# Patient Record
Sex: FEMALE | Race: WHITE | NOT HISPANIC OR LATINO | Employment: FULL TIME | URBAN - NONMETROPOLITAN AREA
[De-identification: names, ages, dates, MRNs, and addresses within clinical notes are randomized per-mention and may not be internally consistent; named-entity substitution may affect disease eponyms.]

---
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
## 2020-09-18 NOTE — PROGRESS NOTES
Patient: Ruby Ramos    YOB: 2000    Date: 09/23/2020    Primary Care Provider: Isamar Leon APRN    Chief Complaint   Patient presents with   • Follow-up   • Suspicious Skin Lesion     on neck in hair line getting bigger and on back with pain        SUBJECTIVE:    History of present illness:  I saw the patient in the office today for the evaluation and treatment for a suspicious mole on her neck and back. Patient states has a mole on neck that is getting bigger and the one on back has started getting painful for a week now.  Lesions becoming pigmented, increasing in size and raised.  No history of melanoma in the family.  She does have history of sun exposure.    The following portions of the patient's history were reviewed and updated as appropriate: allergies, current medications, past family history, past medical history, past social history, past surgical history and problem list.      Review of Systems   Constitutional: Negative for chills, fever and unexpected weight change.   HENT: Negative for hearing loss, trouble swallowing and voice change.    Eyes: Negative for visual disturbance.   Respiratory: Negative for apnea, cough, chest tightness, shortness of breath and wheezing.    Cardiovascular: Negative for chest pain, palpitations and leg swelling.   Gastrointestinal: Negative for abdominal distention, abdominal pain, anal bleeding, blood in stool, constipation, diarrhea, nausea, rectal pain and vomiting.   Endocrine: Negative for cold intolerance and heat intolerance.   Genitourinary: Negative for difficulty urinating, dysuria and flank pain.   Musculoskeletal: Negative for back pain and gait problem.   Skin: Positive for color change. Negative for rash and wound.   Neurological: Negative for dizziness, syncope, speech difficulty, weakness, light-headedness, numbness and headaches.   Hematological: Negative for adenopathy. Does not bruise/bleed easily.   Psychiatric/Behavioral:  "Negative for confusion. The patient is not nervous/anxious.        Allergies:  Allergies   Allergen Reactions   • Gluten Meal    • Mucinex [Guaifenesin Er] Nausea Only and Dizziness       Medications:    Current Outpatient Medications:   •  ibuprofen (ADVIL,MOTRIN) 800 MG tablet, Take 800 mg by mouth Every 6 (Six) Hours As Needed for Mild Pain ., Disp: , Rfl:   •  Multiple Vitamins-Minerals (MULTIVITAMIN WITH MINERALS) tablet tablet, Take 1 tablet by mouth Daily., Disp: , Rfl:   •  Nutritional Supplements (COLD AND FLU PO), Take  by mouth 2 (Two) Times a Day., Disp: , Rfl:   •  ondansetron ODT (ZOFRAN-ODT) 8 MG disintegrating tablet, Take 1 tablet by mouth Every 8 (Eight) Hours As Needed for Nausea or Vomiting., Disp: 15 tablet, Rfl: 0  •  oseltamivir (TAMIFLU) 75 MG capsule, Take 1 capsule by mouth 2 (Two) Times a Day., Disp: 10 capsule, Rfl: 0  •  promethazine (PHENERGAN) 25 MG tablet, Take 1 tablet by mouth Every 6 (Six) Hours As Needed for Nausea., Disp: 20 tablet, Rfl: 0    History:  Past Medical History:   Diagnosis Date   • Celiac disease        Past Surgical History:   Procedure Laterality Date   • ENDOSCOPY     • HERNIA REPAIR         Family History   Problem Relation Age of Onset   • Hypertension Mother    • Hypertension Father    • Diabetes Father    • Cancer Maternal Grandmother    • Cancer Paternal Grandmother        Social History     Tobacco Use   • Smoking status: Current Every Day Smoker     Years: 2.00     Types: Electronic Cigarette   • Smokeless tobacco: Never Used   Substance Use Topics   • Alcohol use: Yes   • Drug use: Never        OBJECTIVE:    Vital Signs:   Vitals:    09/23/20 0916   BP: 110/80   Pulse: 120   SpO2: 98%   Weight: 85.3 kg (188 lb)   Height: 157 cm (61.81\")       Physical Exam:   General Appearance:    Alert, cooperative, in no acute distress   Head:    Normocephalic, without obvious abnormality, atraumatic.  1.5 cm raised pigmented lesion posterior scalp, 1 cm mass posterior " neck, pigmented and irregular.   Eyes:            Lids and lashes normal, conjunctivae and sclerae normal, no   icterus, no pallor, corneas clear, PERRLA   Ears:    Ears appear intact with no abnormalities noted   Throat:   No oral lesions, no thrush, oral mucosa moist   Neck:   No adenopathy, supple, trachea midline, no thyromegaly, no   carotid bruit, no JVD   Lungs:     Clear to auscultation,respirations regular, even and                  unlabored    Heart:    Regular rhythm and normal rate, normal S1 and S2, no            murmur, no gallop, no rub, no click   Chest Wall:    No abnormalities observed   Abdomen:     Normal bowel sounds, no masses, no organomegaly, soft        non-tender, non-distended, no guarding, no rebound                tenderness   Extremities:   Moves all extremities well, no edema, no cyanosis, no             redness   Pulses:   Pulses palpable and equal bilaterally   Skin:   No bleeding, bruising or rash   Lymph nodes:   No palpable adenopathy   Neurologic:   Cranial nerves 2 - 12 grossly intact, sensation intact, DTR       present and equal bilaterally     Results Review:   I reviewed the patient's new clinical results.    Review of Systems was reviewed and confirmed as accurate as documented by the MA.    ASSESSMENT/PLAN:    1. Skin neoplasm        Location: Posterior scalp and posterior neck    Procedure: #1.  Excision 1.5 cm lesion posterior scalp with layered closure.  2.  Excision 1 cm lesion posterior neck with primary closure      I recommend excision. Procedure and the risks and benefits were explained including bleeding and infection. The patient understands these and wishes to proceed.     The patient was brought to the procedure room. Consent and time out were performed. The area was prepped and draped in the usual fashion. 1% lidocaine with epinephrine was infused locally. An ellyptical incision was made around the lesion. Full thickness excision was performed. The lesion  size was 1.5 cm and 1 cm. The wound was closed in layers with interrupted simple vicryl and Nylon for the skin. Wound closure size was 1.8 cm and 1.2 cm. There were no complications and the patient tolerated the procedure well. Hemostasis was well controlled with pressure and there was minimal blood loss. Wound instructions were given.     I discussed the patients findings and my recommendations with patient        Electronically signed by Reid Garcia MD  09/23/20

## 2020-09-23 ENCOUNTER — OFFICE VISIT (OUTPATIENT)
Dept: SURGERY | Facility: CLINIC | Age: 20
End: 2020-09-23

## 2020-09-23 VITALS
DIASTOLIC BLOOD PRESSURE: 80 MMHG | HEIGHT: 62 IN | HEART RATE: 120 BPM | BODY MASS INDEX: 34.6 KG/M2 | OXYGEN SATURATION: 98 % | SYSTOLIC BLOOD PRESSURE: 110 MMHG | WEIGHT: 188 LBS

## 2020-09-23 DIAGNOSIS — D49.2 SKIN NEOPLASM: Primary | ICD-10-CM

## 2020-09-23 PROCEDURE — 11422 EXC H-F-NK-SP B9+MARG 1.1-2: CPT | Performed by: SURGERY

## 2020-09-23 PROCEDURE — 12041 INTMD RPR N-HF/GENIT 2.5CM/<: CPT | Performed by: SURGERY

## 2020-09-23 PROCEDURE — 99213 OFFICE O/P EST LOW 20 MIN: CPT | Performed by: SURGERY

## 2020-09-23 PROCEDURE — 11421 EXC H-F-NK-SP B9+MARG 0.6-1: CPT | Performed by: SURGERY

## 2020-12-14 ENCOUNTER — OFFICE VISIT (OUTPATIENT)
Dept: SURGERY | Facility: CLINIC | Age: 20
End: 2020-12-14

## 2020-12-14 VITALS
TEMPERATURE: 98.6 F | WEIGHT: 179 LBS | OXYGEN SATURATION: 99 % | SYSTOLIC BLOOD PRESSURE: 128 MMHG | BODY MASS INDEX: 32.94 KG/M2 | HEIGHT: 62 IN | DIASTOLIC BLOOD PRESSURE: 80 MMHG | HEART RATE: 101 BPM

## 2020-12-14 DIAGNOSIS — D49.2 SKIN NEOPLASM: Primary | ICD-10-CM

## 2020-12-14 PROCEDURE — 99212 OFFICE O/P EST SF 10 MIN: CPT | Performed by: SURGERY

## 2020-12-14 NOTE — PROGRESS NOTES
Patient: Ruby Ramos  YOB: 2000    Date: 12/14/2020    Primary Care Provider: Isamar Leon APRN    Chief Complaint   Patient presents with   • Follow-up     mole removal       History: Patient is in the office today to follow up on mole removal completed on 9/23/20.#1 of a  1.5 cm lesion posterior scalp with layered closure.  2.  Excision 1 cm lesion posterior neck with primary closure.  Patient states Lesion becoming pigmented, increasing in size and raised.  Patient states that in the last week, lesion has fallen off and now is not an issue.  It is well-healed without irritation or redness.    The following portions of the patient's history were reviewed and updated as appropriate: allergies, current medications, past family history, past medical history, past social history, past surgical history and problem list.    Review of Systems   Constitutional: Negative for chills, fever and unexpected weight change.   HENT: Negative for hearing loss, trouble swallowing and voice change.    Eyes: Negative for visual disturbance.   Respiratory: Negative for apnea, cough, chest tightness, shortness of breath and wheezing.    Cardiovascular: Negative for chest pain, palpitations and leg swelling.   Gastrointestinal: Negative for abdominal distention, abdominal pain, anal bleeding, blood in stool, constipation, diarrhea, nausea, rectal pain and vomiting.   Endocrine: Negative for cold intolerance and heat intolerance.   Genitourinary: Negative for difficulty urinating, dysuria and flank pain.   Musculoskeletal: Negative for back pain and gait problem.   Skin: Positive for color change. Negative for rash and wound.   Neurological: Negative for dizziness, syncope, speech difficulty, weakness, light-headedness, numbness and headaches.   Hematological: Negative for adenopathy. Does not bruise/bleed easily.   Psychiatric/Behavioral: Negative for confusion. The patient is not nervous/anxious.        Vital  "Signs  Vitals:    12/14/20 1536   BP: 128/80   Pulse: 101   Temp: 98.6 °F (37 °C)   SpO2: 99%   Weight: 81.2 kg (179 lb)   Height: 157.5 cm (62\")       Allergies:  Allergies   Allergen Reactions   • Gluten Meal    • Mucinex [Guaifenesin Er] Nausea Only and Dizziness       Medications:    Current Outpatient Medications:   •  ibuprofen (ADVIL,MOTRIN) 800 MG tablet, Take 800 mg by mouth Every 6 (Six) Hours As Needed for Mild Pain ., Disp: , Rfl:   •  Multiple Vitamins-Minerals (MULTIVITAMIN WITH MINERALS) tablet tablet, Take 1 tablet by mouth Daily., Disp: , Rfl:   •  Nutritional Supplements (COLD AND FLU PO), Take  by mouth 2 (Two) Times a Day., Disp: , Rfl:   •  ondansetron ODT (ZOFRAN-ODT) 8 MG disintegrating tablet, Take 1 tablet by mouth Every 8 (Eight) Hours As Needed for Nausea or Vomiting., Disp: 15 tablet, Rfl: 0  •  oseltamivir (TAMIFLU) 75 MG capsule, Take 1 capsule by mouth 2 (Two) Times a Day., Disp: 10 capsule, Rfl: 0  •  promethazine (PHENERGAN) 25 MG tablet, Take 1 tablet by mouth Every 6 (Six) Hours As Needed for Nausea., Disp: 20 tablet, Rfl: 0    Physical Exam:   General Appearance:    Alert, cooperative, in no acute distress   Head:    Normocephalic, without obvious abnormality, atraumatic   Lungs:     Clear to auscultation,respirations regular, even and                  unlabored    Heart:    Regular rhythm and normal rate, normal S1 and S2, no            murmur, no gallop, no rub, no click   Abdomen:     Normal bowel sounds, no masses, no organomegaly, soft        non-tender, non-distended, no guarding, no rebound                tenderness   Extremities:   Moves all extremities well, no edema, no cyanosis, no             redness   Pulses:   Pulses palpable and equal bilaterally   Skin:   No bleeding, bruising or rash.  1 cm area well-healed scar.  No evidence of recurrence or abnormality.     Results Review:   I reviewed the patient's new clinical results.     Review of Systems was reviewed and " confirmed as accurate as documented by the MA.    ASSESSMENT/PLAN:    1. Skin neoplasm       Patient reassured, no evidence of recurrence or malignancy.  Follow-up as needed    Electronically signed by Reid Garcia MD  12/14/20

## 2021-01-06 ENCOUNTER — HOSPITAL ENCOUNTER (EMERGENCY)
Facility: HOSPITAL | Age: 21
Discharge: HOME | End: 2021-01-06
Attending: STUDENT IN AN ORGANIZED HEALTH CARE EDUCATION/TRAINING PROGRAM | Admitting: STUDENT IN AN ORGANIZED HEALTH CARE EDUCATION/TRAINING PROGRAM

## 2021-01-06 VITALS
OXYGEN SATURATION: 100 % | HEIGHT: 62 IN | SYSTOLIC BLOOD PRESSURE: 133 MMHG | DIASTOLIC BLOOD PRESSURE: 94 MMHG | TEMPERATURE: 97.9 F | BODY MASS INDEX: 31.91 KG/M2 | RESPIRATION RATE: 17 BRPM | WEIGHT: 173.4 LBS | HEART RATE: 118 BPM

## 2021-01-06 DIAGNOSIS — L02.215 ABSCESS, PERINEUM: Primary | ICD-10-CM

## 2021-01-06 PROCEDURE — 99282 EMERGENCY DEPT VISIT SF MDM: CPT

## 2021-01-06 RX ORDER — SULFAMETHOXAZOLE AND TRIMETHOPRIM 800; 160 MG/1; MG/1
1 TABLET ORAL 2 TIMES DAILY
Qty: 14 TABLET | Refills: 0 | Status: SHIPPED | OUTPATIENT
Start: 2021-01-06 | End: 2021-01-14

## 2021-01-06 RX ORDER — LIDOCAINE HYDROCHLORIDE 10 MG/ML
10 INJECTION, SOLUTION INFILTRATION; PERINEURAL ONCE
Status: DISCONTINUED | OUTPATIENT
Start: 2021-01-06 | End: 2021-01-06 | Stop reason: HOSPADM

## 2021-01-06 NOTE — ED PROVIDER NOTES
Subjective   20-year-old female presents with a red swollen area in her right groin she states its been there for 2 to 3 days, it is painful, red, swollen, and tender to touch.  She is concerned that it may be an abscess.      History provided by:  Patient   used: No        Review of Systems   Skin:        Abscess in groin area   All other systems reviewed and are negative.      Past Medical History:   Diagnosis Date   • Celiac disease        Allergies   Allergen Reactions   • Gluten Meal    • Mucinex [Guaifenesin Er] Nausea Only and Dizziness       Past Surgical History:   Procedure Laterality Date   • ENDOSCOPY     • HERNIA REPAIR         Family History   Problem Relation Age of Onset   • Hypertension Mother    • Hypertension Father    • Diabetes Father    • Cancer Maternal Grandmother    • Cancer Paternal Grandmother        Social History     Socioeconomic History   • Marital status: Single     Spouse name: Not on file   • Number of children: Not on file   • Years of education: Not on file   • Highest education level: Not on file   Tobacco Use   • Smoking status: Current Every Day Smoker     Years: 2.00     Types: Electronic Cigarette   • Smokeless tobacco: Never Used   Substance and Sexual Activity   • Alcohol use: Yes   • Drug use: Never   • Sexual activity: Defer           Objective   Physical Exam  Vitals signs and nursing note reviewed.   Constitutional:       Appearance: She is well-developed.   HENT:      Head: Normocephalic.   Neck:      Musculoskeletal: Normal range of motion and neck supple.   Cardiovascular:      Rate and Rhythm: Normal rate and regular rhythm.   Pulmonary:      Effort: Pulmonary effort is normal.      Breath sounds: Normal breath sounds.   Abdominal:      Palpations: Abdomen is soft.   Genitourinary:         Comments: 3 cm abscess, red, swollen, pointing, fluctuant  Musculoskeletal: Normal range of motion.   Skin:     General: Skin is warm and dry.      Findings:  Erythema present.   Neurological:      Mental Status: She is alert and oriented to person, place, and time.      Deep Tendon Reflexes: Reflexes are normal and symmetric.         Incision & Drainage    Date/Time: 1/6/2021 4:43 PM  Performed by: Ace Ford Jr., PA-C  Authorized by: Paul Jasso MD     Consent:     Consent obtained:  Verbal    Consent given by:  Patient    Risks discussed:  Bleeding and incomplete drainage  Location:     Type:  Abscess    Size:  3    Location:  Anogenital    Anogenital location:  Perineum  Pre-procedure details:     Skin preparation:  Chloraprep  Anesthesia (see MAR for exact dosages):     Anesthesia method:  None  Procedure type:     Complexity:  Simple  Procedure details:     Needle aspiration: no      Incision types:  Stab incision    Incision depth:  Dermal    Scalpel blade:  11    Wound management:  Extensive cleaning    Drainage:  Purulent    Drainage amount:  Moderate    Wound treatment:  Wound left open    Packing materials:  None  Post-procedure details:     Patient tolerance of procedure:  Tolerated well, no immediate complications               ED Course                                           MDM  Number of Diagnoses or Management Options  Abscess, perineum: new and requires workup  Risk of Complications, Morbidity, and/or Mortality  Presenting problems: minimal  Management options: minimal    Patient Progress  Patient progress: stable      Final diagnoses:   Abscess, perineum            Ace Ford Jr., PA-C  01/06/21 5165

## 2021-01-14 ENCOUNTER — OFFICE VISIT (OUTPATIENT)
Dept: INTERNAL MEDICINE | Facility: CLINIC | Age: 21
End: 2021-01-14

## 2021-01-14 VITALS
TEMPERATURE: 98.2 F | RESPIRATION RATE: 15 BRPM | HEIGHT: 62 IN | SYSTOLIC BLOOD PRESSURE: 128 MMHG | BODY MASS INDEX: 31.47 KG/M2 | DIASTOLIC BLOOD PRESSURE: 80 MMHG | WEIGHT: 171 LBS | HEART RATE: 104 BPM | OXYGEN SATURATION: 100 %

## 2021-01-14 DIAGNOSIS — L02.92 BOIL: Primary | ICD-10-CM

## 2021-01-14 DIAGNOSIS — K90.0 CELIAC DISEASE: ICD-10-CM

## 2021-01-14 DIAGNOSIS — K21.9 GASTROESOPHAGEAL REFLUX DISEASE, UNSPECIFIED WHETHER ESOPHAGITIS PRESENT: ICD-10-CM

## 2021-01-14 PROCEDURE — 99214 OFFICE O/P EST MOD 30 MIN: CPT | Performed by: NURSE PRACTITIONER

## 2021-01-14 RX ORDER — FAMOTIDINE 20 MG/1
20 TABLET, FILM COATED ORAL 2 TIMES DAILY PRN
Qty: 60 TABLET | Refills: 1 | Status: ON HOLD | OUTPATIENT
Start: 2021-01-14 | End: 2021-01-21

## 2021-01-14 NOTE — PROGRESS NOTES
Chief Complaint / Reason:      Chief Complaint   Patient presents with   • Hospital Follow Up Visit     abscess near vagina. is it still infected.quit taking abxs -nausea       Subjective     HPI  Patient presents today to establish care and for ER follow-up regarding abscess.  Patient was seen at [facility] emergency department on 1/6 with Complaint of red swollen area in right groin that has been there for 2 to 3 days and was painful swollen and tender to touch.  She was concerned that it could be an abscess and she was not having fever or chills and stated that she did try over-the-counter and nonpharmacological interventions with minimal relief.  She denies sharing razors the area was lanced while she was in the ER and cleaned well along with being prescribed Bactrim.  Patient's wound was left open and she stated that it did go down some but is still and tender and is uncomfortable when sitting and that her pain any rub the area making the symptoms worse.  Patient did not finish the antibiotics as she states they were making her nauseous and reports celiac disease.  Patient requests referral to gastroenterology.  She denies any blood in urine or stool.    Patient is not up-to-date on vision or dental.  She denies any history of abuse or STDs.  Age of menarche 9 and she became sexually active when she was 15 [employer].  She does report history of anemia and acid reflux in which she states is somewhat acting up but could be due to the antibiotics.  History taken from: patient    PMH/FH/Social History were reviewed and updated appropriately in the electronic medical record.   Past Medical History:   Diagnosis Date   • Celiac disease    • Diabetes mellitus (CMS/HCC)      Past Surgical History:   Procedure Laterality Date   • ENDOSCOPY     • HERNIA REPAIR       Social History     Socioeconomic History   • Marital status: Single     Spouse name: Not on file   • Number of children: Not on file   • Years of  education: Not on file   • Highest education level: Not on file   Tobacco Use   • Smoking status: Former Smoker     Years: 2.00     Types: Electronic Cigarette   • Smokeless tobacco: Never Used   Substance and Sexual Activity   • Alcohol use: Not Currently   • Drug use: Never   • Sexual activity: Defer     Family History   Problem Relation Age of Onset   • Hypertension Mother    • Hypertension Father    • Diabetes Father    • Cancer Maternal Grandmother    • Cancer Paternal Grandmother        Review of Systems:   Review of Systems      All other systems were reviewed and are negative.  Exceptions are noted in the subjective or above.      Objective     Vital Signs  Vitals:    01/14/21 1716   BP: 128/80   Pulse: 104   Resp: 15   Temp: 98.2 °F (36.8 °C)   SpO2: 100%       Body mass index is 31.28 kg/m².    Physical Exam  Vitals signs and nursing note reviewed. Exam conducted with a chaperone present.   Constitutional:       Appearance: She is well-developed.   Cardiovascular:      Rate and Rhythm: Normal rate and regular rhythm.      Heart sounds: Normal heart sounds.   Pulmonary:      Effort: Pulmonary effort is normal.      Breath sounds: Normal breath sounds.   Abdominal:      General: Bowel sounds are normal.      Palpations: Abdomen is soft.   Genitourinary:     Labia:         Right: Tenderness and lesion present.           Comments: Small circular tender area noted in right vaginal area. No drainage noted   Musculoskeletal: Normal range of motion.   Skin:     General: Skin is warm and dry.      Capillary Refill: Capillary refill takes less than 2 seconds.   Neurological:      Mental Status: She is alert and oriented to person, place, and time.      Coordination: Coordination normal.   Psychiatric:         Behavior: Behavior normal.         Thought Content: Thought content normal.         Judgment: Judgment normal.              Results Review:    I reviewed the patient's new clinical results.       Medication  Review:     Current Outpatient Medications:   •  ibuprofen (ADVIL,MOTRIN) 800 MG tablet, Take 800 mg by mouth Every 6 (Six) Hours As Needed for Mild Pain ., Disp: , Rfl:   •  Multiple Vitamins-Minerals (MULTIVITAMIN WITH MINERALS) tablet tablet, Take 1 tablet by mouth Daily., Disp: , Rfl:     Diagnoses and all orders for this visit:    Boil  -      mupirocin (Bactroban) 2 % ointment; Apply  topically to the appropriate area as directed 2 (Two) Times a Day.  Discussed home care instructions along with differential diagnosis.  Recommend patient keep area clean and dry and avoid wearing tight fitting clothing.  Recommend limiting caffeine and carbohydrate intake.  Discussed worsening signs and symptoms with patient.  May need I&D if symptoms do not improve.  Celiac disease  -     Ambulatory Referral to Gastroenterology    Gastroesophageal reflux disease, unspecified whether esophagitis present  -    famotidine (Pepcid) 20 MG tablet; Take 1 tablet by mouth 2 (Two) Times a Day As Needed for Indigestion or Heartburn.  Avoid eating spicy foods  Avoid caffeinated beverages  Avoid carbonated beverages  Do not eat or drink within 2-3 hours of going to bed in the evening  Elevate head of bed on 4-6 inch blocks  Eat smaller portions of food throughout the day              Return in about 2 weeks (around 1/28/2021), or if symptoms worsen or fail to improve, for Annual.    Isamar Leon, APRN  01/14/2021             Repair Anesthesia Method: local infiltration

## 2021-01-21 ENCOUNTER — OFFICE VISIT (OUTPATIENT)
Dept: INTERNAL MEDICINE | Facility: CLINIC | Age: 21
End: 2021-01-21

## 2021-01-21 ENCOUNTER — HOSPITAL ENCOUNTER (INPATIENT)
Facility: HOSPITAL | Age: 21
LOS: 3 days | Discharge: HOME | End: 2021-01-24
Attending: EMERGENCY MEDICINE | Admitting: FAMILY MEDICINE

## 2021-01-21 ENCOUNTER — APPOINTMENT (OUTPATIENT)
Dept: GENERAL RADIOLOGY | Facility: HOSPITAL | Age: 21
End: 2021-01-21

## 2021-01-21 VITALS
HEIGHT: 62 IN | WEIGHT: 163.8 LBS | BODY MASS INDEX: 30.14 KG/M2 | DIASTOLIC BLOOD PRESSURE: 82 MMHG | HEART RATE: 134 BPM | SYSTOLIC BLOOD PRESSURE: 130 MMHG | OXYGEN SATURATION: 100 % | TEMPERATURE: 98.8 F

## 2021-01-21 DIAGNOSIS — R73.9 HYPERGLYCEMIA: Primary | ICD-10-CM

## 2021-01-21 DIAGNOSIS — E10.10 DIABETIC KETOACIDOSIS WITHOUT COMA ASSOCIATED WITH TYPE 1 DIABETES MELLITUS (HCC): Primary | ICD-10-CM

## 2021-01-21 DIAGNOSIS — R82.4 KETONURIA: ICD-10-CM

## 2021-01-21 LAB
ALBUMIN SERPL-MCNC: 4.6 G/DL (ref 3.5–5.2)
ALBUMIN/GLOB SERPL: 1.4 G/DL
ALP SERPL-CCNC: 131 U/L (ref 39–117)
ALT SERPL W P-5'-P-CCNC: 18 U/L (ref 1–33)
ANION GAP SERPL CALCULATED.3IONS-SCNC: 23 MMOL/L (ref 5–15)
ANION GAP SERPL CALCULATED.3IONS-SCNC: 23.4 MMOL/L (ref 5–15)
AST SERPL-CCNC: 14 U/L (ref 1–32)
ATMOSPHERIC PRESS: 731 MMHG
B-HCG UR QL: NEGATIVE
BACTERIA UR QL AUTO: ABNORMAL /HPF
BASE EXCESS BLDV CALC-SCNC: -22.1 MMOL/L (ref 0–2)
BASOPHILS # BLD AUTO: 0.11 10*3/MM3 (ref 0–0.2)
BASOPHILS NFR BLD AUTO: 0.9 % (ref 0–1.5)
BDY SITE: ABNORMAL
BILIRUB BLD-MCNC: NEGATIVE MG/DL
BILIRUB SERPL-MCNC: 0.5 MG/DL (ref 0–1.2)
BILIRUB UR QL STRIP: NEGATIVE
BUN SERPL-MCNC: 7 MG/DL (ref 6–20)
BUN SERPL-MCNC: 8 MG/DL (ref 6–20)
BUN/CREAT SERPL: 11.3 (ref 7–25)
BUN/CREAT SERPL: 11.7 (ref 7–25)
CALCIUM SPEC-SCNC: 7.8 MG/DL (ref 8.6–10.5)
CALCIUM SPEC-SCNC: 8.4 MG/DL (ref 8.6–10.5)
CHLORIDE SERPL-SCNC: 104 MMOL/L (ref 98–107)
CHLORIDE SERPL-SCNC: 98 MMOL/L (ref 98–107)
CLARITY UR: CLEAR
CLARITY, POC: CLEAR
CO2 SERPL-SCNC: 6.6 MMOL/L (ref 22–29)
CO2 SERPL-SCNC: 7 MMOL/L (ref 22–29)
COLOR UR: YELLOW
COLOR UR: YELLOW
CREAT SERPL-MCNC: 0.6 MG/DL (ref 0.57–1)
CREAT SERPL-MCNC: 0.71 MG/DL (ref 0.57–1)
DEPRECATED RDW RBC AUTO: 45.4 FL (ref 37–54)
EOSINOPHIL # BLD AUTO: 0.09 10*3/MM3 (ref 0–0.4)
EOSINOPHIL NFR BLD AUTO: 0.7 % (ref 0.3–6.2)
ERYTHROCYTE [DISTWIDTH] IN BLOOD BY AUTOMATED COUNT: 14.8 % (ref 12.3–15.4)
GFR SERPL CREATININE-BSD FRML MDRD: 105 ML/MIN/1.73
GFR SERPL CREATININE-BSD FRML MDRD: 127 ML/MIN/1.73
GLOBULIN UR ELPH-MCNC: 3.2 GM/DL
GLUCOSE BLDC GLUCOMTR-MCNC: 163 MG/DL (ref 70–130)
GLUCOSE BLDC GLUCOMTR-MCNC: 182 MG/DL (ref 70–130)
GLUCOSE BLDC GLUCOMTR-MCNC: 192 MG/DL (ref 70–130)
GLUCOSE BLDC GLUCOMTR-MCNC: 196 MG/DL (ref 70–130)
GLUCOSE BLDC GLUCOMTR-MCNC: 218 MG/DL (ref 70–130)
GLUCOSE BLDC GLUCOMTR-MCNC: 258 MG/DL (ref 70–130)
GLUCOSE SERPL-MCNC: 222 MG/DL (ref 65–99)
GLUCOSE SERPL-MCNC: 261 MG/DL (ref 65–99)
GLUCOSE UR STRIP-MCNC: ABNORMAL MG/DL
GLUCOSE UR STRIP-MCNC: ABNORMAL MG/DL
GRAN CASTS URNS QL MICRO: ABNORMAL /LPF
HBA1C MFR BLD: 11.2 %
HBA1C MFR BLD: 11.5 % (ref 4.8–5.6)
HCG SERPL QL: NEGATIVE
HCO3 BLDV-SCNC: 6 MMOL/L (ref 22–28)
HCT VFR BLD AUTO: 42.7 % (ref 34–46.6)
HGB BLD-MCNC: 15.3 G/DL (ref 12–15.9)
HGB UR QL STRIP.AUTO: ABNORMAL
HYALINE CASTS UR QL AUTO: ABNORMAL /LPF
IMM GRANULOCYTES # BLD AUTO: 0.11 10*3/MM3 (ref 0–0.05)
IMM GRANULOCYTES NFR BLD AUTO: 0.9 % (ref 0–0.5)
INHALED O2 CONCENTRATION: 21 %
KETONES UR QL STRIP: ABNORMAL
KETONES UR QL: ABNORMAL
LEUKOCYTE EST, POC: NEGATIVE
LEUKOCYTE ESTERASE UR QL STRIP.AUTO: NEGATIVE
LIPASE SERPL-CCNC: 20 U/L (ref 13–60)
LYMPHOCYTES # BLD AUTO: 1.62 10*3/MM3 (ref 0.7–3.1)
LYMPHOCYTES NFR BLD AUTO: 13.2 % (ref 19.6–45.3)
MAGNESIUM SERPL-MCNC: 2 MG/DL (ref 1.7–2.2)
MAGNESIUM SERPL-MCNC: 2.2 MG/DL (ref 1.7–2.2)
MCH RBC QN AUTO: 30.4 PG (ref 26.6–33)
MCHC RBC AUTO-ENTMCNC: 35.8 G/DL (ref 31.5–35.7)
MCV RBC AUTO: 84.9 FL (ref 79–97)
MODALITY: ABNORMAL
MONOCYTES # BLD AUTO: 1.08 10*3/MM3 (ref 0.1–0.9)
MONOCYTES NFR BLD AUTO: 8.8 % (ref 5–12)
NEUTROPHILS NFR BLD AUTO: 75.5 % (ref 42.7–76)
NEUTROPHILS NFR BLD AUTO: 9.27 10*3/MM3 (ref 1.7–7)
NITRITE UR QL STRIP: NEGATIVE
NITRITE UR-MCNC: NEGATIVE MG/ML
NRBC BLD AUTO-RTO: 0 /100 WBC (ref 0–0.2)
NT-PROBNP SERPL-MCNC: 9.9 PG/ML (ref 0–450)
OSMOLALITY SERPL: 292 MOSM/KG (ref 275–300)
PCO2 BLDV: 20 MM HG (ref 40–50)
PH BLDV: 7.09 PH UNITS (ref 7.32–7.42)
PH UR STRIP.AUTO: <=5 [PH] (ref 5–8)
PH UR: 6 [PH] (ref 5–8)
PHOSPHATE SERPL-MCNC: 2.2 MG/DL (ref 2.5–4.5)
PHOSPHATE SERPL-MCNC: 2.5 MG/DL (ref 2.5–4.5)
PLATELET # BLD AUTO: 272 10*3/MM3 (ref 140–450)
PMV BLD AUTO: 10.1 FL (ref 6–12)
PO2 BLDV: 32.9 MM HG (ref 30–50)
POTASSIUM SERPL-SCNC: 2.9 MMOL/L (ref 3.5–5.2)
POTASSIUM SERPL-SCNC: 3.2 MMOL/L (ref 3.5–5.2)
PROT SERPL-MCNC: 7.8 G/DL (ref 6–8.5)
PROT UR QL STRIP: ABNORMAL
PROT UR STRIP-MCNC: ABNORMAL MG/DL
RBC # BLD AUTO: 5.03 10*6/MM3 (ref 3.77–5.28)
RBC # UR STRIP: ABNORMAL /UL
RBC # UR: ABNORMAL /HPF
REF LAB TEST METHOD: ABNORMAL
SAO2 % BLDCOV: 65.5 % (ref 45–75)
SODIUM SERPL-SCNC: 128 MMOL/L (ref 136–145)
SODIUM SERPL-SCNC: 134 MMOL/L (ref 136–145)
SP GR UR STRIP: 1.03 (ref 1–1.03)
SP GR UR: 1.03 (ref 1–1.03)
SQUAMOUS #/AREA URNS HPF: ABNORMAL /HPF
TROPONIN T SERPL-MCNC: <0.01 NG/ML (ref 0–0.03)
TSH SERPL DL<=0.05 MIU/L-ACNC: 2.61 UIU/ML (ref 0.27–4.2)
UROBILINOGEN UR QL STRIP: ABNORMAL
UROBILINOGEN UR QL: NORMAL
VENTILATOR MODE: ABNORMAL
WBC # BLD AUTO: 12.28 10*3/MM3 (ref 3.4–10.8)
WBC UR QL AUTO: ABNORMAL /HPF

## 2021-01-21 PROCEDURE — 99223 1ST HOSP IP/OBS HIGH 75: CPT | Performed by: FAMILY MEDICINE

## 2021-01-21 PROCEDURE — 81003 URINALYSIS AUTO W/O SCOPE: CPT | Performed by: NURSE PRACTITIONER

## 2021-01-21 PROCEDURE — 82805 BLOOD GASES W/O2 SATURATION: CPT

## 2021-01-21 PROCEDURE — 82962 GLUCOSE BLOOD TEST: CPT

## 2021-01-21 PROCEDURE — 84484 ASSAY OF TROPONIN QUANT: CPT | Performed by: NURSE PRACTITIONER

## 2021-01-21 PROCEDURE — 80053 COMPREHEN METABOLIC PANEL: CPT | Performed by: NURSE PRACTITIONER

## 2021-01-21 PROCEDURE — 84100 ASSAY OF PHOSPHORUS: CPT | Performed by: NURSE PRACTITIONER

## 2021-01-21 PROCEDURE — 81001 URINALYSIS AUTO W/SCOPE: CPT | Performed by: NURSE PRACTITIONER

## 2021-01-21 PROCEDURE — 25010000003 POTASSIUM CHLORIDE 10 MEQ/100ML SOLUTION: Performed by: FAMILY MEDICINE

## 2021-01-21 PROCEDURE — 99284 EMERGENCY DEPT VISIT MOD MDM: CPT

## 2021-01-21 PROCEDURE — 82962 GLUCOSE BLOOD TEST: CPT | Performed by: NURSE PRACTITIONER

## 2021-01-21 PROCEDURE — 84703 CHORIONIC GONADOTROPIN ASSAY: CPT | Performed by: NURSE PRACTITIONER

## 2021-01-21 PROCEDURE — 83735 ASSAY OF MAGNESIUM: CPT | Performed by: NURSE PRACTITIONER

## 2021-01-21 PROCEDURE — 83880 ASSAY OF NATRIURETIC PEPTIDE: CPT | Performed by: NURSE PRACTITIONER

## 2021-01-21 PROCEDURE — 80048 BASIC METABOLIC PNL TOTAL CA: CPT | Performed by: NURSE PRACTITIONER

## 2021-01-21 PROCEDURE — 71045 X-RAY EXAM CHEST 1 VIEW: CPT

## 2021-01-21 PROCEDURE — 84100 ASSAY OF PHOSPHORUS: CPT | Performed by: HOSPITALIST

## 2021-01-21 PROCEDURE — 25010000003 POTASSIUM CHLORIDE 10 MEQ/100ML SOLUTION: Performed by: NURSE PRACTITIONER

## 2021-01-21 PROCEDURE — 99214 OFFICE O/P EST MOD 30 MIN: CPT | Performed by: NURSE PRACTITIONER

## 2021-01-21 PROCEDURE — 83690 ASSAY OF LIPASE: CPT | Performed by: NURSE PRACTITIONER

## 2021-01-21 PROCEDURE — 81025 URINE PREGNANCY TEST: CPT | Performed by: NURSE PRACTITIONER

## 2021-01-21 PROCEDURE — 83036 HEMOGLOBIN GLYCOSYLATED A1C: CPT | Performed by: NURSE PRACTITIONER

## 2021-01-21 PROCEDURE — 85025 COMPLETE CBC W/AUTO DIFF WBC: CPT | Performed by: NURSE PRACTITIONER

## 2021-01-21 PROCEDURE — 84443 ASSAY THYROID STIM HORMONE: CPT | Performed by: NURSE PRACTITIONER

## 2021-01-21 PROCEDURE — 83930 ASSAY OF BLOOD OSMOLALITY: CPT | Performed by: NURSE PRACTITIONER

## 2021-01-21 PROCEDURE — 93005 ELECTROCARDIOGRAM TRACING: CPT | Performed by: NURSE PRACTITIONER

## 2021-01-21 RX ORDER — DEXTROSE AND SODIUM CHLORIDE 5; .9 G/100ML; G/100ML
150 INJECTION, SOLUTION INTRAVENOUS CONTINUOUS PRN
Status: DISCONTINUED | OUTPATIENT
Start: 2021-01-21 | End: 2021-01-21

## 2021-01-21 RX ORDER — POTASSIUM CHLORIDE 7.45 MG/ML
10 INJECTION INTRAVENOUS ONCE
Status: COMPLETED | OUTPATIENT
Start: 2021-01-21 | End: 2021-01-21

## 2021-01-21 RX ORDER — SODIUM CHLORIDE 9 MG/ML
10 INJECTION, SOLUTION INTRAVENOUS CONTINUOUS PRN
Status: DISCONTINUED | OUTPATIENT
Start: 2021-01-21 | End: 2021-01-21

## 2021-01-21 RX ORDER — DEXTROSE, SODIUM CHLORIDE, AND POTASSIUM CHLORIDE 5; .45; .15 G/100ML; G/100ML; G/100ML
150 INJECTION INTRAVENOUS CONTINUOUS PRN
Status: DISCONTINUED | OUTPATIENT
Start: 2021-01-21 | End: 2021-01-21

## 2021-01-21 RX ORDER — SODIUM CHLORIDE 0.9 % (FLUSH) 0.9 %
10 SYRINGE (ML) INJECTION ONCE AS NEEDED
Status: DISCONTINUED | OUTPATIENT
Start: 2021-01-21 | End: 2021-01-21

## 2021-01-21 RX ORDER — SODIUM CHLORIDE 0.9 % (FLUSH) 0.9 %
3 SYRINGE (ML) INJECTION EVERY 12 HOURS SCHEDULED
Status: DISCONTINUED | OUTPATIENT
Start: 2021-01-21 | End: 2021-01-24 | Stop reason: HOSPADM

## 2021-01-21 RX ORDER — DEXTROSE, SODIUM CHLORIDE, AND POTASSIUM CHLORIDE 5; .45; .15 G/100ML; G/100ML; G/100ML
150 INJECTION INTRAVENOUS CONTINUOUS PRN
Status: DISCONTINUED | OUTPATIENT
Start: 2021-01-21 | End: 2021-01-22

## 2021-01-21 RX ORDER — SODIUM CHLORIDE AND POTASSIUM CHLORIDE 300; 900 MG/100ML; MG/100ML
250 INJECTION, SOLUTION INTRAVENOUS CONTINUOUS PRN
Status: DISCONTINUED | OUTPATIENT
Start: 2021-01-21 | End: 2021-01-22

## 2021-01-21 RX ORDER — SODIUM CHLORIDE 0.9 % (FLUSH) 0.9 %
10 SYRINGE (ML) INJECTION EVERY 12 HOURS SCHEDULED
Status: DISCONTINUED | OUTPATIENT
Start: 2021-01-21 | End: 2021-01-24 | Stop reason: HOSPADM

## 2021-01-21 RX ORDER — SODIUM CHLORIDE AND POTASSIUM CHLORIDE 300; 900 MG/100ML; MG/100ML
250 INJECTION, SOLUTION INTRAVENOUS CONTINUOUS PRN
Status: DISCONTINUED | OUTPATIENT
Start: 2021-01-21 | End: 2021-01-21

## 2021-01-21 RX ORDER — ACETAMINOPHEN 325 MG/1
650 TABLET ORAL EVERY 4 HOURS PRN
Status: DISCONTINUED | OUTPATIENT
Start: 2021-01-21 | End: 2021-01-24 | Stop reason: HOSPADM

## 2021-01-21 RX ORDER — SODIUM CHLORIDE AND POTASSIUM CHLORIDE 150; 450 MG/100ML; MG/100ML
250 INJECTION, SOLUTION INTRAVENOUS CONTINUOUS PRN
Status: DISCONTINUED | OUTPATIENT
Start: 2021-01-21 | End: 2021-01-22

## 2021-01-21 RX ORDER — ONDANSETRON 2 MG/ML
4 INJECTION INTRAMUSCULAR; INTRAVENOUS EVERY 6 HOURS PRN
Status: DISCONTINUED | OUTPATIENT
Start: 2021-01-21 | End: 2021-01-24 | Stop reason: HOSPADM

## 2021-01-21 RX ORDER — ACETAMINOPHEN 650 MG/1
650 SUPPOSITORY RECTAL EVERY 4 HOURS PRN
Status: DISCONTINUED | OUTPATIENT
Start: 2021-01-21 | End: 2021-01-24 | Stop reason: HOSPADM

## 2021-01-21 RX ORDER — SODIUM CHLORIDE 0.9 % (FLUSH) 0.9 %
10 SYRINGE (ML) INJECTION ONCE AS NEEDED
Status: DISCONTINUED | OUTPATIENT
Start: 2021-01-21 | End: 2021-01-24 | Stop reason: HOSPADM

## 2021-01-21 RX ORDER — SODIUM CHLORIDE 450 MG/100ML
250 INJECTION, SOLUTION INTRAVENOUS CONTINUOUS PRN
Status: DISCONTINUED | OUTPATIENT
Start: 2021-01-21 | End: 2021-01-21

## 2021-01-21 RX ORDER — SODIUM CHLORIDE 9 MG/ML
250 INJECTION, SOLUTION INTRAVENOUS CONTINUOUS PRN
Status: DISCONTINUED | OUTPATIENT
Start: 2021-01-21 | End: 2021-01-22

## 2021-01-21 RX ORDER — POTASSIUM CHLORIDE, DEXTROSE MONOHYDRATE AND SODIUM CHLORIDE 300; 5; 900 MG/100ML; G/100ML; MG/100ML
150 INJECTION, SOLUTION INTRAVENOUS CONTINUOUS PRN
Status: DISCONTINUED | OUTPATIENT
Start: 2021-01-21 | End: 2021-01-22

## 2021-01-21 RX ORDER — SODIUM CHLORIDE AND POTASSIUM CHLORIDE 150; 900 MG/100ML; MG/100ML
250 INJECTION, SOLUTION INTRAVENOUS CONTINUOUS PRN
Status: DISCONTINUED | OUTPATIENT
Start: 2021-01-21 | End: 2021-01-21

## 2021-01-21 RX ORDER — SODIUM CHLORIDE 0.9 % (FLUSH) 0.9 %
10 SYRINGE (ML) INJECTION AS NEEDED
Status: DISCONTINUED | OUTPATIENT
Start: 2021-01-21 | End: 2021-01-24 | Stop reason: HOSPADM

## 2021-01-21 RX ORDER — SODIUM CHLORIDE AND POTASSIUM CHLORIDE 150; 900 MG/100ML; MG/100ML
250 INJECTION, SOLUTION INTRAVENOUS CONTINUOUS PRN
Status: DISCONTINUED | OUTPATIENT
Start: 2021-01-21 | End: 2021-01-22

## 2021-01-21 RX ORDER — SODIUM CHLORIDE 9 MG/ML
250 INJECTION, SOLUTION INTRAVENOUS CONTINUOUS PRN
Status: DISCONTINUED | OUTPATIENT
Start: 2021-01-21 | End: 2021-01-21

## 2021-01-21 RX ORDER — DEXTROSE AND SODIUM CHLORIDE 5; .45 G/100ML; G/100ML
150 INJECTION, SOLUTION INTRAVENOUS CONTINUOUS PRN
Status: DISCONTINUED | OUTPATIENT
Start: 2021-01-21 | End: 2021-01-21

## 2021-01-21 RX ORDER — SODIUM CHLORIDE 0.9 % (FLUSH) 0.9 %
10 SYRINGE (ML) INJECTION AS NEEDED
Status: DISCONTINUED | OUTPATIENT
Start: 2021-01-21 | End: 2021-01-22

## 2021-01-21 RX ORDER — INSULIN GLARGINE 100 [IU]/ML
10 INJECTION, SOLUTION SUBCUTANEOUS DAILY
Qty: 1 PEN | Refills: 3 | Status: SHIPPED | OUTPATIENT
Start: 2021-01-21 | End: 2021-01-26 | Stop reason: SDUPTHER

## 2021-01-21 RX ORDER — SODIUM CHLORIDE 450 MG/100ML
250 INJECTION, SOLUTION INTRAVENOUS CONTINUOUS PRN
Status: DISCONTINUED | OUTPATIENT
Start: 2021-01-21 | End: 2021-01-22

## 2021-01-21 RX ORDER — SODIUM CHLORIDE 0.9 % (FLUSH) 0.9 %
3 SYRINGE (ML) INJECTION EVERY 12 HOURS SCHEDULED
Status: DISCONTINUED | OUTPATIENT
Start: 2021-01-21 | End: 2021-01-21

## 2021-01-21 RX ORDER — DEXTROSE, SODIUM CHLORIDE, AND POTASSIUM CHLORIDE 5; .45; .3 G/100ML; G/100ML; G/100ML
150 INJECTION INTRAVENOUS CONTINUOUS PRN
Status: DISCONTINUED | OUTPATIENT
Start: 2021-01-21 | End: 2021-01-22

## 2021-01-21 RX ORDER — SODIUM CHLORIDE 0.9 % (FLUSH) 0.9 %
10 SYRINGE (ML) INJECTION AS NEEDED
Status: DISCONTINUED | OUTPATIENT
Start: 2021-01-21 | End: 2021-01-21

## 2021-01-21 RX ORDER — SODIUM CHLORIDE AND POTASSIUM CHLORIDE 150; 450 MG/100ML; MG/100ML
250 INJECTION, SOLUTION INTRAVENOUS CONTINUOUS PRN
Status: DISCONTINUED | OUTPATIENT
Start: 2021-01-21 | End: 2021-01-21

## 2021-01-21 RX ORDER — SODIUM CHLORIDE 9 MG/ML
10 INJECTION, SOLUTION INTRAVENOUS CONTINUOUS PRN
Status: DISCONTINUED | OUTPATIENT
Start: 2021-01-21 | End: 2021-01-22

## 2021-01-21 RX ORDER — ONDANSETRON 4 MG/1
4 TABLET, FILM COATED ORAL EVERY 6 HOURS PRN
Status: DISCONTINUED | OUTPATIENT
Start: 2021-01-21 | End: 2021-01-24 | Stop reason: HOSPADM

## 2021-01-21 RX ORDER — DEXTROSE AND SODIUM CHLORIDE 5; .45 G/100ML; G/100ML
150 INJECTION, SOLUTION INTRAVENOUS CONTINUOUS PRN
Status: DISCONTINUED | OUTPATIENT
Start: 2021-01-21 | End: 2021-01-22

## 2021-01-21 RX ORDER — POTASSIUM CHLORIDE, DEXTROSE MONOHYDRATE AND SODIUM CHLORIDE 300; 5; 900 MG/100ML; G/100ML; MG/100ML
150 INJECTION, SOLUTION INTRAVENOUS CONTINUOUS PRN
Status: DISCONTINUED | OUTPATIENT
Start: 2021-01-21 | End: 2021-01-21

## 2021-01-21 RX ORDER — DEXTROSE AND SODIUM CHLORIDE 5; .9 G/100ML; G/100ML
150 INJECTION, SOLUTION INTRAVENOUS CONTINUOUS PRN
Status: DISCONTINUED | OUTPATIENT
Start: 2021-01-21 | End: 2021-01-22

## 2021-01-21 RX ORDER — POTASSIUM CHLORIDE 750 MG/1
40 CAPSULE, EXTENDED RELEASE ORAL ONCE
Status: COMPLETED | OUTPATIENT
Start: 2021-01-21 | End: 2021-01-21

## 2021-01-21 RX ORDER — DEXTROSE MONOHYDRATE 25 G/50ML
12.5 INJECTION, SOLUTION INTRAVENOUS AS NEEDED
Status: DISCONTINUED | OUTPATIENT
Start: 2021-01-21 | End: 2021-01-22

## 2021-01-21 RX ORDER — DEXTROSE, SODIUM CHLORIDE, AND POTASSIUM CHLORIDE 5; .9; .15 G/100ML; G/100ML; G/100ML
150 INJECTION INTRAVENOUS CONTINUOUS PRN
Status: DISCONTINUED | OUTPATIENT
Start: 2021-01-21 | End: 2021-01-21

## 2021-01-21 RX ORDER — DEXTROSE, SODIUM CHLORIDE, AND POTASSIUM CHLORIDE 5; .9; .15 G/100ML; G/100ML; G/100ML
150 INJECTION INTRAVENOUS CONTINUOUS PRN
Status: DISCONTINUED | OUTPATIENT
Start: 2021-01-21 | End: 2021-01-22

## 2021-01-21 RX ORDER — DEXTROSE MONOHYDRATE 25 G/50ML
12.5 INJECTION, SOLUTION INTRAVENOUS AS NEEDED
Status: DISCONTINUED | OUTPATIENT
Start: 2021-01-21 | End: 2021-01-21

## 2021-01-21 RX ORDER — DEXTROSE, SODIUM CHLORIDE, AND POTASSIUM CHLORIDE 5; .45; .3 G/100ML; G/100ML; G/100ML
150 INJECTION INTRAVENOUS CONTINUOUS PRN
Status: DISCONTINUED | OUTPATIENT
Start: 2021-01-21 | End: 2021-01-21

## 2021-01-21 RX ORDER — SODIUM CHLORIDE AND POTASSIUM CHLORIDE 150; 900 MG/100ML; MG/100ML
100 INJECTION, SOLUTION INTRAVENOUS CONTINUOUS
Status: DISCONTINUED | OUTPATIENT
Start: 2021-01-21 | End: 2021-01-21

## 2021-01-21 RX ADMIN — POTASSIUM CHLORIDE, DEXTROSE MONOHYDRATE AND SODIUM CHLORIDE 150 ML/HR: 300; 5; 450 INJECTION, SOLUTION INTRAVENOUS at 20:01

## 2021-01-21 RX ADMIN — POTASSIUM CHLORIDE 10 MEQ: 7.46 INJECTION, SOLUTION INTRAVENOUS at 18:01

## 2021-01-21 RX ADMIN — POTASSIUM CHLORIDE 10 MEQ: 7.46 INJECTION, SOLUTION INTRAVENOUS at 15:00

## 2021-01-21 RX ADMIN — POTASSIUM CHLORIDE 10 MEQ: 7.46 INJECTION, SOLUTION INTRAVENOUS at 20:00

## 2021-01-21 RX ADMIN — SODIUM CHLORIDE 1947 ML: 9 INJECTION, SOLUTION INTRAVENOUS at 13:46

## 2021-01-21 RX ADMIN — POTASSIUM CHLORIDE 10 MEQ: 7.46 INJECTION, SOLUTION INTRAVENOUS at 16:37

## 2021-01-21 RX ADMIN — SODIUM CHLORIDE, PRESERVATIVE FREE 3 ML: 5 INJECTION INTRAVENOUS at 18:02

## 2021-01-21 RX ADMIN — POTASSIUM CHLORIDE 40 MEQ: 10 CAPSULE, COATED, EXTENDED RELEASE ORAL at 20:01

## 2021-01-21 NOTE — ED PROVIDER NOTES
Subjective   History of Present Illness  This is a 20-year-old female that sent over by her primary care provider for questionable DKA.  She reports that she was seen in her office this morning and had a hemoglobin A1c of 11.  She did start her on medications but obtained a urine and noted ketones in her urine.  She was concerned for possible DKA and sent her here for further evaluation.  She denies any nausea or vomiting or loss of consciousness.  She denies any headache or diaphoresis.  She does complain of shortness of air.  Review of Systems   Constitutional: Negative.    HENT: Negative.    Eyes: Negative.    Respiratory: Positive for shortness of breath.    Cardiovascular: Negative.    Gastrointestinal: Negative.    Genitourinary: Negative.    Skin: Negative.    Neurological: Negative.    Psychiatric/Behavioral: Negative.        Past Medical History:   Diagnosis Date   • Celiac disease    • Diabetes mellitus (CMS/HCC)        Allergies   Allergen Reactions   • Gluten Meal    • Mucinex [Guaifenesin Er] Nausea Only and Dizziness       Past Surgical History:   Procedure Laterality Date   • ENDOSCOPY     • HERNIA REPAIR         Family History   Problem Relation Age of Onset   • Hypertension Mother    • Hypertension Father    • Diabetes Father    • Cancer Maternal Grandmother    • Cancer Paternal Grandmother        Social History     Socioeconomic History   • Marital status: Single     Spouse name: Not on file   • Number of children: Not on file   • Years of education: Not on file   • Highest education level: Not on file   Tobacco Use   • Smoking status: Former Smoker     Years: 2.00     Types: Electronic Cigarette   • Smokeless tobacco: Never Used   Substance and Sexual Activity   • Alcohol use: Not Currently   • Drug use: Never   • Sexual activity: Defer           Objective   Physical Exam  Vitals signs and nursing note reviewed.   Constitutional:       Appearance: Normal appearance. She is normal weight.    Neurological:      Mental Status: She is alert.     GEN: No acute distress  Head: Normocephalic, atraumatic  Eyes: Pupils equal round reactive to light  ENT: Posterior pharynx normal in appearance, oral mucosa is moist  Chest: Nontender to palpation  Cardiovascular: Regular rate  Lungs: Clear to auscultation bilaterally  Abdomen: Soft, nontender, nondistended, no peritoneal signs  Extremities: No edema, normal appearance  Neuro: GCS 15  Psych: Mood and affect are appropriate      Procedures           ED Course  ED Course as of Jan 23 2350   u Jan 21, 2021   1440 Will hold insulin gtt at this time until potassium is replaced.     [TW]   1514 Consult with Dr. Johnson, will accept to ICU      [TW]   1539 EKG interpreted by me reveals sinus tachycardia with rate of 106 bpm.  There are some nonspecific T wave changes.  This is an abnormal appearing EKG.    [TB]      ED Course User Index  [TB] Natalia Bailey MD  [TW] Katarzyna Vines, APRN                                           MDM  Number of Diagnoses or Management Options     Amount and/or Complexity of Data Reviewed  Clinical lab tests: ordered and reviewed  Tests in the radiology section of CPT®: ordered and reviewed  Review and summarize past medical records: yes  Discuss the patient with other providers: yes  Independent visualization of images, tracings, or specimens: yes    Risk of Complications, Morbidity, and/or Mortality  Presenting problems: moderate  Diagnostic procedures: moderate  Management options: moderate        Final diagnoses:   Diabetic ketoacidosis without coma associated with type 1 diabetes mellitus (CMS/HCC)            Katarzyna Vines, SONIYA  01/21/21 1516       Katarzyna Vines, SONIYA  01/23/21 9504

## 2021-01-21 NOTE — ED NOTES
At this time, House Supervisor contacted for bed assignment. She stated it would be a little bit before a bed would be opened up but she will call back.     Melia Kim  01/21/21 0480

## 2021-01-21 NOTE — PROGRESS NOTES
"  Office Visit      Patient Name: Ruby Ramos  : 2000   MRN: [id number]   Care Team: Patient Care Team:  Isamar Leon APRN as PCP - General (Nurse Practitioner)  Reid Garcia MD as Consulting Physician (General Surgery)    Chief Complaint  Prediabetes (Has a strong family hx of diabetes, has been having dry mouth, and weight loss. Father checked her blood sugar last Saturday as soon as she woke up and it was 300, since then it has not been below 250.)    Subjective     Subjective      Ruby Ramos presents to [facility] for hyperglycemia. Started going to the gym consistently in August and noticed she was more thirsty but did not think much of it. Has lost 25 pounds since September. Since then her symptoms have gotten worse. Over the weekend her mother noticed how quickly she was losing weight and said she didn't look well decided to check her blood sugar which was over 200 fasting. She continued taking her blood sugar over the weekend and her highest was over 500, it was never lower than 250.  Over the weekend has been very lethargic and can hardly get out of bed. Complains of polyuria, polydipsia, a bitter taste in her mouth, and has noticed her breathing is different. She feels somewhat better today, but has been watching more of what she eats the last few days.      She is on a gluten free diet due to her celiac disease so does not eat a lot of carbohydrates. Eats a lot of meats and vegetables. Snacks on cereal. Does not eat a lot of sweets. Drinks mostly water, milk, gatorade, and orange juice. Recently cut out soda. Was drinking about 3 24oz.  Cokes per day.    Exercises 3 days a week using the treadmill and weight machines.       Objective     Objective   Vital Signs:   /82   Pulse (!) 127   Temp 98.8 °F (37.1 °C)   Ht 157 cm (61.81\")   Wt 74.3 kg (163 lb 12.8 oz)   SpO2 100%   BMI 30.14 kg/m²     Physical Exam  Vitals signs and nursing note " reviewed.   Constitutional:       Appearance: She is not toxic-appearing.   Eyes:      Pupils: Pupils are equal, round, and reactive to light.   Neck:      Musculoskeletal: Neck supple. No muscular tenderness.   Cardiovascular:      Rate and Rhythm: Regular rhythm. Tachycardia present.      Heart sounds: Normal heart sounds. No murmur.   Pulmonary:      Effort: Pulmonary effort is normal.      Breath sounds: Normal breath sounds. No wheezing.   Abdominal:      General: Abdomen is flat. There is no distension.      Palpations: Abdomen is soft.      Tenderness: There is no abdominal tenderness.   Skin:     General: Skin is warm and dry.   Neurological:      Mental Status: She is alert.   Psychiatric:         Mood and Affect: Mood is anxious. Affect is tearful.          Assessment / Plan         Assessment  Problem List Items Addressed This Visit        Endocrine and Metabolic    Hyperglycemia - Primary    Relevant Orders    POC Glycosylated Hemoglobin (Hb A1C) (Completed)    CBC w AUTO Differential    Comprehensive metabolic panel    Vitamin B12    Urinalysis With Microscopic - Urine, Clean Catch    Microalbumin / Creatinine Urine Ratio - Urine, Clean Catch    Ambulatory Referral to Diabetic Education    POCT Glucose (Completed)    C-Peptide    Glutamic Acid Decarboxylase    Anti-islet Cell Antibody    Insulin, Random    Ambulatory Referral to Endocrinology    POC Urinalysis Dipstick, Automated (Completed)       Genitourinary and Reproductive     Ketonuria            Lab 01/21/21  1059   HEMOGLOBIN A1C 11.2     I spent 60 minutes caring for Ruby on this date of service. This time includes time spent by me in the following activities:preparing for the visit, reviewing tests, performing a medically appropriate examination and/or evaluation , counseling and educating the patient/family/caregiver, ordering medications, tests, or procedures, referring and communicating with other health care professionals , documenting  information in the medical record and independently interpreting results and communicating that information with the patient/family/caregiver    Plan  - Given her lab values in office today, increased heart rate, as well as her symptoms over the weekend directed her to go to the ER for further workup for DKA.   - Referral for urgent endocrinology placed for high suspicion of type 1 diabetes as this was abrupt in onset, her history of celiac disease, and her blood glucose levels have been dangerously high.  -We will start long-acting insulin 10 units daily.  She was instructed on how to administer insulin-- insert into the subcutaneous tissue on abdomen, thigh, or back of the arm.  She has a glucometer at home she just bought, testing strips were ordered. She will alternate monitoring fasting and pp blood glucose levels. Her father is a type II insulin dependent diabetic and currently lives with her-- he will be there to help her administer insulin.    - Discussed hypoglycemia treatment with 15 grams of fast-acting carbohydrates an then recheck blood glucose in 15 minutes if blood glucose drops below 70mg/dL  - Labs as above   -Discussed over the phone with the ER provider Dr. Bailey.  Informed of visit today, lab values, and concerns.  - Informed patient of diabetes education classes at the health department and gave her a booklet to log her blood sugars. Long discussion on diet-- limiting carbohydrates, fruits, sugar, and OK to continue eating meat and vegetables. Encouraged her to continue staying away from regular sodas, diet sodas are OK but should be limited.     Follow Up   Return in about 1 month (around 2/21/2021).  Patient was given instructions and counseling regarding her condition or for health maintenance advice. Please see specific information pulled into the AVS if appropriate.     SONIYA Gasca  [facility] [place]

## 2021-01-22 LAB
ANION GAP SERPL CALCULATED.3IONS-SCNC: 14.3 MMOL/L (ref 5–15)
ANION GAP SERPL CALCULATED.3IONS-SCNC: 16.3 MMOL/L (ref 5–15)
ANION GAP SERPL CALCULATED.3IONS-SCNC: 16.6 MMOL/L (ref 5–15)
ANION GAP SERPL CALCULATED.3IONS-SCNC: 17.5 MMOL/L (ref 5–15)
ANION GAP SERPL CALCULATED.3IONS-SCNC: 18.8 MMOL/L (ref 5–15)
ANION GAP SERPL CALCULATED.3IONS-SCNC: 20.5 MMOL/L (ref 5–15)
BUN SERPL-MCNC: 3 MG/DL (ref 6–20)
BUN SERPL-MCNC: 3 MG/DL (ref 6–20)
BUN SERPL-MCNC: 4 MG/DL (ref 6–20)
BUN/CREAT SERPL: 5.3 (ref 7–25)
BUN/CREAT SERPL: 6.1 (ref 7–25)
BUN/CREAT SERPL: 6.9 (ref 7–25)
BUN/CREAT SERPL: 7 (ref 7–25)
BUN/CREAT SERPL: 7.1 (ref 7–25)
BUN/CREAT SERPL: 8.3 (ref 7–25)
CALCIUM SPEC-SCNC: 8.2 MG/DL (ref 8.6–10.5)
CALCIUM SPEC-SCNC: 8.2 MG/DL (ref 8.6–10.5)
CALCIUM SPEC-SCNC: 8.3 MG/DL (ref 8.6–10.5)
CALCIUM SPEC-SCNC: 8.4 MG/DL (ref 8.6–10.5)
CHLORIDE SERPL-SCNC: 105 MMOL/L (ref 98–107)
CHLORIDE SERPL-SCNC: 106 MMOL/L (ref 98–107)
CHLORIDE SERPL-SCNC: 108 MMOL/L (ref 98–107)
CHLORIDE SERPL-SCNC: 109 MMOL/L (ref 98–107)
CHLORIDE SERPL-SCNC: 109 MMOL/L (ref 98–107)
CHLORIDE SERPL-SCNC: 111 MMOL/L (ref 98–107)
CO2 SERPL-SCNC: 10.7 MMOL/L (ref 22–29)
CO2 SERPL-SCNC: 10.7 MMOL/L (ref 22–29)
CO2 SERPL-SCNC: 6.5 MMOL/L (ref 22–29)
CO2 SERPL-SCNC: 6.5 MMOL/L (ref 22–29)
CO2 SERPL-SCNC: 8.2 MMOL/L (ref 22–29)
CO2 SERPL-SCNC: 8.4 MMOL/L (ref 22–29)
CREAT SERPL-MCNC: 0.48 MG/DL (ref 0.57–1)
CREAT SERPL-MCNC: 0.49 MG/DL (ref 0.57–1)
CREAT SERPL-MCNC: 0.56 MG/DL (ref 0.57–1)
CREAT SERPL-MCNC: 0.57 MG/DL (ref 0.57–1)
CREAT SERPL-MCNC: 0.57 MG/DL (ref 0.57–1)
CREAT SERPL-MCNC: 0.58 MG/DL (ref 0.57–1)
DEPRECATED RDW RBC AUTO: 47.3 FL (ref 37–54)
ERYTHROCYTE [DISTWIDTH] IN BLOOD BY AUTOMATED COUNT: 15 % (ref 12.3–15.4)
GFR SERPL CREATININE-BSD FRML MDRD: 133 ML/MIN/1.73
GFR SERPL CREATININE-BSD FRML MDRD: 135 ML/MIN/1.73
GFR SERPL CREATININE-BSD FRML MDRD: 135 ML/MIN/1.73
GFR SERPL CREATININE-BSD FRML MDRD: 138 ML/MIN/1.73
GFR SERPL CREATININE-BSD FRML MDRD: >150 ML/MIN/1.73
GFR SERPL CREATININE-BSD FRML MDRD: >150 ML/MIN/1.73
GLUCOSE BLDC GLUCOMTR-MCNC: 113 MG/DL (ref 70–130)
GLUCOSE BLDC GLUCOMTR-MCNC: 119 MG/DL (ref 70–130)
GLUCOSE BLDC GLUCOMTR-MCNC: 123 MG/DL (ref 70–130)
GLUCOSE BLDC GLUCOMTR-MCNC: 128 MG/DL (ref 70–130)
GLUCOSE BLDC GLUCOMTR-MCNC: 130 MG/DL (ref 70–130)
GLUCOSE BLDC GLUCOMTR-MCNC: 131 MG/DL (ref 70–130)
GLUCOSE BLDC GLUCOMTR-MCNC: 146 MG/DL (ref 70–130)
GLUCOSE BLDC GLUCOMTR-MCNC: 159 MG/DL (ref 70–130)
GLUCOSE BLDC GLUCOMTR-MCNC: 159 MG/DL (ref 70–130)
GLUCOSE BLDC GLUCOMTR-MCNC: 169 MG/DL (ref 70–130)
GLUCOSE BLDC GLUCOMTR-MCNC: 171 MG/DL (ref 70–130)
GLUCOSE BLDC GLUCOMTR-MCNC: 176 MG/DL (ref 70–130)
GLUCOSE BLDC GLUCOMTR-MCNC: 178 MG/DL (ref 70–130)
GLUCOSE BLDC GLUCOMTR-MCNC: 179 MG/DL (ref 70–130)
GLUCOSE BLDC GLUCOMTR-MCNC: 185 MG/DL (ref 70–130)
GLUCOSE BLDC GLUCOMTR-MCNC: 192 MG/DL (ref 70–130)
GLUCOSE BLDC GLUCOMTR-MCNC: 205 MG/DL (ref 70–130)
GLUCOSE BLDC GLUCOMTR-MCNC: 210 MG/DL (ref 70–130)
GLUCOSE BLDC GLUCOMTR-MCNC: 211 MG/DL (ref 70–130)
GLUCOSE BLDC GLUCOMTR-MCNC: 238 MG/DL (ref 70–130)
GLUCOSE BLDC GLUCOMTR-MCNC: 241 MG/DL (ref 70–130)
GLUCOSE BLDC GLUCOMTR-MCNC: 257 MG/DL (ref 70–130)
GLUCOSE BLDC GLUCOMTR-MCNC: 265 MG/DL (ref 70–130)
GLUCOSE SERPL-MCNC: 127 MG/DL (ref 65–99)
GLUCOSE SERPL-MCNC: 130 MG/DL (ref 65–99)
GLUCOSE SERPL-MCNC: 166 MG/DL (ref 65–99)
GLUCOSE SERPL-MCNC: 182 MG/DL (ref 65–99)
GLUCOSE SERPL-MCNC: 183 MG/DL (ref 65–99)
GLUCOSE SERPL-MCNC: 256 MG/DL (ref 65–99)
HCT VFR BLD AUTO: 40.3 % (ref 34–46.6)
HGB BLD-MCNC: 13.9 G/DL (ref 12–15.9)
MAGNESIUM SERPL-MCNC: 1.9 MG/DL (ref 1.7–2.2)
MAGNESIUM SERPL-MCNC: 1.9 MG/DL (ref 1.7–2.2)
MAGNESIUM SERPL-MCNC: 2 MG/DL (ref 1.7–2.2)
MCH RBC QN AUTO: 30.3 PG (ref 26.6–33)
MCHC RBC AUTO-ENTMCNC: 34.5 G/DL (ref 31.5–35.7)
MCV RBC AUTO: 87.8 FL (ref 79–97)
PHOSPHATE SERPL-MCNC: 0.8 MG/DL (ref 2.5–4.5)
PHOSPHATE SERPL-MCNC: 1 MG/DL (ref 2.5–4.5)
PHOSPHATE SERPL-MCNC: 1.5 MG/DL (ref 2.5–4.5)
PHOSPHATE SERPL-MCNC: 1.9 MG/DL (ref 2.5–4.5)
PHOSPHATE SERPL-MCNC: 1.9 MG/DL (ref 2.5–4.5)
PHOSPHATE SERPL-MCNC: 2 MG/DL (ref 2.5–4.5)
PLATELET # BLD AUTO: 230 10*3/MM3 (ref 140–450)
PMV BLD AUTO: 10 FL (ref 6–12)
POTASSIUM SERPL-SCNC: 2.7 MMOL/L (ref 3.5–5.2)
POTASSIUM SERPL-SCNC: 3 MMOL/L (ref 3.5–5.2)
POTASSIUM SERPL-SCNC: 3.2 MMOL/L (ref 3.5–5.2)
POTASSIUM SERPL-SCNC: 3.4 MMOL/L (ref 3.5–5.2)
POTASSIUM SERPL-SCNC: 3.6 MMOL/L (ref 3.5–5.2)
POTASSIUM SERPL-SCNC: 4.1 MMOL/L (ref 3.5–5.2)
RBC # BLD AUTO: 4.59 10*6/MM3 (ref 3.77–5.28)
SODIUM SERPL-SCNC: 132 MMOL/L (ref 136–145)
SODIUM SERPL-SCNC: 133 MMOL/L (ref 136–145)
SODIUM SERPL-SCNC: 133 MMOL/L (ref 136–145)
SODIUM SERPL-SCNC: 134 MMOL/L (ref 136–145)
SODIUM SERPL-SCNC: 135 MMOL/L (ref 136–145)
SODIUM SERPL-SCNC: 136 MMOL/L (ref 136–145)
WBC # BLD AUTO: 9.33 10*3/MM3 (ref 3.4–10.8)

## 2021-01-22 PROCEDURE — 84100 ASSAY OF PHOSPHORUS: CPT | Performed by: FAMILY MEDICINE

## 2021-01-22 PROCEDURE — 25010000003 MAGNESIUM SULFATE 4 GM/100ML SOLUTION: Performed by: FAMILY MEDICINE

## 2021-01-22 PROCEDURE — 25010000002 ONDANSETRON PER 1 MG: Performed by: FAMILY MEDICINE

## 2021-01-22 PROCEDURE — 85027 COMPLETE CBC AUTOMATED: CPT | Performed by: FAMILY MEDICINE

## 2021-01-22 PROCEDURE — 80048 BASIC METABOLIC PNL TOTAL CA: CPT | Performed by: FAMILY MEDICINE

## 2021-01-22 PROCEDURE — 82962 GLUCOSE BLOOD TEST: CPT

## 2021-01-22 PROCEDURE — 99232 SBSQ HOSP IP/OBS MODERATE 35: CPT | Performed by: FAMILY MEDICINE

## 2021-01-22 PROCEDURE — 83735 ASSAY OF MAGNESIUM: CPT | Performed by: FAMILY MEDICINE

## 2021-01-22 PROCEDURE — 63710000001 ONDANSETRON PER 8 MG: Performed by: FAMILY MEDICINE

## 2021-01-22 PROCEDURE — 25010000003 POTASSIUM CHLORIDE 10 MEQ/100ML SOLUTION: Performed by: FAMILY MEDICINE

## 2021-01-22 PROCEDURE — 25810000003 SODIUM CHLORIDE 0.9 % WITH KCL 20 MEQ 20-0.9 MEQ/L-% SOLUTION: Performed by: FAMILY MEDICINE

## 2021-01-22 RX ORDER — DEXTROSE, SODIUM CHLORIDE, AND POTASSIUM CHLORIDE 5; .9; .15 G/100ML; G/100ML; G/100ML
150 INJECTION INTRAVENOUS CONTINUOUS
Status: DISCONTINUED | OUTPATIENT
Start: 2021-01-22 | End: 2021-01-22 | Stop reason: ALTCHOICE

## 2021-01-22 RX ORDER — MAGNESIUM SULFATE HEPTAHYDRATE 40 MG/ML
4 INJECTION, SOLUTION INTRAVENOUS ONCE
Status: COMPLETED | OUTPATIENT
Start: 2021-01-22 | End: 2021-01-22

## 2021-01-22 RX ORDER — SODIUM CHLORIDE 450 MG/100ML
250 INJECTION, SOLUTION INTRAVENOUS CONTINUOUS PRN
Status: DISCONTINUED | OUTPATIENT
Start: 2021-01-22 | End: 2021-01-22

## 2021-01-22 RX ORDER — POTASSIUM CHLORIDE, DEXTROSE MONOHYDRATE AND SODIUM CHLORIDE 300; 5; 900 MG/100ML; G/100ML; MG/100ML
150 INJECTION, SOLUTION INTRAVENOUS CONTINUOUS PRN
Status: DISCONTINUED | OUTPATIENT
Start: 2021-01-22 | End: 2021-01-23

## 2021-01-22 RX ORDER — SODIUM CHLORIDE AND POTASSIUM CHLORIDE 150; 900 MG/100ML; MG/100ML
250 INJECTION, SOLUTION INTRAVENOUS CONTINUOUS PRN
Status: DISCONTINUED | OUTPATIENT
Start: 2021-01-22 | End: 2021-01-22

## 2021-01-22 RX ORDER — DEXTROSE, SODIUM CHLORIDE, AND POTASSIUM CHLORIDE 5; .45; .3 G/100ML; G/100ML; G/100ML
150 INJECTION INTRAVENOUS CONTINUOUS PRN
Status: DISCONTINUED | OUTPATIENT
Start: 2021-01-22 | End: 2021-01-23

## 2021-01-22 RX ORDER — DEXTROSE, SODIUM CHLORIDE, AND POTASSIUM CHLORIDE 5; .45; .3 G/100ML; G/100ML; G/100ML
150 INJECTION INTRAVENOUS CONTINUOUS
Status: DISCONTINUED | OUTPATIENT
Start: 2021-01-22 | End: 2021-01-23

## 2021-01-22 RX ORDER — DEXTROSE, SODIUM CHLORIDE, AND POTASSIUM CHLORIDE 5; .45; .15 G/100ML; G/100ML; G/100ML
150 INJECTION INTRAVENOUS CONTINUOUS PRN
Status: DISCONTINUED | OUTPATIENT
Start: 2021-01-22 | End: 2021-01-23

## 2021-01-22 RX ORDER — SODIUM CHLORIDE AND POTASSIUM CHLORIDE 300; 900 MG/100ML; MG/100ML
250 INJECTION, SOLUTION INTRAVENOUS CONTINUOUS PRN
Status: DISCONTINUED | OUTPATIENT
Start: 2021-01-22 | End: 2021-01-22

## 2021-01-22 RX ORDER — POTASSIUM CHLORIDE 750 MG/1
40 CAPSULE, EXTENDED RELEASE ORAL ONCE
Status: COMPLETED | OUTPATIENT
Start: 2021-01-22 | End: 2021-01-22

## 2021-01-22 RX ORDER — SODIUM CHLORIDE 0.9 % (FLUSH) 0.9 %
10 SYRINGE (ML) INJECTION ONCE AS NEEDED
Status: DISCONTINUED | OUTPATIENT
Start: 2021-01-22 | End: 2021-01-22

## 2021-01-22 RX ORDER — DEXTROSE MONOHYDRATE 25 G/50ML
12.5 INJECTION, SOLUTION INTRAVENOUS AS NEEDED
Status: DISCONTINUED | OUTPATIENT
Start: 2021-01-22 | End: 2021-01-24 | Stop reason: HOSPADM

## 2021-01-22 RX ORDER — MAGNESIUM SULFATE HEPTAHYDRATE 40 MG/ML
2 INJECTION, SOLUTION INTRAVENOUS AS NEEDED
Status: DISCONTINUED | OUTPATIENT
Start: 2021-01-22 | End: 2021-01-23

## 2021-01-22 RX ORDER — DEXTROSE AND SODIUM CHLORIDE 5; .9 G/100ML; G/100ML
150 INJECTION, SOLUTION INTRAVENOUS CONTINUOUS PRN
Status: DISCONTINUED | OUTPATIENT
Start: 2021-01-22 | End: 2021-01-22

## 2021-01-22 RX ORDER — DEXTROSE, SODIUM CHLORIDE, AND POTASSIUM CHLORIDE 5; .9; .15 G/100ML; G/100ML; G/100ML
150 INJECTION INTRAVENOUS CONTINUOUS PRN
Status: DISCONTINUED | OUTPATIENT
Start: 2021-01-22 | End: 2021-01-23

## 2021-01-22 RX ORDER — MAGNESIUM SULFATE HEPTAHYDRATE 40 MG/ML
4 INJECTION, SOLUTION INTRAVENOUS AS NEEDED
Status: DISCONTINUED | OUTPATIENT
Start: 2021-01-22 | End: 2021-01-23

## 2021-01-22 RX ORDER — POTASSIUM CHLORIDE 7.45 MG/ML
10 INJECTION INTRAVENOUS
Status: COMPLETED | OUTPATIENT
Start: 2021-01-22 | End: 2021-01-22

## 2021-01-22 RX ORDER — SODIUM CHLORIDE 9 MG/ML
10 INJECTION, SOLUTION INTRAVENOUS CONTINUOUS PRN
Status: DISCONTINUED | OUTPATIENT
Start: 2021-01-22 | End: 2021-01-24 | Stop reason: HOSPADM

## 2021-01-22 RX ORDER — DEXTROSE AND SODIUM CHLORIDE 5; .45 G/100ML; G/100ML
150 INJECTION, SOLUTION INTRAVENOUS CONTINUOUS PRN
Status: DISCONTINUED | OUTPATIENT
Start: 2021-01-22 | End: 2021-01-23

## 2021-01-22 RX ORDER — SODIUM CHLORIDE 0.9 % (FLUSH) 0.9 %
10 SYRINGE (ML) INJECTION AS NEEDED
Status: DISCONTINUED | OUTPATIENT
Start: 2021-01-22 | End: 2021-01-22

## 2021-01-22 RX ORDER — SODIUM CHLORIDE 0.9 % (FLUSH) 0.9 %
3 SYRINGE (ML) INJECTION EVERY 12 HOURS SCHEDULED
Status: DISCONTINUED | OUTPATIENT
Start: 2021-01-22 | End: 2021-01-22

## 2021-01-22 RX ORDER — SODIUM CHLORIDE AND POTASSIUM CHLORIDE 150; 450 MG/100ML; MG/100ML
250 INJECTION, SOLUTION INTRAVENOUS CONTINUOUS PRN
Status: DISCONTINUED | OUTPATIENT
Start: 2021-01-22 | End: 2021-01-22

## 2021-01-22 RX ORDER — SODIUM CHLORIDE AND POTASSIUM CHLORIDE 150; 900 MG/100ML; MG/100ML
125 INJECTION, SOLUTION INTRAVENOUS CONTINUOUS
Status: DISCONTINUED | OUTPATIENT
Start: 2021-01-22 | End: 2021-01-22

## 2021-01-22 RX ORDER — SODIUM CHLORIDE 9 MG/ML
250 INJECTION, SOLUTION INTRAVENOUS CONTINUOUS PRN
Status: DISCONTINUED | OUTPATIENT
Start: 2021-01-22 | End: 2021-01-22

## 2021-01-22 RX ADMIN — ACETAMINOPHEN 650 MG: 325 TABLET, FILM COATED ORAL at 15:59

## 2021-01-22 RX ADMIN — POTASSIUM CHLORIDE 10 MEQ: 7.46 INJECTION, SOLUTION INTRAVENOUS at 13:34

## 2021-01-22 RX ADMIN — ACETAMINOPHEN 650 MG: 325 TABLET, FILM COATED ORAL at 00:06

## 2021-01-22 RX ADMIN — SODIUM CHLORIDE AND POTASSIUM CHLORIDE 125 ML/HR: 9; 1.49 INJECTION, SOLUTION INTRAVENOUS at 06:15

## 2021-01-22 RX ADMIN — POTASSIUM CHLORIDE 10 MEQ: 7.46 INJECTION, SOLUTION INTRAVENOUS at 10:42

## 2021-01-22 RX ADMIN — POTASSIUM CHLORIDE 10 MEQ: 7.46 INJECTION, SOLUTION INTRAVENOUS at 08:21

## 2021-01-22 RX ADMIN — POTASSIUM CHLORIDE 40 MEQ: 10 CAPSULE, COATED, EXTENDED RELEASE ORAL at 07:16

## 2021-01-22 RX ADMIN — ONDANSETRON HYDROCHLORIDE 4 MG: 4 TABLET, FILM COATED ORAL at 10:47

## 2021-01-22 RX ADMIN — POTASSIUM CHLORIDE 40 MEQ: 10 CAPSULE, COATED, EXTENDED RELEASE ORAL at 18:45

## 2021-01-22 RX ADMIN — ACETAMINOPHEN 650 MG: 325 TABLET, FILM COATED ORAL at 07:31

## 2021-01-22 RX ADMIN — POTASSIUM CHLORIDE, DEXTROSE MONOHYDRATE AND SODIUM CHLORIDE 150 ML/HR: 300; 5; 450 INJECTION, SOLUTION INTRAVENOUS at 18:45

## 2021-01-22 RX ADMIN — POTASSIUM CHLORIDE 10 MEQ: 7.46 INJECTION, SOLUTION INTRAVENOUS at 09:32

## 2021-01-22 RX ADMIN — POTASSIUM CHLORIDE, DEXTROSE MONOHYDRATE AND SODIUM CHLORIDE 150 ML/HR: 150; 5; 900 INJECTION, SOLUTION INTRAVENOUS at 13:45

## 2021-01-22 RX ADMIN — ONDANSETRON 4 MG: 2 INJECTION INTRAMUSCULAR; INTRAVENOUS at 03:15

## 2021-01-22 RX ADMIN — DEXTROSE MONOHYDRATE, SODIUM CHLORIDE, AND POTASSIUM CHLORIDE 150 ML/HR: 50; 4.5; 1.49 INJECTION, SOLUTION INTRAVENOUS at 03:30

## 2021-01-22 RX ADMIN — POTASSIUM CHLORIDE 10 MEQ: 7.46 INJECTION, SOLUTION INTRAVENOUS at 07:17

## 2021-01-22 RX ADMIN — SODIUM CHLORIDE, PRESERVATIVE FREE 3 ML: 5 INJECTION INTRAVENOUS at 13:35

## 2021-01-22 RX ADMIN — INSULIN HUMAN 7 UNITS/HR: 1 INJECTION, SOLUTION INTRAVENOUS at 01:53

## 2021-01-22 RX ADMIN — POTASSIUM CHLORIDE 10 MEQ: 7.46 INJECTION, SOLUTION INTRAVENOUS at 12:05

## 2021-01-22 RX ADMIN — MAGNESIUM SULFATE IN WATER 4 G: 40 INJECTION, SOLUTION INTRAVENOUS at 18:46

## 2021-01-22 RX ADMIN — POTASSIUM CHLORIDE 40 MEQ: 10 CAPSULE, COATED, EXTENDED RELEASE ORAL at 20:54

## 2021-01-22 RX ADMIN — INSULIN HUMAN 7 UNITS/HR: 1 INJECTION, SOLUTION INTRAVENOUS at 13:36

## 2021-01-22 RX ADMIN — POTASSIUM & SODIUM PHOSPHATES POWDER PACK 280-160-250 MG 2 PACKET: 280-160-250 PACK at 18:45

## 2021-01-22 NOTE — H&P
[facility]   HISTORY AND PHYSICAL      Name:  Ruby Ramos   Age:  20 y.o.  Sex:  female  :  2000  MRN:  [id number]   Visit Number:  [id number]  Admission Date:  2021  Date Of Service:  21  Primary Care Physician:  Isamar Leon APRN    Chief Complaint:     Increased urination, thirst, nausea    History Of Presenting Illness:      The patient is a 20-year-old female who had presented to her PCP today with complaints of polyuria, polydipsia, polyphagia.  Medical history of celiac disease.  She tells me that over the last 2 to 3 months she has had significant symptoms of increased thirst, urination, and episodes of nausea.  She notes weight loss of probably 15 to 20 pounds.  She has been active and does exercise at a gym nearly daily.  She was recently diagnosed with a perineum abscess, was on Bactroban.  She states she did not feel well over the last few days, checked her blood glucose with her father's meter, was running in the 3-400 range.  She went to her PCP today and was diagnosed with diabetes.  Was sent to the ER for further evaluation.    In the ER, Patient was tachycardic, normotensive, afebrile, not hypoxic.  Initial labs with a glucose of 261, creatinine 0.71, potassium 3.2, CO2 of 6.6.  Magnesium of 2.2.  Urine pregnancy testing negative.  Troponin negative.  TSH within normal range.  Urinalysis with positive ketones and glucose.  Count 12,000.  VBG with pH of 7.086.  A1c of 11.5.  Chest x-ray unremarkable.  Due to concern for new onset diabetes with DKA, we were asked admit the patient.    Review Of Systems:     General: Denies any fevers, chills or loss of consciousness.   Psychological: No history of any hallucinations and delusions.  Ophthalmic: No history of any diplopia or transient loss of vision.  ENT: No history of sore throat, nasal congestion or ear pain.   Allergy and immunology: No history of rash or itching.  Hematological and  Lymphatic: No history of neck swelling or easy bleeding.  Endocrine: No history of any recent unintentional weight gain or loss.  Respiratory: Mild shortness of breath  Cardiovascular: No history of chest pain or palpitations.   Gastrointestinal: Nausea, no abdominal pain, no diarrhea  Genitourinary: No history of dysuria or hematuria.  Frequent urination  Musculoskeletal: No muscle pain. No calf pain.   Neurological: No history of any focal weakness. No loss of consciousness. Denies any numbness.  Generalized weakness  Dermatological: No history of any redness or pruritis.     Past Medical History:    Past Medical History:   Diagnosis Date   • Celiac disease    • Diabetes mellitus (CMS/HCC)        Past Surgical history:    Past Surgical History:   Procedure Laterality Date   • ENDOSCOPY     • HERNIA REPAIR         Social History:    Social History     Socioeconomic History   • Marital status: Single     Spouse name: Not on file   • Number of children: Not on file   • Years of education: Not on file   • Highest education level: Not on file   Tobacco Use   • Smoking status: Former Smoker     Years: 2.00     Types: Electronic Cigarette   • Smokeless tobacco: Never Used   Substance and Sexual Activity   • Alcohol use: Not Currently   • Drug use: Never   • Sexual activity: Defer       Family History:    Family History   Problem Relation Age of Onset   • Hypertension Mother    • Hypertension Father    • Diabetes Father    • Cancer Maternal Grandmother    • Cancer Paternal Grandmother      Multiple paternal relatives with type 2 diabetes  Allergies:      Gluten meal and Mucinex [guaifenesin er]    Home Medications:    Prior to Admission Medications     Prescriptions Last Dose Informant Patient Reported? Taking?    Insulin Glargine (BASAGLAR KWIKPEN) 100 UNIT/ML injection pen Patient Taking Differently  No Yes    Inject 10 Units under the skin into the appropriate area as directed Daily.    Patient taking differently:   Inject 10 Units under the skin into the appropriate area as directed Daily. New medication prescribed today, has not started taking yet.    Multiple Vitamins-Minerals (MULTIVITAMIN WITH MINERALS) tablet tablet 1/21/2021  Yes Yes    Take 1 tablet by mouth Daily.    mupirocin (Bactroban) 2 % ointment Past Week  No Yes    Apply  topically to the appropriate area as directed 2 (Two) Times a Day.    glucose blood test strip Patient Taking Differently  No No    Use as instructed             ED Medications:    Medications   sodium chloride 0.9 % flush 10 mL (has no administration in time range)   potassium chloride 10 mEq in 100 mL IVPB (0 mEq Intravenous Stopped 1/21/21 1636)     And   potassium chloride 10 mEq in 100 mL IVPB (0 mEq Intravenous Stopped 1/21/21 1801)     And   potassium chloride 10 mEq in 100 mL IVPB (10 mEq Intravenous New Bag 1/21/21 1801)     And   potassium chloride 10 mEq in 100 mL IVPB (has no administration in time range)   potassium chloride (MICRO-K) CR capsule 40 mEq (has no administration in time range)   sodium chloride 0.9 % flush 10 mL (has no administration in time range)   sodium chloride 0.9 % flush 10 mL (has no administration in time range)   sodium chloride 0.9 % with KCl 20 mEq/L infusion (has no administration in time range)   acetaminophen (TYLENOL) tablet 650 mg (has no administration in time range)     Or   acetaminophen (TYLENOL) suppository 650 mg (has no administration in time range)   ondansetron (ZOFRAN) tablet 4 mg (has no administration in time range)     Or   ondansetron (ZOFRAN) injection 4 mg (has no administration in time range)   sodium chloride 0.9 % flush 3 mL (has no administration in time range)   sodium chloride 0.9 % flush 10 mL (has no administration in time range)   sodium chloride 0.9 % infusion (has no administration in time range)   sodium chloride 0.9 % with KCl 20 mEq/L infusion (has no administration in time range)   sodium chloride 0.9 % with KCl 40 mEq/L  infusion (has no administration in time range)   dextrose 5 % and sodium chloride 0.9 % infusion (has no administration in time range)   dextrose 5 % and sodium chloride 0.9 % with KCl 40 mEq/L infusion (has no administration in time range)   dextrose 5 % and sodium chloride 0.9 % with KCl 20 mEq/L infusion (has no administration in time range)   sodium chloride 0.45 % infusion (has no administration in time range)   sodium chloride 0.45 % with KCl 20 mEq/L infusion (has no administration in time range)   sodium chloride 0.45 % 1,000 mL with potassium chloride 40 mEq infusion (has no administration in time range)   dextrose 5 % and sodium chloride 0.45 % infusion (has no administration in time range)   dextrose 5 % and sodium chloride 0.45 % with KCl 20 mEq/L infusion (has no administration in time range)   dextrose 5 % and sodium chloride 0.45 % with KCl 40 mEq/L infusion (has no administration in time range)   insulin regular 1 unit/mL in 0.9% sodium chloride (has no administration in time range)   dextrose (D50W) 25 g/ 50mL Intravenous Solution 12.5 g (has no administration in time range)   sodium chloride 0.9 % flush 10 mL (has no administration in time range)   sodium chloride 0.9 % infusion (has no administration in time range)   sodium chloride 0.9 % bolus 1,947 mL (0 mL Intravenous Stopped 1/21/21 1801)       Vital Signs:    Temp:  [97.3 °F (36.3 °C)-98.8 °F (37.1 °C)] 98.1 °F (36.7 °C)  Heart Rate:  [] 110  Resp:  [24] 24  BP: (104-142)/(49-83) 113/69        01/21/21  1216 01/21/21  1840   Weight: 64.9 kg (143 lb) 75.4 kg (166 lb 4.8 oz)       Body mass index is 29.46 kg/m².    Physical Exam:    General Appearance:  Alert and cooperative, not in any acute distress.   Head:  Atraumatic and normocephalic, without obvious abnormality.   Eyes:          PERRLA, conjunctivae and sclerae normal, no Icterus. No pallor. Extraocular movements are within normal limits.   Ears:  Ears appear intact with no  abnormalities noted.   Throat: No oral lesions, no thrush, oral mucosa moist.   Neck: Supple, trachea midline, no thyromegaly, no carotid bruit.   Back:   No tenderness to palpation, range of motion normal.   Lungs:   Breath sounds heard bilaterally equally.  No crackles or wheezing. No pleural rub or bronchial breathing.   Heart:  Normal S1 and S2, no murmur, no gallop, no rub. No JVD.   Abdomen:   Normal bowel sounds, no masses, no organomegaly. Soft, nontender, nondistended, no guarding, no rebound tenderness.   Extremities: Moves all extremities well, no edema, no cyanosis, no clubbing.   Pulses: Pulses palpable and equal bilaterally.   Skin: No bleeding, bruising or rash.   Neurologic: Alert and oriented x 3. Moves all four limbs equally. No tremors. No facial asymmetry.     Laboratory data:    I have reviewed the labs done in the emergency room.    Results from last 7 days   Lab Units 01/21/21  1500 01/21/21  1345   SODIUM mmol/L 134* 128*   POTASSIUM mmol/L 2.9* 3.2*   CHLORIDE mmol/L 104 98   CO2 mmol/L 7.0* 6.6*   BUN mg/dL 7 8   CREATININE mg/dL 0.60 0.71   CALCIUM mg/dL 7.8* 8.4*   BILIRUBIN mg/dL  --  0.5   ALK PHOS U/L  --  131*   ALT (SGPT) U/L  --  18   AST (SGOT) U/L  --  14   GLUCOSE mg/dL 222* 261*     Results from last 7 days   Lab Units 01/21/21  1345   WBC 10*3/mm3 12.28*   HEMOGLOBIN g/dL 15.3   HEMATOCRIT % 42.7   PLATELETS 10*3/mm3 272         Results from last 7 days   Lab Units 01/21/21  1345   TROPONIN T ng/mL <0.010     Results from last 7 days   Lab Units 01/21/21  1345   PROBNP pg/mL 9.9         Results from last 7 days   Lab Units 01/21/21  1345   LIPASE U/L 20         Results from last 7 days   Lab Units 01/21/21  1345 01/21/21  1057   COLOR UA  Yellow Yellow   SPECIFIC GRAVITY, URINE   --  1.030   GLUCOSE UA  >=1000 mg/dL (3+)*  --    KETONES UA  >=160 mg/dL (4+)* >80 mg/dL*   LEUKOCYTES UA  Negative Negative   PH, URINE  <=5.0 6.0   BILIRUBIN UA  Negative Negative   UROBILINOGEN UA   0.2 E.U./dL Normal     Pain Management Panel     There is no flowsheet data to display.              EKG:      Sinus tachycardia, rate of 106, no acute ST or T wave abnormalities per my interpretation    Radiology:    Imaging Results (Last 72 Hours)     Procedure Component Value Units Date/Time    XR Chest 1 View [[id number]] Collected: 01/21/21 1444     Updated: 01/21/21 1446    Narrative:      PROCEDURE: XR CHEST 1 VW-     HISTORY: short of breath     COMPARISON: October 21, 2017.     FINDINGS: The heart is normal in size. The mediastinum is unremarkable.  The lungs are clear. There is no pneumothorax.  There are no acute  osseous abnormalities.       Impression:      No acute cardiopulmonary process.     Continued followup is recommended.     This report was finalized on 1/21/2021 2:44 PM by Emerald Giang M.D..            Diabetic ketoacidosis without coma associated with type 1 diabetes mellitus (CMS/[facility])      Assessment:    1.  Diabetic ketoacidosis without coma associated with type 1 diabetes mellitus, suspected, present on admission  2.  Dehydration  3.  Nausea  4.  History of celiac disease  5.  Hypokalemia    Plan:    Patient be admitted to the ICU for further evaluation management.  She does have likely type 1 diabetes based off history and age with evidence of insulin deficiency with weight loss and symptomatology over the last 3 months.  Has been given aggressive hydration with normal saline, currently receiving potassium replacement.  Once obtain adequate potassium level, will start on insulin drip and adjust fluids based off BMP.  Monitor urine output.  Diabetic educator consult for tomorrow.  Nutrition consult.  Will allow the patient to have ice chips tonight.    It appears patient already has outpatient endocrinology referral made.  Will allow them to work-up full diabetes antibody testing.    Patient otherwise meets inpatient level care with anticipated stay greater than 2 midnights.  Further  plan of care will be depend upon improvement clinical condition.  Advance Care Planning   ACP discussion was held with the patient during this visit. Patient does not have an advance directive, declines further assistance.    Nicole Mcintosh DO  01/21/21  19:46 EST    Dictated utilizing Dragon dictation.

## 2021-01-22 NOTE — CONSULTS
"Adult Nutrition  Assessment/PES    Patient Name:  Ruby Ramos  YOB: 2000  MRN: [id number]  Admit Date:  1/21/2021    Assessment Date:  1/22/2021    Comments:    Recommend:  1. Continue current NPO diet order as medically appropriate and tolerated. Once medically appropriate, consider advancing to a regular diet with consistent carbohydrate and gluten-free modifications.   2. Once medically appropriate to advance diet, establish and encourage PO intake.  3. RD to order snack once diet advances.  4. Consider a multivitamin with minerals daily.  5. Continue to monitor and replace electrolytes PRN     RD to follow pt and available PRN.        Reason for Assessment     Row Name 01/22/21 0924          Reason for Assessment    Reason For Assessment  diagnosis/disease state;nurse/nurse practitioner consult     Diagnosis  diabetes diagnosis/complications;gastrointestinal disease DKA, DM 1, celiac disease     Identified At Risk by Screening Criteria  BMI;need for education           Anthropometrics     Row Name 01/22/21 0928 01/22/21 0400       Anthropometrics    Height  160 cm (63\")  --    Weight  --  72.8 kg (160 lb 8 oz)       Ideal Body Weight (IBW)    Ideal Body Weight (IBW) (kg)  52.72  --        Labs/Tests/Procedures/Meds     Row Name 01/22/21 0927          Labs/Procedures/Meds    Lab Results Reviewed  reviewed, pertinent     Lab Results Comments  High: Cl, HgbA1C Low: BUN, K+, phos        Medications    Pertinent Medications Reviewed  reviewed, pertinent     Pertinent Medications Comments  potassium chloride         Physical Findings     Row Name 01/22/21 0928          Physical Findings    Overall Physical Appearance  overweight         Estimated/Assessed Needs     Row Name 01/22/21 0928          Calculation Measurements    Weight Used For Calculations  58.4 kg (128 lb 12 oz) adjusted BW     Height  160 cm (63\")        Estimated/Assessed Needs    Additional Documentation  Calorie Requirements " (Group);Protein Requirements (Group);[place]-St. Jeor Equation (Group);Fluid Requirements (Group)        Calorie Requirements    Estimated Calorie Need Method  [place]-St Jeor     Estimated Calorie Requirement Comment  1565-0651        [place]-St. Jeor Equation    RMR (Tama-St. Jeor Equation)  1323.125     Tama-St. Jeor Activity Factors  -- 1.3        Protein Requirements    Weight Used For Protein Calculations  58.4 kg (128 lb 12 oz) adjusted BW     Est Protein Requirement Amount (gms/kg)  1.2 gm protein 58-70 grams     Estimated Protein Requirements (gms/day)  70.08        Fluid Requirements    Estimated Fluid Requirement Method  [place]-Segar Formula     [place]-Segar Method (over 20 kg)  2668         Nutrition Prescription Ordered     Row Name 01/22/21 0932          Nutrition Prescription PO    Current PO Diet  NPO         Evaluation of Received Nutrient/Fluid Intake     Row Name 01/22/21 0933          PO Evaluation    Number of Days PO Intake Evaluated  Insufficient Data               Problem/Interventions:  Problem 1     Row Name 01/22/21 0933          Nutrition Diagnoses Problem 1    Problem 1  Altered Nutrition Related to Labs     Etiology (related to)  Medical Diagnosis     Endocrine  DM1     Metabolic/ICU  Ketoacidosis     Signs/Symptoms (evidenced by)  Biochemical     Specific Labs Noted  K+;Phosphorus;HgbA1C;BUN               Intervention Goal     Row Name 01/22/21 0935          Intervention Goal    General  Improved nutrition related lab(s);Meet nutritional needs for age/condition     PO  Advance diet;Establish PO     Weight  No significant weight loss         Nutrition Intervention     Row Name 01/22/21 0935          Nutrition Intervention    RD/Tech Action  Follow Tx progress;Recommend/ordered     Recommended/Ordered  Diet;Snack Continue NPO diet order until medically appropriate to advance         Nutrition Prescription     Row Name 01/22/21 0937          Nutrition Prescription PO    PO  Prescription  Begin/change diet;Other (comment) Cotiue NPO diet order until medically appropriate to advance     Begin/Change Diet to  Regular     Common Modifiers  Consistent Carbohydrate;Gluten Free     New PO Prescription Ordered?  No, recommended        Other Orders    Obtain Weight  Daily     Obtain Weight Ordered?  No, recommended     Supplement  Vitamin mineral supplement     Supplement Ordered?  No, recommended     Other  Monitor and replace electrolytes PRN         Education/Evaluation     Row Name 01/22/21 0957          Education    Education  Will Instruct as appropriate        Monitor/Evaluation    Monitor  Per protocol;I&O;Pertinent labs;Weight;Skin status           Electronically signed by:  Soila Banuelos RD  01/22/21 09:39 EST

## 2021-01-22 NOTE — PLAN OF CARE
Goal Outcome Evaluation:  Plan of Care Reviewed With: patient  Progress: no change  Outcome Summary: Patient's potassium level remains low today; being replaced PO and IV. Currently on insulin drip. Respiration rate has improved. Patient c/o a headache today.

## 2021-01-22 NOTE — PROGRESS NOTES
[facility] HOSPITALIST    PROGRESS NOTE    Name:  Ruby Ramos   Age:  20 y.o.  Sex:  female  :  2000  MRN:  [id number]   Visit Number:  [id number]  Admission Date:  2021  Date Of Service:  21  Primary Care Physician:  Isamar Leon APRN     LOS: 1 day :      Chief Complaint: Nausea, thirst, hyperglycemia        Subjective / Interval History: The patient is a 20-year-old lady who had seen her primary care physician for diabetic symptoms including polyuria, polydipsia, polyphagia.  She has past medical history of celiac disease.  She had been active and going to the gym.  She recently had a perineum abscess on Bactroban.  She had generally felt unwell lately with her glucose checked on her father's meter running in the 300s to 400s.  She presented to the emergency room for further evaluation.  She was noted to be in acute diabetic ketoacidosis with new onset diabetes.  Her hemoglobin A1c was 11.5.  Her glucose was 261 with a creatinine of 0.71.  Urinalysis was positive for ketones and glucose.  White blood cell count 12 with VBG pH 7.086.  The hospitalist service was asked to admit to the ICU.    The patient was seen earlier today.  She had severe hypokalemia so her insulin drip was initially held and then restarted.  She is continued to have metabolic acidosis with increased anion gap and hyperglycemia.  Electrolyte replacement provided.      Review of Systems:     General ROS: Patient denies any fevers, chills or loss of consciousness.  Generalized malaise and thirst  Ophthalmic ROS: Denies any diplopia or transient loss of vision.  ENT ROS: Denies sore throat, nasal congestion or ear pain.   Respiratory ROS: Denies cough or shortness of breath.  Cardiovascular ROS: Denies chest pain or palpitations. No history of exertional chest pain.   Gastrointestinal ROS: Positive nausea without vomiting. Denies any abdominal pain. No diarrhea.  Genitourinary ROS: Denies dysuria  or hematuria.  Musculoskeletal ROS: Denies back pain. No muscle pain. No calf pain.  Neurological ROS: Denies any focal weakness. No loss of consciousness. Denies any numbness. Denies neck pain.   Dermatological ROS: Denies any redness or pruritis.    Vital Signs:    Temp:  [97.9 °F (36.6 °C)-99 °F (37.2 °C)] 98.1 °F (36.7 °C)  Heart Rate:  [] 95  Resp:  [14-24] 18  BP: ()/(46-79) 123/67    Intake and output:    I/O last 3 completed shifts:  In: 3772 [P.O.:150; I.V.:1575; IV Piggyback:2047]  Out: 1950 [Urine:1950]  I/O this shift:  In: 1978 [I.V.:1978]  Out: 2100 [Urine:2100]    Physical Examination:    General Appearance:    Alert and cooperative, not in any acute distress.   Head:    Atraumatic and normocephalic, without obvious abnormality.   Eyes:            PERRLA, conjunctivae and sclerae normal, no Icterus. No pallor. Extraocular movements are within normal limits.   Throat:   No oral lesions, no thrush, oral mucosa moist.   Neck:   Supple, trachea midline, no thyromegaly, no carotid bruit.   Lungs:     Chest shape is normal. Breath sounds heard bilaterally equally.  No crackles or wheezing. No Pleural rub or bronchial breathing.    Heart:    Normal S1 and S2, no murmur, no gallop, no rub. No JVD   Abdomen:     Normal bowel sounds, no masses, no organomegaly. Soft     nontender, nondistended, no guarding, no rebound                tenderness   Extremities:   Moves all extremities well, no edema, no cyanosis, no           clubbing   Skin:   No bleeding, bruising or rash.   Neurologic:   Cranial nerves 2 - 12 grossly intact, sensation intact, Motor power is normal and equal bilaterally.   Laboratory results:    Results from last 7 days   Lab Units 01/22/21  1148 01/22/21  0806 01/22/21  0503  01/21/21  1345   SODIUM mmol/L 133* 136 135*   < > 128*   POTASSIUM mmol/L 4.1 3.4* 2.7*   < > 3.2*   CHLORIDE mmol/L 106 109* 111*   < > 98   CO2 mmol/L 8.2* 10.7* 6.5*   < > 6.6*   BUN mg/dL 4* 4* 4*   < > 8    CREATININE mg/dL 0.56* 0.57 0.48*   < > 0.71   CALCIUM mg/dL 8.2* 8.4* 8.3*   < > 8.4*   BILIRUBIN mg/dL  --   --   --   --  0.5   ALK PHOS U/L  --   --   --   --  131*   ALT (SGPT) U/L  --   --   --   --  18   AST (SGOT) U/L  --   --   --   --  14   GLUCOSE mg/dL 183* 130* 127*   < > 261*    < > = values in this interval not displayed.     Results from last 7 days   Lab Units 01/22/21  0503 01/21/21  1345   WBC 10*3/mm3 9.33 12.28*   HEMOGLOBIN g/dL 13.9 15.3   HEMATOCRIT % 40.3 42.7   PLATELETS 10*3/mm3 230 272         Results from last 7 days   Lab Units 01/21/21  1345   TROPONIN T ng/mL <0.010               I have reviewed the patient's laboratory results.    Radiology results:    Imaging Results (Last 24 Hours)     ** No results found for the last 24 hours. **          I have reviewed the patient's radiology reports.    Medication Review:     I have reviewed the patient's active and prn medications.     Assessment:    1.  Diabetic ketoacidosis without coma associated with type 1 diabetes mellitus, suspected, present on admission  2.  Dehydration  3.  Nausea  4.  History of celiac disease  5.  Hypokalemia         Plan:  Continue to follow in the ICU requiring insulin drip.  Continue frequent glucose monitoring hourly with titration of drip according to frequent labs monitored.  Electrolyte replacement per protocol.  Insulin drip titration per protocol.  Continue to monitor labs and adjust medications daily.  I anticipate discharge home in the next 1 to 2 days once her acidosis resolves and her blood glucose remained stable.  Teaching for diabetes will be provided.  Continue n.p.o. with ice chips.    Medication risks and benefits were discussed in detail. Patient reported satisfaction with care delivered and treatment plan.     Oliva Jeter DO  01/22/21  17:37 EST

## 2021-01-22 NOTE — PROGRESS NOTES
Continued Stay Note  [facility]     Patient Name: Ruby Ramos  MRN: [id number]  Today's Date: 1/22/2021    Admit Date: 1/21/2021    Discharge Plan     Row Name 01/22/21 1430       Plan    Plan  Discharge plan attempted. Unable to contact anyone to discuss discharge needs. Will continue to follow and assist as needed.        Discharge Codes    No documentation.       Expected Discharge Date and Time     Expected Discharge Date Expected Discharge Time    Jan 26, 2021             Dasha Sanchez LCSW

## 2021-01-22 NOTE — PAYOR COMM NOTE
"TO:BC  FROM:JAY EASTMAN, RN PHONE [phone]  IN CLINICALS REF# [id number]    Ruby Ramos (20 y.o. Female)     Date of Birth Social Security Number Address Home Phone MRN    2000  [address] [id number] [phone] [id number]    [employer] Marital Status          [facility] Single       Admission Date Admission Type Admitting Provider Attending Provider Department, Room/Bed    21 Emergency Oliva Jeter DO Gandee, Julie G, DO [facility] INTENSIVE CARE, I01/    Discharge Date Discharge Disposition Discharge Destination                       Attending Provider: Oliva Jeter DO    Allergies: Gluten Meal, Mucinex [Guaifenesin Er]    Isolation: None   Infection: None   Code Status: CPR    Ht: 160 cm (63\")   Wt: 72.8 kg (160 lb 8 oz)    Admission Cmt: None   Principal Problem: None                Active Insurance as of 2021     Primary Coverage     Payor Plan Insurance Group Employer/Plan Group    [facility] BLUE CROSS [employer] EMPLOYEE [id number]     Payor Plan Address Payor Plan Phone Number Payor Plan Fax Number Effective Dates    [address] [phone]  2015 - None Entered    [place] [address]       Subscriber Name Subscriber Birth Date Member ID       TORIBIO RAMOS [birth year]1964 [id number]                 Emergency Contacts      (Rel.) Home Phone Work Phone Mobile Phone    RichardDilipDivya (Mother) [phone] -- --    Harsh Ramos (Father) [phone] -- --               History & Physical      Nicole Mcintosh DO at 21 1946              [facility] HOSPITALIST   HISTORY AND PHYSICAL      Name:  Ruby Ramos   Age:  20 y.o.  Sex:  female  :  2000  MRN:  [id number]   Visit Number:  [id number]  Admission Date:  2021  Date Of Service:  21  Primary Care Physician:  Isamar Leon APRN    Chief Complaint:     Increased urination, thirst, nausea    History " Of Presenting Illness:      The patient is a 20-year-old female who had presented to her PCP today with complaints of polyuria, polydipsia, polyphagia.  Medical history of celiac disease.  She tells me that over the last 2 to 3 months she has had significant symptoms of increased thirst, urination, and episodes of nausea.  She notes weight loss of probably 15 to 20 pounds.  She has been active and does exercise at a gym nearly daily.  She was recently diagnosed with a perineum abscess, was on Bactroban.  She states she did not feel well over the last few days, checked her blood glucose with her father's meter, was running in the 3-400 range.  She went to her PCP today and was diagnosed with diabetes.  Was sent to the ER for further evaluation.    In the ER, Patient was tachycardic, normotensive, afebrile, not hypoxic.  Initial labs with a glucose of 261, creatinine 0.71, potassium 3.2, CO2 of 6.6.  Magnesium of 2.2.  Urine pregnancy testing negative.  Troponin negative.  TSH within normal range.  Urinalysis with positive ketones and glucose.  Count 12,000.  VBG with pH of 7.086.  A1c of 11.5.  Chest x-ray unremarkable.  Due to concern for new onset diabetes with DKA, we were asked admit the patient.    Review Of Systems:     General: Denies any fevers, chills or loss of consciousness.   Psychological: No history of any hallucinations and delusions.  Ophthalmic: No history of any diplopia or transient loss of vision.  ENT: No history of sore throat, nasal congestion or ear pain.   Allergy and immunology: No history of rash or itching.  Hematological and Lymphatic: No history of neck swelling or easy bleeding.  Endocrine: No history of any recent unintentional weight gain or loss.  Respiratory: Mild shortness of breath  Cardiovascular: No history of chest pain or palpitations.   Gastrointestinal: Nausea, no abdominal pain, no diarrhea  Genitourinary: No history of dysuria or hematuria.  Frequent  urination  Musculoskeletal: No muscle pain. No calf pain.   Neurological: No history of any focal weakness. No loss of consciousness. Denies any numbness.  Generalized weakness  Dermatological: No history of any redness or pruritis.     Past Medical History:    Past Medical History:   Diagnosis Date   • Celiac disease    • Diabetes mellitus (CMS/HCC)        Past Surgical history:    Past Surgical History:   Procedure Laterality Date   • ENDOSCOPY     • HERNIA REPAIR         Social History:    Social History     Socioeconomic History   • Marital status: Single     Spouse name: Not on file   • Number of children: Not on file   • Years of education: Not on file   • Highest education level: Not on file   Tobacco Use   • Smoking status: Former Smoker     Years: 2.00     Types: Electronic Cigarette   • Smokeless tobacco: Never Used   Substance and Sexual Activity   • Alcohol use: Not Currently   • Drug use: Never   • Sexual activity: Defer       Family History:    Family History   Problem Relation Age of Onset   • Hypertension Mother    • Hypertension Father    • Diabetes Father    • Cancer Maternal Grandmother    • Cancer Paternal Grandmother      Multiple paternal relatives with type 2 diabetes  Allergies:      Gluten meal and Mucinex [guaifenesin er]    Home Medications:    Prior to Admission Medications     Prescriptions Last Dose Informant Patient Reported? Taking?    Insulin Glargine (BASAGLAR KWIKPEN) 100 UNIT/ML injection pen Patient Taking Differently  No Yes    Inject 10 Units under the skin into the appropriate area as directed Daily.    Patient taking differently:  Inject 10 Units under the skin into the appropriate area as directed Daily. New medication prescribed today, has not started taking yet.    Multiple Vitamins-Minerals (MULTIVITAMIN WITH MINERALS) tablet tablet 1/21/2021  Yes Yes    Take 1 tablet by mouth Daily.    mupirocin (Bactroban) 2 % ointment Past Week  No Yes    Apply  topically to the  appropriate area as directed 2 (Two) Times a Day.    glucose blood test strip Patient Taking Differently  No No    Use as instructed             ED Medications:    Medications   sodium chloride 0.9 % flush 10 mL (has no administration in time range)   potassium chloride 10 mEq in 100 mL IVPB (0 mEq Intravenous Stopped 1/21/21 1636)     And   potassium chloride 10 mEq in 100 mL IVPB (0 mEq Intravenous Stopped 1/21/21 1801)     And   potassium chloride 10 mEq in 100 mL IVPB (10 mEq Intravenous New Bag 1/21/21 1801)     And   potassium chloride 10 mEq in 100 mL IVPB (has no administration in time range)   potassium chloride (MICRO-K) CR capsule 40 mEq (has no administration in time range)   sodium chloride 0.9 % flush 10 mL (has no administration in time range)   sodium chloride 0.9 % flush 10 mL (has no administration in time range)   sodium chloride 0.9 % with KCl 20 mEq/L infusion (has no administration in time range)   acetaminophen (TYLENOL) tablet 650 mg (has no administration in time range)     Or   acetaminophen (TYLENOL) suppository 650 mg (has no administration in time range)   ondansetron (ZOFRAN) tablet 4 mg (has no administration in time range)     Or   ondansetron (ZOFRAN) injection 4 mg (has no administration in time range)   sodium chloride 0.9 % flush 3 mL (has no administration in time range)   sodium chloride 0.9 % flush 10 mL (has no administration in time range)   sodium chloride 0.9 % infusion (has no administration in time range)   sodium chloride 0.9 % with KCl 20 mEq/L infusion (has no administration in time range)   sodium chloride 0.9 % with KCl 40 mEq/L infusion (has no administration in time range)   dextrose 5 % and sodium chloride 0.9 % infusion (has no administration in time range)   dextrose 5 % and sodium chloride 0.9 % with KCl 40 mEq/L infusion (has no administration in time range)   dextrose 5 % and sodium chloride 0.9 % with KCl 20 mEq/L infusion (has no administration in time  range)   sodium chloride 0.45 % infusion (has no administration in time range)   sodium chloride 0.45 % with KCl 20 mEq/L infusion (has no administration in time range)   sodium chloride 0.45 % 1,000 mL with potassium chloride 40 mEq infusion (has no administration in time range)   dextrose 5 % and sodium chloride 0.45 % infusion (has no administration in time range)   dextrose 5 % and sodium chloride 0.45 % with KCl 20 mEq/L infusion (has no administration in time range)   dextrose 5 % and sodium chloride 0.45 % with KCl 40 mEq/L infusion (has no administration in time range)   insulin regular 1 unit/mL in 0.9% sodium chloride (has no administration in time range)   dextrose (D50W) 25 g/ 50mL Intravenous Solution 12.5 g (has no administration in time range)   sodium chloride 0.9 % flush 10 mL (has no administration in time range)   sodium chloride 0.9 % infusion (has no administration in time range)   sodium chloride 0.9 % bolus 1,947 mL (0 mL Intravenous Stopped 1/21/21 1801)       Vital Signs:    Temp:  [97.3 °F (36.3 °C)-98.8 °F (37.1 °C)] 98.1 °F (36.7 °C)  Heart Rate:  [] 110  Resp:  [24] 24  BP: (104-142)/(49-83) 113/69        01/21/21  1216 01/21/21  1840   Weight: 64.9 kg (143 lb) 75.4 kg (166 lb 4.8 oz)       Body mass index is 29.46 kg/m².    Physical Exam:    General Appearance:  Alert and cooperative, not in any acute distress.   Head:  Atraumatic and normocephalic, without obvious abnormality.   Eyes:          PERRLA, conjunctivae and sclerae normal, no Icterus. No pallor. Extraocular movements are within normal limits.   Ears:  Ears appear intact with no abnormalities noted.   Throat: No oral lesions, no thrush, oral mucosa moist.   Neck: Supple, trachea midline, no thyromegaly, no carotid bruit.   Back:   No tenderness to palpation, range of motion normal.   Lungs:   Breath sounds heard bilaterally equally.  No crackles or wheezing. No pleural rub or bronchial breathing.   Heart:  Normal S1 and  S2, no murmur, no gallop, no rub. No JVD.   Abdomen:   Normal bowel sounds, no masses, no organomegaly. Soft, nontender, nondistended, no guarding, no rebound tenderness.   Extremities: Moves all extremities well, no edema, no cyanosis, no clubbing.   Pulses: Pulses palpable and equal bilaterally.   Skin: No bleeding, bruising or rash.   Neurologic: Alert and oriented x 3. Moves all four limbs equally. No tremors. No facial asymmetry.     Laboratory data:    I have reviewed the labs done in the emergency room.    Results from last 7 days   Lab Units 01/21/21  1500 01/21/21  1345   SODIUM mmol/L 134* 128*   POTASSIUM mmol/L 2.9* 3.2*   CHLORIDE mmol/L 104 98   CO2 mmol/L 7.0* 6.6*   BUN mg/dL 7 8   CREATININE mg/dL 0.60 0.71   CALCIUM mg/dL 7.8* 8.4*   BILIRUBIN mg/dL  --  0.5   ALK PHOS U/L  --  131*   ALT (SGPT) U/L  --  18   AST (SGOT) U/L  --  14   GLUCOSE mg/dL 222* 261*     Results from last 7 days   Lab Units 01/21/21  1345   WBC 10*3/mm3 12.28*   HEMOGLOBIN g/dL 15.3   HEMATOCRIT % 42.7   PLATELETS 10*3/mm3 272         Results from last 7 days   Lab Units 01/21/21  1345   TROPONIN T ng/mL <0.010     Results from last 7 days   Lab Units 01/21/21  1345   PROBNP pg/mL 9.9         Results from last 7 days   Lab Units 01/21/21  1345   LIPASE U/L 20         Results from last 7 days   Lab Units 01/21/21  1345 01/21/21  1057   COLOR UA  Yellow Yellow   SPECIFIC GRAVITY, URINE   --  1.030   GLUCOSE UA  >=1000 mg/dL (3+)*  --    KETONES UA  >=160 mg/dL (4+)* >80 mg/dL*   LEUKOCYTES UA  Negative Negative   PH, URINE  <=5.0 6.0   BILIRUBIN UA  Negative Negative   UROBILINOGEN UA  0.2 E.U./dL Normal     Pain Management Panel     There is no flowsheet data to display.              EKG:      Sinus tachycardia, rate of 106, no acute ST or T wave abnormalities per my interpretation    Radiology:    Imaging Results (Last 72 Hours)     Procedure Component Value Units Date/Time    XR Chest 1 View [[id number]] Collected: 01/21/21  1444     Updated: 01/21/21 1446    Narrative:      PROCEDURE: XR CHEST 1 VW-     HISTORY: short of breath     COMPARISON: October 21, 2017.     FINDINGS: The heart is normal in size. The mediastinum is unremarkable.  The lungs are clear. There is no pneumothorax.  There are no acute  osseous abnormalities.       Impression:      No acute cardiopulmonary process.     Continued followup is recommended.     This report was finalized on 1/21/2021 2:44 PM by Emerald Giang M.D..            Diabetic ketoacidosis without coma associated with type 1 diabetes mellitus (CMS/[facility])      Assessment:    1.  Diabetic ketoacidosis without coma associated with type 1 diabetes mellitus, suspected, present on admission  2.  Dehydration  3.  Nausea  4.  History of celiac disease  5.  Hypokalemia    Plan:    Patient be admitted to the ICU for further evaluation management.  She does have likely type 1 diabetes based off history and age with evidence of insulin deficiency with weight loss and symptomatology over the last 3 months.  Has been given aggressive hydration with normal saline, currently receiving potassium replacement.  Once obtain adequate potassium level, will start on insulin drip and adjust fluids based off BMP.  Monitor urine output.  Diabetic educator consult for tomorrow.  Nutrition consult.  Will allow the patient to have ice chips tonight.    It appears patient already has outpatient endocrinology referral made.  Will allow them to work-up full diabetes antibody testing.    Patient otherwise meets inpatient level care with anticipated stay greater than 2 midnights.  Further plan of care will be depend upon improvement clinical condition.  Advance Care Planning   ACP discussion was held with the patient during this visit. Patient does not have an advance directive, declines further assistance.    Nicole Mcintosh DO  01/21/21  19:46 EST    Dictated utilizing Dragon dictation.    Electronically signed by Arnaldo  Nicole Dumont DO at 01/21/21 1952          Emergency Department Notes      Katarzyna Vinse, APRN at 01/21/21 1312          Subjective   History of Present Illness  This is a 20-year-old female that sent over by her primary care provider for questionable DKA.  She reports that she was seen in her office this morning and had a hemoglobin A1c of 11.  She did start her on medications but obtained a urine and noted ketones in her urine.  She was concerned for possible DKA and sent her here for further evaluation.  She denies any nausea or vomiting or loss of consciousness.  She denies any headache or diaphoresis.  She does complain of shortness of air.  Review of Systems   Constitutional: Negative.    HENT: Negative.    Eyes: Negative.    Respiratory: Positive for shortness of breath.    Cardiovascular: Negative.    Gastrointestinal: Negative.    Genitourinary: Negative.    Skin: Negative.    Neurological: Negative.    Psychiatric/Behavioral: Negative.        Past Medical History:   Diagnosis Date   • Celiac disease    • Diabetes mellitus (CMS/HCC)        Allergies   Allergen Reactions   • Gluten Meal    • Mucinex [Guaifenesin Er] Nausea Only and Dizziness       Past Surgical History:   Procedure Laterality Date   • ENDOSCOPY     • HERNIA REPAIR         Family History   Problem Relation Age of Onset   • Hypertension Mother    • Hypertension Father    • Diabetes Father    • Cancer Maternal Grandmother    • Cancer Paternal Grandmother        Social History     Socioeconomic History   • Marital status: Single     Spouse name: Not on file   • Number of children: Not on file   • Years of education: Not on file   • Highest education level: Not on file   Tobacco Use   • Smoking status: Former Smoker     Years: 2.00     Types: Electronic Cigarette   • Smokeless tobacco: Never Used   Substance and Sexual Activity   • Alcohol use: Not Currently   • Drug use: Never   • Sexual activity: Defer           Objective   Physical  Exam  Vitals signs and nursing note reviewed.   Constitutional:       Appearance: Normal appearance. She is normal weight.   Neurological:      Mental Status: She is alert.     GEN: No acute distress  Head: Normocephalic, atraumatic  Eyes: Pupils equal round reactive to light  ENT: Posterior pharynx normal in appearance, oral mucosa is moist  Chest: Nontender to palpation  Cardiovascular: Regular rate  Lungs: Clear to auscultation bilaterally  Abdomen: Soft, nontender, nondistended, no peritoneal signs  Extremities: No edema, normal appearance  Neuro: GCS 15  Psych: Mood and affect are appropriate      Procedures          ED Course  ED Course as of Jan 21 1516   Thu Jan 21, 2021   1440 Will hold insulin gtt at this time until potassium is replaced.     [TW]   1514 Consult with Dr. Johnson, will accept to ICU      [TW]      ED Course User Index  [TW] Katarzyna Vines APRN                                           MDM  Number of Diagnoses or Management Options     Amount and/or Complexity of Data Reviewed  Clinical lab tests: ordered and reviewed  Tests in the radiology section of CPT®: ordered and reviewed  Review and summarize past medical records: yes  Discuss the patient with other providers: yes  Independent visualization of images, tracings, or specimens: yes    Risk of Complications, Morbidity, and/or Mortality  Presenting problems: moderate  Diagnostic procedures: moderate  Management options: moderate        Final diagnoses:   Diabetic ketoacidosis without coma associated with type 1 diabetes mellitus (CMS/[facility])            Katarzyna Vines APRN  01/21/21 1516      Electronically signed by Katarzyna Vines APRN at 01/21/21 1516     Melia Kim at 01/21/21 1518        At this time, House Supervisor contacted for bed assignment. She stated it would be a little bit before a bed would be opened up but she will call back.     Melia Kim  01/21/21 1519      Electronically signed by Melia Kim at  01/21/21 1519     Ellen Katz, RN at 01/21/21 1825        Report given to Ellen Young RN, RN  01/21/21 1825      Electronically signed by Ellen Katz, RN at 01/21/21 1825         Current Facility-Administered Medications   Medication Dose Route Frequency Provider Last Rate Last Admin   • acetaminophen (TYLENOL) tablet 650 mg  650 mg Oral Q4H PRN Nicole Mcintosh, DO   650 mg at 01/22/21 0731    Or   • acetaminophen (TYLENOL) suppository 650 mg  650 mg Rectal Q4H PRN Nicole Mcintosh, DO       • dextrose (D50W) 25 g/ 50mL Intravenous Solution 12.5 g  12.5 g Intravenous PRN Gandee, Oliva G, DO       • dextrose 5 % and sodium chloride 0.45 % infusion  150 mL/hr Intravenous Continuous PRN Gandee, Oliva G, DO       • dextrose 5 % and sodium chloride 0.45 % with KCl 20 mEq/L infusion  150 mL/hr Intravenous Continuous PRN Gandee, Oliva G, DO       • dextrose 5 % and sodium chloride 0.45 % with KCl 40 mEq/L infusion  150 mL/hr Intravenous Continuous PRN Gandee, Oliva G, DO       • dextrose 5 % and sodium chloride 0.9 % with KCl 20 mEq/L infusion  150 mL/hr Intravenous Continuous PRN Gandee, Oliva G, DO       • dextrose 5 % and sodium chloride 0.9 % with KCl 20 mEq/L infusion  150 mL/hr Intravenous Continuous Gandee, Oliva G,  mL/hr at 01/22/21 1345 150 mL/hr at 01/22/21 1345   • dextrose 5 % and sodium chloride 0.9 % with KCl 40 mEq/L infusion  150 mL/hr Intravenous Continuous PRN Gandee, Oliva G, DO       • insulin regular 1 unit/mL in 0.9% sodium chloride  7 Units/hr Intravenous Titrated Gandee Oliva G, DO 7 mL/hr at 01/22/21 1336 7 Units/hr at 01/22/21 1336   • ondansetron (ZOFRAN) tablet 4 mg  4 mg Oral Q6H PRN Nicole Mcintosh, DO   4 mg at 01/22/21 1047    Or   • ondansetron (ZOFRAN) injection 4 mg  4 mg Intravenous Q6H PRN Nicole Mcintosh DO   4 mg at 01/22/21 0315   • potassium chloride 10 mEq in 100 mL IVPB  10 mEq Intravenous Q1H Nicole Mcintosh  Tim,  mL/hr at 01/22/21 1334 10 mEq at 01/22/21 1334   • sodium chloride 0.9 % flush 10 mL  10 mL Intravenous PRN Karrick, Nicole Tim, DO       • sodium chloride 0.9 % flush 10 mL  10 mL Intravenous Q12H Karrick, Nicole Tim, DO       • sodium chloride 0.9 % flush 10 mL  10 mL Intravenous PRN Karrick, Nicole Tim, DO       • sodium chloride 0.9 % flush 10 mL  10 mL Intravenous Once PRN Karrick, Nicole Tim, DO       • sodium chloride 0.9 % flush 10 mL  10 mL Intravenous PRN Gandee, Oliva G, DO       • sodium chloride 0.9 % flush 10 mL  10 mL Intravenous Once PRN Gandee, Oliva G, DO       • sodium chloride 0.9 % flush 3 mL  3 mL Intravenous Q12H Karrick, Niocle Tim, DO       • sodium chloride 0.9 % flush 3 mL  3 mL Intravenous Q12H Gandee, Oliva G, DO   3 mL at 01/22/21 1335   • sodium chloride 0.9 % infusion  10 mL/hr Intravenous Continuous PRN Gandee, Oliva G, DO           Lab Results (last 24 hours)     Procedure Component Value Units Date/Time    POC Glucose Once [[id number]]  (Abnormal) Collected: 01/22/21 1354    Specimen: Blood Updated: 01/22/21 1356     Glucose 211 mg/dL      Comment: Serial Number: [id number]:  [id number]       POC Glucose Once [[id number]]  (Abnormal) Collected: 01/22/21 1255    Specimen: Blood Updated: 01/22/21 1257     Glucose 176 mg/dL      Comment: Serial Number: [id number]:  [id number]       Basic Metabolic Panel [[id number]]  (Abnormal) Collected: 01/22/21 1148    Specimen: Blood Updated: 01/22/21 1209     Glucose 183 mg/dL      BUN 4 mg/dL      Creatinine 0.56 mg/dL      Sodium 133 mmol/L      Potassium 4.1 mmol/L      Chloride 106 mmol/L      CO2 8.2 mmol/L      Calcium 8.2 mg/dL      eGFR Non African Amer 138 mL/min/1.73      BUN/Creatinine Ratio 7.1     Anion Gap 18.8 mmol/L     Narrative:      GFR Normal >60  Chronic Kidney Disease <60  Kidney Failure <15      Magnesium [[id number]]  (Normal) Collected: 01/22/21 1148    Specimen: Blood Updated: 01/22/21  1209     Magnesium 2.0 mg/dL     Phosphorus [[id number]]  (Abnormal) Collected: 01/22/21 1148    Specimen: Blood Updated: 01/22/21 1209     Phosphorus 2.0 mg/dL     POC Glucose Once [[id number]]  (Abnormal) Collected: 01/22/21 1148    Specimen: Blood Updated: 01/22/21 1209     Glucose 185 mg/dL      Comment: Serial Number: [id number]:  [id number]       POC Glucose Once [[id number]]  (Abnormal) Collected: 01/22/21 1055    Specimen: Blood Updated: 01/22/21 1208     Glucose 178 mg/dL      Comment: Serial Number: [id number]:  [id number]       POC Glucose Once [[id number]]  (Abnormal) Collected: 01/22/21 0952    Specimen: Blood Updated: 01/22/21 1208     Glucose 131 mg/dL      Comment: Serial Number: [id number]:  [id number]       POC Glucose Once [[id number]]  (Normal) Collected: 01/22/21 0853    Specimen: Blood Updated: 01/22/21 0855     Glucose 119 mg/dL      Comment: Serial Number: [id number]:  [id number]       Phosphorus [[id number]]  (Abnormal) Collected: 01/22/21 0806    Specimen: Blood Updated: 01/22/21 0848     Phosphorus 1.9 mg/dL     Basic Metabolic Panel [[id number]]  (Abnormal) Collected: 01/22/21 0806    Specimen: Blood Updated: 01/22/21 0839     Glucose 130 mg/dL      BUN 4 mg/dL      Creatinine 0.57 mg/dL      Sodium 136 mmol/L      Potassium 3.4 mmol/L      Chloride 109 mmol/L      CO2 10.7 mmol/L      Calcium 8.4 mg/dL      eGFR Non African Amer 135 mL/min/1.73      BUN/Creatinine Ratio 7.0     Anion Gap 16.3 mmol/L     Narrative:      GFR Normal >60  Chronic Kidney Disease <60  Kidney Failure <15      Magnesium [[id number]]  (Normal) Collected: 01/22/21 0806    Specimen: Blood Updated: 01/22/21 0838     Magnesium 2.0 mg/dL     POC Glucose Once [[id number]]  (Normal) Collected: 01/22/21 0754    Specimen: Blood Updated: 01/22/21 0800     Glucose 123 mg/dL      Comment: Serial Number: [id number]:  [id number]       POC Glucose Once [[id number]]  (Normal) Collected: 01/22/21 0643    Specimen: Blood  Updated: 01/22/21 0644     Glucose 113 mg/dL      Comment: Serial Number: [id number]:  [id number]       Basic Metabolic Panel [[id number]]  (Abnormal) Collected: 01/22/21 0503    Specimen: Blood Updated: 01/22/21 0545     Glucose 127 mg/dL      BUN 4 mg/dL      Creatinine 0.48 mg/dL      Sodium 135 mmol/L      Potassium 2.7 mmol/L      Chloride 111 mmol/L      CO2 6.5 mmol/L      Calcium 8.3 mg/dL      eGFR Non African Amer >150 mL/min/1.73      BUN/Creatinine Ratio 8.3     Anion Gap 17.5 mmol/L     Narrative:      GFR Normal >60  Chronic Kidney Disease <60  Kidney Failure <15      Phosphorus [[id number]]  (Abnormal) Collected: 01/22/21 0503    Specimen: Blood Updated: 01/22/21 0545     Phosphorus 1.0 mg/dL     POC Glucose Once [[id number]]  (Normal) Collected: 01/22/21 0532    Specimen: Blood Updated: 01/22/21 0534     Glucose 128 mg/dL      Comment: Serial Number: [id number]:  [id number]       Magnesium [[id number]]  (Normal) Collected: 01/22/21 0503    Specimen: Blood Updated: 01/22/21 0530     Magnesium 1.9 mg/dL     CBC (No Diff) [[id number]]  (Normal) Collected: 01/22/21 0503    Specimen: Blood Updated: 01/22/21 0510     WBC 9.33 10*3/mm3      RBC 4.59 10*6/mm3      Hemoglobin 13.9 g/dL      Hematocrit 40.3 %      MCV 87.8 fL      MCH 30.3 pg      MCHC 34.5 g/dL      RDW 15.0 %      RDW-SD 47.3 fl      MPV 10.0 fL      Platelets 230 10*3/mm3     POC Glucose Once [[id number]]  (Abnormal) Collected: 01/22/21 0436    Specimen: Blood Updated: 01/22/21 0441     Glucose 146 mg/dL      Comment: Serial Number: [id number]:  [id number]       POC Glucose Once [[id number]]  (Abnormal) Collected: 01/22/21 0229    Specimen: Blood Updated: 01/22/21 0231     Glucose 257 mg/dL      Comment: Serial Number: [id number]:  [id number]       POC Glucose Once [[id number]]  (Abnormal) Collected: 01/22/21 0114    Specimen: Blood Updated: 01/22/21 0117     Glucose 265 mg/dL      Comment: Serial Number: [id number]:  [id number]        Phosphorus [[id number]]  (Abnormal) Collected: 01/22/21 0033    Specimen: Blood Updated: 01/22/21 0116     Phosphorus 1.9 mg/dL     Basic Metabolic Panel [[id number]]  (Abnormal) Collected: 01/22/21 0033    Specimen: Blood Updated: 01/22/21 0106     Glucose 256 mg/dL      BUN 4 mg/dL      Creatinine 0.58 mg/dL      Sodium 132 mmol/L      Potassium 3.6 mmol/L      Chloride 105 mmol/L      CO2 6.5 mmol/L      Calcium 8.2 mg/dL      eGFR Non African Amer 133 mL/min/1.73      BUN/Creatinine Ratio 6.9     Anion Gap 20.5 mmol/L     Narrative:      GFR Normal >60  Chronic Kidney Disease <60  Kidney Failure <15      Magnesium [[id number]]  (Normal) Collected: 01/22/21 0033    Specimen: Blood Updated: 01/22/21 0106     Magnesium 2.0 mg/dL     POC Glucose Once [[id number]]  (Abnormal) Collected: 01/22/21 0001    Specimen: Blood Updated: 01/22/21 0008     Glucose 238 mg/dL      Comment: Serial Number: [id number]:  [id number]       POC Glucose Once [[id number]]  (Abnormal) Collected: 01/21/21 2305    Specimen: Blood Updated: 01/22/21 0008     Glucose 241 mg/dL      Comment: Serial Number: [id number]:  [id number]       Osmolality, Serum [[id number]]  (Normal) Collected: 01/21/21 1500    Specimen: Blood Updated: 01/21/21 2321     Osmolality 292 mOsm/kg     POC Glucose Once [[id number]]  (Abnormal) Collected: 01/21/21 2221    Specimen: Blood Updated: 01/21/21 2227     Glucose 218 mg/dL      Comment: Serial Number: [id number]:  [id number]       POC Glucose Once [[id number]]  (Abnormal) Collected: 01/21/21 2101    Specimen: Blood Updated: 01/21/21 2107     Glucose 192 mg/dL      Comment: Serial Number: [id number]:  [id number]       POC Glucose Once [[id number]]  (Abnormal) Collected: 01/21/21 1933    Specimen: Blood Updated: 01/21/21 1939     Glucose 182 mg/dL      Comment: Serial Number: [id number]:  [id number]       POC Glucose Once [[id number]]  (Abnormal) Collected: 01/21/21 1840    Specimen: Blood Updated:  01/21/21 1849     Glucose 196 mg/dL      Comment: Serial Number: [id number]:  [id number]       POC Glucose Once [[id number]]  (Abnormal) Collected: 01/21/21 1804    Specimen: Blood Updated: 01/21/21 1813     Glucose 163 mg/dL      Comment: Serial Number: [id number]:  [id number]       Phosphorus [[id number]]  (Normal) Collected: 01/21/21 1345    Specimen: Blood Updated: 01/21/21 1653     Phosphorus 2.5 mg/dL     hCG, Serum, Qualitative [[id number]]  (Normal) Collected: 01/21/21 1500    Specimen: Blood Updated: 01/21/21 1601     HCG Qualitative Negative    Hemoglobin A1c [[id number]]  (Abnormal) Collected: 01/21/21 1345    Specimen: Blood Updated: 01/21/21 1542     Hemoglobin A1C 11.50 %     Narrative:      Hemoglobin A1C Ranges:    Increased Risk for Diabetes  5.7% to 6.4%  Diabetes                     >= 6.5%  Diabetic Goal                < 7.0%    Basic Metabolic Panel [[id number]]  (Abnormal) Collected: 01/21/21 1500    Specimen: Blood Updated: 01/21/21 1537     Glucose 222 mg/dL      BUN 7 mg/dL      Creatinine 0.60 mg/dL      Sodium 134 mmol/L      Potassium 2.9 mmol/L      Chloride 104 mmol/L      CO2 7.0 mmol/L      Calcium 7.8 mg/dL      eGFR Non African Amer 127 mL/min/1.73      BUN/Creatinine Ratio 11.7     Anion Gap 23.0 mmol/L     Narrative:      GFR Normal >60  Chronic Kidney Disease <60  Kidney Failure <15      Magnesium [[id number]]  (Normal) Collected: 01/21/21 1500    Specimen: Blood Updated: 01/21/21 1537     Magnesium 2.0 mg/dL     Phosphorus [[id number]]  (Abnormal) Collected: 01/21/21 1500    Specimen: Blood Updated: 01/21/21 1537     Phosphorus 2.2 mg/dL           Physician Progress Notes (last 48 hours) (Notes from 01/20/21 1427 through 01/22/21 1427)    No notes of this type exist for this encounter.       Consult Notes (last 48 hours) (Notes from 01/20/21 1427 through 01/22/21 1427)    No notes of this type exist for this encounter.

## 2021-01-23 LAB
ANION GAP SERPL CALCULATED.3IONS-SCNC: 12.3 MMOL/L (ref 5–15)
ANION GAP SERPL CALCULATED.3IONS-SCNC: 12.6 MMOL/L (ref 5–15)
ANION GAP SERPL CALCULATED.3IONS-SCNC: 14.3 MMOL/L (ref 5–15)
ANION GAP SERPL CALCULATED.3IONS-SCNC: 14.6 MMOL/L (ref 5–15)
ANION GAP SERPL CALCULATED.3IONS-SCNC: 16.1 MMOL/L (ref 5–15)
BASOPHILS # BLD AUTO: 0.04 10*3/MM3 (ref 0–0.2)
BASOPHILS # BLD AUTO: 0.05 10*3/MM3 (ref 0–0.2)
BASOPHILS NFR BLD AUTO: 0.7 % (ref 0–1.5)
BASOPHILS NFR BLD AUTO: 0.8 % (ref 0–1.5)
BUN SERPL-MCNC: 2 MG/DL (ref 6–20)
BUN SERPL-MCNC: 3 MG/DL (ref 6–20)
BUN SERPL-MCNC: 3 MG/DL (ref 6–20)
BUN/CREAT SERPL: 4 (ref 7–25)
BUN/CREAT SERPL: 4.5 (ref 7–25)
BUN/CREAT SERPL: 4.8 (ref 7–25)
BUN/CREAT SERPL: 6.1 (ref 7–25)
BUN/CREAT SERPL: 7.3 (ref 7–25)
CALCIUM SPEC-SCNC: 7.7 MG/DL (ref 8.6–10.5)
CALCIUM SPEC-SCNC: 7.7 MG/DL (ref 8.6–10.5)
CALCIUM SPEC-SCNC: 8.2 MG/DL (ref 8.6–10.5)
CHLORIDE SERPL-SCNC: 107 MMOL/L (ref 98–107)
CHLORIDE SERPL-SCNC: 108 MMOL/L (ref 98–107)
CHLORIDE SERPL-SCNC: 108 MMOL/L (ref 98–107)
CHLORIDE SERPL-SCNC: 109 MMOL/L (ref 98–107)
CHLORIDE SERPL-SCNC: 109 MMOL/L (ref 98–107)
CO2 SERPL-SCNC: 11.7 MMOL/L (ref 22–29)
CO2 SERPL-SCNC: 12.4 MMOL/L (ref 22–29)
CO2 SERPL-SCNC: 14.4 MMOL/L (ref 22–29)
CO2 SERPL-SCNC: 15.7 MMOL/L (ref 22–29)
CO2 SERPL-SCNC: 9.9 MMOL/L (ref 22–29)
CREAT SERPL-MCNC: 0.41 MG/DL (ref 0.57–1)
CREAT SERPL-MCNC: 0.42 MG/DL (ref 0.57–1)
CREAT SERPL-MCNC: 0.44 MG/DL (ref 0.57–1)
CREAT SERPL-MCNC: 0.49 MG/DL (ref 0.57–1)
CREAT SERPL-MCNC: 0.5 MG/DL (ref 0.57–1)
DEPRECATED RDW RBC AUTO: 45.3 FL (ref 37–54)
DEPRECATED RDW RBC AUTO: 46.3 FL (ref 37–54)
EOSINOPHIL # BLD AUTO: 0.11 10*3/MM3 (ref 0–0.4)
EOSINOPHIL # BLD AUTO: 0.15 10*3/MM3 (ref 0–0.4)
EOSINOPHIL NFR BLD AUTO: 1.8 % (ref 0.3–6.2)
EOSINOPHIL NFR BLD AUTO: 2.5 % (ref 0.3–6.2)
ERYTHROCYTE [DISTWIDTH] IN BLOOD BY AUTOMATED COUNT: 15 % (ref 12.3–15.4)
ERYTHROCYTE [DISTWIDTH] IN BLOOD BY AUTOMATED COUNT: 15.1 % (ref 12.3–15.4)
GFR SERPL CREATININE-BSD FRML MDRD: >150 ML/MIN/1.73
GLUCOSE BLDC GLUCOMTR-MCNC: 111 MG/DL (ref 70–130)
GLUCOSE BLDC GLUCOMTR-MCNC: 140 MG/DL (ref 70–130)
GLUCOSE BLDC GLUCOMTR-MCNC: 147 MG/DL (ref 70–130)
GLUCOSE BLDC GLUCOMTR-MCNC: 147 MG/DL (ref 70–130)
GLUCOSE BLDC GLUCOMTR-MCNC: 162 MG/DL (ref 70–130)
GLUCOSE BLDC GLUCOMTR-MCNC: 165 MG/DL (ref 70–130)
GLUCOSE BLDC GLUCOMTR-MCNC: 166 MG/DL (ref 70–130)
GLUCOSE BLDC GLUCOMTR-MCNC: 172 MG/DL (ref 70–130)
GLUCOSE BLDC GLUCOMTR-MCNC: 173 MG/DL (ref 70–130)
GLUCOSE BLDC GLUCOMTR-MCNC: 183 MG/DL (ref 70–130)
GLUCOSE BLDC GLUCOMTR-MCNC: 186 MG/DL (ref 70–130)
GLUCOSE BLDC GLUCOMTR-MCNC: 188 MG/DL (ref 70–130)
GLUCOSE BLDC GLUCOMTR-MCNC: 207 MG/DL (ref 70–130)
GLUCOSE BLDC GLUCOMTR-MCNC: 208 MG/DL (ref 70–130)
GLUCOSE BLDC GLUCOMTR-MCNC: 209 MG/DL (ref 70–130)
GLUCOSE BLDC GLUCOMTR-MCNC: 216 MG/DL (ref 70–130)
GLUCOSE BLDC GLUCOMTR-MCNC: 217 MG/DL (ref 70–130)
GLUCOSE BLDC GLUCOMTR-MCNC: 220 MG/DL (ref 70–130)
GLUCOSE BLDC GLUCOMTR-MCNC: 234 MG/DL (ref 70–130)
GLUCOSE SERPL-MCNC: 143 MG/DL (ref 65–99)
GLUCOSE SERPL-MCNC: 146 MG/DL (ref 65–99)
GLUCOSE SERPL-MCNC: 222 MG/DL (ref 65–99)
GLUCOSE SERPL-MCNC: 230 MG/DL (ref 65–99)
GLUCOSE SERPL-MCNC: 232 MG/DL (ref 65–99)
HCT VFR BLD AUTO: 36.5 % (ref 34–46.6)
HCT VFR BLD AUTO: 38.8 % (ref 34–46.6)
HGB BLD-MCNC: 13.2 G/DL (ref 12–15.9)
HGB BLD-MCNC: 14 G/DL (ref 12–15.9)
IMM GRANULOCYTES # BLD AUTO: 0.05 10*3/MM3 (ref 0–0.05)
IMM GRANULOCYTES # BLD AUTO: 0.06 10*3/MM3 (ref 0–0.05)
IMM GRANULOCYTES NFR BLD AUTO: 0.8 % (ref 0–0.5)
IMM GRANULOCYTES NFR BLD AUTO: 1 % (ref 0–0.5)
LYMPHOCYTES # BLD AUTO: 1.58 10*3/MM3 (ref 0.7–3.1)
LYMPHOCYTES # BLD AUTO: 1.68 10*3/MM3 (ref 0.7–3.1)
LYMPHOCYTES NFR BLD AUTO: 26.2 % (ref 19.6–45.3)
LYMPHOCYTES NFR BLD AUTO: 27.6 % (ref 19.6–45.3)
MAGNESIUM SERPL-MCNC: 2 MG/DL (ref 1.7–2.2)
MAGNESIUM SERPL-MCNC: 2.2 MG/DL (ref 1.7–2.2)
MAGNESIUM SERPL-MCNC: 2.3 MG/DL (ref 1.7–2.2)
MAGNESIUM SERPL-MCNC: 2.3 MG/DL (ref 1.7–2.2)
MAGNESIUM SERPL-MCNC: 2.9 MG/DL (ref 1.7–2.2)
MCH RBC QN AUTO: 30.3 PG (ref 26.6–33)
MCH RBC QN AUTO: 30.6 PG (ref 26.6–33)
MCHC RBC AUTO-ENTMCNC: 36.1 G/DL (ref 31.5–35.7)
MCHC RBC AUTO-ENTMCNC: 36.2 G/DL (ref 31.5–35.7)
MCV RBC AUTO: 84 FL (ref 79–97)
MCV RBC AUTO: 84.7 FL (ref 79–97)
MONOCYTES # BLD AUTO: 0.83 10*3/MM3 (ref 0.1–0.9)
MONOCYTES # BLD AUTO: 0.83 10*3/MM3 (ref 0.1–0.9)
MONOCYTES NFR BLD AUTO: 13.6 % (ref 5–12)
MONOCYTES NFR BLD AUTO: 13.8 % (ref 5–12)
NEUTROPHILS NFR BLD AUTO: 3.34 10*3/MM3 (ref 1.7–7)
NEUTROPHILS NFR BLD AUTO: 3.39 10*3/MM3 (ref 1.7–7)
NEUTROPHILS NFR BLD AUTO: 54.8 % (ref 42.7–76)
NEUTROPHILS NFR BLD AUTO: 56.4 % (ref 42.7–76)
NRBC BLD AUTO-RTO: 0 /100 WBC (ref 0–0.2)
NRBC BLD AUTO-RTO: 0 /100 WBC (ref 0–0.2)
PHOSPHATE SERPL-MCNC: 1.6 MG/DL (ref 2.5–4.5)
PHOSPHATE SERPL-MCNC: 1.9 MG/DL (ref 2.5–4.5)
PHOSPHATE SERPL-MCNC: 1.9 MG/DL (ref 2.5–4.5)
PHOSPHATE SERPL-MCNC: 2 MG/DL (ref 2.5–4.5)
PHOSPHATE SERPL-MCNC: 2.2 MG/DL (ref 2.5–4.5)
PHOSPHATE SERPL-MCNC: 2.9 MG/DL (ref 2.5–4.5)
PLATELET # BLD AUTO: 221 10*3/MM3 (ref 140–450)
PLATELET # BLD AUTO: 234 10*3/MM3 (ref 140–450)
PMV BLD AUTO: 10.6 FL (ref 6–12)
PMV BLD AUTO: 10.9 FL (ref 6–12)
POTASSIUM SERPL-SCNC: 2.9 MMOL/L (ref 3.5–5.2)
POTASSIUM SERPL-SCNC: 3.4 MMOL/L (ref 3.5–5.2)
POTASSIUM SERPL-SCNC: 3.4 MMOL/L (ref 3.5–5.2)
POTASSIUM SERPL-SCNC: 3.5 MMOL/L (ref 3.5–5.2)
POTASSIUM SERPL-SCNC: 3.6 MMOL/L (ref 3.5–5.2)
POTASSIUM SERPL-SCNC: 3.6 MMOL/L (ref 3.5–5.2)
RBC # BLD AUTO: 4.31 10*6/MM3 (ref 3.77–5.28)
RBC # BLD AUTO: 4.62 10*6/MM3 (ref 3.77–5.28)
SODIUM SERPL-SCNC: 134 MMOL/L (ref 136–145)
SODIUM SERPL-SCNC: 135 MMOL/L (ref 136–145)
SODIUM SERPL-SCNC: 136 MMOL/L (ref 136–145)
WBC # BLD AUTO: 6.02 10*3/MM3 (ref 3.4–10.8)
WBC # BLD AUTO: 6.09 10*3/MM3 (ref 3.4–10.8)

## 2021-01-23 PROCEDURE — 80048 BASIC METABOLIC PNL TOTAL CA: CPT | Performed by: FAMILY MEDICINE

## 2021-01-23 PROCEDURE — 85025 COMPLETE CBC W/AUTO DIFF WBC: CPT | Performed by: FAMILY MEDICINE

## 2021-01-23 PROCEDURE — 84100 ASSAY OF PHOSPHORUS: CPT | Performed by: FAMILY MEDICINE

## 2021-01-23 PROCEDURE — 63710000001 ONDANSETRON PER 8 MG: Performed by: FAMILY MEDICINE

## 2021-01-23 PROCEDURE — 82962 GLUCOSE BLOOD TEST: CPT

## 2021-01-23 PROCEDURE — 84132 ASSAY OF SERUM POTASSIUM: CPT | Performed by: FAMILY MEDICINE

## 2021-01-23 PROCEDURE — 83735 ASSAY OF MAGNESIUM: CPT | Performed by: FAMILY MEDICINE

## 2021-01-23 PROCEDURE — 63710000001 INSULIN DETEMIR PER 5 UNITS: Performed by: FAMILY MEDICINE

## 2021-01-23 PROCEDURE — 99232 SBSQ HOSP IP/OBS MODERATE 35: CPT | Performed by: FAMILY MEDICINE

## 2021-01-23 RX ORDER — POTASSIUM CHLORIDE 750 MG/1
40 CAPSULE, EXTENDED RELEASE ORAL EVERY 4 HOURS
Status: COMPLETED | OUTPATIENT
Start: 2021-01-23 | End: 2021-01-23

## 2021-01-23 RX ORDER — DEXTROSE MONOHYDRATE 25 G/50ML
25 INJECTION, SOLUTION INTRAVENOUS
Status: DISCONTINUED | OUTPATIENT
Start: 2021-01-23 | End: 2021-01-24 | Stop reason: HOSPADM

## 2021-01-23 RX ORDER — NICOTINE POLACRILEX 4 MG
1 LOZENGE BUCCAL
Status: DISCONTINUED | OUTPATIENT
Start: 2021-01-23 | End: 2021-01-24 | Stop reason: HOSPADM

## 2021-01-23 RX ORDER — SODIUM CHLORIDE 9 MG/ML
125 INJECTION, SOLUTION INTRAVENOUS CONTINUOUS
Status: DISCONTINUED | OUTPATIENT
Start: 2021-01-23 | End: 2021-01-24 | Stop reason: HOSPADM

## 2021-01-23 RX ORDER — POTASSIUM CHLORIDE 750 MG/1
40 CAPSULE, EXTENDED RELEASE ORAL ONCE
Status: DISCONTINUED | OUTPATIENT
Start: 2021-01-23 | End: 2021-01-23

## 2021-01-23 RX ORDER — POTASSIUM CHLORIDE 750 MG/1
40 CAPSULE, EXTENDED RELEASE ORAL ONCE
Status: COMPLETED | OUTPATIENT
Start: 2021-01-23 | End: 2021-01-23

## 2021-01-23 RX ADMIN — INSULIN DETEMIR 10 UNITS: 100 INJECTION, SOLUTION SUBCUTANEOUS at 16:31

## 2021-01-23 RX ADMIN — POTASSIUM & SODIUM PHOSPHATES POWDER PACK 280-160-250 MG 2 PACKET: 280-160-250 PACK at 01:21

## 2021-01-23 RX ADMIN — POTASSIUM CHLORIDE 40 MEQ: 10 CAPSULE, COATED, EXTENDED RELEASE ORAL at 20:34

## 2021-01-23 RX ADMIN — POTASSIUM CHLORIDE, DEXTROSE MONOHYDRATE AND SODIUM CHLORIDE 150 ML/HR: 300; 5; 450 INJECTION, SOLUTION INTRAVENOUS at 01:21

## 2021-01-23 RX ADMIN — POTASSIUM CHLORIDE 40 MEQ: 10 CAPSULE, COATED, EXTENDED RELEASE ORAL at 01:21

## 2021-01-23 RX ADMIN — SODIUM CHLORIDE, PRESERVATIVE FREE 3 ML: 5 INJECTION INTRAVENOUS at 20:36

## 2021-01-23 RX ADMIN — SODIUM CHLORIDE, PRESERVATIVE FREE 10 ML: 5 INJECTION INTRAVENOUS at 20:35

## 2021-01-23 RX ADMIN — POTASSIUM CHLORIDE, DEXTROSE MONOHYDRATE AND SODIUM CHLORIDE 150 ML/HR: 150; 5; 450 INJECTION, SOLUTION INTRAVENOUS at 12:02

## 2021-01-23 RX ADMIN — SODIUM CHLORIDE, PRESERVATIVE FREE 3 ML: 5 INJECTION INTRAVENOUS at 08:20

## 2021-01-23 RX ADMIN — SODIUM CHLORIDE 30 ML/HR: 9 INJECTION, SOLUTION INTRAVENOUS at 16:31

## 2021-01-23 RX ADMIN — ONDANSETRON HYDROCHLORIDE 4 MG: 4 TABLET, FILM COATED ORAL at 00:51

## 2021-01-23 RX ADMIN — POTASSIUM CHLORIDE 40 MEQ: 10 CAPSULE, COATED, EXTENDED RELEASE ORAL at 16:31

## 2021-01-23 RX ADMIN — SODIUM CHLORIDE, PRESERVATIVE FREE 10 ML: 5 INJECTION INTRAVENOUS at 08:20

## 2021-01-23 RX ADMIN — POTASSIUM & SODIUM PHOSPHATES POWDER PACK 280-160-250 MG 2 PACKET: 280-160-250 PACK at 16:31

## 2021-01-23 RX ADMIN — POTASSIUM & SODIUM PHOSPHATES POWDER PACK 280-160-250 MG 2 PACKET: 280-160-250 PACK at 08:08

## 2021-01-23 RX ADMIN — POTASSIUM CHLORIDE, DEXTROSE MONOHYDRATE AND SODIUM CHLORIDE 150 ML/HR: 300; 5; 450 INJECTION, SOLUTION INTRAVENOUS at 08:04

## 2021-01-23 NOTE — PROGRESS NOTES
[facility] HOSPITALIST    PROGRESS NOTE    Name:  Ruby Ramos   Age:  20 y.o.  Sex:  female  :  2000  MRN:  [id number]   Visit Number:  [id number]  Admission Date:  2021  Date Of Service:  21  Primary Care Physician:  Isamar Leon APRN     LOS: 2 days :      Chief Complaint: Nausea, thirst, hyperglycemia        Subjective / Interval History: The patient is seen again earlier this morning.  She is sitting up in bed feeling well.  She is hungry and wanting to eat.  Her glucose is still greater than 200 and she is still acidotic but improving.  I feel the patient should continue the insulin drip and frequent labs but I will allow the patient to have some sugar-free clear liquids in the meantime.  Her mom was supportive at the bedside.  The patient reports having helped and watched her father treat his diabetes for years and she feels very comfortable with this.  She reported receiving diabetes counseling yesterday also.  Her primary care physician already set her up with a referral to endocrinology outpatient.      The patient is a 20-year-old lady who had seen her primary care physician for diabetic symptoms including polyuria, polydipsia, polyphagia.  She has past medical history of celiac disease.  She had been active and going to the gym.  She recently had a perineum abscess on Bactroban.  She had generally felt unwell lately with her glucose checked on her father's meter running in the 300s to 400s.  She presented to the emergency room for further evaluation.  She was noted to be in acute diabetic ketoacidosis with new onset diabetes.  Her hemoglobin A1c was 11.5.  Her glucose was 261 with a creatinine of 0.71.  Urinalysis was positive for ketones and glucose.  White blood cell count 12 with VBG pH 7.086.  Hospitalist service admitted her to the ICU.          Review of Systems:     General ROS: Patient denies any fevers, chills or loss of consciousness.   Hungry  Ophthalmic ROS: Denies any diplopia or transient loss of vision.  ENT ROS: Denies sore throat, nasal congestion or ear pain.   Respiratory ROS: Denies cough or shortness of breath.  Cardiovascular ROS: Denies chest pain or palpitations. No history of exertional chest pain.   Gastrointestinal ROS: Positive nausea without vomiting. Denies any abdominal pain. No diarrhea.  Genitourinary ROS: Denies dysuria or hematuria.  Musculoskeletal ROS: Denies back pain. No muscle pain. No calf pain.  Neurological ROS: Denies any focal weakness. No loss of consciousness. Denies any numbness. Denies neck pain.   Dermatological ROS: Denies any redness or pruritis.    Vital Signs:    Temp:  [97.9 °F (36.6 °C)-98.5 °F (36.9 °C)] 97.9 °F (36.6 °C)  Heart Rate:  [] 90  Resp:  [16-18] 16  BP: ()/(67-77) 95/67    Intake and output:    I/O last 3 completed shifts:  In: 4830 [P.O.:150; I.V.:4680]  Out: 5250 [Urine:5250]  I/O this shift:  In: -   Out: 500 [Urine:500]    Physical Examination: Reviewed again today    General Appearance:    Alert and cooperative, not in any acute distress.   Head:    Atraumatic and normocephalic, without obvious abnormality.   Eyes:            PERRLA, EOMI.   Throat:   No oral lesions, no thrush, oral mucosa moist.   Neck:   Supple, trachea midline, no thyromegaly   Lungs:    Clear bilaterally    Heart:    Normal S1 and S2, no murmur, no gallop, no rub. No JVD   Abdomen:    Soft and nontender   Extremities:   Moves all extremities well, no edema, no cyanosis, no           clubbing   Skin:   No bleeding, bruising or rash.   Neurologic:  No tremor, sensation intact, moves arms and legs equally   Laboratory results:    Results from last 7 days   Lab Units 01/23/21  1052 01/23/21  0630 01/23/21  0434  01/21/21  1345   SODIUM mmol/L 136 135* 134*   < > 128*   POTASSIUM mmol/L 3.4* 3.6 3.5   < > 3.2*   CHLORIDE mmol/L 109* 108* 108*   < > 98   CO2 mmol/L 14.4* 12.4* 9.9*   < > 6.6*   BUN mg/dL 2*  2* 3*   < > 8   CREATININE mg/dL 0.42* 0.50* 0.41*   < > 0.71   CALCIUM mg/dL 8.2* 8.2* 7.7*   < > 8.4*   BILIRUBIN mg/dL  --   --   --   --  0.5   ALK PHOS U/L  --   --   --   --  131*   ALT (SGPT) U/L  --   --   --   --  18   AST (SGOT) U/L  --   --   --   --  14   GLUCOSE mg/dL 222* 232* 230*   < > 261*    < > = values in this interval not displayed.     Results from last 7 days   Lab Units 01/23/21  0630 01/23/21  0434 01/22/21  0503   WBC 10*3/mm3 6.09 6.02 9.33   HEMOGLOBIN g/dL 13.2 14.0 13.9   HEMATOCRIT % 36.5 38.8 40.3   PLATELETS 10*3/mm3 221 234 230         Results from last 7 days   Lab Units 01/21/21  1345   TROPONIN T ng/mL <0.010               I have reviewed the patient's laboratory results.    Radiology results:    Imaging Results (Last 24 Hours)     ** No results found for the last 24 hours. **          I have reviewed the patient's radiology reports.    Medication Review:     I have reviewed the patient's active and prn medications.     Assessment:    1.  Diabetic ketoacidosis without coma associated with type 1 diabetes mellitus, suspected, present on admission  2.  Dehydration  3.  Nausea  4.  History of celiac disease  5.  Hypokalemia     Plan:  Continue to monitor in the ICU while requiring an insulin drip.  Continue frequent glucose monitoring, electrolyte replacement, fluid adjustments as needed per frequent labs and per protocol.  Her acidosis is improving.  Her glucose control is improving.  Diabetes teaching has already been provided.  Sugar-free clear liquid diet ordered.  Further recommendations depend on the hospital course.  Hopefully later today we can start her on Levemir and stop the insulin drip with improved acidosis.      Medication risks and benefits were discussed in detail. Patient and her mother reported satisfaction with care delivered and treatment plan.     Oliva Jeter DO  01/23/21  14:01 EST

## 2021-01-23 NOTE — PLAN OF CARE
Goal Outcome Evaluation:  Plan of Care Reviewed With: patient  Progress: improving   Pt. Currently sitting up in bed talking with family. Pt has no complaints at this time.

## 2021-01-23 NOTE — PLAN OF CARE
Goal Outcome Evaluation:  Plan of Care Reviewed With: patient  Progress: improving   VSS. 2x 40mEq PO potassium. Insulin .5u/hr. 0400 labs. Retimed future labs. Patient resting in bed. No c/o pain/discomfort.

## 2021-01-24 ENCOUNTER — READMISSION MANAGEMENT (OUTPATIENT)
Dept: CALL CENTER | Facility: HOSPITAL | Age: 21
End: 2021-01-24

## 2021-01-24 VITALS
SYSTOLIC BLOOD PRESSURE: 111 MMHG | OXYGEN SATURATION: 98 % | WEIGHT: 161.38 LBS | RESPIRATION RATE: 18 BRPM | HEIGHT: 63 IN | DIASTOLIC BLOOD PRESSURE: 50 MMHG | TEMPERATURE: 97.6 F | BODY MASS INDEX: 28.59 KG/M2 | HEART RATE: 92 BPM

## 2021-01-24 LAB
ANION GAP SERPL CALCULATED.3IONS-SCNC: 11.1 MMOL/L (ref 5–15)
ANION GAP SERPL CALCULATED.3IONS-SCNC: 16.3 MMOL/L (ref 5–15)
BASOPHILS # BLD AUTO: 0.05 10*3/MM3 (ref 0–0.2)
BASOPHILS NFR BLD AUTO: 0.9 % (ref 0–1.5)
BUN SERPL-MCNC: 2 MG/DL (ref 6–20)
BUN SERPL-MCNC: 3 MG/DL (ref 6–20)
BUN/CREAT SERPL: 4.3 (ref 7–25)
BUN/CREAT SERPL: 4.4 (ref 7–25)
CALCIUM SPEC-SCNC: 8.3 MG/DL (ref 8.6–10.5)
CALCIUM SPEC-SCNC: 8.8 MG/DL (ref 8.6–10.5)
CHLORIDE SERPL-SCNC: 105 MMOL/L (ref 98–107)
CHLORIDE SERPL-SCNC: 106 MMOL/L (ref 98–107)
CO2 SERPL-SCNC: 16.7 MMOL/L (ref 22–29)
CO2 SERPL-SCNC: 20.9 MMOL/L (ref 22–29)
CREAT SERPL-MCNC: 0.46 MG/DL (ref 0.57–1)
CREAT SERPL-MCNC: 0.68 MG/DL (ref 0.57–1)
DEPRECATED RDW RBC AUTO: 45.5 FL (ref 37–54)
EOSINOPHIL # BLD AUTO: 0.11 10*3/MM3 (ref 0–0.4)
EOSINOPHIL NFR BLD AUTO: 2 % (ref 0.3–6.2)
ERYTHROCYTE [DISTWIDTH] IN BLOOD BY AUTOMATED COUNT: 14.9 % (ref 12.3–15.4)
GFR SERPL CREATININE-BSD FRML MDRD: 110 ML/MIN/1.73
GFR SERPL CREATININE-BSD FRML MDRD: >150 ML/MIN/1.73
GLUCOSE BLDC GLUCOMTR-MCNC: 150 MG/DL (ref 70–130)
GLUCOSE BLDC GLUCOMTR-MCNC: 186 MG/DL (ref 70–130)
GLUCOSE BLDC GLUCOMTR-MCNC: 281 MG/DL (ref 70–130)
GLUCOSE SERPL-MCNC: 152 MG/DL (ref 65–99)
GLUCOSE SERPL-MCNC: 207 MG/DL (ref 65–99)
HCT VFR BLD AUTO: 38.3 % (ref 34–46.6)
HGB BLD-MCNC: 13.7 G/DL (ref 12–15.9)
IMM GRANULOCYTES # BLD AUTO: 0.03 10*3/MM3 (ref 0–0.05)
IMM GRANULOCYTES NFR BLD AUTO: 0.5 % (ref 0–0.5)
LYMPHOCYTES # BLD AUTO: 2.11 10*3/MM3 (ref 0.7–3.1)
LYMPHOCYTES NFR BLD AUTO: 38.3 % (ref 19.6–45.3)
MAGNESIUM SERPL-MCNC: 2 MG/DL (ref 1.7–2.2)
MCH RBC QN AUTO: 30.4 PG (ref 26.6–33)
MCHC RBC AUTO-ENTMCNC: 35.8 G/DL (ref 31.5–35.7)
MCV RBC AUTO: 84.9 FL (ref 79–97)
MONOCYTES # BLD AUTO: 0.72 10*3/MM3 (ref 0.1–0.9)
MONOCYTES NFR BLD AUTO: 13.1 % (ref 5–12)
NEUTROPHILS NFR BLD AUTO: 2.49 10*3/MM3 (ref 1.7–7)
NEUTROPHILS NFR BLD AUTO: 45.2 % (ref 42.7–76)
NRBC BLD AUTO-RTO: 0 /100 WBC (ref 0–0.2)
PHOSPHATE SERPL-MCNC: 3.1 MG/DL (ref 2.5–4.5)
PLATELET # BLD AUTO: 222 10*3/MM3 (ref 140–450)
PMV BLD AUTO: 10.4 FL (ref 6–12)
POTASSIUM SERPL-SCNC: 3.1 MMOL/L (ref 3.5–5.2)
POTASSIUM SERPL-SCNC: 3.5 MMOL/L (ref 3.5–5.2)
RBC # BLD AUTO: 4.51 10*6/MM3 (ref 3.77–5.28)
SODIUM SERPL-SCNC: 138 MMOL/L (ref 136–145)
SODIUM SERPL-SCNC: 138 MMOL/L (ref 136–145)
WBC # BLD AUTO: 5.51 10*3/MM3 (ref 3.4–10.8)

## 2021-01-24 PROCEDURE — 63710000001 INSULIN ASPART PER 5 UNITS: Performed by: FAMILY MEDICINE

## 2021-01-24 PROCEDURE — 63710000001 INSULIN DETEMIR PER 5 UNITS: Performed by: FAMILY MEDICINE

## 2021-01-24 PROCEDURE — 82962 GLUCOSE BLOOD TEST: CPT

## 2021-01-24 PROCEDURE — 84100 ASSAY OF PHOSPHORUS: CPT | Performed by: FAMILY MEDICINE

## 2021-01-24 PROCEDURE — 83735 ASSAY OF MAGNESIUM: CPT | Performed by: FAMILY MEDICINE

## 2021-01-24 PROCEDURE — 85025 COMPLETE CBC W/AUTO DIFF WBC: CPT | Performed by: FAMILY MEDICINE

## 2021-01-24 PROCEDURE — 99239 HOSP IP/OBS DSCHRG MGMT >30: CPT | Performed by: FAMILY MEDICINE

## 2021-01-24 PROCEDURE — 80048 BASIC METABOLIC PNL TOTAL CA: CPT | Performed by: FAMILY MEDICINE

## 2021-01-24 PROCEDURE — 25010000002 ONDANSETRON PER 1 MG: Performed by: FAMILY MEDICINE

## 2021-01-24 RX ORDER — POTASSIUM CHLORIDE 750 MG/1
40 CAPSULE, EXTENDED RELEASE ORAL EVERY 4 HOURS
Status: COMPLETED | OUTPATIENT
Start: 2021-01-24 | End: 2021-01-24

## 2021-01-24 RX ORDER — PEN NEEDLE, DIABETIC 32GX 5/32"
1 NEEDLE, DISPOSABLE MISCELLANEOUS
Qty: 100 EACH | Refills: 0 | Status: SHIPPED | OUTPATIENT
Start: 2021-01-24 | End: 2021-01-26

## 2021-01-24 RX ORDER — CALCIUM CITRATE/VITAMIN D3 200MG-6.25
TABLET ORAL
Qty: 200 EACH | Refills: 0 | Status: SHIPPED | OUTPATIENT
Start: 2021-01-24 | End: 2021-01-26

## 2021-01-24 RX ORDER — POTASSIUM CHLORIDE 20 MEQ/1
20 TABLET, EXTENDED RELEASE ORAL DAILY
Qty: 14 TABLET | Refills: 0 | Status: SHIPPED | OUTPATIENT
Start: 2021-01-24 | End: 2021-03-09 | Stop reason: ALTCHOICE

## 2021-01-24 RX ORDER — PEN NEEDLE, DIABETIC 31 GX5/16"
1 NEEDLE, DISPOSABLE MISCELLANEOUS 4 TIMES DAILY
Qty: 200 EACH | Refills: 0 | Status: SHIPPED | OUTPATIENT
Start: 2021-01-24 | End: 2021-01-26 | Stop reason: SDUPTHER

## 2021-01-24 RX ADMIN — INSULIN ASPART 2 UNITS: 100 INJECTION, SOLUTION INTRAVENOUS; SUBCUTANEOUS at 17:00

## 2021-01-24 RX ADMIN — ONDANSETRON 4 MG: 2 INJECTION INTRAMUSCULAR; INTRAVENOUS at 04:32

## 2021-01-24 RX ADMIN — SODIUM CHLORIDE, PRESERVATIVE FREE 10 ML: 5 INJECTION INTRAVENOUS at 09:55

## 2021-01-24 RX ADMIN — INSULIN DETEMIR 10 UNITS: 100 INJECTION, SOLUTION SUBCUTANEOUS at 09:02

## 2021-01-24 RX ADMIN — INSULIN ASPART 6 UNITS: 100 INJECTION, SOLUTION INTRAVENOUS; SUBCUTANEOUS at 12:12

## 2021-01-24 RX ADMIN — SODIUM CHLORIDE, PRESERVATIVE FREE 3 ML: 5 INJECTION INTRAVENOUS at 09:00

## 2021-01-24 RX ADMIN — ACETAMINOPHEN 650 MG: 325 TABLET, FILM COATED ORAL at 04:35

## 2021-01-24 RX ADMIN — POTASSIUM CHLORIDE 40 MEQ: 10 CAPSULE, COATED, EXTENDED RELEASE ORAL at 12:12

## 2021-01-24 RX ADMIN — POTASSIUM CHLORIDE 40 MEQ: 10 CAPSULE, COATED, EXTENDED RELEASE ORAL at 09:02

## 2021-01-24 RX ADMIN — SODIUM CHLORIDE 125 ML/HR: 9 INJECTION, SOLUTION INTRAVENOUS at 13:19

## 2021-01-24 NOTE — DISCHARGE SUMMARY
[facility]      Name:  Ruby Ramos   Age:  20 y.o.  Sex:  female  :  2000  MRN:  [id number]   Visit Number:  [id number]  Primary Care Physician:  Isamar Leon APRN  Date of Discharge:  2021  Admission Date:  2021    Discharge Diagnosis:       1.  Diabetic ketoacidosis without coma associated with type 1 vs 2 diabetes mellitus, suspected, present on admission  2.  Dehydration  3.  Nausea  4.  History of celiac disease  5.  Hypokalemia     History of Present Illness/Hospital Course:  Patient is a 20 yr old with celiac disease, who presented with diabetic symptoms. In fact she did have new onset diabetes with Acute DKA. She was provided insulin drip, fluids, electrolyte replacement accordingly. Nutrition consulted to assist with teaching and giving insulin. Once DKA resolved, she was started on diabetic gluten free diet. Fingerstick glucose monitoring performed, with sliding scale insulin and 10 units nightly levemir. She will start basaglar tonight 10units. Glucose controlled. She is feeling back to herself and feels confident to go home tonight.      She already has referral to Endocrine started from primary care. She already had Basglar pen sent to pharmacy. I called pharmacy and spoke to pharmacist to verify prescriptions will be received correctly. Patient given diabetes teaching and by nurse here with diabetes. Glucometer test strips and lancets were provided for the machine here provided. Novolog sliding scale given based on meals and chart given. Potassium given.     This coming week: Follow up PCP. Adjust insulin. Repeat BMP and see if further potassium will be needed.     Consults:     Consults     No orders found from 2020 to 2021.          Procedures Performed:             Vital Signs:    Temp:  [97.6 °F (36.4 °C)-98.7 °F (37.1 °C)] 97.6 °F (36.4 °C)  Heart Rate:  [] 92  Resp:  [18-20] 18  BP: ()/(50-81)  111/50    Physical Exam:      General Appearance:    Alert, cooperative, in no acute distress   Head:    Normocephalic, without obvious abnormality, atraumatic   Eyes:            Lids and lashes normal, conjunctivae and sclerae normal, no   icterus, no pallor, corneas clear, PERRLA   Ears:    Ears appear intact with no abnormalities noted   Throat:   No oral lesions, no thrush, oral mucosa moist   Neck:   No adenopathy, supple, trachea midline, no thyromegaly, no     carotid bruit, no JVD   Back:     No kyphosis present, no scoliosis present, no skin lesions,       erythema or scars, no tenderness to percussion or                   palpation,   range of motion normal   Lungs:     Clear to auscultation,respirations regular, even and                   unlabored    Heart:    Regular rhythm and normal rate, normal S1 and S2, no            murmur, no gallop, no rub, no click   Breast Exam:    Deferred   Abdomen:     Normal bowel sounds, no masses, no organomegaly, soft        non-tender, non-distended, no guarding, no rebound                 tenderness   Genitalia:    Deferred   Extremities:   Moves all extremities well, no edema, no cyanosis, no              redness   Pulses:   Pulses palpable and equal bilaterally   Skin:   No bleeding, bruising or rash   Lymph nodes:   No palpable adenopathy   Neurologic:   Cranial nerves 2 - 12 grossly intact, sensation intact, DTR        present and equal bilaterally         Pertinent Lab Results:     Results from last 7 days   Lab Units 01/24/21  1444 01/24/21  0510 01/23/21  2250 01/23/21  1524  01/21/21  1345   SODIUM mmol/L 138 138  --  135*   < > 128*   POTASSIUM mmol/L 3.5 3.1* 3.4* 2.9*   < > 3.2*   CHLORIDE mmol/L 106 105  --  107   < > 98   CO2 mmol/L 20.9* 16.7*  --  15.7*   < > 6.6*   BUN mg/dL 3* 2*  --  2*   < > 8   CREATININE mg/dL 0.68 0.46*  --  0.44*   < > 0.71   CALCIUM mg/dL 8.3* 8.8  --  8.2*   < > 8.4*   BILIRUBIN mg/dL  --   --   --   --   --  0.5   ALK PHOS  U/L  --   --   --   --   --  131*   ALT (SGPT) U/L  --   --   --   --   --  18   AST (SGOT) U/L  --   --   --   --   --  14   GLUCOSE mg/dL 207* 152*  --  143*   < > 261*    < > = values in this interval not displayed.     Results from last 7 days   Lab Units 01/24/21  0510 01/23/21  0630 01/23/21  0434   WBC 10*3/mm3 5.51 6.09 6.02   HEMOGLOBIN g/dL 13.7 13.2 14.0   HEMATOCRIT % 38.3 36.5 38.8   PLATELETS 10*3/mm3 222 221 234         Results from last 7 days   Lab Units 01/21/21  1345   TROPONIN T ng/mL <0.010     Results from last 7 days   Lab Units 01/21/21  1345   PROBNP pg/mL 9.9         Results from last 7 days   Lab Units 01/21/21  1345   LIPASE U/L 20         Results from last 7 days   Lab Units 01/21/21  1345 01/21/21  1057   COLOR UA  Yellow Yellow   SPECIFIC GRAVITY, URINE   --  1.030   GLUCOSE UA  >=1000 mg/dL (3+)*  --    KETONES UA  >=160 mg/dL (4+)* >80 mg/dL*   LEUKOCYTES UA  Negative Negative   PH, URINE  <=5.0 6.0   BILIRUBIN UA  Negative Negative   UROBILINOGEN UA  0.2 E.U./dL Normal     Pain Management Panel     There is no flowsheet data to display.              Pertinent Radiology Results:    Imaging Results (All)     Procedure Component Value Units Date/Time    XR Chest 1 View [[id number]] Collected: 01/21/21 1444     Updated: 01/21/21 1446    Narrative:      PROCEDURE: XR CHEST 1 VW-     HISTORY: short of breath     COMPARISON: October 21, 2017.     FINDINGS: The heart is normal in size. The mediastinum is unremarkable.  The lungs are clear. There is no pneumothorax.  There are no acute  osseous abnormalities.       Impression:      No acute cardiopulmonary process.     Continued followup is recommended.     This report was finalized on 1/21/2021 2:44 PM by Emerald Giang M.D..          ECHO:         Condition on Discharge:  Stable        Code status during the hospital stay:        Discharge Disposition:    Home or Self Care    Discharge Medication:       Discharge Medications      New  Medications      Instructions Start Date   BD Pen Needle [place] 2nd Gen 32G X 4 MM misc  Generic drug: Insulin Pen Needle   1 dose, Does not apply, 3 Times Daily With Meals      insulin aspart 100 UNIT/ML solution pen-injector sc pen  Commonly known as: novoLOG FLEXPEN   4-16 Units, Subcutaneous, 3 Times Daily With Meals, 30 DAY SUPPLY      potassium chloride 20 MEQ CR tablet  Commonly known as: K-DUR,KLOR-CON   20 mEq, Oral, Daily      True Comfort Twist Top Lancets misc   1 Device, Does not apply, 4 Times Daily         Changes to Medications      Instructions Start Date   Insulin Glargine 100 UNIT/ML injection pen  Commonly known as: BASAGLAR KWIKPEN  What changed: additional instructions   10 Units, Subcutaneous, Daily      True Metrix Blood Glucose Test test strip  Generic drug: glucose blood  What changed: additional instructions   TEST THREE TIMES DAILY WITH MEALS, NIGHTLY, AND AS NEEDED         Stop These Medications    multivitamin with minerals tablet tablet     mupirocin 2 % ointment  Commonly known as: Bactroban            Discharge Diet:     Diet Instructions     Diet: Consistent Carbohydrate      Discharge Diet: Consistent Carbohydrate          Activity at Discharge:     Activity Instructions     Activity as Tolerated            Follow-up Appointments:    Future Appointments   Date Time Provider Department Center   2/22/2021  1:00 PM Isamar Leon APRN [facility]   3/15/2021  2:30 PM Wolfgang Patiño MD [facility] None     Additional Instructions for the Follow-ups that You Need to Schedule     Discharge Follow-up with PCP   As directed       Currently Documented PCP:    Isamar Leon APRN    PCP Phone Number:    [phone]     Follow Up Details: Less than one week               Test Results Pending at Discharge:           Oliva Jeter DO  01/24/21  16:18 EST      Medication risks and benefits were discussed in great detail. The patient reported being satisfied with the  current treatment plan and the care delivered while hospitalized.     Time:  I spent 35 minutes preparing discharge counseling and teaching.

## 2021-01-24 NOTE — PLAN OF CARE
Goal Outcome Evaluation:  Plan of Care Reviewed With: patient  Progress: improving  Outcome Summary: Pt resting well this shift.  Continues to receive iv fluids as ordered.  FSBS with insulin per orders.  Pt had a shower this shift.

## 2021-01-24 NOTE — OUTREACH NOTE
Prep Survey      Responses   [facility] patient discharged from?  [place]   Is LACE score < 7 ?  No   Emergency Room discharge w/ pulse ox?  No   Eligibility  [facility]   Date of Admission  01/21/21   Date of Discharge  01/24/21   Discharge Disposition  Home or Self Care   Discharge diagnosis  Diabetic ketoacidosis    Does the patient have one of the following disease processes/diagnoses(primary or secondary)?  Other   Does the patient have Home health ordered?  No   Is there a DME ordered?  No   Prep survey completed?  Yes          Oliva Marroquin RN

## 2021-01-25 ENCOUNTER — TRANSITIONAL CARE MANAGEMENT TELEPHONE ENCOUNTER (OUTPATIENT)
Dept: CALL CENTER | Facility: HOSPITAL | Age: 21
End: 2021-01-25

## 2021-01-25 NOTE — OUTREACH NOTE
Call Center TCM Note      Responses   [facility] patient discharged from?  [facility]   Does the patient have one of the following disease processes/diagnoses(primary or secondary)?  Other   TCM attempt successful?  No   Unsuccessful attempts  Attempt 2          Ericka Moran MA    1/25/2021, 18:03 EST

## 2021-01-25 NOTE — OUTREACH NOTE
Call Center TCM Note      Responses   [facility] patient discharged from?  [facility]   Does the patient have one of the following disease processes/diagnoses(primary or secondary)?  Other   TCM attempt successful?  No   Unsuccessful attempts  Attempt 1          Ericka Moran MA    1/25/2021, 14:19 EST

## 2021-01-26 ENCOUNTER — LAB (OUTPATIENT)
Dept: LAB | Facility: HOSPITAL | Age: 21
End: 2021-01-26

## 2021-01-26 ENCOUNTER — OFFICE VISIT (OUTPATIENT)
Dept: DIABETES SERVICES | Facility: HOSPITAL | Age: 21
End: 2021-01-26

## 2021-01-26 ENCOUNTER — TRANSITIONAL CARE MANAGEMENT TELEPHONE ENCOUNTER (OUTPATIENT)
Dept: CALL CENTER | Facility: HOSPITAL | Age: 21
End: 2021-01-26

## 2021-01-26 ENCOUNTER — OFFICE VISIT (OUTPATIENT)
Dept: ENDOCRINOLOGY | Facility: CLINIC | Age: 21
End: 2021-01-26

## 2021-01-26 VITALS
OXYGEN SATURATION: 99 % | BODY MASS INDEX: 29.77 KG/M2 | WEIGHT: 168 LBS | HEIGHT: 63 IN | SYSTOLIC BLOOD PRESSURE: 134 MMHG | DIASTOLIC BLOOD PRESSURE: 80 MMHG | TEMPERATURE: 98.2 F | HEART RATE: 125 BPM

## 2021-01-26 DIAGNOSIS — E10.65 TYPE 1 DIABETES MELLITUS WITH HYPERGLYCEMIA (HCC): Primary | ICD-10-CM

## 2021-01-26 LAB
ANION GAP SERPL CALCULATED.3IONS-SCNC: 12 MMOL/L (ref 5–15)
BUN SERPL-MCNC: 5 MG/DL (ref 6–20)
BUN/CREAT SERPL: 12.2 (ref 7–25)
CALCIUM SPEC-SCNC: 9.4 MG/DL (ref 8.6–10.5)
CHLORIDE SERPL-SCNC: 103 MMOL/L (ref 98–107)
CO2 SERPL-SCNC: 24 MMOL/L (ref 22–29)
CREAT SERPL-MCNC: 0.41 MG/DL (ref 0.57–1)
GFR SERPL CREATININE-BSD FRML MDRD: >150 ML/MIN/1.73
GLUCOSE SERPL-MCNC: 224 MG/DL (ref 65–99)
POTASSIUM SERPL-SCNC: 3.8 MMOL/L (ref 3.5–5.2)
SODIUM SERPL-SCNC: 139 MMOL/L (ref 136–145)

## 2021-01-26 PROCEDURE — 99204 OFFICE O/P NEW MOD 45 MIN: CPT | Performed by: INTERNAL MEDICINE

## 2021-01-26 PROCEDURE — 86341 ISLET CELL ANTIBODY: CPT | Performed by: INTERNAL MEDICINE

## 2021-01-26 PROCEDURE — G0108 DIAB MANAGE TRN  PER INDIV: HCPCS

## 2021-01-26 PROCEDURE — 80048 BASIC METABOLIC PNL TOTAL CA: CPT | Performed by: INTERNAL MEDICINE

## 2021-01-26 PROCEDURE — 84681 ASSAY OF C-PEPTIDE: CPT | Performed by: INTERNAL MEDICINE

## 2021-01-26 RX ORDER — PROCHLORPERAZINE 25 MG/1
1 SUPPOSITORY RECTAL TAKE AS DIRECTED
Qty: 3 EACH | Refills: 11 | Status: SHIPPED | OUTPATIENT
Start: 2021-01-26 | End: 2021-11-24

## 2021-01-26 RX ORDER — PEN NEEDLE, DIABETIC 31 GX5/16"
1 NEEDLE, DISPOSABLE MISCELLANEOUS 4 TIMES DAILY
Qty: 400 EACH | Refills: 3 | Status: SHIPPED | OUTPATIENT
Start: 2021-01-26

## 2021-01-26 RX ORDER — GLUCAGON INJECTION, SOLUTION 1 MG/.2ML
1 INJECTION, SOLUTION SUBCUTANEOUS ONCE AS NEEDED
Qty: 0.2 ML | Refills: 5 | Status: SHIPPED | OUTPATIENT
Start: 2021-01-26 | End: 2021-01-27

## 2021-01-26 RX ORDER — PROCHLORPERAZINE 25 MG/1
1 SUPPOSITORY RECTAL ONCE
Qty: 1 EACH | Refills: 0 | Status: SHIPPED | OUTPATIENT
Start: 2021-01-26 | End: 2021-01-26

## 2021-01-26 RX ORDER — PROCHLORPERAZINE 25 MG/1
1 SUPPOSITORY RECTAL TAKE AS DIRECTED
Qty: 1 EACH | Refills: 3 | Status: SHIPPED | OUTPATIENT
Start: 2021-01-26 | End: 2022-02-22

## 2021-01-26 RX ORDER — INSULIN GLARGINE 100 [IU]/ML
12 INJECTION, SOLUTION SUBCUTANEOUS NIGHTLY
Qty: 15 ML | Refills: 1 | Status: SHIPPED | OUTPATIENT
Start: 2021-01-26 | End: 2021-03-09

## 2021-01-26 NOTE — PATIENT INSTRUCTIONS
Increase the basaglar to 12 units at night. Once a week, increase this dose by 2 units until the fasting glucose levels are < 200.     Take novolog 6 units before meals plus extra if your blood sugar is high.   If BG is 150-199: extra 1 unit  -249: extra 2 units  -299: extra 3 units  -349: extra 4 units  +: extra 5 units.

## 2021-01-26 NOTE — OUTREACH NOTE
Call Center TCM Note      Responses   [facility] patient discharged from?  [facility]   Does the patient have one of the following disease processes/diagnoses(primary or secondary)?  Other   TCM attempt successful?  No   Unsuccessful attempts  Attempt 3          Ericka Avendaño RN    1/26/2021, 08:53 EST      
no abrasions, no jaundice, no lesions, no pruritis, and no rashes.

## 2021-01-26 NOTE — PROGRESS NOTES
Chief Complaint   Patient presents with   • Diabetes        Referring Provider  Macrina Godinez, SONIYA     HPI   Ruby Ramos is a 20 y.o. female had concerns including Diabetes.      Diabetes was diagnosed this past week.  She had a nonhealing genital abscess and was feeling poorly.  Her father who is diabetic checked her glucose and was over 500.  PCP checked A1c and it was over 11.  She was admitted to the hospital from 1/21-1/24 for newly diagnosed diabetes with DKA.  Polyuria and polydipsia started about 3 months ago. Thinks she lost some weight but not sure how much.   She was discharged on Basaglar 10 units nightly and NovoLog sliding scale.    Was drinking water and gatorade (regular). Stopped regular soda in October.   Is on basaglar 10 units at night and novolog. Took three doses of novolog yesterday.   Her AM BG was 204 and she took 6 units of novolog. She ate 2.5 hrs ago and BG is now 274. BG hasn't been less than 200 since discharge.    She met with a dietician in the hospital.     Diet:  Breakfast: doesn't eat most of the time, or cheese eggs and main  Lunch: microwaveable meals  Dinner: varies - home cooked   Snacks: chips    Past Medical History:   Diagnosis Date   • Celiac disease    • Diabetes mellitus (CMS/HCC)      Past Surgical History:   Procedure Laterality Date   • ENDOSCOPY     • HERNIA REPAIR        Family History   Problem Relation Age of Onset   • Hypertension Mother    • Hypertension Father    • Diabetes Father    • Cancer Maternal Grandmother    • Cancer Paternal Grandmother       Social History     Socioeconomic History   • Marital status: Single     Spouse name: Not on file   • Number of children: Not on file   • Years of education: Not on file   • Highest education level: Not on file   Tobacco Use   • Smoking status: Former Smoker     Years: 2.00     Types: Electronic Cigarette   • Smokeless tobacco: Never Used   Substance and Sexual Activity   • Alcohol use: Not Currently   •  Drug use: Never   • Sexual activity: Defer      Allergies   Allergen Reactions   • Gluten Meal    • Mucinex [Guaifenesin Er] Nausea Only and Dizziness      Current Outpatient Medications on File Prior to Visit   Medication Sig Dispense Refill   • potassium chloride (K-DUR,KLOR-CON) 20 MEQ CR tablet Take 1 tablet by mouth Daily. 14 tablet 0   • [DISCONTINUED] glucose blood (True Metrix Blood Glucose Test) test strip TEST THREE TIMES DAILY WITH MEALS, NIGHTLY, AND AS NEEDED 200 each 0   • [DISCONTINUED] insulin aspart (novoLOG FLEXPEN) 100 UNIT/ML solution pen-injector sc pen Inject 4-16 Units under the skin into the appropriate area as directed 3 (Three) Times a Day With Meals. 30 DAY SUPPLY 5 pen 0   • [DISCONTINUED] Insulin Glargine (BASAGLAR KWIKPEN) 100 UNIT/ML injection pen Inject 10 Units under the skin into the appropriate area as directed Daily. (Patient taking differently: Inject 10 Units under the skin into the appropriate area as directed Daily. New medication prescribed today, has not started taking yet.) 1 pen 3   • [DISCONTINUED] Insulin Pen Needle (BD Pen Needle [place] 2nd Gen) 32G X 4 MM misc 1 dose 3 (Three) Times a Day With Meals for 30 days. 100 each 0   • [DISCONTINUED] True Comfort Twist Top Lancets misc 1 Device 4 (Four) Times a Day. 200 each 0     No current facility-administered medications on file prior to visit.         Review of Systems   Constitutional: Positive for fatigue and unexpected weight loss.   HENT: Negative.    Eyes: Positive for blurred vision.   Respiratory: Negative.    Cardiovascular: Negative.    Gastrointestinal: Negative.    Endocrine: Positive for polydipsia and polyuria.   Genitourinary: Negative.    Musculoskeletal: Negative.    Skin: Negative.    Allergic/Immunologic: Negative.    Neurological: Negative.    Hematological: Negative.    Psychiatric/Behavioral: Positive for stress. Negative for suicidal ideas.          /80   Pulse (!) 125   Temp 98.2 °F (36.8 °C)  "(Infrared)   Ht 160 cm (63\")   Wt 76.2 kg (168 lb)   LMP 12/29/2020 (Exact Date)   SpO2 99%   BMI 29.76 kg/m²      Physical Exam     Constitutional:  well developed; well nourished  no acute distress   ENT/Thyroid: no thyromegaly  no palpable nodules   Eyes: EOM intact  Conjunctiva: clear   Respiratory:  breathing is unlabored  clear to auscultation bilaterally   Cardiovascular:  regular rate and rhythm, S1, S2 normal, no murmur, click, rub or gallop   Chest:  Not performed.   Abdomen: Not performed.   : Not performed.   Musculoskeletal: negative findings:  ROM of all joints is normal, no deformities present   Skin: dry and warm   Neuro: normal without focal findings and mental status, speech normal, alert and oriented x3   Psych: oriented to time, place and person, mood and affect are within normal limits     Labs/Imaging    CMP:  Lab Results   Component Value Date    GLU 82 03/12/2015    BUN 3 (L) 01/24/2021    CREATININE 0.68 01/24/2021    EGFRIFNONA 110 01/24/2021    BCR 4.4 (L) 01/24/2021     01/24/2021    K 3.5 01/24/2021    CO2 20.9 (L) 01/24/2021    CALCIUM 8.3 (L) 01/24/2021    ALBUMIN 4.60 01/21/2021    LABIL2 1.7 03/12/2015    BILITOT 0.5 01/21/2021    ALKPHOS 131 (H) 01/21/2021    AST 14 01/21/2021    ALT 18 01/21/2021     Lipid Panel:  Lab Results   Component Value Date    TRIG 45 03/12/2015    HDL 47 03/12/2015    VLDL 9 03/12/2015    LDL 83 03/12/2015     HbA1c:  Lab Results   Component Value Date    HGBA1C 11.50 (H) 01/21/2021    HGBA1C 11.2 01/21/2021     TSH:  Lab Results   Component Value Date    TSH 2.610 01/21/2021           Assessment and Plan    Diagnoses and all orders for this visit:    1. Type 1 diabetes mellitus with hyperglycemia (CMS/HCC) (Primary)  Diagnosed last week when she was admitted to the hospital with an A1c over 11 and in DKA.  Presumably type I based on age of diagnosis, onset of symptoms, and DKA.  Increase Basaglar to 12 units nightly.  Increase NovoLog to 6 " units with meals +1: 50 greater than 150.  Prescription for Dexcom was sent to the pharmacy.  This will be necessary as patient should be checking her blood sugars 4 times per day with the need to adjust the dose of insulin depending on glucose.  She may be interested in transitioning to an insulin pump in the future.  Nutritionist met with the patient in the office today.  Call the office if BG's are persistently above 200s.  Checking type I antibodies for confirmation of diagnosis.  Discussed possible risk of hypoglycemia and how to treat. Script for glucagon was also sent to her pharmacy.  -     Insulin Glargine (BASAGLAR KWIKPEN) 100 UNIT/ML injection pen; Inject 12 Units under the skin into the appropriate area as directed Every Night. New medication prescribed today, has not started taking yet.  Dispense: 15 mL; Refill: 1  -     insulin aspart (novoLOG FLEXPEN) 100 UNIT/ML solution pen-injector sc pen; 6 units with meals plus 2:50>150, MDD 40 units  Dispense: 5 pen; Refill: 0  -     Glucagon (Gvoke HypoPen 1-Pack) 1 MG/0.2ML solution auto-injector; Inject 1 mg under the skin into the appropriate area as directed 1 (One) Time As Needed (hypoglycemia) for up to 1 day.  Dispense: 0.2 mL; Refill: 5  -     Continuous Blood Gluc  (Dexcom G6 ) device; 1 each 1 (One) Time for 1 dose.  Dispense: 1 each; Refill: 0  -     Continuous Blood Gluc Sensor (Dexcom G6 Sensor); 1 each Take As Directed.  Dispense: 3 each; Refill: 11  -     Continuous Blood Gluc Transmit (Dexcom G6 Transmitter) misc; 1 each Take As Directed.  Dispense: 1 each; Refill: 3  -     glucose blood test strip; Four times daily  Dispense: 400 each; Refill: 3  -     True Comfort Twist Top Lancets misc; 1 Device 4 (Four) Times a Day.  Dispense: 400 each; Refill: 3  -     Basic Metabolic Panel  -     C-Peptide  -     Glutamic Acid Decarboxylase  -     IA-2 Autoantibodies  -     Insulin Pen Needle 32G X 4 MM misc; 1 each 4 (Four) Times a Day.   Dispense: 400 each; Refill: 3         Return in about 6 weeks (around 3/9/2021) for next scheduled follow up. The patient was instructed to contact the clinic with any interval questions or concerns.    Layla Corral, DO   Endocrinologist    Please note that portions of this document were completed with a voice recognition program. Efforts were made to edit the dictations, but occasionally words are mis-transcribed.

## 2021-01-26 NOTE — CONSULTS
Diabetes Education    Patient Name:  Ruby Ramos  YOB: 2000  MRN: [id number]  Admit Date:  (Not on file)        1 hr DM education provided for newly dx pt. Reviewed BG monitoring, hypoglycemia, hyperglycemia, activity, sick days, stress, nutrition, and insulin. Please see full notes in Media tab. Thank you for the referral.      Electronically signed by:  Soila Lua  01/26/21 12:40 EST

## 2021-01-28 LAB — C PEPTIDE SERPL-MCNC: 1.3 NG/ML (ref 1.1–4.4)

## 2021-01-30 LAB — GAD65 AB SER IA-ACNC: 2263.9 U/ML (ref 0–5)

## 2021-01-31 LAB — ISLET CELL512 AB SER-ACNC: 8.5 U/ML

## 2021-02-02 ENCOUNTER — READMISSION MANAGEMENT (OUTPATIENT)
Dept: CALL CENTER | Facility: HOSPITAL | Age: 21
End: 2021-02-02

## 2021-02-02 NOTE — OUTREACH NOTE
Medical Week 2 Survey      Responses   [facility] patient discharged from?  [facility]   Does the patient have one of the following disease processes/diagnoses(primary or secondary)?  Other   Week 2 attempt successful?  No   Revoke  Decline to participate [Patient revoked. 4 unsuccessful attempts to reach patient .]          Ericka Avendaño RN

## 2021-03-09 ENCOUNTER — OFFICE VISIT (OUTPATIENT)
Dept: ENDOCRINOLOGY | Facility: CLINIC | Age: 21
End: 2021-03-09

## 2021-03-09 VITALS
HEART RATE: 80 BPM | WEIGHT: 168.8 LBS | TEMPERATURE: 98.2 F | BODY MASS INDEX: 31.06 KG/M2 | SYSTOLIC BLOOD PRESSURE: 100 MMHG | HEIGHT: 62 IN | DIASTOLIC BLOOD PRESSURE: 70 MMHG

## 2021-03-09 DIAGNOSIS — E16.2 HYPOGLYCEMIA: ICD-10-CM

## 2021-03-09 DIAGNOSIS — E10.9 TYPE 1 DIABETES MELLITUS WITHOUT COMPLICATION (HCC): Primary | ICD-10-CM

## 2021-03-09 PROBLEM — R82.4 KETONURIA: Status: RESOLVED | Noted: 2021-01-21 | Resolved: 2021-03-09

## 2021-03-09 PROBLEM — R73.9 HYPERGLYCEMIA: Status: RESOLVED | Noted: 2021-01-21 | Resolved: 2021-03-09

## 2021-03-09 PROBLEM — E10.10 DIABETIC KETOACIDOSIS WITHOUT COMA ASSOCIATED WITH TYPE 1 DIABETES MELLITUS: Status: RESOLVED | Noted: 2021-01-21 | Resolved: 2021-03-09

## 2021-03-09 PROCEDURE — 99214 OFFICE O/P EST MOD 30 MIN: CPT | Performed by: INTERNAL MEDICINE

## 2021-03-09 RX ORDER — MULTIPLE VITAMINS W/ MINERALS TAB 9MG-400MCG
1 TAB ORAL DAILY
COMMUNITY

## 2021-03-09 RX ORDER — PROCHLORPERAZINE 25 MG/1
SUPPOSITORY RECTAL
COMMUNITY
Start: 2021-01-26 | End: 2021-05-27 | Stop reason: SDUPTHER

## 2021-03-09 RX ORDER — INSULIN GLARGINE 100 [IU]/ML
14 INJECTION, SOLUTION SUBCUTANEOUS NIGHTLY
Qty: 15 ML | Refills: 1
Start: 2021-03-09 | End: 2021-06-15 | Stop reason: SDUPTHER

## 2021-03-14 NOTE — PROGRESS NOTES
New Patient Consult      Date: 03/15/2021   Patient Name: Ruby Ramos  MRN: [id number]  : 2000     Referring Physician: Isamar Leon AP*    Chief Complaint   Patient presents with   • Celiac Disease       History of Present Illness: Ruby Ramos is a 20 y.o. female who is here today to establish care with Gastroenterology for     She was diagnosed with celiac disease about 5 years ago as per pt and had blood work  And EGD. She doent remember where exactly it was done. she was having lot of abdominal pain and nausea, diarrhea at that time. She was completely gluten free since then and that helped her symptoms. Now having once daily regular bowel movement, no nausea but occasionally gets mild mid abdominal discomfort.   This patient deny any abdominal pain, change in bowel habit, hematochezia or melena.  Weight is stable. Pt denies nausea vomiting or odynophagia or dysphagia. There is a history of mild acid reflux. There is no history of anemia. Prior history of EGD about 5 years ago. No prior colonoscopy. No family history of colon cancer or any GI malignancy. No history of any abdominal surgery. Denies alcohol abuse or cigarette moking.     Subjective      Past Medical History:   Past Medical History:   Diagnosis Date   • Celiac disease    • Diabetes mellitus (CMS/HCC)    • Diabetes mellitus type I (CMS/HCC)        Past Surgical History:   Past Surgical History:   Procedure Laterality Date   • ENDOSCOPY     • HERNIA REPAIR         Family History:   Family History   Problem Relation Age of Onset   • Hypertension Mother    • Hypertension Father    • Diabetes Father    • Cancer Maternal Grandmother    • Cancer Paternal Grandmother        Social History:   Social History     Socioeconomic History   • Marital status: Single     Spouse name: Not on file   • Number of children: Not on file   • Years of education: Not on file   • Highest education level: Not on file   Tobacco Use   • Smoking status:  Former Smoker     Years: 2.00     Types: Electronic Cigarette   • Smokeless tobacco: Never Used   Vaping Use   • Vaping Use: Never used   Substance and Sexual Activity   • Alcohol use: Not Currently   • Drug use: Never   • Sexual activity: Defer         Current Outpatient Medications:   •  Continuous Blood Gluc  (Dexcom G6 ) device, USE AS DIRECTED TO CHECK BLOOD SUGAR., Disp: , Rfl:   •  Continuous Blood Gluc Sensor (Dexcom G6 Sensor), 1 each Take As Directed., Disp: 3 each, Rfl: 11  •  Continuous Blood Gluc Transmit (Dexcom G6 Transmitter) misc, 1 each Take As Directed., Disp: 1 each, Rfl: 3  •  glucose blood test strip, Four times daily, Disp: 400 each, Rfl: 3  •  insulin aspart (novoLOG FLEXPEN) 100 UNIT/ML solution pen-injector sc pen, Inject 5 Units under the skin into the appropriate area as directed 3 (Three) Times a Day With Meals. 5 units with meals plus 1:50>150, MDD 40 units, Disp: 15 mL, Rfl: 1  •  Insulin Glargine (BASAGLAR KWIKPEN) 100 UNIT/ML injection pen, Inject 14 Units under the skin into the appropriate area as directed Every Night. New medication prescribed today, has not started taking yet., Disp: 15 mL, Rfl: 1  •  Insulin Pen Needle 32G X 4 MM misc, 1 each 4 (Four) Times a Day., Disp: 400 each, Rfl: 3  •  multivitamin with minerals (MULTIVITAMIN ADULT PO), Take 1 tablet by mouth Daily., Disp: , Rfl:   •  True Comfort Twist Top Lancets misc, 1 Device 4 (Four) Times a Day., Disp: 400 each, Rfl: 3    Allergies   Allergen Reactions   • Gluten Meal    • Mucinex [Guaifenesin Er] Nausea Only and Dizziness       Review of Systems:   Review of Systems   Constitutional: Negative for appetite change, fatigue, fever and unexpected weight loss.   HENT: Negative for trouble swallowing.    Respiratory: Negative for cough, shortness of breath and wheezing.    Cardiovascular: Negative for chest pain, palpitations and leg swelling.   Gastrointestinal: Positive for GERD. Negative for abdominal  "distention, abdominal pain, anal bleeding, blood in stool, constipation, diarrhea, nausea, rectal pain, vomiting and indigestion.   Genitourinary: Negative for dysuria, frequency and hematuria.   Musculoskeletal: Negative for back pain and joint swelling.   Skin: Negative for color change, rash and skin lesions.   Neurological: Negative for dizziness, syncope, speech difficulty, weakness, headache and memory problem.   Hematological: Negative for adenopathy. Does not bruise/bleed easily.   Psychiatric/Behavioral: Negative for agitation, behavioral problems, suicidal ideas and depressed mood.       The following portions of the patient's history were reviewed and updated as appropriate: allergies, current medications, past family history, past medical history, past social history, past surgical history and problem list.    Objective     Physical Exam:  Vital Signs:   Vitals:    03/15/21 1525   BP: 102/60   Pulse: 79   Temp: 97.1 °F (36.2 °C)   TempSrc: Temporal   SpO2: 99%   Weight: 78 kg (172 lb)   Height: 157.5 cm (62\")       Physical Exam  Vitals and nursing note reviewed.   Constitutional:       Appearance: She is well-developed.   Eyes:      Conjunctiva/sclera: Conjunctivae normal.   Cardiovascular:      Rate and Rhythm: Normal rate and regular rhythm.      Heart sounds: No murmur.   Pulmonary:      Effort: Pulmonary effort is normal. No respiratory distress.      Breath sounds: Normal breath sounds.   Abdominal:      General: Bowel sounds are normal. There is no distension.      Palpations: Abdomen is soft. There is no mass.      Tenderness: There is no abdominal tenderness.      Hernia: No hernia is present.   Musculoskeletal:      Cervical back: Normal range of motion and neck supple.   Lymphadenopathy:      Cervical: No cervical adenopathy.   Skin:     General: Skin is warm and dry.   Neurological:      General: No focal deficit present.      Mental Status: She is alert and oriented to person, place, and " time.      Sensory: No sensory deficit.         Results Review:   I have reviewed the patient's new clinical and imaging results and agree with the interpretation.     Office Visit on 01/26/2021   Component Date Value Ref Range Status   • Glucose 01/26/2021 224* 65 - 99 mg/dL Final   • BUN 01/26/2021 5* 6 - 20 mg/dL Final   • Creatinine 01/26/2021 0.41* 0.57 - 1.00 mg/dL Final   • Sodium 01/26/2021 139  136 - 145 mmol/L Final   • Potassium 01/26/2021 3.8  3.5 - 5.2 mmol/L Final   • Chloride 01/26/2021 103  98 - 107 mmol/L Final   • CO2 01/26/2021 24.0  22.0 - 29.0 mmol/L Final   • Calcium 01/26/2021 9.4  8.6 - 10.5 mg/dL Final   • eGFR Non  Amer 01/26/2021 >150  >60 mL/min/1.73 Final   • BUN/Creatinine Ratio 01/26/2021 12.2  7.0 - 25.0 Final   • Anion Gap 01/26/2021 12.0  5.0 - 15.0 mmol/L Final   • C-Peptide 01/26/2021 1.3  1.1 - 4.4 ng/mL Final    C-Peptide reference interval is for fasting patients.   • SILVIANO-65 01/26/2021 2263.9* 0.0 - 5.0 U/mL Final    **Results verified by repeat testing**   • IA-2 Autoantibodies 01/26/2021 8.5* U/mL Final    Reference Range:  <7.5        Negative  > or = 7.5  Positive   No results displayed because visit has over 200 results.      Office Visit on 01/21/2021   Component Date Value Ref Range Status   • Hemoglobin A1C 01/21/2021 11.2  % Final   • Glucose 01/21/2021 258* 70 - 130 mg/dL Final   • Color 01/21/2021 Yellow  Yellow, Straw, Dark Yellow, Hetal Final   • Clarity, UA 01/21/2021 Clear  Clear Final   • Specific Gravity  01/21/2021 1.030  1.005 - 1.030 Final   • pH, Urine 01/21/2021 6.0  5.0 - 8.0 Final   • Leukocytes 01/21/2021 Negative  Negative Final   • Nitrite, UA 01/21/2021 Negative  Negative Final   • Protein, POC 01/21/2021 30 mg/dL* Negative mg/dL Final   • Glucose, UA 01/21/2021 1000 mg/dL (3+)  Negative, 1000 mg/dL (3+) mg/dL Final   • Ketones, UA 01/21/2021 >80 mg/dL* Negative Final   • Urobilinogen, UA 01/21/2021 Normal  Normal Final   • Bilirubin  01/21/2021 Negative  Negative Final   • Blood, UA 01/21/2021 1+* Negative Final      XR Chest 1 View    Result Date: 1/21/2021  No acute cardiopulmonary process.  Continued followup is recommended.  This report was finalized on 1/21/2021 2:44 PM by Emerald Giang M.D..      Assessment / Plan      Assessment & Plan:  1. Celiac disease  Celiac disease when she was 15-year-old about 5 years ago.  As per patient she had a positive blood work and also positive EGD and biopsy findings at that time.  Patient does not remember what exactly her work-up was performed.  She has been gluten-free since then her symptoms almost resolved except she will have occasional bloating and pressure sensation in the mid abdomen.     She needs a celiac serology, this may come negative this time given patient is on a gluten-free diet  We will also schedule for an EGD and duodenal biopsy  We will get a vitamin B12 and folic acid level.  Her recent hemoglobin level was normal    The indications, technique, alternatives and potential risk and complications were discussed with the patient including but not limited to bleeding, bowel perforations, missing lesions and anesthetic complications. The patient understands and wishes to proceed with the procedure and has given their verbal consent. Written patient education information was given to the patient.   The patient will call if they have further questions before procedure.     - IgA; Future  - Tissue Transglutaminase, IgA; Future  - Case Request; Standing  - Implement Anesthesia Orders Day of Procedure; Standing  - Obtain Informed Consent; Standing  - Oxygen Therapy- Nasal Cannula; 2 LPM; Titrate for SPO2: equal to or greater than, 90%; Standing  - POC Glucose Once; Standing  - Pregnancy, Urine -; Standing  - sodium chloride 0.9 % infusion  - Case Request  - Vitamin B12; Future  - Folate; Future    2. Gastroesophageal reflux disease without esophagitis  Minimal occasional reflux  symptoms  Advised reflux diet      3. Class 1 obesity due to excess calories without serious comorbidity with body mass index (BMI) of 31.0 to 31.9 in adult  Advised to lose at least 10 pounds over the next 6 months time  Advised regular exercise half hour every day at least 3 days in a week.   Reduced calorie intake  and lifestyle and dietary changes  have been discussed  Advised to avoid alcohol and smoking cigarette      Follow Up:   Return for Follow Up after procedure.    Wolfgang Patiño MD  [facility] [place]  3/15/2021  15:57 EDT    Please note that portions of this note may have been completed with a voice recognition program.

## 2021-03-15 ENCOUNTER — LAB (OUTPATIENT)
Dept: LAB | Facility: HOSPITAL | Age: 21
End: 2021-03-15

## 2021-03-15 ENCOUNTER — OFFICE VISIT (OUTPATIENT)
Dept: GASTROENTEROLOGY | Facility: CLINIC | Age: 21
End: 2021-03-15

## 2021-03-15 VITALS
HEIGHT: 62 IN | BODY MASS INDEX: 31.65 KG/M2 | TEMPERATURE: 97.1 F | OXYGEN SATURATION: 99 % | SYSTOLIC BLOOD PRESSURE: 102 MMHG | HEART RATE: 79 BPM | DIASTOLIC BLOOD PRESSURE: 60 MMHG | WEIGHT: 172 LBS

## 2021-03-15 DIAGNOSIS — K90.0 CELIAC DISEASE: Primary | ICD-10-CM

## 2021-03-15 DIAGNOSIS — K90.0 CELIAC DISEASE: ICD-10-CM

## 2021-03-15 DIAGNOSIS — K21.9 GASTROESOPHAGEAL REFLUX DISEASE WITHOUT ESOPHAGITIS: ICD-10-CM

## 2021-03-15 DIAGNOSIS — Z01.818 PREOP TESTING: Primary | ICD-10-CM

## 2021-03-15 DIAGNOSIS — E66.09 CLASS 1 OBESITY DUE TO EXCESS CALORIES WITHOUT SERIOUS COMORBIDITY WITH BODY MASS INDEX (BMI) OF 31.0 TO 31.9 IN ADULT: ICD-10-CM

## 2021-03-15 LAB
BACTERIA UR QL AUTO: ABNORMAL /HPF
BILIRUB UR QL STRIP: NEGATIVE
CLARITY UR: CLEAR
COLOR UR: YELLOW
FOLATE SERPL-MCNC: 17.8 NG/ML (ref 4.78–24.2)
GLUCOSE UR STRIP-MCNC: ABNORMAL MG/DL
HGB UR QL STRIP.AUTO: NEGATIVE
HYALINE CASTS UR QL AUTO: ABNORMAL /LPF
KETONES UR QL STRIP: NEGATIVE
LEUKOCYTE ESTERASE UR QL STRIP.AUTO: NEGATIVE
NITRITE UR QL STRIP: NEGATIVE
PH UR STRIP.AUTO: 6 [PH] (ref 5–8)
PROT UR QL STRIP: NEGATIVE
RBC # UR: ABNORMAL /HPF
REF LAB TEST METHOD: ABNORMAL
SP GR UR STRIP: 1.02 (ref 1–1.03)
SQUAMOUS #/AREA URNS HPF: ABNORMAL /HPF
UROBILINOGEN UR QL STRIP: ABNORMAL
VIT B12 BLD-MCNC: 787 PG/ML (ref 211–946)
WBC UR QL AUTO: ABNORMAL /HPF

## 2021-03-15 PROCEDURE — 81001 URINALYSIS AUTO W/SCOPE: CPT | Performed by: NURSE PRACTITIONER

## 2021-03-15 PROCEDURE — 82607 VITAMIN B-12: CPT

## 2021-03-15 PROCEDURE — 83516 IMMUNOASSAY NONANTIBODY: CPT

## 2021-03-15 PROCEDURE — 82784 ASSAY IGA/IGD/IGG/IGM EACH: CPT

## 2021-03-15 PROCEDURE — 99204 OFFICE O/P NEW MOD 45 MIN: CPT | Performed by: INTERNAL MEDICINE

## 2021-03-15 PROCEDURE — 36415 COLL VENOUS BLD VENIPUNCTURE: CPT

## 2021-03-15 PROCEDURE — 82746 ASSAY OF FOLIC ACID SERUM: CPT

## 2021-03-15 RX ORDER — SODIUM CHLORIDE 9 MG/ML
70 INJECTION, SOLUTION INTRAVENOUS CONTINUOUS PRN
Status: CANCELLED | OUTPATIENT
Start: 2021-03-15

## 2021-03-16 LAB
IGA1 MFR SER: 148 MG/DL (ref 70–400)
TTG IGA SER-ACNC: 5 U/ML (ref 0–3)

## 2021-03-24 PROBLEM — K21.9 GASTROESOPHAGEAL REFLUX DISEASE WITHOUT ESOPHAGITIS: Status: ACTIVE | Noted: 2021-03-24

## 2021-03-24 PROBLEM — K90.0 CELIAC DISEASE: Status: ACTIVE | Noted: 2021-03-24

## 2021-04-12 ENCOUNTER — LAB (OUTPATIENT)
Dept: LAB | Facility: HOSPITAL | Age: 21
End: 2021-04-12

## 2021-04-12 DIAGNOSIS — Z01.818 PREOP TESTING: ICD-10-CM

## 2021-04-12 PROCEDURE — U0004 COV-19 TEST NON-CDC HGH THRU: HCPCS

## 2021-04-12 PROCEDURE — C9803 HOPD COVID-19 SPEC COLLECT: HCPCS

## 2021-04-13 LAB — SARS-COV-2 RNA NOSE QL NAA+PROBE: NOT DETECTED

## 2021-04-14 ENCOUNTER — ANESTHESIA EVENT (OUTPATIENT)
Dept: GASTROENTEROLOGY | Facility: HOSPITAL | Age: 21
End: 2021-04-14

## 2021-04-14 ENCOUNTER — ANESTHESIA (OUTPATIENT)
Dept: GASTROENTEROLOGY | Facility: HOSPITAL | Age: 21
End: 2021-04-14

## 2021-04-14 ENCOUNTER — HOSPITAL ENCOUNTER (OUTPATIENT)
Facility: HOSPITAL | Age: 21
Setting detail: HOSPITAL OUTPATIENT SURGERY
Discharge: HOME | End: 2021-04-14
Attending: INTERNAL MEDICINE | Admitting: INTERNAL MEDICINE

## 2021-04-14 VITALS
HEART RATE: 92 BPM | OXYGEN SATURATION: 99 % | TEMPERATURE: 98.2 F | BODY MASS INDEX: 30.12 KG/M2 | WEIGHT: 170 LBS | RESPIRATION RATE: 16 BRPM | DIASTOLIC BLOOD PRESSURE: 52 MMHG | SYSTOLIC BLOOD PRESSURE: 100 MMHG | HEIGHT: 63 IN

## 2021-04-14 DIAGNOSIS — K21.9 GASTROESOPHAGEAL REFLUX DISEASE WITHOUT ESOPHAGITIS: ICD-10-CM

## 2021-04-14 DIAGNOSIS — K90.0 CELIAC DISEASE: ICD-10-CM

## 2021-04-14 LAB
B-HCG UR QL: NEGATIVE
GLUCOSE BLDC GLUCOMTR-MCNC: 92 MG/DL (ref 70–130)
GLUCOSE BLDC GLUCOMTR-MCNC: 92 MG/DL (ref 70–130)
INTERNAL NEGATIVE CONTROL: NEGATIVE
INTERNAL POSITIVE CONTROL: POSITIVE
Lab: NORMAL

## 2021-04-14 PROCEDURE — 43239 EGD BIOPSY SINGLE/MULTIPLE: CPT | Performed by: INTERNAL MEDICINE

## 2021-04-14 PROCEDURE — 81025 URINE PREGNANCY TEST: CPT | Performed by: INTERNAL MEDICINE

## 2021-04-14 PROCEDURE — 82962 GLUCOSE BLOOD TEST: CPT

## 2021-04-14 PROCEDURE — 25010000002 PROPOFOL 10 MG/ML EMULSION: Performed by: NURSE ANESTHETIST, CERTIFIED REGISTERED

## 2021-04-14 RX ORDER — KETAMINE HYDROCHLORIDE 50 MG/ML
INJECTION, SOLUTION, CONCENTRATE INTRAMUSCULAR; INTRAVENOUS AS NEEDED
Status: DISCONTINUED | OUTPATIENT
Start: 2021-04-14 | End: 2021-04-14 | Stop reason: SURG

## 2021-04-14 RX ORDER — ONDANSETRON 2 MG/ML
4 INJECTION INTRAMUSCULAR; INTRAVENOUS ONCE AS NEEDED
Status: CANCELLED | OUTPATIENT
Start: 2021-04-14 | End: 2021-04-14

## 2021-04-14 RX ORDER — LIDOCAINE HYDROCHLORIDE 20 MG/ML
INJECTION, SOLUTION INTRAVENOUS AS NEEDED
Status: DISCONTINUED | OUTPATIENT
Start: 2021-04-14 | End: 2021-04-14 | Stop reason: SURG

## 2021-04-14 RX ORDER — SODIUM CHLORIDE 9 MG/ML
70 INJECTION, SOLUTION INTRAVENOUS CONTINUOUS PRN
Status: DISCONTINUED | OUTPATIENT
Start: 2021-04-14 | End: 2021-04-14 | Stop reason: HOSPADM

## 2021-04-14 RX ORDER — PROPOFOL 10 MG/ML
VIAL (ML) INTRAVENOUS AS NEEDED
Status: DISCONTINUED | OUTPATIENT
Start: 2021-04-14 | End: 2021-04-14 | Stop reason: SURG

## 2021-04-14 RX ORDER — SODIUM CHLORIDE 0.9 % (FLUSH) 0.9 %
10 SYRINGE (ML) INJECTION AS NEEDED
Status: DISCONTINUED | OUTPATIENT
Start: 2021-04-14 | End: 2021-04-14 | Stop reason: HOSPADM

## 2021-04-14 RX ADMIN — PROPOFOL 200 MG: 10 INJECTION, EMULSION INTRAVENOUS at 08:09

## 2021-04-14 RX ADMIN — LIDOCAINE HYDROCHLORIDE 80 MG: 20 INJECTION, SOLUTION INTRAVENOUS at 08:05

## 2021-04-14 RX ADMIN — KETAMINE HYDROCHLORIDE 50 MG: 50 INJECTION, SOLUTION INTRAMUSCULAR; INTRAVENOUS at 08:05

## 2021-04-14 RX ADMIN — SODIUM CHLORIDE 70 ML/HR: 9 INJECTION, SOLUTION INTRAVENOUS at 07:05

## 2021-04-14 NOTE — ANESTHESIA PREPROCEDURE EVALUATION
Anesthesia Evaluation     Patient summary reviewed and Nursing notes reviewed   no history of anesthetic complications:  NPO Solid Status: > 8 hours  NPO Liquid Status: > 8 hours           Airway   Mallampati: II  TM distance: >3 FB  Neck ROM: full  no difficulty expected  Dental - normal exam     Pulmonary - negative pulmonary ROS and normal exam   Cardiovascular - negative cardio ROS and normal exam    Rhythm: regular  Rate: normal        Neuro/Psych- negative ROS  GI/Hepatic/Renal/Endo    (+)  GERD,  diabetes mellitus using insulin,     Musculoskeletal (-) negative ROS    Abdominal    Substance History - negative use     OB/GYN negative ob/gyn ROS         Other - negative ROS                       Anesthesia Plan    ASA 2     MAC   (Pt told that intravenous sedation will be used as the primary anesthetic along with local anesthesia if necessary. Every effort will be made to make sure the patient is comfortable.     The patient was told they may or may not have recall for the procedure. It was further explained that if the MAC was not adequate that a general anesthetic with either an LMA or endotracheal tube would be required.     Will proceed with the plan of care.)  intravenous induction     Anesthetic plan, all risks, benefits, and alternatives have been provided, discussed and informed consent has been obtained with: patient.

## 2021-04-14 NOTE — ANESTHESIA POSTPROCEDURE EVALUATION
Patient: Ruby Ramos    Procedure Summary     Date: 04/14/21 Room / Location: [facility] / [facility]    Anesthesia Start: 0759 Anesthesia Stop: 0810    Procedure: ESOPHAGOGASTRODUODENOSCOPY WITH BIOPSY (N/A Esophagus) Diagnosis:       Celiac disease      Gastroesophageal reflux disease without esophagitis      (Celiac disease [K90.0])      (Gastroesophageal reflux disease without esophagitis [K21.9])    Surgeons: Wolfgang Patiño MD Provider: Anoop Polk CRNA    Anesthesia Type: MAC ASA Status: 2          Anesthesia Type: MAC    Vitals  Vitals Value Taken Time   /52 04/14/21 0844   Temp 98.2 °F (36.8 °C) 04/14/21 0814   Pulse 92 04/14/21 0844   Resp 16 04/14/21 0844   SpO2 99 % 04/14/21 0844           Post Anesthesia Care and Evaluation    Patient location during evaluation: PHASE II  Patient participation: complete - patient participated  Level of consciousness: awake  Pain score: 1  Pain management: adequate  Airway patency: patent  Anesthetic complications: No anesthetic complications  PONV Status: controlled  Cardiovascular status: acceptable and stable  Respiratory status: acceptable  Hydration status: acceptable

## 2021-04-20 LAB
LAB AP CASE REPORT: NORMAL
PATH REPORT.FINAL DX SPEC: NORMAL

## 2021-05-26 ENCOUNTER — OFFICE VISIT (OUTPATIENT)
Dept: INTERNAL MEDICINE | Facility: CLINIC | Age: 21
End: 2021-05-26

## 2021-05-26 VITALS
SYSTOLIC BLOOD PRESSURE: 118 MMHG | HEIGHT: 62 IN | WEIGHT: 166.8 LBS | HEART RATE: 107 BPM | BODY MASS INDEX: 30.69 KG/M2 | OXYGEN SATURATION: 100 % | DIASTOLIC BLOOD PRESSURE: 66 MMHG | RESPIRATION RATE: 16 BRPM | TEMPERATURE: 98.4 F

## 2021-05-26 DIAGNOSIS — R19.7 DIARRHEA, UNSPECIFIED TYPE: ICD-10-CM

## 2021-05-26 DIAGNOSIS — R11.0 NAUSEA: ICD-10-CM

## 2021-05-26 DIAGNOSIS — R35.0 URINARY FREQUENCY: Primary | ICD-10-CM

## 2021-05-26 DIAGNOSIS — K90.0 CELIAC DISEASE: ICD-10-CM

## 2021-05-26 PROCEDURE — 99213 OFFICE O/P EST LOW 20 MIN: CPT | Performed by: NURSE PRACTITIONER

## 2021-05-26 RX ORDER — ONDANSETRON 4 MG/1
4 TABLET, ORALLY DISINTEGRATING ORAL EVERY 8 HOURS PRN
Qty: 15 TABLET | Refills: 1 | Status: SHIPPED | OUTPATIENT
Start: 2021-05-26

## 2021-05-26 NOTE — PROGRESS NOTES
Chief Complaint / Reason:      Chief Complaint   Patient presents with   • Diarrhea     Since monday   • Nausea       Subjective     HPI  Patient presents today with complaints of diarrhea, abdominal pain and nausea that has been going on since Monday.  Patient does have a history of celiac disease, GERD and type 1 diabetes.  She denies any chance of pregnancy that she is aware of and did eat gluten accidentally on Sunday.  Denies any blood in stool or emesis.  Patient did have EGD 4/14/2021.    History taken from: patient    PMH/FH/Social History were reviewed and updated appropriately in the electronic medical record.   Past Medical History:   Diagnosis Date   • Celiac disease    • Diabetes mellitus (CMS/HCC)    • Diabetes mellitus type I (CMS/HCC)    • GERD (gastroesophageal reflux disease)      Past Surgical History:   Procedure Laterality Date   • ENDOSCOPY     • ENDOSCOPY N/A 4/14/2021    Procedure: ESOPHAGOGASTRODUODENOSCOPY WITH BIOPSY;  Surgeon: Wolfgang Patiño MD;  Location: [facility] ENDOSCOPY;  Service: Gastroenterology;  Laterality: N/A;   • HERNIA REPAIR       Social History     Socioeconomic History   • Marital status: Single     Spouse name: Not on file   • Number of children: Not on file   • Years of education: Not on file   • Highest education level: Not on file   Tobacco Use   • Smoking status: Current Every Day Smoker     Years: 2.00     Types: Electronic Cigarette   • Smokeless tobacco: Never Used   Vaping Use   • Vaping Use: Every day   • Substances: Nicotine, Flavoring   • Devices: Pre-filled or refillable cartridge, Refillable tank   Substance and Sexual Activity   • Alcohol use: Not Currently   • Drug use: Never   • Sexual activity: Defer     Family History   Problem Relation Age of Onset   • Hypertension Mother    • Hypertension Father    • Diabetes Father    • Cancer Maternal Grandmother    • Cancer Paternal Grandmother    • Colon cancer Neg Hx        Review of Systems:   Review of  Systems   Constitutional: Positive for fatigue.   Respiratory: Negative.    Cardiovascular: Negative.    Gastrointestinal: Positive for diarrhea and nausea.   Allergic/Immunologic: Positive for environmental allergies.   Neurological: Negative.    Psychiatric/Behavioral: Negative.          All other systems were reviewed and are negative.  Exceptions are noted in the subjective or above.      Objective     Vital Signs  Vitals:    05/26/21 1704   BP: 118/66   Pulse: 107   Resp: 16   Temp: 98.4 °F (36.9 °C)   SpO2: 100%       Body mass index is 30.51 kg/m².    Physical Exam  Vitals and nursing note reviewed.   Constitutional:       Appearance: She is well-developed.   Cardiovascular:      Rate and Rhythm: Regular rhythm. Tachycardia present.      Pulses: Normal pulses.      Heart sounds: Normal heart sounds.   Pulmonary:      Effort: Pulmonary effort is normal.      Breath sounds: Normal breath sounds.   Abdominal:      General: Bowel sounds are normal. There is no distension.      Palpations: Abdomen is soft. There is no mass.      Tenderness: There is abdominal tenderness. There is no right CVA tenderness, left CVA tenderness, guarding or rebound.      Hernia: No hernia is present.   Musculoskeletal:         General: Normal range of motion.   Skin:     General: Skin is warm and dry.      Capillary Refill: Capillary refill takes less than 2 seconds.   Neurological:      Mental Status: She is alert and oriented to person, place, and time.      Coordination: Coordination normal.   Psychiatric:         Behavior: Behavior normal.         Thought Content: Thought content normal.         Judgment: Judgment normal.              Results Review:    I reviewed the patient's previous clinical results.       Medication Review:     Current Outpatient Medications:   •  Continuous Blood Gluc  (Dexcom G6 ) device, USE AS DIRECTED TO CHECK BLOOD SUGAR., Disp: , Rfl:   •  Continuous Blood Gluc Sensor (Dexcom G6  Sensor), 1 each Take As Directed., Disp: 3 each, Rfl: 11  •  Continuous Blood Gluc Transmit (Dexcom G6 Transmitter) misc, 1 each Take As Directed., Disp: 1 each, Rfl: 3  •  glucose blood test strip, Four times daily, Disp: 400 each, Rfl: 3  •  insulin aspart (novoLOG FLEXPEN) 100 UNIT/ML solution pen-injector sc pen, Inject 5 Units under the skin into the appropriate area as directed 3 (Three) Times a Day With Meals. 5 units with meals plus 1:50>150, MDD 40 units, Disp: 15 mL, Rfl: 1  •  Insulin Glargine (BASAGLAR KWIKPEN) 100 UNIT/ML injection pen, Inject 14 Units under the skin into the appropriate area as directed Every Night. New medication prescribed today, has not started taking yet., Disp: 15 mL, Rfl: 1  •  Insulin Pen Needle 32G X 4 MM misc, 1 each 4 (Four) Times a Day., Disp: 400 each, Rfl: 3  •  multivitamin with minerals (MULTIVITAMIN ADULT PO), Take 1 tablet by mouth Daily., Disp: , Rfl:   •  True Comfort Twist Top Lancets misc, 1 Device 4 (Four) Times a Day., Disp: 400 each, Rfl: 3    Diagnoses and all orders for this visit:    Urinary frequency  -     POCT urinalysis dipstick, automated  Stable without worsening signs or symptoms discussed UTI prevention.  Diarrhea, unspecified type  Discussed dietary modifications and advised patient to adhere to gluten-free diet.  Discussed differential diagnosis.  Celiac disease  Discussed dietary modifications and recommend adhering to a gluten-free diet  Nausea  -     ondansetron ODT (Zofran ODT) 4 MG disintegrating tablet; Place 1 tablet on the tongue Every 8 (Eight) Hours As Needed for Nausea.          Return if symptoms worsen or fail to improve.    Isamar Leon, APRN  05/26/2021

## 2021-05-27 ENCOUNTER — OFFICE VISIT (OUTPATIENT)
Dept: GASTROENTEROLOGY | Facility: CLINIC | Age: 21
End: 2021-05-27

## 2021-05-27 VITALS
TEMPERATURE: 98.4 F | RESPIRATION RATE: 20 BRPM | SYSTOLIC BLOOD PRESSURE: 119 MMHG | BODY MASS INDEX: 31.1 KG/M2 | WEIGHT: 169 LBS | HEIGHT: 62 IN | DIASTOLIC BLOOD PRESSURE: 65 MMHG | HEART RATE: 94 BPM

## 2021-05-27 DIAGNOSIS — K90.0 CELIAC DISEASE: Primary | ICD-10-CM

## 2021-05-27 PROCEDURE — 99213 OFFICE O/P EST LOW 20 MIN: CPT | Performed by: INTERNAL MEDICINE

## 2021-05-27 NOTE — PROGRESS NOTES
Follow Up Note     Date: 2021   Patient Name: Ruby Ramos  MRN: [id number]  : 2000     Referring Physician: Isamar Leon APRN    Chief Complaint:    Chief Complaint   Patient presents with   • Follow-up     Post EGD   • Celiac Disease   • Heartburn       Interval History:   2021  Ruby Ramos is a 21 y.o. female who is here today for follow up for follow-up after her recent endoscopic procedure.  She recently had a few days of diarrhea when she is treated with her gluten-free diet otherwise no abdominal pain no currently having regular bowel movements.  She had a recent EGD she is here to discuss the pathology findings and further plan.    03/15/ 2021  Ruby Ramos is a 20 y.o. female who is here today to establish care with Gastroenterology for   She was diagnosed with celiac disease about 5 years ago as per pt and had blood work  And EGD. She doent remember where exactly it was done. she was having lot of abdominal pain and nausea, diarrhea at that time. She was completely gluten free since then and that helped her symptoms. Now having once daily regular bowel movement, no nausea but occasionally gets mild mid abdominal discomfort.   This patient deny any abdominal pain, change in bowel habit, hematochezia or melena.  Weight is stable. Pt denies nausea vomiting or odynophagia or dysphagia. There is a history of mild acid reflux. There is no history of anemia. Prior history of EGD about 5 years ago. No prior colonoscopy. No family history of colon cancer or any GI malignancy. No history of any abdominal surgery. Denies alcohol abuse or cigarette moking.      Subjective      Past Medical History:   Past Medical History:   Diagnosis Date   • Celiac disease    • Diabetes mellitus (CMS/HCC)    • Diabetes mellitus type I (CMS/HCC)    • GERD (gastroesophageal reflux disease)      Past Surgical History:   Past Surgical History:   Procedure Laterality Date   • ENDOSCOPY     • ENDOSCOPY  N/A 4/14/2021    Procedure: ESOPHAGOGASTRODUODENOSCOPY WITH BIOPSY;  Surgeon: Wolfgang Patiño MD;  Location: [facility] ENDOSCOPY;  Service: Gastroenterology;  Laterality: N/A;   • HERNIA REPAIR         Family History:   Family History   Problem Relation Age of Onset   • Hypertension Mother    • Hypertension Father    • Diabetes Father    • Cancer Maternal Grandmother    • Cancer Paternal Grandmother    • Colon cancer Neg Hx        Social History:   Social History     Socioeconomic History   • Marital status: Single     Spouse name: Not on file   • Number of children: Not on file   • Years of education: Not on file   • Highest education level: Not on file   Tobacco Use   • Smoking status: Current Every Day Smoker     Years: 2.00     Types: Electronic Cigarette   • Smokeless tobacco: Never Used   Vaping Use   • Vaping Use: Every day   • Substances: Nicotine, Flavoring   • Devices: Pre-filled or refillable cartridge, Refillable tank   Substance and Sexual Activity   • Alcohol use: Not Currently   • Drug use: Never   • Sexual activity: Defer       Medications:     Current Outpatient Medications:   •  Continuous Blood Gluc Sensor (Dexcom G6 Sensor), 1 each Take As Directed., Disp: 3 each, Rfl: 11  •  Continuous Blood Gluc Transmit (Dexcom G6 Transmitter) misc, 1 each Take As Directed., Disp: 1 each, Rfl: 3  •  glucose blood test strip, Four times daily, Disp: 400 each, Rfl: 3  •  insulin aspart (novoLOG FLEXPEN) 100 UNIT/ML solution pen-injector sc pen, Inject 5 Units under the skin into the appropriate area as directed 3 (Three) Times a Day With Meals. 5 units with meals plus 1:50>150, MDD 40 units, Disp: 15 mL, Rfl: 1  •  Insulin Glargine (BASAGLAR KWIKPEN) 100 UNIT/ML injection pen, Inject 14 Units under the skin into the appropriate area as directed Every Night. New medication prescribed today, has not started taking yet., Disp: 15 mL, Rfl: 1  •  Insulin Pen Needle 32G X 4 MM misc, 1 each 4 (Four) Times a Day.,  "Disp: 400 each, Rfl: 3  •  multivitamin with minerals (MULTIVITAMIN ADULT PO), Take 1 tablet by mouth Daily., Disp: , Rfl:   •  ondansetron ODT (Zofran ODT) 4 MG disintegrating tablet, Place 1 tablet on the tongue Every 8 (Eight) Hours As Needed for Nausea., Disp: 15 tablet, Rfl: 1  •  True Comfort Twist Top Lancets misc, 1 Device 4 (Four) Times a Day., Disp: 400 each, Rfl: 3    Allergies:   Allergies   Allergen Reactions   • Gluten Meal Other (See Comments)     Celiac disease   • Mucinex [Guaifenesin Er] Nausea Only and Dizziness       Review of Systems:   Review of Systems   Constitutional: Negative for appetite change, fatigue and unexpected weight loss.   HENT: Positive for trouble swallowing.    Gastrointestinal: Positive for constipation and diarrhea. Negative for abdominal distention, abdominal pain, anal bleeding, blood in stool, nausea, rectal pain, vomiting, GERD and indigestion.       The following portions of the patient's history were reviewed and updated as appropriate: allergies, current medications, past family history, past medical history, past social history, past surgical history and problem list.    Objective     Physical Exam:  Vital Signs:   Vitals:    05/27/21 1334   BP: 119/65   Pulse: 94   Resp: 20   Temp: 98.4 °F (36.9 °C)   Weight: 76.7 kg (169 lb)   Height: 157.5 cm (62\")       Physical Exam  Vitals and nursing note reviewed.   Constitutional:       Appearance: She is well-developed. She is obese.   Eyes:      Conjunctiva/sclera: Conjunctivae normal.   Cardiovascular:      Rate and Rhythm: Normal rate and regular rhythm.      Heart sounds: No murmur heard.     Pulmonary:      Effort: Pulmonary effort is normal. No respiratory distress.      Breath sounds: Normal breath sounds.   Abdominal:      General: Bowel sounds are normal. There is no distension.      Palpations: Abdomen is soft. There is no mass.      Tenderness: There is no abdominal tenderness.      Hernia: No hernia is present. "   Musculoskeletal:      Cervical back: Neck supple.   Lymphadenopathy:      Cervical: No cervical adenopathy.   Skin:     General: Skin is warm and dry.   Neurological:      General: No focal deficit present.      Mental Status: She is alert and oriented to person, place, and time.      Sensory: No sensory deficit.         Results Review:   I reviewed the patient's new clinical results.    Admission on 04/14/2021, Discharged on 04/14/2021   Component Date Value Ref Range Status   • Glucose 04/14/2021 92  70 - 130 mg/dL Final    Serial Number: [id number]:  [id number]   • HCG, Urine, QL 04/14/2021 Negative  Negative Final   • Lot Number 04/14/2021 [id number]   Final   • Internal Positive Control 04/14/2021 Positive   Final   • Internal Negative Control 04/14/2021 Negative   Final   • Case Report 04/14/2021    Final                    Value:Surgical Pathology Report                         Case: [id number]                                  Authorizing Provider:  Wolfgang Patiño MD  Collected:           04/14/2021 08:03 AM          Ordering Location:     [facility]    Received:            04/14/2021 09:22 AM                                 [facility]                                                                    Pathologist:           Cheri Rudd MD                                                   Specimens:   1) - Gastric, Antrum, antrum and body for h.pylori                                                  2) - Esophagus, Distal, salmon colored patch                                                        3) - Small Intestine, Duodenum, known history of celiac disease, repeat biopsy            • Final Diagnosis 04/14/2021    Final                    Value:This result contains rich text formatting which cannot be displayed here.   • Glucose 04/14/2021 92  70 - 130 mg/dL Final    Serial Number: [id number]:  [id number]   Lab on 04/12/2021   Component Date Value Ref Range Status   •  SARS-CoV-2 EDILMA 04/12/2021 Not Detected  Not Detected Final   Lab on 03/15/2021   Component Date Value Ref Range Status   • IgA 03/15/2021 148  70 - 400 mg/dL Final   • Tissue Transglutaminase IgA 03/15/2021 5* 0 - 3 U/mL Final                                  Negative        0 -  3                                Weak Positive   4 - 10                                Positive           >10   Tissue Transglutaminase (tTG) has been identified   as the endomysial antigen.  Studies have demonstr-   ated that endomysial IgA antibodies have over 99%   specificity for gluten sensitive enteropathy.   • Vitamin B-12 03/15/2021 787  211 - 946 pg/mL Final   • Folate 03/15/2021 17.80  4.78 - 24.20 ng/mL Final      No radiology results for the last 90 days.    Assessment / Plan      1. Celiac disease  5/27/2021  Patient clinically doing very well without any significant abdominal pain diarrhea change in bowel habit.  She had a recent EGD done on 4/14/2021 which revealed tiny islands of salmon-colored mucosa at the GE junction biopsies negative for Liang's esophagus.  Gastric biopsy revealed mild reactive gastropathy otherwise unremarkable.  Duodenal mucosal examination was unremarkable and biopsy did not reveal any mucosal abnormalities indicating very good response to gluten-free diet..    Her IgA TTG normalized as well now, her Sanchez catheter vitamin B12 level is normal.  Hemoglobin is normal.  All these indicate that patient is following a gluten-free diet  For now recommend to continue gluten-free diet  Yearly CBC CMP folic acid vitamin B12 level and iron studies  Repeat EGD in 2 to 3 years time for surveillance and biopsy  Patient is slightly high risk for any small bowel neoplastic process and will monitor closely      3/15/2021   Celiac disease when she was 15-year-old about 5 years ago.  As per patient she had a positive blood work and also positive EGD and biopsy findings at that time.  Patient does not remember what  exactly her work-up was performed.  She has been gluten-free since then her symptoms almost resolved except she will have occasional bloating and pressure sensation in the mid abdomen.      She needs a celiac serology, this may come negative this time given patient is on a gluten-free diet  We will also schedule for an EGD and duodenal biopsy  We will get a vitamin B12 and folic acid level.  Her recent hemoglobin level was normal       2. Gastroesophageal reflux disease without esophagitis  No significant symptoms now  Advised to continue with the reflux diet.  Pepcid as needed        3. Class 1 obesity due to excess calories without serious comorbidity with body mass index (BMI) of 31.0 to 31.9 in adult  5/27/2021  She gained about 3 pounds since the last visit.  We had a discussion today regarding that to avoid any further weight gain to prevent any fatty liver disease and progression of the liver disease.  Advised to lose at least 5 to 10 pounds before the next visit.    3/15/2021   advised to lose at least 10 pounds over the next 6 months time  Advised regular exercise half hour every day at least 3 days in a week.   Reduced calorie intake  and lifestyle and dietary changes  have been discussed  Advised to avoid alcohol and smoking cigarette       Follow Up:   No follow-ups on file.    Wolfgang Patiño MD  [facility] [place]  5/27/2021  13:41 EDT     Please note that portions of this note may have been completed with a voice recognition program.

## 2021-06-04 ENCOUNTER — OFFICE VISIT (OUTPATIENT)
Dept: INTERNAL MEDICINE | Facility: CLINIC | Age: 21
End: 2021-06-04

## 2021-06-04 VITALS
DIASTOLIC BLOOD PRESSURE: 59 MMHG | WEIGHT: 165 LBS | HEART RATE: 98 BPM | RESPIRATION RATE: 16 BRPM | HEIGHT: 63 IN | TEMPERATURE: 98.2 F | OXYGEN SATURATION: 100 % | SYSTOLIC BLOOD PRESSURE: 120 MMHG | BODY MASS INDEX: 29.23 KG/M2

## 2021-06-04 DIAGNOSIS — F32.A ANXIETY AND DEPRESSION: Primary | ICD-10-CM

## 2021-06-04 DIAGNOSIS — F41.9 ANXIETY AND DEPRESSION: Primary | ICD-10-CM

## 2021-06-04 PROCEDURE — 99213 OFFICE O/P EST LOW 20 MIN: CPT | Performed by: NURSE PRACTITIONER

## 2021-06-04 NOTE — PROGRESS NOTES
Chief Complaint / Reason:      Chief Complaint   Patient presents with   • Anxiety     requesting med or referral        Subjective     HPI  Patient presents today with complaints of anxiety and depression.  She denies SI or HI.  She states that she has always struggled since she was about 11 or 12 but has, thought that she would grow out of it.  She has not been on any medication and has been under a lot of stress as she was diagnosed with diabetes.  Patient also is concerned that she may have ADHD.  Does not sleep well at night.  History taken from: patient    PMH/FH/Social History were reviewed and updated appropriately in the electronic medical record.   Past Medical History:   Diagnosis Date   • Celiac disease    • Diabetes mellitus (CMS/HCC)    • Diabetes mellitus type I (CMS/HCC)    • GERD (gastroesophageal reflux disease)      Past Surgical History:   Procedure Laterality Date   • ENDOSCOPY     • ENDOSCOPY N/A 4/14/2021    Procedure: ESOPHAGOGASTRODUODENOSCOPY WITH BIOPSY;  Surgeon: Wolfgang Patiño MD;  Location: [facility] ENDOSCOPY;  Service: Gastroenterology;  Laterality: N/A;   • HERNIA REPAIR       Social History     Socioeconomic History   • Marital status: Single     Spouse name: Not on file   • Number of children: Not on file   • Years of education: Not on file   • Highest education level: Not on file   Tobacco Use   • Smoking status: Current Every Day Smoker     Years: 2.00     Types: Electronic Cigarette   • Smokeless tobacco: Never Used   Vaping Use   • Vaping Use: Every day   • Substances: Nicotine, Flavoring   • Devices: Pre-filled or refillable cartridge, Refillable tank   Substance and Sexual Activity   • Alcohol use: Not Currently   • Drug use: Never   • Sexual activity: Defer     Family History   Problem Relation Age of Onset   • Hypertension Mother    • Hypertension Father    • Diabetes Father    • Cancer Maternal Grandmother    • Cancer Paternal Grandmother    • Colon cancer Neg Hx         Review of Systems:   Review of Systems   Constitutional: Positive for fatigue.   Respiratory: Negative.    Cardiovascular: Negative.    Allergic/Immunologic: Positive for environmental allergies.   Neurological: Negative.    Psychiatric/Behavioral: Positive for sleep disturbance, depressed mood and stress. The patient is nervous/anxious.          All other systems were reviewed and are negative.  Exceptions are noted in the subjective or above.      Objective     Vital Signs  Vitals:    06/04/21 1029   BP: 120/59   Pulse: 98   Resp: 16   Temp: 98.2 °F (36.8 °C)   SpO2: 100%       Body mass index is 29.23 kg/m².    Physical Exam  Vitals and nursing note reviewed.   Constitutional:       Appearance: She is well-developed.   Cardiovascular:      Rate and Rhythm: Normal rate and regular rhythm.      Heart sounds: Normal heart sounds.   Pulmonary:      Effort: Pulmonary effort is normal.      Breath sounds: Normal breath sounds.   Abdominal:      General: Bowel sounds are normal.      Palpations: Abdomen is soft.   Musculoskeletal:         General: Normal range of motion.   Skin:     General: Skin is warm and dry.      Capillary Refill: Capillary refill takes less than 2 seconds.   Neurological:      Mental Status: She is alert and oriented to person, place, and time.      Coordination: Coordination normal.   Psychiatric:         Mood and Affect: Mood is anxious and depressed.         Behavior: Behavior normal.         Thought Content: Thought content normal.         Judgment: Judgment normal.      Comments: Very fidgety during visit, shaking leg              Results Review:    I reviewed the patient's new clinical results.   Anxiety SILVIANO-7 1/14/2021   Feeling nervous, anxious or on edge 0   Not being able to stop or control worrying 0   Worrying too much about different things 0   Trouble Relaxing 0   Being so restless that it is hard to sit still 0   Becoming easily annoyed or irritable 0   Feeling afraid as if  something awful might happen 0   SILVIANO 7 Total Score 0   If you checked any problems, how difficult have these problems made it for you to do your work, take care of things at home, or get along with other people Not difficult at all     Anxiety SILVIANO-7 6/4/2021   Feeling nervous, anxious or on edge 3   Not being able to stop or control worrying 3   Worrying too much about different things 3   Trouble Relaxing 3   Being so restless that it is hard to sit still 3   Becoming easily annoyed or irritable 3   Feeling afraid as if something awful might happen 3   SILVIANO 7 Total Score 21   If you checked any problems, how difficult have these problems made it for you to do your work, take care of things at home, or get along with other people Extremely difficult     PHQ-9 Depression Screening  Little interest or pleasure in doing things? 3   Feeling down, depressed, or hopeless? 3   Trouble falling or staying asleep, or sleeping too much? 3   Feeling tired or having little energy? 3   Poor appetite or overeating? 3   Feeling bad about yourself - or that you are a failure or have let yourself or your family down? 3   Trouble concentrating on things, such as reading the newspaper or watching television? 2   Moving or speaking so slowly that other people could have noticed? Or the opposite - being so fidgety or restless that you have been moving around a lot more than usual? 3   Thoughts that you would be better off dead, or of hurting yourself in some way? 0   PHQ-9 Total Score 23   If you checked off any problems, how difficult have these problems made it for you to do your work, take care of things at home, or get along with other people? Very difficult       Medication Review:     Current Outpatient Medications:   •  Continuous Blood Gluc Sensor (Dexcom G6 Sensor), 1 each Take As Directed., Disp: 3 each, Rfl: 11  •  Continuous Blood Gluc Transmit (Dexcom G6 Transmitter) misc, 1 each Take As Directed., Disp: 1 each, Rfl: 3  •   glucose blood test strip, Four times daily, Disp: 400 each, Rfl: 3  •  insulin aspart (novoLOG FLEXPEN) 100 UNIT/ML solution pen-injector sc pen, Inject 5 Units under the skin into the appropriate area as directed 3 (Three) Times a Day With Meals. 5 units with meals plus 1:50>150, MDD 40 units, Disp: 15 mL, Rfl: 1  •  Insulin Glargine (BASAGLAR KWIKPEN) 100 UNIT/ML injection pen, Inject 14 Units under the skin into the appropriate area as directed Every Night. New medication prescribed today, has not started taking yet., Disp: 15 mL, Rfl: 1  •  Insulin Pen Needle 32G X 4 MM misc, 1 each 4 (Four) Times a Day., Disp: 400 each, Rfl: 3  •  multivitamin with minerals (MULTIVITAMIN ADULT PO), Take 1 tablet by mouth Daily., Disp: , Rfl:   •  ondansetron ODT (Zofran ODT) 4 MG disintegrating tablet, Place 1 tablet on the tongue Every 8 (Eight) Hours As Needed for Nausea., Disp: 15 tablet, Rfl: 1  •  True Comfort Twist Top Lancets misc, 1 Device 4 (Four) Times a Day., Disp: 400 each, Rfl: 3    Diagnoses and all orders for this visit:    Anxiety and depression  -     Ambulatory Referral to Psychiatry    Discussed nonpharmacological interventions and recommend patient get adequate rest hydration nutrition.      Return if symptoms worsen or fail to improve.    Isamar Leon, APRN  06/04/2021

## 2021-06-15 ENCOUNTER — LAB (OUTPATIENT)
Dept: LAB | Facility: HOSPITAL | Age: 21
End: 2021-06-15

## 2021-06-15 ENCOUNTER — OFFICE VISIT (OUTPATIENT)
Dept: ENDOCRINOLOGY | Facility: CLINIC | Age: 21
End: 2021-06-15

## 2021-06-15 VITALS
HEIGHT: 63 IN | WEIGHT: 165.6 LBS | DIASTOLIC BLOOD PRESSURE: 72 MMHG | SYSTOLIC BLOOD PRESSURE: 104 MMHG | BODY MASS INDEX: 29.34 KG/M2 | HEART RATE: 101 BPM | OXYGEN SATURATION: 98 %

## 2021-06-15 DIAGNOSIS — E10.649 TYPE 1 DIABETES MELLITUS WITH HYPOGLYCEMIA AND WITHOUT COMA (HCC): Primary | ICD-10-CM

## 2021-06-15 DIAGNOSIS — E16.2 HYPOGLYCEMIA: ICD-10-CM

## 2021-06-15 LAB
EXPIRATION DATE: ABNORMAL
EXPIRATION DATE: NORMAL
GLUCOSE BLDC GLUCOMTR-MCNC: 134 MG/DL (ref 70–130)
HBA1C MFR BLD: 4.7 %
Lab: ABNORMAL
Lab: NORMAL

## 2021-06-15 PROCEDURE — 95251 CONT GLUC MNTR ANALYSIS I&R: CPT | Performed by: INTERNAL MEDICINE

## 2021-06-15 PROCEDURE — 83036 HEMOGLOBIN GLYCOSYLATED A1C: CPT | Performed by: INTERNAL MEDICINE

## 2021-06-15 PROCEDURE — 99214 OFFICE O/P EST MOD 30 MIN: CPT | Performed by: INTERNAL MEDICINE

## 2021-06-15 RX ORDER — INSULIN GLARGINE 100 [IU]/ML
10 INJECTION, SOLUTION SUBCUTANEOUS NIGHTLY
Qty: 15 ML | Refills: 1 | Status: SHIPPED | OUTPATIENT
Start: 2021-06-15 | End: 2022-03-17 | Stop reason: SDUPTHER

## 2021-06-30 ENCOUNTER — TELEPHONE (OUTPATIENT)
Dept: INTERNAL MEDICINE | Facility: CLINIC | Age: 21
End: 2021-06-30

## 2021-06-30 ENCOUNTER — OFFICE VISIT (OUTPATIENT)
Dept: BEHAVIORAL HEALTH | Facility: CLINIC | Age: 21
End: 2021-06-30

## 2021-06-30 VITALS
DIASTOLIC BLOOD PRESSURE: 70 MMHG | SYSTOLIC BLOOD PRESSURE: 116 MMHG | WEIGHT: 164 LBS | HEIGHT: 63 IN | BODY MASS INDEX: 29.06 KG/M2

## 2021-06-30 DIAGNOSIS — F90.2 ATTENTION DEFICIT HYPERACTIVITY DISORDER, COMBINED TYPE: Primary | ICD-10-CM

## 2021-06-30 DIAGNOSIS — F41.1 GENERALIZED ANXIETY DISORDER: ICD-10-CM

## 2021-06-30 PROCEDURE — 90792 PSYCH DIAG EVAL W/MED SRVCS: CPT | Performed by: NURSE PRACTITIONER

## 2021-06-30 RX ORDER — DEXTROAMPHETAMINE SACCHARATE, AMPHETAMINE ASPARTATE MONOHYDRATE, DEXTROAMPHETAMINE SULFATE AND AMPHETAMINE SULFATE 2.5; 2.5; 2.5; 2.5 MG/1; MG/1; MG/1; MG/1
10 CAPSULE, EXTENDED RELEASE ORAL DAILY
Qty: 30 CAPSULE | Refills: 0 | Status: SHIPPED | OUTPATIENT
Start: 2021-06-30 | End: 2021-07-28

## 2021-06-30 NOTE — TELEPHONE ENCOUNTER
[facility] states the pharmacy still has not received the new prescription of ADDERALL.  Please advise.  Phone number and pharmacy verified.

## 2021-06-30 NOTE — PROGRESS NOTES
Patient Name: Ruby Ramos  MRN: [id number]   :  2000     Referring Physician: Isamar Leon APRN    Chief Complaint:     ICD-10-CM ICD-9-CM   1. Attention deficit hyperactivity disorder, combined type  F90.2 314.01   2. Generalized anxiety disorder  F41.1 300.02       HPI:   Ruby Ramos is a 21 y.o. female who is here today for initial evaluation of ADHD and Anxiety   Patient states she has been struggling with anxiety for an very long time. She is not sleeping well and has difficulty with both falling asleep and staying asleep. On a good night she will get approximately 6 hours. She feels hopeless about her future. She states her energy varies and her concentration is poor. She has periods were she feels both weighted down and restless. She was recently diagnosed with Type 1 diabetes and her appetite is poor. She states she is a worrier and has difficulty controlling the worry. She feels jittery, irritable, tense and on edge frequently. She tires easily, and her worry keeps her awake at night. She has a history of cutting at age 18 and a suicide attempt by overdose at age 16. She denies any psychiatric hospitalizations. She denies any active or passive suicidal ideations. She states she has significant trouble with focus and attention. She starts things she doesn't finish, procrastinates, avoids tasks that require sustained focus, is easily distracted, has difficulty following instructions, is fidgety, has difficulty sitting still, is always on the go, talks excessively and is impatient. As a child she struggled with focus and attention as well. She states she had difficulty with turning in assignments timely, daydreaming, and zoning out. At work she has continued to have difficulties with focus and attention. She has noticed that she procrastinates frequently as well.    Past Medical History:   Past Medical History:   Diagnosis Date   • Celiac disease    • Diabetes mellitus (CMS/[facility])    •  Diabetes mellitus type I (CMS/HCC)    • GERD (gastroesophageal reflux disease)        Past Surgical History:   Past Surgical History:   Procedure Laterality Date   • ENDOSCOPY     • ENDOSCOPY N/A 4/14/2021    Procedure: ESOPHAGOGASTRODUODENOSCOPY WITH BIOPSY;  Surgeon: Wolfgang Patiño MD;  Location: [facility] ENDOSCOPY;  Service: Gastroenterology;  Laterality: N/A;   • HERNIA REPAIR         Social History:   Social History     Socioeconomic History   • Marital status: Single     Spouse name: Not on file   • Number of children: Not on file   • Years of education: Not on file   • Highest education level: Not on file   Tobacco Use   • Smoking status: Current Every Day Smoker     Years: 2.00     Types: Electronic Cigarette   • Smokeless tobacco: Never Used   Vaping Use   • Vaping Use: Every day   • Substances: Nicotine, Flavoring   • Devices: Pre-filled or refillable cartridge, Refillable tank   Substance and Sexual Activity   • Alcohol use: Not Currently   • Drug use: Never   • Sexual activity: Defer       Family History:  Family History   Problem Relation Age of Onset   • Hypertension Mother    • Hypertension Father    • Diabetes Father    • Cancer Maternal Grandmother    • Cancer Paternal Grandmother    • Colon cancer Neg Hx        Allergy:  Allergies   Allergen Reactions   • Gluten Meal Other (See Comments)     Celiac disease   • Mucinex [Guaifenesin Er] Nausea Only and Dizziness       Current Medications:   Current Outpatient Medications   Medication Sig Dispense Refill   • Continuous Blood Gluc Sensor (Dexcom G6 Sensor) 1 each Take As Directed. 3 each 11   • Continuous Blood Gluc Transmit (Dexcom G6 Transmitter) misc 1 each Take As Directed. 1 each 3   • glucose blood test strip Four times daily 400 each 3   • insulin aspart (novoLOG FLEXPEN) 100 UNIT/ML solution pen-injector sc pen Inject 5 Units under the skin into the appropriate area as directed 3 (Three) Times a Day With Meals. 5 units with meals plus  1:50>150, MDD 40 units 15 mL 1   • Insulin Glargine (BASAGLAR KWIKPEN) 100 UNIT/ML injection pen Inject 10 Units under the skin into the appropriate area as directed Every Night. 15 mL 1   • Insulin Pen Needle 32G X 4 MM misc 1 each 4 (Four) Times a Day. 400 each 3   • multivitamin with minerals (MULTIVITAMIN ADULT PO) Take 1 tablet by mouth Daily.     • ondansetron ODT (Zofran ODT) 4 MG disintegrating tablet Place 1 tablet on the tongue Every 8 (Eight) Hours As Needed for Nausea. 15 tablet 1   • True Comfort Twist Top Lancets misc 1 Device 4 (Four) Times a Day. 400 each 3     No current facility-administered medications for this visit.       Lab Results:   Office Visit on 06/15/2021   Component Date Value Ref Range Status   • Hemoglobin A1C 06/15/2021 4.7  % Final   • Lot Number 06/15/2021 [id number]   Final   • Expiration Date 06/15/2021 2/5/23   Final   • Glucose 06/15/2021 134* 70 - 130 mg/dL Final   • Lot Number 06/15/2021 [id number]   Final   • Expiration Date 06/15/2021 01-29-22   Final   Admission on 04/14/2021, Discharged on 04/14/2021   Component Date Value Ref Range Status   • Glucose 04/14/2021 92  70 - 130 mg/dL Final    Serial Number: [id number]:  [id number]   • HCG, Urine, QL 04/14/2021 Negative  Negative Final   • Lot Number 04/14/2021 [id number]   Final   • Internal Positive Control 04/14/2021 Positive   Final   • Internal Negative Control 04/14/2021 Negative   Final   • Case Report 04/14/2021    Final                    Value:Surgical Pathology Report                         Case: [id number]                                  Authorizing Provider:  Wolfgang Patiño MD  Collected:           04/14/2021 08:03 AM          Ordering Location:     [facility]    Received:            04/14/2021 09:22 AM                                 S[facility] ENDO                                                                    Pathologist:           Cheri Rudd MD                                                    Specimens:   1) - Gastric, Antrum, antrum and body for h.pylori                                                  2) - Esophagus, Distal, salmon colored patch                                                        3) - Small Intestine, Duodenum, known history of celiac disease, repeat biopsy            • Final Diagnosis 04/14/2021    Final                    Value:This result contains rich text formatting which cannot be displayed here.   • Glucose 04/14/2021 92  70 - 130 mg/dL Final    Serial Number: [id number]:  [id number]   Lab on 04/12/2021   Component Date Value Ref Range Status   • SARS-CoV-2 EDILMA 04/12/2021 Not Detected  Not Detected Final       Review of Symptoms:   Review of Systems   Constitutional: Negative for activity change, appetite change, fatigue, unexpected weight gain and unexpected weight loss.   Respiratory: Negative for shortness of breath and wheezing.    Gastrointestinal: Negative for constipation, diarrhea, nausea and vomiting.   Musculoskeletal: Negative for gait problem.   Skin: Negative for dry skin and rash.   Neurological: Negative for dizziness, speech difficulty, weakness, light-headedness, headache, memory problem and confusion.   Psychiatric/Behavioral: Positive for decreased concentration, sleep disturbance and positive for hyperactivity. Negative for agitation, behavioral problems, dysphoric mood, hallucinations, self-injury, suicidal ideas, depressed mood and stress. The patient is nervous/anxious.        Physical Exam:   Physical Exam  Vitals and nursing note reviewed.   Constitutional:       General: She is not in acute distress.     Appearance: Normal appearance. She is well-developed. She is not diaphoretic.   HENT:      Head: Normocephalic and atraumatic.   Eyes:      Conjunctiva/sclera: Conjunctivae normal.   Cardiovascular:      Rate and Rhythm: Normal rate.   Pulmonary:      Effort: Pulmonary effort is normal. No respiratory distress.  "  Musculoskeletal:         General: Normal range of motion.      Cervical back: Full passive range of motion without pain and normal range of motion.   Skin:     General: Skin is warm and dry.   Neurological:      Mental Status: She is alert and oriented to person, place, and time.   Psychiatric:         Attention and Perception: Perception normal. She is inattentive.         Mood and Affect: Affect normal. Mood is anxious. Mood is not depressed. Affect is not labile, blunt, angry or inappropriate.         Speech: Speech normal. Speech is not rapid and pressured or tangential.         Behavior: Behavior normal. Behavior is not agitated, slowed, aggressive, withdrawn, hyperactive or combative. Behavior is cooperative.         Thought Content: Thought content normal. Thought content is not paranoid or delusional. Thought content does not include homicidal or suicidal ideation. Thought content does not include homicidal or suicidal plan.         Cognition and Memory: Cognition normal.         Judgment: Judgment is impulsive.       Blood pressure 116/70, height 160 cm (63\"), weight 74.4 kg (164 lb), not currently breastfeeding.  Body mass index is 29.05 kg/m².     Mental Status Exam:   Appearance: appropriate  Hygiene:   good  Cooperation:  Cooperative  Eye Contact:  Good  Psychomotor Behavior:  Hyperactive  Mood:within normal limits  Affect:  Appropriate  Hopelessness: 4  Speech:  Normal  Thought Process:  Goal directed  Thought Content:  Normal  Suicidal:  None  Homicidal:  None  Hallucinations:  None  Delusion:  None  Memory:  Intact  Orientation:  Person, Place, Time and Situation  Reliability:  good  Insight:  Good  Judgement:  Fair  Impulse Control:  Fair  Physical/Medical Issues:  No     PHQ-9 Depression Screening  Little interest or pleasure in doing things? 2   Feeling down, depressed, or hopeless? 2   Trouble falling or staying asleep, or sleeping too much? 3   Feeling tired or having little energy? 2   Poor " appetite or overeating? 1 (poor appetite)   Feeling bad about yourself - or that you are a failure or have let yourself or your family down? 3   Trouble concentrating on things, such as reading the newspaper or watching television? 3   Moving or speaking so slowly that other people could have noticed? Or the opposite - being so fidgety or restless that you have been moving around a lot more than usual? 3   Thoughts that you would be better off dead, or of hurting yourself in some way? 0   PHQ-9 Total Score 19   If you checked off any problems, how difficult have these problems made it for you to do your work, take care of things at home, or get along with other people? Extremely dIfficult      Assessment/Plan:   Diagnoses and all orders for this visit:    1. Attention deficit hyperactivity disorder, combined type (Primary)  -     Compliance Drug Analysis, Ur - Urine, Clean Catch    2. Generalized anxiety disorder  -     Compliance Drug Analysis, Ur - Urine, Clean Catch    Plan: Begin Adderall XR 10mg PO q AM, controlled substance agreement and urine drug screen obtained    A psychological evaluation was conducted in order to assess past and current level of functioning. Areas assessed included, but were not limited to: perception of social support, perception of ability to face and deal with challenges in life (positive functioning), anxiety symptoms, depressive symptoms, perspective on beliefs/belief system, coping skills for stress, intelligence level,  Therapeutic rapport was established. Interventions conducted today were geared towards incorporating medication management along with support for continued therapy. Education was also provided as to the med management with this provider and what to expect in subsequent sessions.    We discussed risks, benefits,goals and side effects of the above medication and the patient was agreeable with the plan.Patient was educated on the importance of compliance with  treatment and follow-up appointments. Patient is aware to contact the [facility] with any worsening of symptoms. To call for questions or concerns and return early if necessary. Patent is agreeable to go to the Emergency Department or call 911 should they begin SI/HI.     Treatment Plan:   Discussed risks, benefits, and alternatives of medication. Encouraged healthy habits (eating, exercise and sleep). Call if any questions or problems arise. Medication reconciled. Controlled substance monitoring report reviewed. Provided psychoeducation.. Discussed coping strategies and current stressors. Set appropriate boundaries and limits for patient's well-being. Use distraction techniques to improve symptoms. Access support networks.      Return in about 4 weeks (around 7/28/2021) for Follow Up 15 min.    Love Raymundo, APRMARZENA  Scribed for Love Raymundo by Valeria Siddiqui, MSN, RN-BC, [facility] [facility] PMHNP student

## 2021-07-06 ENCOUNTER — HOSPITAL ENCOUNTER (OUTPATIENT)
Dept: DIABETES SERVICES | Facility: HOSPITAL | Age: 21
Setting detail: RECURRING SERIES
Discharge: HOME | End: 2021-07-06

## 2021-07-07 LAB — DRUGS UR: NORMAL

## 2021-07-23 ENCOUNTER — OFFICE VISIT (OUTPATIENT)
Dept: INTERNAL MEDICINE | Facility: CLINIC | Age: 21
End: 2021-07-23

## 2021-07-23 VITALS
HEART RATE: 103 BPM | RESPIRATION RATE: 15 BRPM | WEIGHT: 163 LBS | HEIGHT: 63 IN | DIASTOLIC BLOOD PRESSURE: 63 MMHG | OXYGEN SATURATION: 100 % | TEMPERATURE: 98.9 F | BODY MASS INDEX: 28.88 KG/M2 | SYSTOLIC BLOOD PRESSURE: 116 MMHG

## 2021-07-23 DIAGNOSIS — R30.0 DYSURIA: Primary | ICD-10-CM

## 2021-07-23 DIAGNOSIS — E10.9 TYPE 1 DIABETES MELLITUS WITHOUT COMPLICATION (HCC): ICD-10-CM

## 2021-07-23 DIAGNOSIS — N89.8 VAGINAL ITCHING: ICD-10-CM

## 2021-07-23 LAB
BILIRUB BLD-MCNC: NEGATIVE MG/DL
CLARITY, POC: ABNORMAL
COLOR UR: YELLOW
GLUCOSE UR STRIP-MCNC: NEGATIVE MG/DL
KETONES UR QL: NEGATIVE
LEUKOCYTE EST, POC: NEGATIVE
NITRITE UR-MCNC: NEGATIVE MG/ML
PH UR: 6 [PH] (ref 5–8)
PROT UR STRIP-MCNC: NEGATIVE MG/DL
RBC # UR STRIP: NEGATIVE /UL
SP GR UR: 1.03 (ref 1–1.03)
UROBILINOGEN UR QL: NORMAL

## 2021-07-23 PROCEDURE — 81003 URINALYSIS AUTO W/O SCOPE: CPT | Performed by: NURSE PRACTITIONER

## 2021-07-23 PROCEDURE — 99213 OFFICE O/P EST LOW 20 MIN: CPT | Performed by: NURSE PRACTITIONER

## 2021-07-23 RX ORDER — INSULIN ASPART 100 [IU]/ML
INJECTION, SOLUTION INTRAVENOUS; SUBCUTANEOUS
Qty: 15 ML | Refills: 1 | Status: SHIPPED | OUTPATIENT
Start: 2021-07-23 | End: 2022-03-17 | Stop reason: SDUPTHER

## 2021-07-23 RX ORDER — FLUCONAZOLE 150 MG/1
150 TABLET ORAL
Qty: 2 TABLET | Refills: 0 | Status: SHIPPED | OUTPATIENT
Start: 2021-07-23 | End: 2021-07-28

## 2021-07-23 NOTE — PROGRESS NOTES
Chief Complaint / Reason:      Chief Complaint   Patient presents with   • Vaginal Itching     irritation.        Subjective     HPI  Patient presents today with complaints of vaginal irritation and itching.  She does report vaginal discharge but denies fever chills pelvic pain or any odor to the discharge.  She has not tried over-the-counter medications but states that she did go swimming last week but she tried to get out of her bed and suit after swimming.  She also has been going to the gym working out and has increased her carbs after meeting with her dietitian as her hemoglobin A1c had significantly decreased.  And she was having hypoglycemic episodes.   History taken from: patient    PMH/FH/Social History were reviewed and updated appropriately in the electronic medical record.   Past Medical History:   Diagnosis Date   • Celiac disease    • Diabetes mellitus (CMS/HCC)    • Diabetes mellitus type I (CMS/HCC)    • GERD (gastroesophageal reflux disease)      Past Surgical History:   Procedure Laterality Date   • ENDOSCOPY     • ENDOSCOPY N/A 4/14/2021    Procedure: ESOPHAGOGASTRODUODENOSCOPY WITH BIOPSY;  Surgeon: Wolfgang Patiño MD;  Location: [facility] ENDOS[facility];  Service: Gastroenterology;  Laterality: N/A;   • HERNIA REPAIR       Social History     Socioeconomic History   • Marital status: Single     Spouse name: Not on file   • Number of children: Not on file   • Years of education: Not on file   • Highest education level: Not on file   Tobacco Use   • Smoking status: Current Every Day Smoker     Years: 2.00     Types: Electronic Cigarette   • Smokeless tobacco: Never Used   Vaping Use   • Vaping Use: Every day   • Substances: Nicotine, Flavoring   • Devices: Pre-filled or refillable cartridge, Refillable tank   Substance and Sexual Activity   • Alcohol use: Not Currently   • Drug use: Never   • Sexual activity: Defer     Family History   Problem Relation Age of Onset   • Hypertension Mother    •  Hypertension Father    • Diabetes Father    • Cancer Maternal Grandmother    • Cancer Paternal Grandmother    • Colon cancer Neg Hx        Review of Systems:   Review of Systems   Constitutional: Negative.    Respiratory: Negative.    Cardiovascular: Negative.    Genitourinary: Positive for vaginal discharge.   Neurological: Negative.    Psychiatric/Behavioral: Negative.          All other systems were reviewed and are negative.  Exceptions are noted in the subjective or above.      Objective     Vital Signs  Vitals:    07/23/21 1604   BP: 116/63   Pulse: 103   Resp: 15   Temp: 98.9 °F (37.2 °C)   SpO2: 100%       Body mass index is 28.87 kg/m².    Physical Exam  Vitals and nursing note reviewed.   Constitutional:       Appearance: She is well-developed.   Cardiovascular:      Rate and Rhythm: Regular rhythm. Tachycardia present.      Heart sounds: Normal heart sounds.   Pulmonary:      Effort: Pulmonary effort is normal.      Breath sounds: Normal breath sounds.   Abdominal:      General: Bowel sounds are normal.      Palpations: Abdomen is soft.      Tenderness: There is abdominal tenderness in the right lower quadrant, suprapubic area and left lower quadrant.   Musculoskeletal:         General: Normal range of motion.   Skin:     General: Skin is warm and dry.      Capillary Refill: Capillary refill takes less than 2 seconds.   Neurological:      Mental Status: She is alert and oriented to person, place, and time.      Coordination: Coordination normal.   Psychiatric:         Behavior: Behavior normal.         Thought Content: Thought content normal.         Judgment: Judgment normal.              Results Review:    I reviewed the patient's new clinical results.   Office Visit on 07/23/2021   Component Date Value Ref Range Status   • Color 07/23/2021 Yellow  Yellow, Straw, Dark Yellow, Hetal Final   • Clarity, UA 07/23/2021 Cloudy* Clear Final   • Specific Gravity  07/23/2021 1.030  1.005 - 1.030 Final   • pH,  Urine 07/23/2021 6.0  5.0 - 8.0 Final   • Leukocytes 07/23/2021 Negative  Negative Final   • Nitrite, UA 07/23/2021 Negative  Negative Final   • Protein, POC 07/23/2021 Negative  Negative mg/dL Final   • Glucose, UA 07/23/2021 Negative  Negative, 1000 mg/dL (3+) mg/dL Final   • Ketones, UA 07/23/2021 Negative  Negative Final   • Urobilinogen, UA 07/23/2021 Normal  Normal Final   • Bilirubin 07/23/2021 Negative  Negative Final   • Blood, UA 07/23/2021 Negative  Negative Final         Medication Review:     Current Outpatient Medications:   •  amphetamine-dextroamphetamine XR (Adderall XR) 10 MG 24 hr capsule, Take 1 capsule by mouth Daily, Disp: 30 capsule, Rfl: 0  •  Continuous Blood Gluc Sensor (Dexcom G6 Sensor), 1 each Take As Directed., Disp: 3 each, Rfl: 11  •  Continuous Blood Gluc Transmit (Dexcom G6 Transmitter) misc, 1 each Take As Directed., Disp: 1 each, Rfl: 3  •  glucose blood test strip, Four times daily, Disp: 400 each, Rfl: 3  •  Insulin Glargine (BASAGLAR KWIKPEN) 100 UNIT/ML injection pen, Inject 10 Units under the skin into the appropriate area as directed Every Night., Disp: 15 mL, Rfl: 1  •  Insulin Pen Needle 32G X 4 MM misc, 1 each 4 (Four) Times a Day., Disp: 400 each, Rfl: 3  •  multivitamin with minerals (MULTIVITAMIN ADULT PO), Take 1 tablet by mouth Daily., Disp: , Rfl:   •  NovoLOG FlexPen 100 UNIT/ML solution pen-injector sc pen, INJECT 6 UNITS SUBCUTANEOUSLY INTO THE APPROPRIATE AREA WITH MEALS PLUS 2:50>150. MAX DAILY DOSE 40 UNITS., Disp: 15 mL, Rfl: 1  •  ondansetron ODT (Zofran ODT) 4 MG disintegrating tablet, Place 1 tablet on the tongue Every 8 (Eight) Hours As Needed for Nausea., Disp: 15 tablet, Rfl: 1  •  True Comfort Twist Top Lancets misc, 1 Device 4 (Four) Times a Day., Disp: 400 each, Rfl: 3  •  fluconazole (Diflucan) 150 MG tablet, Take 1 tablet by mouth Every Other Day., Disp: 2 tablet, Rfl: 0    Diagnoses and all orders for this visit:    Dysuria  -     POCT urinalysis  dipstick, automated    Vaginal itching  -     fluconazole (Diflucan) 150 MG tablet; Take 1 tablet by mouth Every Other Day.    Discussed yeast infection prevention with patient along with worsening signs and symptoms.      Return if symptoms worsen or fail to improve.    Isamar Leon, APRN  07/23/2021

## 2021-07-28 ENCOUNTER — OFFICE VISIT (OUTPATIENT)
Dept: BEHAVIORAL HEALTH | Facility: CLINIC | Age: 21
End: 2021-07-28

## 2021-07-28 VITALS
WEIGHT: 165.8 LBS | HEIGHT: 63 IN | BODY MASS INDEX: 29.38 KG/M2 | DIASTOLIC BLOOD PRESSURE: 64 MMHG | SYSTOLIC BLOOD PRESSURE: 114 MMHG

## 2021-07-28 DIAGNOSIS — F90.2 ATTENTION DEFICIT HYPERACTIVITY DISORDER, COMBINED TYPE: Primary | ICD-10-CM

## 2021-07-28 DIAGNOSIS — F41.1 GENERALIZED ANXIETY DISORDER: ICD-10-CM

## 2021-07-28 PROCEDURE — 99213 OFFICE O/P EST LOW 20 MIN: CPT | Performed by: NURSE PRACTITIONER

## 2021-07-28 RX ORDER — DEXTROAMPHETAMINE SACCHARATE, AMPHETAMINE ASPARTATE MONOHYDRATE, DEXTROAMPHETAMINE SULFATE AND AMPHETAMINE SULFATE 5; 5; 5; 5 MG/1; MG/1; MG/1; MG/1
20 CAPSULE, EXTENDED RELEASE ORAL EVERY MORNING
Qty: 30 CAPSULE | Refills: 0 | Status: CANCELLED | OUTPATIENT
Start: 2021-07-28

## 2021-07-28 RX ORDER — DEXTROAMPHETAMINE SACCHARATE, AMPHETAMINE ASPARTATE, DEXTROAMPHETAMINE SULFATE AND AMPHETAMINE SULFATE 2.5; 2.5; 2.5; 2.5 MG/1; MG/1; MG/1; MG/1
TABLET ORAL
Qty: 30 TABLET | Refills: 0 | Status: SHIPPED | OUTPATIENT
Start: 2021-07-28 | End: 2021-08-25 | Stop reason: SDUPTHER

## 2021-07-28 RX ORDER — DEXTROAMPHETAMINE SACCHARATE, AMPHETAMINE ASPARTATE MONOHYDRATE, DEXTROAMPHETAMINE SULFATE AND AMPHETAMINE SULFATE 3.75; 3.75; 3.75; 3.75 MG/1; MG/1; MG/1; MG/1
15 CAPSULE, EXTENDED RELEASE ORAL EVERY MORNING
Qty: 30 CAPSULE | Refills: 0 | Status: SHIPPED | OUTPATIENT
Start: 2021-07-28 | End: 2021-08-25 | Stop reason: SDUPTHER

## 2021-07-30 ENCOUNTER — PRIOR AUTHORIZATION (OUTPATIENT)
Dept: FAMILY MEDICINE CLINIC | Facility: CLINIC | Age: 21
End: 2021-07-30

## 2021-08-03 ENCOUNTER — APPOINTMENT (OUTPATIENT)
Dept: GENERAL RADIOLOGY | Facility: HOSPITAL | Age: 21
End: 2021-08-03

## 2021-08-03 ENCOUNTER — HOSPITAL ENCOUNTER (EMERGENCY)
Facility: HOSPITAL | Age: 21
Discharge: HOME | End: 2021-08-03
Attending: EMERGENCY MEDICINE | Admitting: EMERGENCY MEDICINE

## 2021-08-03 VITALS
SYSTOLIC BLOOD PRESSURE: 115 MMHG | RESPIRATION RATE: 16 BRPM | BODY MASS INDEX: 29.45 KG/M2 | HEIGHT: 63 IN | HEART RATE: 117 BPM | OXYGEN SATURATION: 100 % | DIASTOLIC BLOOD PRESSURE: 70 MMHG | WEIGHT: 166.2 LBS | TEMPERATURE: 98.2 F

## 2021-08-03 DIAGNOSIS — R07.89 SENSATION OF CHEST TIGHTNESS: ICD-10-CM

## 2021-08-03 DIAGNOSIS — Z20.822 CLOSE EXPOSURE TO COVID-19 VIRUS: Primary | ICD-10-CM

## 2021-08-03 LAB
ALBUMIN SERPL-MCNC: 5.2 G/DL (ref 3.5–5.2)
ALBUMIN/GLOB SERPL: 2.1 G/DL
ALP SERPL-CCNC: 80 U/L (ref 39–117)
ALT SERPL W P-5'-P-CCNC: 14 U/L (ref 1–33)
ANION GAP SERPL CALCULATED.3IONS-SCNC: 12.1 MMOL/L (ref 5–15)
AST SERPL-CCNC: 14 U/L (ref 1–32)
B-HCG UR QL: NEGATIVE
BASOPHILS # BLD AUTO: 0.03 10*3/MM3 (ref 0–0.2)
BASOPHILS NFR BLD AUTO: 0.3 % (ref 0–1.5)
BILIRUB SERPL-MCNC: 0.9 MG/DL (ref 0–1.2)
BILIRUB UR QL STRIP: NEGATIVE
BUN SERPL-MCNC: 10 MG/DL (ref 6–20)
BUN/CREAT SERPL: 13.2 (ref 7–25)
CALCIUM SPEC-SCNC: 10 MG/DL (ref 8.6–10.5)
CHLORIDE SERPL-SCNC: 104 MMOL/L (ref 98–107)
CLARITY UR: CLEAR
CO2 SERPL-SCNC: 21.9 MMOL/L (ref 22–29)
COLOR UR: YELLOW
CREAT SERPL-MCNC: 0.76 MG/DL (ref 0.57–1)
D DIMER PPP FEU-MCNC: 0.29 MCGFEU/ML (ref 0–0.57)
DEPRECATED RDW RBC AUTO: 40.3 FL (ref 37–54)
EOSINOPHIL # BLD AUTO: 0.11 10*3/MM3 (ref 0–0.4)
EOSINOPHIL NFR BLD AUTO: 1.1 % (ref 0.3–6.2)
ERYTHROCYTE [DISTWIDTH] IN BLOOD BY AUTOMATED COUNT: 12.8 % (ref 12.3–15.4)
GFR SERPL CREATININE-BSD FRML MDRD: 96 ML/MIN/1.73
GLOBULIN UR ELPH-MCNC: 2.5 GM/DL
GLUCOSE SERPL-MCNC: 102 MG/DL (ref 65–99)
GLUCOSE UR STRIP-MCNC: NEGATIVE MG/DL
HCT VFR BLD AUTO: 39.5 % (ref 34–46.6)
HGB BLD-MCNC: 14 G/DL (ref 12–15.9)
HGB UR QL STRIP.AUTO: NEGATIVE
IMM GRANULOCYTES # BLD AUTO: 0.03 10*3/MM3 (ref 0–0.05)
IMM GRANULOCYTES NFR BLD AUTO: 0.3 % (ref 0–0.5)
KETONES UR QL STRIP: NEGATIVE
LEUKOCYTE ESTERASE UR QL STRIP.AUTO: NEGATIVE
LYMPHOCYTES # BLD AUTO: 1.56 10*3/MM3 (ref 0.7–3.1)
LYMPHOCYTES NFR BLD AUTO: 15.2 % (ref 19.6–45.3)
MCH RBC QN AUTO: 30.6 PG (ref 26.6–33)
MCHC RBC AUTO-ENTMCNC: 35.4 G/DL (ref 31.5–35.7)
MCV RBC AUTO: 86.2 FL (ref 79–97)
MONOCYTES # BLD AUTO: 0.69 10*3/MM3 (ref 0.1–0.9)
MONOCYTES NFR BLD AUTO: 6.7 % (ref 5–12)
NEUTROPHILS NFR BLD AUTO: 7.82 10*3/MM3 (ref 1.7–7)
NEUTROPHILS NFR BLD AUTO: 76.4 % (ref 42.7–76)
NITRITE UR QL STRIP: NEGATIVE
NRBC BLD AUTO-RTO: 0 /100 WBC (ref 0–0.2)
NT-PROBNP SERPL-MCNC: 30.9 PG/ML (ref 0–450)
PH UR STRIP.AUTO: 6.5 [PH] (ref 5–8)
PLATELET # BLD AUTO: 283 10*3/MM3 (ref 140–450)
PMV BLD AUTO: 9.3 FL (ref 6–12)
POTASSIUM SERPL-SCNC: 3.7 MMOL/L (ref 3.5–5.2)
PROT SERPL-MCNC: 7.7 G/DL (ref 6–8.5)
PROT UR QL STRIP: NEGATIVE
RBC # BLD AUTO: 4.58 10*6/MM3 (ref 3.77–5.28)
SARS-COV-2 RNA PNL SPEC NAA+PROBE: NOT DETECTED
SODIUM SERPL-SCNC: 138 MMOL/L (ref 136–145)
SP GR UR STRIP: 1.01 (ref 1–1.03)
TROPONIN T SERPL-MCNC: <0.01 NG/ML (ref 0–0.03)
UROBILINOGEN UR QL STRIP: NORMAL
WBC # BLD AUTO: 10.24 10*3/MM3 (ref 3.4–10.8)

## 2021-08-03 PROCEDURE — 81003 URINALYSIS AUTO W/O SCOPE: CPT | Performed by: PHYSICIAN ASSISTANT

## 2021-08-03 PROCEDURE — 93005 ELECTROCARDIOGRAM TRACING: CPT | Performed by: PHYSICIAN ASSISTANT

## 2021-08-03 PROCEDURE — 99283 EMERGENCY DEPT VISIT LOW MDM: CPT

## 2021-08-03 PROCEDURE — 85379 FIBRIN DEGRADATION QUANT: CPT | Performed by: PHYSICIAN ASSISTANT

## 2021-08-03 PROCEDURE — 87635 SARS-COV-2 COVID-19 AMP PRB: CPT | Performed by: PHYSICIAN ASSISTANT

## 2021-08-03 PROCEDURE — 80053 COMPREHEN METABOLIC PANEL: CPT | Performed by: PHYSICIAN ASSISTANT

## 2021-08-03 PROCEDURE — 84484 ASSAY OF TROPONIN QUANT: CPT | Performed by: PHYSICIAN ASSISTANT

## 2021-08-03 PROCEDURE — 83880 ASSAY OF NATRIURETIC PEPTIDE: CPT | Performed by: PHYSICIAN ASSISTANT

## 2021-08-03 PROCEDURE — 81025 URINE PREGNANCY TEST: CPT | Performed by: PHYSICIAN ASSISTANT

## 2021-08-03 PROCEDURE — 85025 COMPLETE CBC W/AUTO DIFF WBC: CPT | Performed by: PHYSICIAN ASSISTANT

## 2021-08-03 PROCEDURE — 71045 X-RAY EXAM CHEST 1 VIEW: CPT

## 2021-08-03 RX ORDER — SODIUM CHLORIDE 0.9 % (FLUSH) 0.9 %
10 SYRINGE (ML) INJECTION AS NEEDED
Status: DISCONTINUED | OUTPATIENT
Start: 2021-08-03 | End: 2021-08-03 | Stop reason: HOSPADM

## 2021-08-03 RX ADMIN — SODIUM CHLORIDE 1000 ML: 9 INJECTION, SOLUTION INTRAVENOUS at 18:26

## 2021-08-03 NOTE — ED PROVIDER NOTES
Subjective   Patient is a 21-year-old female with history of celiac disease, type 1 diabetes and GERD presenting to the ER for evaluation of symptoms after exposure to known illness. Patient states she works at a  and was exposed to COVID-19 on Friday 07/30/21. She states that recently she has had a little bit of chest tightness and body aches. She denies any known fever, neck, vision loss or changes, chest pain, shortness of breath, dumping, nausea, emesis, diarrhea, leg pain or swelling, or any other symptoms but she states her blood glucose has been running within normal limits at home.           Review of Systems   Constitutional: Negative for chills and fever.   HENT: Negative.    Eyes: Negative.    Respiratory: Positive for chest tightness. Negative for cough and shortness of breath.    Cardiovascular: Negative.    Gastrointestinal: Negative.    Genitourinary: Negative.    Musculoskeletal: Positive for myalgias.   Skin: Negative.    Neurological: Negative.    Psychiatric/Behavioral: Negative.        Past Medical History:   Diagnosis Date   • Celiac disease    • Diabetes mellitus (CMS/HCC)    • Diabetes mellitus type I (CMS/HCC)    • GERD (gastroesophageal reflux disease)        Allergies   Allergen Reactions   • Gluten Meal Other (See Comments)     Celiac disease   • Mucinex [Guaifenesin Er] Nausea Only and Dizziness       Past Surgical History:   Procedure Laterality Date   • ENDOSCOPY     • ENDOSCOPY N/A 4/14/2021    Procedure: ESOPHAGOGASTRODUODENOSCOPY WITH BIOPSY;  Surgeon: Wolfgang Patiño MD;  Location: [facility] ENDOSCOPY;  Service: Gastroenterology;  Laterality: N/A;   • HERNIA REPAIR         Family History   Problem Relation Age of Onset   • Hypertension Mother    • Hypertension Father    • Diabetes Father    • Cancer Maternal Grandmother    • Cancer Paternal Grandmother    • Colon cancer Neg Hx        Social History     Socioeconomic History   • Marital status: Single     Spouse name: Not  "on file   • Number of children: Not on file   • Years of education: Not on file   • Highest education level: Not on file   Tobacco Use   • Smoking status: Current Every Day Smoker     Years: 2.00     Types: Electronic Cigarette   • Smokeless tobacco: Never Used   Vaping Use   • Vaping Use: Every day   • Substances: Nicotine, Flavoring   • Devices: Pre-filled or refillable cartridge, Refillable tank   Substance and Sexual Activity   • Alcohol use: Yes     Comment: occ   • Drug use: Never   • Sexual activity: Defer           Objective   Physical Exam  Vitals and nursing note reviewed.     /70 (BP Location: Right arm, Patient Position: Sitting)   Pulse 117   Temp 98.2 °F (36.8 °C) (Oral)   Resp 16   Ht 160 cm (63\")   Wt 75.4 kg (166 lb 3.2 oz)   LMP 07/26/2021 (Approximate)   SpO2 100%   BMI 29.44 kg/m²     GEN: No acute distress, sitting from stretcher.  Awake and alert.  Does not appear septic or toxic.  She is answering questions appropriately.  Head: Normocephalic, atraumatic  Eyes: EOM intact  ENT: Mask in place per protocol  Cardiovascular: Tachycardic, regular rhythm  Lungs: Clear to auscultation bilaterally without adventitious sounds  Abdomen: Soft, nontender, nondistended, no peritoneal signs, no guarding  Extremities: No edema, normal appearance, full range of motion without deficits  Neuro: GCS 15  Psych: Mood and affect are appropriate    Procedures           ED Course  ED Course as of Aug 03 2358   Tue Aug 03, 2021   1825 EKG interpreted by me: sinus tachycardia, no acute ST changes, non specific T wave changes, this is an abnormal EKG    [MP]   1944 HCG, Urine QL: Negative [LA]   1944 D-Dimer, Quant: 0.29 [LA]   1944 Troponin T: <0.010 [LA]   1944 Color, UA: Yellow [LA]   1944 Appearance, UA: Clear [LA]   1944 pH, UA: 6.5 [LA]   1944 Specific Gravity, UA: 1.006 [LA]   1944 Glucose: Negative [LA]   1944 Ketones, UA: Negative [LA]   1944 Bilirubin, UA: Negative [LA]   1944 Blood, UA: Negative " [LA]   1944 Protein, UA: Negative [LA]   1944 Leukocytes, UA: Negative [LA]   1944 Nitrite, UA: Negative [LA]   1944 Urobilinogen, UA: 0.2 E.U./dL [LA]   1944 Glucose(!): 102 [LA]   1944 BUN: 10 [LA]   1944 Creatinine: 0.76 [LA]   1944 Sodium: 138 [LA]   1944 Potassium: 3.7 [LA]   1944 CO2(!): 21.9 [LA]   1944 Calcium: 10.0 [LA]   1944 Total Protein: 7.7 [LA]   1944 Albumin: 5.20 [LA]   1944 ALT (SGPT): 14 [LA]   1944 Alkaline Phosphatase: 80 [LA]   1944 Total Bilirubin: 0.9 [LA]   1944 eGFR Non  Am: 96 [LA]   1944 Globulin: 2.5 [LA]   1944 WBC: 10.24 [LA]   1944 Hemoglobin: 14.0 [LA]   1944 Platelets: 283 [LA]   1944 COVID19: Not Detected [LA]   1953 Discussed findings the patient.  She is resting comfortably heart rate has improved.  Discussed follow-up and strict return precautions.  She verbalized understanding was in agreement with this plan of care    [LA]      ED Course User Index  [LA] Tiffanie Franklin PA-C  [MP] Nj Amaral MD                                           MDM  Number of Diagnoses or Management Options  Close exposure to COVID-19 virus  Sensation of chest tightness  Diagnosis management comments: On Arrival, patient is tachycardic.  She is afebrile.  She is saturating 100% on room air is in no respiratory distress.  Differential could include viral illness, dehydration, lecture abnormalities, cardiac dysrhythmia, pneumonia, pulmonary embolism, and other concerns.  Obtain basic labs, troponin, D-dimer, EKG, chest x-ray, UPT.  Patient's labs are stable.  No significant hyperglycemia or leukocytosis.  There is no elevation of D-dimer or troponin.  Chest x-ray was clear.  COVID-19 was negative.  Patient was stable here.  She did have a heart rate improved and was in the 90s.  Discussed she could have a viral illness causing her symptoms.  For symptomatic treatment, follow-up and strict return precautions.  She verbalized understanding was in agreement with this plan of care.          Amount and/or Complexity of Data Reviewed  Clinical lab tests: reviewed and ordered  Tests in the radiology section of CPT®: ordered and reviewed  Discussion of test results with the performing providers: yes  Review and summarize past medical records: yes  Discuss the patient with other providers: yes    Risk of Complications, Morbidity, and/or Mortality  Presenting problems: moderate  Diagnostic procedures: moderate  Management options: low    Patient Progress  Patient progress: stable      Final diagnoses:   Close exposure to COVID-19 virus   Sensation of chest tightness       ED Disposition  ED Disposition     ED Disposition Condition Comment    Discharge Stable           Isamar Leon, APRN  [address] [address]  [phone]    Schedule an appointment as soon as possible for a visit            Medication List      No changes were made to your prescriptions during this visit.          Tiffanie Franklin PA-C  08/03/21 5020

## 2021-08-03 NOTE — ED NOTES
Pt checked her BG and stated it was 90. She asked for an apple juice and YAO Moreno said she could have one. Apple juice provided @ [facility].     Melia Kim  08/03/21 [id number]

## 2021-08-03 NOTE — DISCHARGE INSTRUCTIONS
Continue to monitor symptoms.  Take over-the-counter cough and cold medicines as directed.  Continue to monitor glucose, drink plenty of fluids to stay well-hydrated.  Follow-up with your primary care provider in the next few days to reevaluate symptoms.  If you continue to have symptoms or develop new symptoms, may need to get retested for COVID-19.  Return to the ER for any change in worsening symptoms, or any additional concerns including but not limited to severe or worsening chest pain, hemoptysis, dizziness, syncope, shortness of breath.

## 2021-08-25 ENCOUNTER — PATIENT MESSAGE (OUTPATIENT)
Dept: BEHAVIORAL HEALTH | Facility: CLINIC | Age: 21
End: 2021-08-25

## 2021-08-25 DIAGNOSIS — F90.2 ATTENTION DEFICIT HYPERACTIVITY DISORDER, COMBINED TYPE: ICD-10-CM

## 2021-08-25 RX ORDER — DEXTROAMPHETAMINE SACCHARATE, AMPHETAMINE ASPARTATE, DEXTROAMPHETAMINE SULFATE AND AMPHETAMINE SULFATE 2.5; 2.5; 2.5; 2.5 MG/1; MG/1; MG/1; MG/1
TABLET ORAL
Qty: 30 TABLET | Refills: 0 | Status: SHIPPED | OUTPATIENT
Start: 2021-08-25 | End: 2021-09-14

## 2021-08-25 RX ORDER — DEXTROAMPHETAMINE SACCHARATE, AMPHETAMINE ASPARTATE MONOHYDRATE, DEXTROAMPHETAMINE SULFATE AND AMPHETAMINE SULFATE 3.75; 3.75; 3.75; 3.75 MG/1; MG/1; MG/1; MG/1
15 CAPSULE, EXTENDED RELEASE ORAL EVERY MORNING
Qty: 30 CAPSULE | Refills: 0 | Status: SHIPPED | OUTPATIENT
Start: 2021-08-25 | End: 2021-09-14

## 2021-08-25 RX ORDER — DEXTROAMPHETAMINE SACCHARATE, AMPHETAMINE ASPARTATE, DEXTROAMPHETAMINE SULFATE AND AMPHETAMINE SULFATE 2.5; 2.5; 2.5; 2.5 MG/1; MG/1; MG/1; MG/1
TABLET ORAL
Qty: 30 TABLET | Refills: 0 | Status: CANCELLED | OUTPATIENT
Start: 2021-08-25

## 2021-08-25 RX ORDER — DEXTROAMPHETAMINE SACCHARATE, AMPHETAMINE ASPARTATE MONOHYDRATE, DEXTROAMPHETAMINE SULFATE AND AMPHETAMINE SULFATE 3.75; 3.75; 3.75; 3.75 MG/1; MG/1; MG/1; MG/1
15 CAPSULE, EXTENDED RELEASE ORAL EVERY MORNING
Qty: 30 CAPSULE | Refills: 0 | Status: CANCELLED | OUTPATIENT
Start: 2021-08-25

## 2021-08-25 NOTE — TELEPHONE ENCOUNTER
Incoming Refill Request      Medication requested (name and dose): ADDERALL 10 MG AND ADDERALL 15 MG    Pharmacy where request should be sent: [facility] [place]    Additional details provided by patient: PATIENT IS WANTING TO KNOW IF THE BOOSTER DOSE CAN BE MOVED BACK DOWN TO 10 MG. STATES SHE FEELS JITTERY.    Best call back number: [phone]    Does the patient have less than a 3 day supply:  [x] Yes  [] No    Opal Rudd  08/25/21, 13:42 EDT

## 2021-08-25 NOTE — TELEPHONE ENCOUNTER
From: Ruby Ramos  To: SONIYA Weir  Sent: 8/25/2021 12:26 PM EDT  Subject: Prescription Question    Hello, this is Ruby Ramos. I'm down to 4 pills left in my current Adderall prescription and I'm needing to get a refill. I also need my booster refilled as well.

## 2021-08-27 NOTE — TELEPHONE ENCOUNTER
Attempted to contact patient; no answer and no release on file for behavioral health. [employer] message sent with provider's instruction

## 2021-09-14 ENCOUNTER — OFFICE VISIT (OUTPATIENT)
Dept: BEHAVIORAL HEALTH | Facility: CLINIC | Age: 21
End: 2021-09-14

## 2021-09-14 VITALS — HEIGHT: 63 IN | WEIGHT: 166 LBS | BODY MASS INDEX: 29.41 KG/M2

## 2021-09-14 DIAGNOSIS — F41.1 GENERALIZED ANXIETY DISORDER: ICD-10-CM

## 2021-09-14 DIAGNOSIS — F90.2 ATTENTION DEFICIT HYPERACTIVITY DISORDER, COMBINED TYPE: Primary | ICD-10-CM

## 2021-09-14 PROCEDURE — 99213 OFFICE O/P EST LOW 20 MIN: CPT | Performed by: NURSE PRACTITIONER

## 2021-09-14 RX ORDER — DEXTROAMPHETAMINE SACCHARATE, AMPHETAMINE ASPARTATE MONOHYDRATE, DEXTROAMPHETAMINE SULFATE AND AMPHETAMINE SULFATE 2.5; 2.5; 2.5; 2.5 MG/1; MG/1; MG/1; MG/1
10 CAPSULE, EXTENDED RELEASE ORAL EVERY MORNING
Qty: 30 CAPSULE | Refills: 0 | Status: SHIPPED | OUTPATIENT
Start: 2021-09-14 | End: 2021-10-26 | Stop reason: SDUPTHER

## 2021-09-14 RX ORDER — DEXTROAMPHETAMINE SACCHARATE, AMPHETAMINE ASPARTATE, DEXTROAMPHETAMINE SULFATE AND AMPHETAMINE SULFATE 2.5; 2.5; 2.5; 2.5 MG/1; MG/1; MG/1; MG/1
TABLET ORAL
Qty: 30 TABLET | Refills: 0 | Status: SHIPPED | OUTPATIENT
Start: 2021-09-14 | End: 2021-10-26 | Stop reason: SDUPTHER

## 2021-09-14 NOTE — PROGRESS NOTES
Patient Name: Ruby Ramos  MRN: [id number]   :  2000     Chief Complaint:      ICD-10-CM ICD-9-CM   1. Attention deficit hyperactivity disorder, combined type  F90.2 314.01   2. Generalized anxiety disorder  F41.1 300.02       History of Present Illness: Ruby Ramos is a 21 y.o. female is here today for medication management follow up.  Patient states the increase to 15 mg in the morning has caused her heart to race and her chest to feel tight.  Would like to go back down to the 10 mg extended release and then have the 10 mg short acting booster in the afternoon.  The following portions of the patient's history were reviewed and updated as appropriate: allergies, current medications, past family history, past medical history, past social history, past surgical history and problem list.    Review of Systems;;  Review of Systems   Constitutional: Negative for activity change, appetite change, fatigue, unexpected weight gain and unexpected weight loss.   Respiratory: Negative for shortness of breath and wheezing.    Gastrointestinal: Negative for constipation, diarrhea, nausea and vomiting.   Musculoskeletal: Negative for gait problem.   Skin: Negative for dry skin and rash.   Neurological: Negative for dizziness, speech difficulty, weakness, light-headedness, headache, memory problem and confusion.   Psychiatric/Behavioral: Negative for agitation, behavioral problems, decreased concentration, dysphoric mood, hallucinations, self-injury, sleep disturbance, suicidal ideas, negative for hyperactivity, depressed mood and stress. The patient is not nervous/anxious.        Physical Exam;;  Physical Exam  Vitals and nursing note reviewed.   Constitutional:       General: She is not in acute distress.     Appearance: She is well-developed. She is not diaphoretic.   HENT:      Head: Normocephalic and atraumatic.   Eyes:      Conjunctiva/sclera: Conjunctivae normal.   Cardiovascular:      Rate and Rhythm: Normal  "rate.   Pulmonary:      Effort: Pulmonary effort is normal. No respiratory distress.   Musculoskeletal:         General: Normal range of motion.      Cervical back: Full passive range of motion without pain and normal range of motion.   Skin:     General: Skin is warm and dry.   Neurological:      Mental Status: She is alert and oriented to person, place, and time.   Psychiatric:         Mood and Affect: Mood is not anxious or depressed. Affect is not labile, blunt, angry or inappropriate.         Speech: Speech is not rapid and pressured or tangential.         Behavior: Behavior normal. Behavior is not agitated, slowed, aggressive, withdrawn, hyperactive or combative. Behavior is cooperative.         Thought Content: Thought content normal. Thought content is not paranoid or delusional. Thought content does not include homicidal or suicidal ideation. Thought content does not include homicidal or suicidal plan.         Judgment: Judgment normal.       Height 160 cm (63\"), weight 75.3 kg (166 lb), not currently breastfeeding.  Body mass index is 29.41 kg/m².    Current Medications;;    Current Outpatient Medications:   •  amphetamine-dextroamphetamine (Adderall) 10 MG tablet, Take 10 mg orally every afternoon., Disp: 30 tablet, Rfl: 0  •  amphetamine-dextroamphetamine XR (Adderall XR) 10 MG 24 hr capsule, Take 1 capsule by mouth Every Morning, Disp: 30 capsule, Rfl: 0  •  Continuous Blood Gluc Sensor (Dexcom G6 Sensor), 1 each Take As Directed., Disp: 3 each, Rfl: 11  •  Continuous Blood Gluc Transmit (Dexcom G6 Transmitter) misc, 1 each Take As Directed., Disp: 1 each, Rfl: 3  •  glucose blood test strip, Four times daily, Disp: 400 each, Rfl: 3  •  Insulin Glargine (BASAGLAR KWIKPEN) 100 UNIT/ML injection pen, Inject 10 Units under the skin into the appropriate area as directed Every Night., Disp: 15 mL, Rfl: 1  •  Insulin Pen Needle 32G X 4 MM misc, 1 each 4 (Four) Times a Day., Disp: 400 each, Rfl: 3  •  " multivitamin with minerals (MULTIVITAMIN ADULT PO), Take 1 tablet by mouth Daily., Disp: , Rfl:   •  NovoLOG FlexPen 100 UNIT/ML solution pen-injector sc pen, INJECT 6 UNITS SUBCUTANEOUSLY INTO THE APPROPRIATE AREA WITH MEALS PLUS 2:50>150. MAX DAILY DOSE 40 UNITS., Disp: 15 mL, Rfl: 1  •  ondansetron ODT (Zofran ODT) 4 MG disintegrating tablet, Place 1 tablet on the tongue Every 8 (Eight) Hours As Needed for Nausea., Disp: 15 tablet, Rfl: 1  •  True Comfort Twist Top Lancets misc, 1 Device 4 (Four) Times a Day., Disp: 400 each, Rfl: 3    Lab Results:   Admission on 08/03/2021, Discharged on 08/03/2021   Component Date Value Ref Range Status   • Glucose 08/03/2021 102* 65 - 99 mg/dL Final   • BUN 08/03/2021 10  6 - 20 mg/dL Final   • Creatinine 08/03/2021 0.76  0.57 - 1.00 mg/dL Final   • Sodium 08/03/2021 138  136 - 145 mmol/L Final   • Potassium 08/03/2021 3.7  3.5 - 5.2 mmol/L Final   • Chloride 08/03/2021 104  98 - 107 mmol/L Final   • CO2 08/03/2021 21.9* 22.0 - 29.0 mmol/L Final   • Calcium 08/03/2021 10.0  8.6 - 10.5 mg/dL Final   • Total Protein 08/03/2021 7.7  6.0 - 8.5 g/dL Final   • Albumin 08/03/2021 5.20  3.50 - 5.20 g/dL Final   • ALT (SGPT) 08/03/2021 14  1 - 33 U/L Final   • AST (SGOT) 08/03/2021 14  1 - 32 U/L Final   • Alkaline Phosphatase 08/03/2021 80  39 - 117 U/L Final   • Total Bilirubin 08/03/2021 0.9  0.0 - 1.2 mg/dL Final   • eGFR Non  Amer 08/03/2021 96  >60 mL/min/1.73 Final   • Globulin 08/03/2021 2.5  gm/dL Final   • A/G Ratio 08/03/2021 2.1  g/dL Final   • BUN/Creatinine Ratio 08/03/2021 13.2  7.0 - 25.0 Final   • Anion Gap 08/03/2021 12.1  5.0 - 15.0 mmol/L Final   • HCG, Urine QL 08/03/2021 Negative  Negative Final   • Color, UA 08/03/2021 Yellow  Yellow, Straw Final   • Appearance, UA 08/03/2021 Clear  Clear Final   • pH, UA 08/03/2021 6.5  5.0 - 8.0 Final   • Specific Gravity, UA 08/03/2021 1.006  1.005 - 1.030 Final   • Glucose, UA 08/03/2021 Negative  Negative Final   •  Ketones, UA 08/03/2021 Negative  Negative Final   • Bilirubin, UA 08/03/2021 Negative  Negative Final   • Blood, UA 08/03/2021 Negative  Negative Final   • Protein, UA 08/03/2021 Negative  Negative Final   • Leuk Esterase, UA 08/03/2021 Negative  Negative Final   • Nitrite, UA 08/03/2021 Negative  Negative Final   • Urobilinogen, UA 08/03/2021 0.2 E.U./dL  0.2 - 1.0 E.U./dL Final   • proBNP 08/03/2021 30.9  0.0 - 450.0 pg/mL Final   • Troponin T 08/03/2021 <0.010  0.000 - 0.030 ng/mL Final   • COVID19 08/03/2021 Not Detected  Not Detected - Ref. Range Final   • WBC 08/03/2021 10.24  3.40 - 10.80 10*3/mm3 Final   • RBC 08/03/2021 4.58  3.77 - 5.28 10*6/mm3 Final   • Hemoglobin 08/03/2021 14.0  12.0 - 15.9 g/dL Final   • Hematocrit 08/03/2021 39.5  34.0 - 46.6 % Final   • MCV 08/03/2021 86.2  79.0 - 97.0 fL Final   • MCH 08/03/2021 30.6  26.6 - 33.0 pg Final   • MCHC 08/03/2021 35.4  31.5 - 35.7 g/dL Final   • RDW 08/03/2021 12.8  12.3 - 15.4 % Final   • RDW-SD 08/03/2021 40.3  37.0 - 54.0 fl Final   • MPV 08/03/2021 9.3  6.0 - 12.0 fL Final   • Platelets 08/03/2021 283  140 - 450 10*3/mm3 Final   • Neutrophil % 08/03/2021 76.4* 42.7 - 76.0 % Final   • Lymphocyte % 08/03/2021 15.2* 19.6 - 45.3 % Final   • Monocyte % 08/03/2021 6.7  5.0 - 12.0 % Final   • Eosinophil % 08/03/2021 1.1  0.3 - 6.2 % Final   • Basophil % 08/03/2021 0.3  0.0 - 1.5 % Final   • Immature Grans % 08/03/2021 0.3  0.0 - 0.5 % Final   • Neutrophils, Absolute 08/03/2021 7.82* 1.70 - 7.00 10*3/mm3 Final   • Lymphocytes, Absolute 08/03/2021 1.56  0.70 - 3.10 10*3/mm3 Final   • Monocytes, Absolute 08/03/2021 0.69  0.10 - 0.90 10*3/mm3 Final   • Eosinophils, Absolute 08/03/2021 0.11  0.00 - 0.40 10*3/mm3 Final   • Basophils, Absolute 08/03/2021 0.03  0.00 - 0.20 10*3/mm3 Final   • Immature Grans, Absolute 08/03/2021 0.03  0.00 - 0.05 10*3/mm3 Final   • nRBC 08/03/2021 0.0  0.0 - 0.2 /100 WBC Final   • D-Dimer, Quantitative 08/03/2021 0.29  0.00 - 0.57  MCGFEU/mL Final   Office Visit on 07/23/2021   Component Date Value Ref Range Status   • Color 07/23/2021 Yellow  Yellow, Straw, Dark Yellow, Hetal Final   • Clarity, UA 07/23/2021 Cloudy* Clear Final   • Specific Gravity  07/23/2021 1.030  1.005 - 1.030 Final   • pH, Urine 07/23/2021 6.0  5.0 - 8.0 Final   • Leukocytes 07/23/2021 Negative  Negative Final   • Nitrite, UA 07/23/2021 Negative  Negative Final   • Protein, POC 07/23/2021 Negative  Negative mg/dL Final   • Glucose, UA 07/23/2021 Negative  Negative, 1000 mg/dL (3+) mg/dL Final   • Ketones, UA 07/23/2021 Negative  Negative Final   • Urobilinogen, UA 07/23/2021 Normal  Normal Final   • Bilirubin 07/23/2021 Negative  Negative Final   • Blood, UA 07/23/2021 Negative  Negative Final   Office Visit on 06/30/2021   Component Date Value Ref Range Status   • Report Summary 06/30/2021 FINAL   Final    Comment: ====================================================================  [facility] DRUG ANALYSIS,[facility]  ====================================================================  Test                             Result       Flag       Units  Drug Present    Alcohol, Ethyl                 0.029                   g/dL     Sources of ethyl alcohol include alcoholic beverages or as a     fermentation product of glucose; glucose is present in this     specimen.  Interpret result with caution, as the presence of     ethyl alcohol is likely due, at least in part, to fermentation of     glucose.  ====================================================================  Test                      Result    Flag   Units      Ref Range    Creatinine              169              mg/dL      >=20  ====================================================================  Declared Medications:   Medication list was not provided.  ====================================================================  For clinical co                           nsultation, please call [phone].  ====================================================================     Office Visit on 06/15/2021   Component Date Value Ref Range Status   • Hemoglobin A1C 06/15/2021 4.7  % Final   • Lot Number 06/15/2021 [id number]   Final   • Expiration Date 06/15/2021 2/5/23   Final   • Glucose 06/15/2021 134* 70 - 130 mg/dL Final   • Lot Number 06/15/2021 [id number]   Final   • Expiration Date 06/15/2021 01-29-22   Final       Mental Status Exam:   Hygiene:   good  Cooperation:  Cooperative  Eye Contact:  Good  Psychomotor Behavior:  Appropriate  Mood:within normal limits  Affect:  Appropriate  Hopelessness: Denies  Speech:  Normal  Thought Process:  Goal directed  Thought Content:  Normal  Suicidal:  None  Homicidal:  None  Hallucinations:  None  Delusion:  None  Memory:  Intact  Orientation:  Person, Place, Time and Situation  Reliability:  good  Insight:  Good  Judgement:  Good  Impulse Control:  Good  Physical/Medical Issues:  No     PHQ-9 Depression Screening  Little interest or pleasure in doing things? 1   Feeling down, depressed, or hopeless? 2   Trouble falling or staying asleep, or sleeping too much? 3   Feeling tired or having little energy? 1   Poor appetite or overeating? 0   Feeling bad about yourself - or that you are a failure or have let yourself or your family down? 2   Trouble concentrating on things, such as reading the newspaper or watching television? 1   Moving or speaking so slowly that other people could have noticed? Or the opposite - being so fidgety or restless that you have been moving around a lot more than usual? 0   Thoughts that you would be better off dead, or of hurting yourself in some way? 0   PHQ-9 Total Score 10   If you checked off any problems, how difficult have these problems made it for you to do your work, take care of things at home, or get along with other people? Somewhat difficult        Assessment/Plan:  Diagnoses and all orders for this visit:    1. Attention  deficit hyperactivity disorder, combined type (Primary)  -     amphetamine-dextroamphetamine (Adderall) 10 MG tablet; Take 10 mg orally every afternoon.  Dispense: 30 tablet; Refill: 0  -     amphetamine-dextroamphetamine XR (Adderall XR) 10 MG 24 hr capsule; Take 1 capsule by mouth Every Morning  Dispense: 30 capsule; Refill: 0    2. Generalized anxiety disorder      Decrease Adderall XR back down to 10 mg and continue the afternoon booster of 10 mg.    A psychological evaluation was conducted in order to assess past and current level of functioning. Areas assessed included, but were not limited to: perception of social support, perception of ability to face and deal with challenges in life (positive functioning), anxiety symptoms, depressive symptoms, perspective on beliefs/belief system, coping skills for stress, intelligence level,  Therapeutic rapport was established. Interventions conducted today were geared towards incorporating medication management along with support for continued therapy. Education was also provided as to the med management with this provider and what to expect in subsequent sessions.    We discussed risks, benefits,goals and side effects of the above medication and the patient was agreeable with the plan.Patient was educated on the importance of compliance with treatment and follow-up appointments. Patient is aware to contact the [facility] with any worsening of symptoms. To call for questions or concerns and return early if necessary. Patent is agreeable to go to the Emergency Department or call 911 should they begin SI/HI.     Treatment Plan:   Discussed risks, benefits, and alternatives of medication. Encouraged healthy habits (eating, exercise and sleep). Call if any questions or problems arise. Medication reconciled. Controlled substance monitoring report reviewed. Provided psychoeducation.. Discussed coping strategies and current stressors. Set appropriate boundaries and limits  for patient's well-being. Use distraction techniques to improve symptoms. Access support networks.      Return in about 4 weeks (around 10/12/2021) for Follow Up 15 min.    Love Raymundo, APRN

## 2021-10-26 ENCOUNTER — OFFICE VISIT (OUTPATIENT)
Dept: BEHAVIORAL HEALTH | Facility: CLINIC | Age: 21
End: 2021-10-26

## 2021-10-26 VITALS
SYSTOLIC BLOOD PRESSURE: 100 MMHG | DIASTOLIC BLOOD PRESSURE: 60 MMHG | BODY MASS INDEX: 27.82 KG/M2 | WEIGHT: 157 LBS | HEIGHT: 63 IN

## 2021-10-26 DIAGNOSIS — F41.1 GENERALIZED ANXIETY DISORDER: ICD-10-CM

## 2021-10-26 DIAGNOSIS — F90.2 ATTENTION DEFICIT HYPERACTIVITY DISORDER, COMBINED TYPE: Primary | ICD-10-CM

## 2021-10-26 PROCEDURE — 99213 OFFICE O/P EST LOW 20 MIN: CPT | Performed by: NURSE PRACTITIONER

## 2021-10-26 RX ORDER — DEXTROAMPHETAMINE SACCHARATE, AMPHETAMINE ASPARTATE MONOHYDRATE, DEXTROAMPHETAMINE SULFATE AND AMPHETAMINE SULFATE 2.5; 2.5; 2.5; 2.5 MG/1; MG/1; MG/1; MG/1
10 CAPSULE, EXTENDED RELEASE ORAL EVERY MORNING
Qty: 30 CAPSULE | Refills: 0 | Status: SHIPPED | OUTPATIENT
Start: 2021-10-26 | End: 2021-11-30 | Stop reason: SDUPTHER

## 2021-10-26 RX ORDER — DEXTROAMPHETAMINE SACCHARATE, AMPHETAMINE ASPARTATE, DEXTROAMPHETAMINE SULFATE AND AMPHETAMINE SULFATE 2.5; 2.5; 2.5; 2.5 MG/1; MG/1; MG/1; MG/1
TABLET ORAL
Qty: 30 TABLET | Refills: 0 | Status: SHIPPED | OUTPATIENT
Start: 2021-10-26 | End: 2021-11-30 | Stop reason: SDUPTHER

## 2021-10-26 NOTE — PROGRESS NOTES
Patient Name: Ruby Ramos  MRN: [id number]   :  2000     Chief Complaint:      ICD-10-CM ICD-9-CM   1. Attention deficit hyperactivity disorder, combined type  F90.2 314.01   2. Generalized anxiety disorder  F41.1 300.02       History of Present Illness: Ruby Ramos is a 21 y.o. female is here today for medication management follow up.  Patient states her current doses of Adderall are working well.  Not having any side effects that she is aware of.  Patient is sleeping well and has no concerns.  Would like to start therapy for her anxiety. The following portions of the patient's history were reviewed and updated as appropriate: allergies, current medications, past family history, past medical history, past social history, past surgical history and problem list.    Review of Systems;;  Review of Systems   Constitutional: Negative for activity change, appetite change, fatigue, unexpected weight gain and unexpected weight loss.   Respiratory: Negative for shortness of breath and wheezing.    Gastrointestinal: Negative for constipation, diarrhea, nausea and vomiting.   Musculoskeletal: Negative for gait problem.   Skin: Negative for dry skin and rash.   Neurological: Negative for dizziness, speech difficulty, weakness, light-headedness, headache, memory problem and confusion.   Psychiatric/Behavioral: Negative for agitation, behavioral problems, decreased concentration, dysphoric mood, hallucinations, self-injury, sleep disturbance, suicidal ideas, negative for hyperactivity, depressed mood and stress. The patient is nervous/anxious.        Physical Exam;;  Physical Exam  Vitals and nursing note reviewed.   Constitutional:       General: She is not in acute distress.     Appearance: She is well-developed. She is not diaphoretic.   HENT:      Head: Normocephalic and atraumatic.   Eyes:      Conjunctiva/sclera: Conjunctivae normal.   Cardiovascular:      Rate and Rhythm: Normal rate.   Pulmonary:      Effort:  "Pulmonary effort is normal. No respiratory distress.   Musculoskeletal:         General: Normal range of motion.      Cervical back: Full passive range of motion without pain and normal range of motion.   Skin:     General: Skin is warm and dry.   Neurological:      Mental Status: She is alert and oriented to person, place, and time.   Psychiatric:         Mood and Affect: Mood is anxious. Mood is not depressed. Affect is not labile, blunt, angry or inappropriate.         Speech: Speech is not rapid and pressured or tangential.         Behavior: Behavior normal. Behavior is not agitated, slowed, aggressive, withdrawn, hyperactive or combative. Behavior is cooperative.         Thought Content: Thought content normal. Thought content is not paranoid or delusional. Thought content does not include homicidal or suicidal ideation. Thought content does not include homicidal or suicidal plan.         Judgment: Judgment normal.       Blood pressure 100/60, height 160 cm (63\"), weight 71.2 kg (157 lb), not currently breastfeeding.  Body mass index is 27.81 kg/m².    Current Medications;;    Current Outpatient Medications:   •  amphetamine-dextroamphetamine (Adderall) 10 MG tablet, Take 10 mg orally every afternoon., Disp: 30 tablet, Rfl: 0  •  amphetamine-dextroamphetamine XR (Adderall XR) 10 MG 24 hr capsule, Take 1 capsule by mouth Every Morning, Disp: 30 capsule, Rfl: 0  •  Continuous Blood Gluc Sensor (Dexcom G6 Sensor), 1 each Take As Directed., Disp: 3 each, Rfl: 11  •  Continuous Blood Gluc Transmit (Dexcom G6 Transmitter) misc, 1 each Take As Directed., Disp: 1 each, Rfl: 3  •  glucose blood test strip, Four times daily, Disp: 400 each, Rfl: 3  •  Insulin Glargine (BASAGLAR KWIKPEN) 100 UNIT/ML injection pen, Inject 10 Units under the skin into the appropriate area as directed Every Night., Disp: 15 mL, Rfl: 1  •  Insulin Pen Needle 32G X 4 MM misc, 1 each 4 (Four) Times a Day., Disp: 400 each, Rfl: 3  •  " multivitamin with minerals (MULTIVITAMIN ADULT PO), Take 1 tablet by mouth Daily., Disp: , Rfl:   •  NovoLOG FlexPen 100 UNIT/ML solution pen-injector sc pen, INJECT 6 UNITS SUBCUTANEOUSLY INTO THE APPROPRIATE AREA WITH MEALS PLUS 2:50>150. MAX DAILY DOSE 40 UNITS., Disp: 15 mL, Rfl: 1  •  ondansetron ODT (Zofran ODT) 4 MG disintegrating tablet, Place 1 tablet on the tongue Every 8 (Eight) Hours As Needed for Nausea., Disp: 15 tablet, Rfl: 1  •  True Comfort Twist Top Lancets misc, 1 Device 4 (Four) Times a Day., Disp: 400 each, Rfl: 3    Lab Results:   Admission on 08/03/2021, Discharged on 08/03/2021   Component Date Value Ref Range Status   • Glucose 08/03/2021 102* 65 - 99 mg/dL Final   • BUN 08/03/2021 10  6 - 20 mg/dL Final   • Creatinine 08/03/2021 0.76  0.57 - 1.00 mg/dL Final   • Sodium 08/03/2021 138  136 - 145 mmol/L Final   • Potassium 08/03/2021 3.7  3.5 - 5.2 mmol/L Final   • Chloride 08/03/2021 104  98 - 107 mmol/L Final   • CO2 08/03/2021 21.9* 22.0 - 29.0 mmol/L Final   • Calcium 08/03/2021 10.0  8.6 - 10.5 mg/dL Final   • Total Protein 08/03/2021 7.7  6.0 - 8.5 g/dL Final   • Albumin 08/03/2021 5.20  3.50 - 5.20 g/dL Final   • ALT (SGPT) 08/03/2021 14  1 - 33 U/L Final   • AST (SGOT) 08/03/2021 14  1 - 32 U/L Final   • Alkaline Phosphatase 08/03/2021 80  39 - 117 U/L Final   • Total Bilirubin 08/03/2021 0.9  0.0 - 1.2 mg/dL Final   • eGFR Non  Amer 08/03/2021 96  >60 mL/min/1.73 Final   • Globulin 08/03/2021 2.5  gm/dL Final   • A/G Ratio 08/03/2021 2.1  g/dL Final   • BUN/Creatinine Ratio 08/03/2021 13.2  7.0 - 25.0 Final   • Anion Gap 08/03/2021 12.1  5.0 - 15.0 mmol/L Final   • HCG, Urine QL 08/03/2021 Negative  Negative Final   • Color, UA 08/03/2021 Yellow  Yellow, Straw Final   • Appearance, UA 08/03/2021 Clear  Clear Final   • pH, UA 08/03/2021 6.5  5.0 - 8.0 Final   • Specific Gravity, UA 08/03/2021 1.006  1.005 - 1.030 Final   • Glucose, UA 08/03/2021 Negative  Negative Final   •  Ketones, UA 08/03/2021 Negative  Negative Final   • Bilirubin, UA 08/03/2021 Negative  Negative Final   • Blood, UA 08/03/2021 Negative  Negative Final   • Protein, UA 08/03/2021 Negative  Negative Final   • Leuk Esterase, UA 08/03/2021 Negative  Negative Final   • Nitrite, UA 08/03/2021 Negative  Negative Final   • Urobilinogen, UA 08/03/2021 0.2 E.U./dL  0.2 - 1.0 E.U./dL Final   • proBNP 08/03/2021 30.9  0.0 - 450.0 pg/mL Final   • Troponin T 08/03/2021 <0.010  0.000 - 0.030 ng/mL Final   • COVID19 08/03/2021 Not Detected  Not Detected - Ref. Range Final   • WBC 08/03/2021 10.24  3.40 - 10.80 10*3/mm3 Final   • RBC 08/03/2021 4.58  3.77 - 5.28 10*6/mm3 Final   • Hemoglobin 08/03/2021 14.0  12.0 - 15.9 g/dL Final   • Hematocrit 08/03/2021 39.5  34.0 - 46.6 % Final   • MCV 08/03/2021 86.2  79.0 - 97.0 fL Final   • MCH 08/03/2021 30.6  26.6 - 33.0 pg Final   • MCHC 08/03/2021 35.4  31.5 - 35.7 g/dL Final   • RDW 08/03/2021 12.8  12.3 - 15.4 % Final   • RDW-SD 08/03/2021 40.3  37.0 - 54.0 fl Final   • MPV 08/03/2021 9.3  6.0 - 12.0 fL Final   • Platelets 08/03/2021 283  140 - 450 10*3/mm3 Final   • Neutrophil % 08/03/2021 76.4* 42.7 - 76.0 % Final   • Lymphocyte % 08/03/2021 15.2* 19.6 - 45.3 % Final   • Monocyte % 08/03/2021 6.7  5.0 - 12.0 % Final   • Eosinophil % 08/03/2021 1.1  0.3 - 6.2 % Final   • Basophil % 08/03/2021 0.3  0.0 - 1.5 % Final   • Immature Grans % 08/03/2021 0.3  0.0 - 0.5 % Final   • Neutrophils, Absolute 08/03/2021 7.82* 1.70 - 7.00 10*3/mm3 Final   • Lymphocytes, Absolute 08/03/2021 1.56  0.70 - 3.10 10*3/mm3 Final   • Monocytes, Absolute 08/03/2021 0.69  0.10 - 0.90 10*3/mm3 Final   • Eosinophils, Absolute 08/03/2021 0.11  0.00 - 0.40 10*3/mm3 Final   • Basophils, Absolute 08/03/2021 0.03  0.00 - 0.20 10*3/mm3 Final   • Immature Grans, Absolute 08/03/2021 0.03  0.00 - 0.05 10*3/mm3 Final   • nRBC 08/03/2021 0.0  0.0 - 0.2 /100 WBC Final   • D-Dimer, Quantitative 08/03/2021 0.29  0.00 - 0.57  MCGFEU/mL Final       Mental Status Exam:   Hygiene:   good  Cooperation:  Cooperative  Eye Contact:  Good  Psychomotor Behavior:  Appropriate  Mood:within normal limits  Affect:  Appropriate  Hopelessness: Denies  Speech:  Normal  Thought Process:  Goal directed  Thought Content:  Normal  Suicidal:  None  Homicidal:  None  Hallucinations:  None  Delusion:  None  Memory:  Intact  Orientation:  Person, Place, Time and Situation  Reliability:  good  Insight:  Good  Judgement:  Good  Impulse Control:  Good  Physical/Medical Issues:  No     PHQ-9 Depression Screening  Little interest or pleasure in doing things? 1   Feeling down, depressed, or hopeless? 1   Trouble falling or staying asleep, or sleeping too much? 0   Feeling tired or having little energy? 0   Poor appetite or overeating? 0   Feeling bad about yourself - or that you are a failure or have let yourself or your family down? 0   Trouble concentrating on things, such as reading the newspaper or watching television? 1   Moving or speaking so slowly that other people could have noticed? Or the opposite - being so fidgety or restless that you have been moving around a lot more than usual? 0   Thoughts that you would be better off dead, or of hurting yourself in some way? 0   PHQ-9 Total Score 3   If you checked off any problems, how difficult have these problems made it for you to do your work, take care of things at home, or get along with other people? Not difficult at all        Assessment/Plan:  Diagnoses and all orders for this visit:    1. Attention deficit hyperactivity disorder, combined type (Primary)  -     amphetamine-dextroamphetamine (Adderall) 10 MG tablet; Take 10 mg orally every afternoon.  Dispense: 30 tablet; Refill: 0  -     amphetamine-dextroamphetamine XR (Adderall XR) 10 MG 24 hr capsule; Take 1 capsule by mouth Every Morning  Dispense: 30 capsule; Refill: 0  -     Ambulatory Referral to Behavioral Health    2. Generalized anxiety  disorder  -     Ambulatory Referral to Behavioral Health      We will continue medication as currently ordered.  Patient having no side effects from this.  Would like a referral to therapy to work through anxiety patient is having.    A psychological evaluation was conducted in order to assess past and current level of functioning. Areas assessed included, but were not limited to: perception of social support, perception of ability to face and deal with challenges in life (positive functioning), anxiety symptoms, depressive symptoms, perspective on beliefs/belief system, coping skills for stress, intelligence level,  Therapeutic rapport was established. Interventions conducted today were geared towards incorporating medication management along with support for continued therapy. Education was also provided as to the med management with this provider and what to expect in subsequent sessions.    We discussed risks, benefits,goals and side effects of the above medication and the patient was agreeable with the plan.Patient was educated on the importance of compliance with treatment and follow-up appointments. Patient is aware to contact the [facility] with any worsening of symptoms. To call for questions or concerns and return early if necessary. Patent is agreeable to go to the Emergency Department or call 911 should they begin SI/HI.     Treatment Plan:   Discussed risks, benefits, and alternatives of medication. Encouraged healthy habits (eating, exercise and sleep). Call if any questions or problems arise. Medication reconciled. Controlled substance monitoring report reviewed. Provided psychoeducation.. Discussed coping strategies and current stressors. Set appropriate boundaries and limits for patient's well-being. Use distraction techniques to improve symptoms. Access support networks.      Return in about 3 months (around 1/26/2022) for Follow Up 15 min.    SONIYA Dunn

## 2021-11-02 ENCOUNTER — PRIOR AUTHORIZATION (OUTPATIENT)
Dept: BEHAVIORAL HEALTH | Facility: CLINIC | Age: 21
End: 2021-11-02

## 2021-11-10 ENCOUNTER — OFFICE VISIT (OUTPATIENT)
Dept: INTERNAL MEDICINE | Facility: CLINIC | Age: 21
End: 2021-11-10

## 2021-11-10 VITALS
HEIGHT: 63 IN | TEMPERATURE: 97.4 F | OXYGEN SATURATION: 100 % | WEIGHT: 160 LBS | BODY MASS INDEX: 28.35 KG/M2 | HEART RATE: 100 BPM | SYSTOLIC BLOOD PRESSURE: 120 MMHG | DIASTOLIC BLOOD PRESSURE: 53 MMHG | RESPIRATION RATE: 16 BRPM

## 2021-11-10 DIAGNOSIS — E66.3 OVERWEIGHT WITH BODY MASS INDEX (BMI) OF 28 TO 28.9 IN ADULT: ICD-10-CM

## 2021-11-10 DIAGNOSIS — F90.2 ATTENTION DEFICIT HYPERACTIVITY DISORDER, COMBINED TYPE: ICD-10-CM

## 2021-11-10 DIAGNOSIS — Z13.228 SCREENING FOR ENDOCRINE, NUTRITIONAL, METABOLIC AND IMMUNITY DISORDER: ICD-10-CM

## 2021-11-10 DIAGNOSIS — Z00.00 PHYSICAL EXAM, ANNUAL: Primary | ICD-10-CM

## 2021-11-10 DIAGNOSIS — K21.9 GASTROESOPHAGEAL REFLUX DISEASE WITHOUT ESOPHAGITIS: ICD-10-CM

## 2021-11-10 DIAGNOSIS — F41.1 GENERALIZED ANXIETY DISORDER: ICD-10-CM

## 2021-11-10 DIAGNOSIS — Z13.0 SCREENING FOR ENDOCRINE, NUTRITIONAL, METABOLIC AND IMMUNITY DISORDER: ICD-10-CM

## 2021-11-10 DIAGNOSIS — Z13.29 SCREENING FOR ENDOCRINE, NUTRITIONAL, METABOLIC AND IMMUNITY DISORDER: ICD-10-CM

## 2021-11-10 DIAGNOSIS — F33.0 MILD EPISODE OF RECURRENT MAJOR DEPRESSIVE DISORDER (HCC): ICD-10-CM

## 2021-11-10 DIAGNOSIS — E10.9 TYPE 1 DIABETES MELLITUS WITHOUT COMPLICATION (HCC): ICD-10-CM

## 2021-11-10 DIAGNOSIS — Z13.21 SCREENING FOR ENDOCRINE, NUTRITIONAL, METABOLIC AND IMMUNITY DISORDER: ICD-10-CM

## 2021-11-10 LAB
BILIRUB BLD-MCNC: NEGATIVE MG/DL
CLARITY, POC: ABNORMAL
COLOR UR: YELLOW
EXPIRATION DATE: ABNORMAL
GLUCOSE UR STRIP-MCNC: ABNORMAL MG/DL
KETONES UR QL: NEGATIVE
LEUKOCYTE EST, POC: NEGATIVE
Lab: ABNORMAL
NITRITE UR-MCNC: NEGATIVE MG/ML
PH UR: 6 [PH] (ref 5–8)
PROT UR STRIP-MCNC: NEGATIVE MG/DL
RBC # UR STRIP: NEGATIVE /UL
SP GR UR: 1.01 (ref 1–1.03)
UROBILINOGEN UR QL: NORMAL

## 2021-11-10 PROCEDURE — 99395 PREV VISIT EST AGE 18-39: CPT | Performed by: NURSE PRACTITIONER

## 2021-11-10 PROCEDURE — 81003 URINALYSIS AUTO W/O SCOPE: CPT | Performed by: NURSE PRACTITIONER

## 2021-11-10 RX ORDER — BUPROPION HYDROCHLORIDE 75 MG/1
75 TABLET ORAL 2 TIMES DAILY
Qty: 60 TABLET | Refills: 1 | Status: SHIPPED | OUTPATIENT
Start: 2021-11-10 | End: 2021-12-08

## 2021-11-10 NOTE — PROGRESS NOTES
Chief Complaint   Patient presents with   • Annual Exam     pap       Ruby Ramos is a 21 y.o. female and is here for a comprehensive physical exam. The patient reports problems - fatigue and mood changes. Denies SI or HI.  Patient does see psych but missed last appointment..     History:  LMP: Patient's last menstrual period was 10/20/2021.  Last pap date: Never had Pap smear before  Abnormal pap? no  : 0  Para: 0  STD:none  Age of menarche:9  Sexually active:15 (males)  Abuse:none  Single   Do you take any herbs or supplements that were not prescribed by a doctor? no  Are you taking calcium supplements? no  Are you taking aspirin daily? no      Health Habits:  Dental Exam. up to date  Eye Exam. up to date  Exercise: 4 times/week.  Current exercise activities include: walking    Health Maintenance   Topic Date Due   • URINE MICROALBUMIN  Never done   • Pneumococcal Vaccine 0-64 (1 of 2 - PPSV23) Never done   • HPV VACCINES (1 - 2-dose series) Never done   • COVID-19 Vaccine (1) Never done   • HEPATITIS C SCREENING  Never done   • CHLAMYDIA SCREENING  2019   • PAP SMEAR  Never done   • DIABETIC EYE EXAM  Never done   • Hepatitis B (1 of 3 - Risk 3-dose series) Never done   • TDAP/TD VACCINES (1 - Tdap) Never done   • INFLUENZA VACCINE  11/10/2022 (Originally 2021)   • HEMOGLOBIN A1C  12/15/2021   • DIABETIC FOOT EXAM  06/15/2022   • ANNUAL PHYSICAL  2022   • MENINGOCOCCAL VACCINE  Aged Out       PMH, PSH, SocHx, FamHx, Allergies, and Medications: Reviewed and updated in the Visit Navigator.     Allergies   Allergen Reactions   • Gluten Meal Other (See Comments)     Celiac disease   • Mucinex [Guaifenesin Er] Nausea Only and Dizziness     Past Medical History:   Diagnosis Date   • Celiac disease    • Diabetes mellitus (HCC)    • Diabetes mellitus type I (HCC)    • GERD (gastroesophageal reflux disease)      Past Surgical History:   Procedure Laterality Date   • ENDOSCOPY     • ENDOSCOPY  "N/A 4/14/2021    Procedure: ESOPHAGOGASTRODUODENOSCOPY WITH BIOPSY;  Surgeon: Wolfgang Patiño MD;  Location: [facility] ENDOSCOPY;  Service: Gastroenterology;  Laterality: N/A;   • HERNIA REPAIR       Social History     Socioeconomic History   • Marital status: Single   Tobacco Use   • Smoking status: Current Every Day Smoker     Years: 2.00     Types: Electronic Cigarette   • Smokeless tobacco: Never Used   Vaping Use   • Vaping Use: Every day   • Substances: Nicotine, Flavoring   • Devices: Pre-filled or refillable cartridge, Refillable tank   Substance and Sexual Activity   • Alcohol use: Yes     Comment: occ   • Drug use: Never   • Sexual activity: Defer     Family History   Problem Relation Age of Onset   • Hypertension Mother    • Hypertension Father    • Diabetes Father    • Cancer Maternal Grandmother    • Cancer Paternal Grandmother    • Colon cancer Neg Hx        Review of Systems  Review of Systems      Vitals:    11/10/21 1658   BP: 120/53   Pulse: 100   Resp: 16   Temp: 97.4 °F (36.3 °C)   SpO2: 100%       Objective   /53   Pulse 100   Temp 97.4 °F (36.3 °C)   Resp 16   Ht 160 cm (63\")   Wt 72.6 kg (160 lb)   LMP 10/20/2021   SpO2 100%   BMI 28.34 kg/m²   Office Visit on 11/10/2021   Component Date Value Ref Range Status   • Color 11/10/2021 Yellow  Yellow, Straw, Dark Yellow, Hetal Final   • Clarity, UA 11/10/2021 Cloudy* Clear Final   • Specific Gravity  11/10/2021 1.015  1.005 - 1.030 Final   • pH, Urine 11/10/2021 6.0  5.0 - 8.0 Final   • Leukocytes 11/10/2021 Negative  Negative Final   • Nitrite, UA 11/10/2021 Negative  Negative Final   • Protein, POC 11/10/2021 Negative  Negative mg/dL Final   • Glucose, UA 11/10/2021 1+* Negative, 1000 mg/dL (3+) mg/dL Final   • Ketones, UA 11/10/2021 Negative  Negative Final   • Urobilinogen, UA 11/10/2021 Normal  Normal Final   • Bilirubin 11/10/2021 Negative  Negative Final   • Blood, UA 11/10/2021 Negative  Negative Final   • Lot Number " 11/10/2021 [id number]   Final   • Expiration Date 11/10/2021 03/18/2023   Final       Physical Exam  Vitals and nursing note reviewed. Exam conducted with a chaperone present.   Constitutional:       General: She is not in acute distress.     Appearance: Normal appearance. She is well-developed.   HENT:      Head: Normocephalic and atraumatic.      Right Ear: Hearing, ear canal and external ear normal. Tympanic membrane is erythematous and bulging.      Left Ear: Hearing, ear canal and external ear normal. Tympanic membrane is erythematous and bulging.      Nose: Mucosal edema present. No rhinorrhea.      Right Sinus: No maxillary sinus tenderness or frontal sinus tenderness.      Left Sinus: No maxillary sinus tenderness or frontal sinus tenderness.      Mouth/Throat:      Mouth: Mucous membranes are dry.      Dentition: Normal dentition.      Pharynx: Posterior oropharyngeal erythema present.      Comments: PND    Eyes:      Conjunctiva/sclera: Conjunctivae normal.      Pupils: Pupils are equal, round, and reactive to light.   Neck:      Thyroid: No thyroid mass or thyromegaly.      Vascular: No carotid bruit or JVD.   Cardiovascular:      Rate and Rhythm: Regular rhythm. Tachycardia present.      Pulses: Normal pulses.      Heart sounds: Normal heart sounds, S1 normal and S2 normal. No murmur heard.      Pulmonary:      Effort: Pulmonary effort is normal. No respiratory distress.      Breath sounds: Normal breath sounds.   Chest:   Breasts:      Right: Normal.      Left: Normal.        Comments: Fibrocystic breast noted bilateral  Abdominal:      General: Bowel sounds are normal. There is no distension or abdominal bruit.      Palpations: Abdomen is soft. There is no mass.      Tenderness: There is no abdominal tenderness.   Genitourinary:     Vagina: Normal.      Cervix: Discharge present.      Uterus: Normal.       Adnexa: Right adnexa normal and left adnexa normal.   Musculoskeletal:         General:  Normal range of motion.      Cervical back: Normal range of motion and neck supple.   Lymphadenopathy:      Head:      Right side of head: No submental, submandibular or tonsillar adenopathy.      Left side of head: No submental, submandibular or tonsillar adenopathy.      Cervical: No cervical adenopathy.   Skin:     General: Skin is warm and dry.      Capillary Refill: Capillary refill takes less than 2 seconds.      Findings: No rash.      Nails: There is no clubbing.   Neurological:      Mental Status: She is alert and oriented to person, place, and time.      Cranial Nerves: No cranial nerve deficit.      Sensory: No sensory deficit.      Gait: Gait normal.   Psychiatric:         Behavior: Behavior normal.         Thought Content: Thought content normal.         Judgment: Judgment normal.              Assessment/Plan   1. Healthy female exam.    2. Patient Counseling: Including but not Limited to the following, when appropriate:  --Nutrition: Stressed importance of moderation in sodium/caffeine intake, saturated fat and cholesterol, caloric balance, sufficient intake of fresh fruits, vegetables, fiber, calcium, iron, and 1 mg of folate supplement per day (for females capable of pregnancy).  --Discussed the issue of estrogen replacement, calcium supplement, and the daily use of baby aspirin.  --Exercise: Stressed the importance of regular exercise.   --Substance Abuse: Discussed cessation/primary prevention of tobacco, alcohol, or other drug use; driving or other dangerous activities under the influence; availability of treatment for abuse, as indicated based on social history.    --Sexuality: Discussed sexually transmitted diseases, partner selection, use of condoms, avoidance of unintended pregnancy  and contraceptive alternatives.   --Injury prevention: Discussed safety belts, safety helmets, smoke detector, smoking near bedding or upholstery.   --Dental health: Discussed importance of regular tooth brushing,  flossing, and dental visits.  --Immunizations reviewed.  --Discussed benefits of regular vision and dental screening      3. Discussed the patient's BMI with her.  The BMI is above average; BMI management plan is completed  4. Return if symptoms worsen or fail to improve.  5. Age-appropriate Screening Scheduled      Assessment/Plan     Diagnoses and all orders for this visit:    1. Physical exam, annual (Primary)  -     Comprehensive metabolic panel  -     Lipid panel  -     CBC w AUTO Differential  -     POCT urinalysis dipstick, automated    2. Type 1 diabetes mellitus without complication (HCC)  -     Hemoglobin A1c  -     POCT urinalysis dipstick, automated  Recommend following up with endocrinology and adhere to diabetic diet.  Recommend stress management and discussed previous hemoglobin A1c results and risk factors associated with uncontrolled diabetes.  3. Generalized anxiety disorder  Discussed nonpharmacological interventions  4. Screening for endocrine, nutritional, metabolic and immunity disorder  -     Vitamin B12  -     Vitamin D 25 hydroxy  -     TSH  -     T4, free  -     Folate    5. Attention deficit hyperactivity disorder, combined type  -     buPROPion (WELLBUTRIN) 75 MG tablet; Take 1 tablet by mouth 2 (Two) Times a Day.  Dispense: 60 tablet; Refill: 1    6.  Mild episode of recurrent major depressive disorder  -     buPROPion (WELLBUTRIN) 75 MG tablet; Take 1 tablet by mouth 2 (Two) Times a Day.  Dispense: 60 tablet; Refill: 1  Discussed medication options dosage side effects and indications.  Advised patient to follow-up with psych.  7. Gastroesophageal reflux disease without esophagitis  Stable without worsening signs and symptoms discussed dietary modifications  8. Overweight with body mass index (BMI) of 28 to 28.9 in adult    Discussed dietary modifications along with exercise             Isamar Leon, SONIYA 11/10/2021

## 2021-11-23 DIAGNOSIS — E10.65 TYPE 1 DIABETES MELLITUS WITH HYPERGLYCEMIA (HCC): ICD-10-CM

## 2021-11-24 RX ORDER — PROCHLORPERAZINE 25 MG/1
SUPPOSITORY RECTAL
Qty: 3 EACH | Refills: 0 | Status: SHIPPED | OUTPATIENT
Start: 2021-11-24 | End: 2022-01-19

## 2021-11-30 DIAGNOSIS — F90.2 ATTENTION DEFICIT HYPERACTIVITY DISORDER, COMBINED TYPE: ICD-10-CM

## 2021-11-30 RX ORDER — DEXTROAMPHETAMINE SACCHARATE, AMPHETAMINE ASPARTATE, DEXTROAMPHETAMINE SULFATE AND AMPHETAMINE SULFATE 2.5; 2.5; 2.5; 2.5 MG/1; MG/1; MG/1; MG/1
TABLET ORAL
Qty: 30 TABLET | Refills: 0 | Status: SHIPPED | OUTPATIENT
Start: 2021-11-30 | End: 2022-04-21 | Stop reason: SDUPTHER

## 2021-11-30 RX ORDER — DEXTROAMPHETAMINE SACCHARATE, AMPHETAMINE ASPARTATE MONOHYDRATE, DEXTROAMPHETAMINE SULFATE AND AMPHETAMINE SULFATE 2.5; 2.5; 2.5; 2.5 MG/1; MG/1; MG/1; MG/1
10 CAPSULE, EXTENDED RELEASE ORAL EVERY MORNING
Qty: 30 CAPSULE | Refills: 0 | Status: SHIPPED | OUTPATIENT
Start: 2021-11-30 | End: 2022-01-03 | Stop reason: SDUPTHER

## 2021-11-30 NOTE — TELEPHONE ENCOUNTER
Rx Refill Note  Requested Prescriptions      No prescriptions requested or ordered in this encounter      Last office visit with prescribing clinician: 10/26/2021      Next office visit with prescribing clinician: 1/25/2022            Meredith Byers MA  11/30/21, 13:17 EST

## 2021-12-08 ENCOUNTER — OFFICE VISIT (OUTPATIENT)
Dept: INTERNAL MEDICINE | Facility: CLINIC | Age: 21
End: 2021-12-08

## 2021-12-08 VITALS
BODY MASS INDEX: 28.35 KG/M2 | HEIGHT: 63 IN | DIASTOLIC BLOOD PRESSURE: 70 MMHG | WEIGHT: 160 LBS | SYSTOLIC BLOOD PRESSURE: 118 MMHG | HEART RATE: 103 BPM | RESPIRATION RATE: 15 BRPM | OXYGEN SATURATION: 100 % | TEMPERATURE: 98.4 F

## 2021-12-08 DIAGNOSIS — F90.2 ATTENTION DEFICIT HYPERACTIVITY DISORDER, COMBINED TYPE: ICD-10-CM

## 2021-12-08 DIAGNOSIS — F33.0 MILD EPISODE OF RECURRENT MAJOR DEPRESSIVE DISORDER (HCC): ICD-10-CM

## 2021-12-08 PROCEDURE — 99213 OFFICE O/P EST LOW 20 MIN: CPT | Performed by: NURSE PRACTITIONER

## 2021-12-08 RX ORDER — BUPROPION HYDROCHLORIDE 100 MG/1
100 TABLET, EXTENDED RELEASE ORAL 2 TIMES DAILY
Qty: 60 TABLET | Refills: 1 | Status: SHIPPED | OUTPATIENT
Start: 2021-12-08 | End: 2022-01-05

## 2021-12-08 NOTE — PROGRESS NOTES
Chief Complaint / Reason:      Chief Complaint   Patient presents with   • Follow-up     wellbutrin working       Subjective     HPI  Patient presents today for follow-up regarding Wellbutrin and states it is working denies SI or HI.  Would like to discuss increasing dosage and advised patient to do nonpharmacological interventions she denies any side effects to the medication.  Patient also has ADHD and does see psych.  History taken from: patient    PMH/FH/Social History were reviewed and updated appropriately in the electronic medical record.   Past Medical History:   Diagnosis Date   • Celiac disease    • Diabetes mellitus (HCC)    • Diabetes mellitus type I (HCC)    • GERD (gastroesophageal reflux disease)      Past Surgical History:   Procedure Laterality Date   • ENDOSCOPY     • ENDOSCOPY N/A 4/14/2021    Procedure: ESOPHAGOGASTRODUODENOSCOPY WITH BIOPSY;  Surgeon: Wolfgang Patiño MD;  Location: [facility] ENDOSCOPY;  Service: Gastroenterology;  Laterality: N/A;   • HERNIA REPAIR       Social History     Socioeconomic History   • Marital status: Single   Tobacco Use   • Smoking status: Current Every Day Smoker     Years: 2.00     Types: Electronic Cigarette   • Smokeless tobacco: Never Used   Vaping Use   • Vaping Use: Every day   • Substances: Nicotine, Flavoring   • Devices: Pre-filled or refillable cartridge, Refillable tank   Substance and Sexual Activity   • Alcohol use: Yes     Comment: occ   • Drug use: Never   • Sexual activity: Defer     Family History   Problem Relation Age of Onset   • Hypertension Mother    • Hypertension Father    • Diabetes Father    • Cancer Maternal Grandmother    • Cancer Paternal Grandmother    • Colon cancer Neg Hx        Review of Systems:   Review of Systems   Constitutional: Negative.    Respiratory: Negative.    Cardiovascular: Negative.    Neurological: Negative.    Psychiatric/Behavioral: Negative.          All other systems were reviewed and are negative.   Exceptions are noted in the subjective or above.      Objective     Vital Signs  Vitals:    12/08/21 1656   BP: 118/70   Pulse: 103   Resp: 15   Temp: 98.4 °F (36.9 °C)   SpO2: 100%       Body mass index is 28.34 kg/m².    Physical Exam  Vitals and nursing note reviewed.   Constitutional:       General: She is not in acute distress.     Appearance: She is well-developed.   Cardiovascular:      Rate and Rhythm: Normal rate and regular rhythm.      Heart sounds: Normal heart sounds.   Pulmonary:      Effort: Pulmonary effort is normal.      Breath sounds: Normal breath sounds. No wheezing.   Chest:      Chest wall: No tenderness.   Skin:     General: Skin is warm and dry.      Coloration: Skin is not pale.      Findings: No erythema or rash.   Neurological:      Mental Status: She is alert and oriented to person, place, and time.   Psychiatric:         Behavior: Behavior normal.         Thought Content: Thought content normal.         Judgment: Judgment normal.              Results Review:    I reviewed the patient's previous clinical results.       Medication Review:     Current Outpatient Medications:   •  amphetamine-dextroamphetamine (Adderall) 10 MG tablet, Take 10 mg orally every afternoon., Disp: 30 tablet, Rfl: 0  •  amphetamine-dextroamphetamine XR (Adderall XR) 10 MG 24 hr capsule, Take 1 capsule by mouth Every Morning, Disp: 30 capsule, Rfl: 0  •  buPROPion (WELLBUTRIN) 75 MG tablet, Take 1 tablet by mouth 2 (Two) Times a Day., Disp: 60 tablet, Rfl: 1  •  Continuous Blood Gluc Sensor (Dexcom G6 Sensor), change as directed every 10 days, Disp: 3 each, Rfl: 0  •  Continuous Blood Gluc Transmit (Dexcom G6 Transmitter) misc, 1 each Take As Directed., Disp: 1 each, Rfl: 3  •  glucose blood test strip, Four times daily, Disp: 400 each, Rfl: 3  •  Insulin Glargine (BASAGLAR KWIKPEN) 100 UNIT/ML injection pen, Inject 10 Units under the skin into the appropriate area as directed Every Night., Disp: 15 mL, Rfl: 1  •   Insulin Pen Needle 32G X 4 MM misc, 1 each 4 (Four) Times a Day., Disp: 400 each, Rfl: 3  •  multivitamin with minerals (MULTIVITAMIN ADULT PO), Take 1 tablet by mouth Daily., Disp: , Rfl:   •  NovoLOG FlexPen 100 UNIT/ML solution pen-injector sc pen, INJECT 6 UNITS SUBCUTANEOUSLY INTO THE APPROPRIATE AREA WITH MEALS PLUS 2:50>150. MAX DAILY DOSE 40 UNITS., Disp: 15 mL, Rfl: 1  •  ondansetron ODT (Zofran ODT) 4 MG disintegrating tablet, Place 1 tablet on the tongue Every 8 (Eight) Hours As Needed for Nausea., Disp: 15 tablet, Rfl: 1  •  True Comfort Twist Top Lancets misc, 1 Device 4 (Four) Times a Day., Disp: 400 each, Rfl: 3    Diagnoses and all orders for this visit:    Attention deficit hyperactivity disorder, combined type  -     buPROPion SR (Wellbutrin SR) 100 MG 12 hr tablet; Take 1 tablet by mouth 2 (Two) Times a Day.    Mild episode of recurrent major depressive disorder (HCC)  -     buPROPion SR (Wellbutrin SR) 100 MG 12 hr tablet; Take 1 tablet by mouth 2 (Two) Times a Day.          Return in about 4 weeks (around 1/5/2022), or if symptoms worsen or fail to improve.    Isamar Leon, APRN  12/08/2021

## 2022-01-03 DIAGNOSIS — F90.2 ATTENTION DEFICIT HYPERACTIVITY DISORDER, COMBINED TYPE: ICD-10-CM

## 2022-01-03 NOTE — TELEPHONE ENCOUNTER
Incoming Refill Request      Medication requested (name and dose): adderall xr 10mg    Pharmacy where request should be sent: [facility]    Additional details provided by patient: n/a    Best call back number: [phone]    Does the patient have less than a 3 day supply:  [] Yes  [x] No    Vanessa Rey  01/03/22, 15:06 EST

## 2022-01-03 NOTE — TELEPHONE ENCOUNTER
Rx Refill Note  Requested Prescriptions     Pending Prescriptions Disp Refills   • amphetamine-dextroamphetamine XR (Adderall XR) 10 MG 24 hr capsule 30 capsule 0     Sig: Take 1 capsule by mouth Every Morning      Last office visit with prescribing clinician: 10/26/2021      Next office visit with prescribing clinician: 1/25/2022            Meredith Byers MA  01/03/22, 15:51 EST

## 2022-01-04 RX ORDER — DEXTROAMPHETAMINE SACCHARATE, AMPHETAMINE ASPARTATE MONOHYDRATE, DEXTROAMPHETAMINE SULFATE AND AMPHETAMINE SULFATE 2.5; 2.5; 2.5; 2.5 MG/1; MG/1; MG/1; MG/1
10 CAPSULE, EXTENDED RELEASE ORAL EVERY MORNING
Qty: 30 CAPSULE | Refills: 0 | Status: SHIPPED | OUTPATIENT
Start: 2022-01-04 | End: 2022-01-25

## 2022-01-05 ENCOUNTER — OFFICE VISIT (OUTPATIENT)
Dept: INTERNAL MEDICINE | Facility: CLINIC | Age: 22
End: 2022-01-05

## 2022-01-05 VITALS
HEART RATE: 92 BPM | RESPIRATION RATE: 15 BRPM | OXYGEN SATURATION: 100 % | WEIGHT: 160 LBS | HEIGHT: 63 IN | DIASTOLIC BLOOD PRESSURE: 51 MMHG | TEMPERATURE: 98 F | SYSTOLIC BLOOD PRESSURE: 106 MMHG | BODY MASS INDEX: 28.35 KG/M2

## 2022-01-05 DIAGNOSIS — F33.0 MILD EPISODE OF RECURRENT MAJOR DEPRESSIVE DISORDER: ICD-10-CM

## 2022-01-05 DIAGNOSIS — F41.1 GENERALIZED ANXIETY DISORDER: ICD-10-CM

## 2022-01-05 DIAGNOSIS — F90.2 ATTENTION DEFICIT HYPERACTIVITY DISORDER, COMBINED TYPE: ICD-10-CM

## 2022-01-05 DIAGNOSIS — R21 RASH/SKIN ERUPTION: Primary | ICD-10-CM

## 2022-01-05 PROCEDURE — 99214 OFFICE O/P EST MOD 30 MIN: CPT | Performed by: NURSE PRACTITIONER

## 2022-01-05 NOTE — PROGRESS NOTES
Chief Complaint / Reason:      Chief Complaint   Patient presents with   • ADD     quit taking wellbutrin   • Anxiety       Subjective     HPI  Patient presents today for follow-up regarding anxiety and ADHD.  She states that she stopped taking the Wellbutrin and she took it for about a month as it made her more depressed and typically she does not have depression hers is more anxiety and difficulty focusing.  Patient denies SI or HI and she will be starting counseling and thinks that would help at this time she would like to consider not taking any medication.  She and I discussed possible GeneSight testing.  She states that she has had a rash for quite some time and it [place] comes and goes she does lay in the tanning bed but states that she cannot find any association with that she does have a history of celiac disease and adheres to gluten free diet so she does not feel that it food related.  She denies any itching.  Denies any change in household items and denies any history of STDs.  History taken from: patient    PMH/FH/Social History were reviewed and updated appropriately in the electronic medical record.   Past Medical History:   Diagnosis Date   • Celiac disease    • Diabetes mellitus (HCC)    • Diabetes mellitus type I (HCC)    • GERD (gastroesophageal reflux disease)      Past Surgical History:   Procedure Laterality Date   • ENDOSCOPY     • ENDOSCOPY N/A 4/14/2021    Procedure: ESOPHAGOGASTRODUODENOSCOPY WITH BIOPSY;  Surgeon: Wolfgang Patiño MD;  Location: [facility] ENDOSCOPY;  Service: Gastroenterology;  Laterality: N/A;   • HERNIA REPAIR       Social History     Socioeconomic History   • Marital status: Single   Tobacco Use   • Smoking status: Current Every Day Smoker     Years: 2.00     Types: Electronic Cigarette   • Smokeless tobacco: Never Used   Vaping Use   • Vaping Use: Every day   • Substances: Nicotine, Flavoring   • Devices: Pre-filled or refillable cartridge, Refillable tank    Substance and Sexual Activity   • Alcohol use: Yes     Comment: occ   • Drug use: Never   • Sexual activity: Defer     Family History   Problem Relation Age of Onset   • Hypertension Mother    • Hypertension Father    • Diabetes Father    • Cancer Maternal Grandmother    • Cancer Paternal Grandmother    • Colon cancer Neg Hx        Review of Systems:   Review of Systems   Constitutional: Positive for fatigue.   HENT: Negative.    Respiratory: Negative.    Cardiovascular: Negative.    Skin: Positive for dry skin and rash.   Allergic/Immunologic: Positive for environmental allergies.   Neurological: Negative.    Psychiatric/Behavioral: Positive for stress. The patient is nervous/anxious.          All other systems were reviewed and are negative.  Exceptions are noted in the subjective or above.      Objective     Vital Signs  Vitals:    01/05/22 0938   BP: 106/51   Pulse: 92   Resp: 15   Temp: 98 °F (36.7 °C)   SpO2: 100%       Body mass index is 28.34 kg/m².    Physical Exam  Vitals and nursing note reviewed.   Constitutional:       General: She is not in acute distress.     Appearance: She is well-developed.   Cardiovascular:      Rate and Rhythm: Normal rate and regular rhythm.      Pulses: Normal pulses.      Heart sounds: Normal heart sounds.   Pulmonary:      Effort: Pulmonary effort is normal.      Breath sounds: Normal breath sounds. No wheezing.   Chest:      Chest wall: No tenderness.   Skin:     General: Skin is warm and dry.      Capillary Refill: Capillary refill takes less than 2 seconds.      Coloration: Skin is not pale.      Findings: Erythema and rash present.      Comments: Small raised circular rash on abdomen and legs   Neurological:      Mental Status: She is alert and oriented to person, place, and time.   Psychiatric:         Behavior: Behavior normal.         Thought Content: Thought content normal.         Judgment: Judgment normal.              Results Review:    I reviewed the patient's  previous clinical results.       Medication Review:     Current Outpatient Medications:   •  amphetamine-dextroamphetamine (Adderall) 10 MG tablet, Take 10 mg orally every afternoon., Disp: 30 tablet, Rfl: 0  •  amphetamine-dextroamphetamine XR (Adderall XR) 10 MG 24 hr capsule, Take 1 capsule by mouth Every Morning, Disp: 30 capsule, Rfl: 0  •  Continuous Blood Gluc Sensor (Dexcom G6 Sensor), change as directed every 10 days, Disp: 3 each, Rfl: 0  •  Continuous Blood Gluc Transmit (Dexcom G6 Transmitter) misc, 1 each Take As Directed., Disp: 1 each, Rfl: 3  •  glucose blood test strip, Four times daily, Disp: 400 each, Rfl: 3  •  Insulin Glargine (BASAGLAR KWIKPEN) 100 UNIT/ML injection pen, Inject 10 Units under the skin into the appropriate area as directed Every Night., Disp: 15 mL, Rfl: 1  •  Insulin Pen Needle 32G X 4 MM misc, 1 each 4 (Four) Times a Day., Disp: 400 each, Rfl: 3  •  multivitamin with minerals (MULTIVITAMIN ADULT PO), Take 1 tablet by mouth Daily., Disp: , Rfl:   •  NovoLOG FlexPen 100 UNIT/ML solution pen-injector sc pen, INJECT 6 UNITS SUBCUTANEOUSLY INTO THE APPROPRIATE AREA WITH MEALS PLUS 2:50>150. MAX DAILY DOSE 40 UNITS., Disp: 15 mL, Rfl: 1  •  ondansetron ODT (Zofran ODT) 4 MG disintegrating tablet, Place 1 tablet on the tongue Every 8 (Eight) Hours As Needed for Nausea., Disp: 15 tablet, Rfl: 1  •  True Comfort Twist Top Lancets misc, 1 Device 4 (Four) Times a Day., Disp: 400 each, Rfl: 3    Diagnoses and all orders for this visit:    Rash/skin eruption  -     Ambulatory Referral to Dermatology  Discussed home care instructions and provided patient with Cervae wash  Attention deficit hyperactivity disorder, combined type  Stable on current medication regimen follow-up with psych  Generalized anxiety disorder  Discussed nonpharmacological interventions and at this time recommend contacting insurance to see if they will pay for GeneSight testing and following up with psych.  Discontinue  Wellbutrin  Mild episode of recurrent major depressive disorder (HCC)  Stable without worsening signs and symptoms recommend follow-up with psych and do counseling as scheduled.        Return if symptoms worsen or fail to improve.    Isamar Leon, APRN  01/05/2022

## 2022-01-19 DIAGNOSIS — E10.65 TYPE 1 DIABETES MELLITUS WITH HYPERGLYCEMIA: ICD-10-CM

## 2022-01-19 RX ORDER — PROCHLORPERAZINE 25 MG/1
SUPPOSITORY RECTAL
Qty: 3 EACH | Refills: 11 | Status: SHIPPED | OUTPATIENT
Start: 2022-01-19 | End: 2022-12-06

## 2022-01-25 ENCOUNTER — TELEMEDICINE (OUTPATIENT)
Dept: BEHAVIORAL HEALTH | Facility: CLINIC | Age: 22
End: 2022-01-25

## 2022-01-25 DIAGNOSIS — F41.1 GENERALIZED ANXIETY DISORDER: ICD-10-CM

## 2022-01-25 DIAGNOSIS — F90.2 ATTENTION DEFICIT HYPERACTIVITY DISORDER, COMBINED TYPE: Primary | ICD-10-CM

## 2022-01-25 PROCEDURE — 99213 OFFICE O/P EST LOW 20 MIN: CPT | Performed by: NURSE PRACTITIONER

## 2022-01-25 RX ORDER — DEXTROAMPHETAMINE SACCHARATE, AMPHETAMINE ASPARTATE MONOHYDRATE, DEXTROAMPHETAMINE SULFATE AND AMPHETAMINE SULFATE 3.75; 3.75; 3.75; 3.75 MG/1; MG/1; MG/1; MG/1
15 CAPSULE, EXTENDED RELEASE ORAL EVERY MORNING
Qty: 30 CAPSULE | Refills: 0 | Status: SHIPPED | OUTPATIENT
Start: 2022-01-25 | End: 2022-03-14 | Stop reason: SDUPTHER

## 2022-01-25 NOTE — PROGRESS NOTES
Patient Name: Ruby Ramos  MRN: [id number]   :  2000     Chief Complaint:      ICD-10-CM ICD-9-CM   1. Attention deficit hyperactivity disorder, combined type  F90.2 314.01   2. Generalized anxiety disorder  F41.1 300.02       History of Present Illness: Ruby Ramos is a 21 y.o. female is here today for medication management follow up.  Patient states for the last month or 2 she does not feel the Adderall has been effective as it was in the beginning.  Patient states when she went up to 15 mg before she felt her heart was racing.  Is wondering about trying this again since she has been on the 10 mg longer than she was before.    The following portions of the patient's history were reviewed and updated as appropriate: allergies, current medications, past family history, past medical history, past social history, past surgical history and problem list.    Review of Systems;;  Review of Systems   Constitutional: Negative for activity change, appetite change, fatigue, unexpected weight gain and unexpected weight loss.   Respiratory: Negative for shortness of breath and wheezing.    Gastrointestinal: Negative for constipation, diarrhea, nausea and vomiting.   Musculoskeletal: Negative for gait problem.   Skin: Negative for dry skin and rash.   Neurological: Negative for dizziness, speech difficulty, weakness, light-headedness, headache, memory problem and confusion.   Psychiatric/Behavioral: Positive for decreased concentration. Negative for agitation, behavioral problems, dysphoric mood, hallucinations, self-injury, sleep disturbance, suicidal ideas, negative for hyperactivity, depressed mood and stress. The patient is not nervous/anxious.        Physical Exam;;  Physical Exam  Vitals and nursing note reviewed.   Constitutional:       General: She is not in acute distress.     Appearance: She is well-developed. She is not diaphoretic.   HENT:      Head: Normocephalic and atraumatic.   Eyes:       Conjunctiva/sclera: Conjunctivae normal.   Cardiovascular:      Rate and Rhythm: Normal rate.   Pulmonary:      Effort: Pulmonary effort is normal. No respiratory distress.   Musculoskeletal:         General: Normal range of motion.      Cervical back: Full passive range of motion without pain and normal range of motion.   Skin:     General: Skin is warm and dry.   Neurological:      Mental Status: She is alert and oriented to person, place, and time.   Psychiatric:         Mood and Affect: Mood is not anxious or depressed. Affect is not labile, blunt, angry or inappropriate.         Speech: Speech is not rapid and pressured or tangential.         Behavior: Behavior normal. Behavior is not agitated, slowed, aggressive, withdrawn, hyperactive or combative. Behavior is cooperative.         Thought Content: Thought content normal. Thought content is not paranoid or delusional. Thought content does not include homicidal or suicidal ideation. Thought content does not include homicidal or suicidal plan.         Judgment: Judgment normal.       not currently breastfeeding.  There is no height or weight on file to calculate BMI.    Current Medications;;    Current Outpatient Medications:   •  amphetamine-dextroamphetamine (Adderall) 10 MG tablet, Take 10 mg orally every afternoon., Disp: 30 tablet, Rfl: 0  •  amphetamine-dextroamphetamine XR (Adderall XR) 15 MG 24 hr capsule, Take 1 capsule by mouth Every Morning, Disp: 30 capsule, Rfl: 0  •  Continuous Blood Gluc Sensor (Dexcom G6 Sensor), USE TO CHECK BLOOD GLUCOSE ONCE DAILY OR AS DIRECTED. CHANGE EVERY 10 DAYS, Disp: 3 each, Rfl: 11  •  Continuous Blood Gluc Transmit (Dexcom G6 Transmitter) misc, 1 each Take As Directed., Disp: 1 each, Rfl: 3  •  glucose blood test strip, Four times daily, Disp: 400 each, Rfl: 3  •  Insulin Glargine (BASAGLAR KWIKPEN) 100 UNIT/ML injection pen, Inject 10 Units under the skin into the appropriate area as directed Every Night., Disp: 15  mL, Rfl: 1  •  Insulin Pen Needle 32G X 4 MM misc, 1 each 4 (Four) Times a Day., Disp: 400 each, Rfl: 3  •  multivitamin with minerals (MULTIVITAMIN ADULT PO), Take 1 tablet by mouth Daily., Disp: , Rfl:   •  NovoLOG FlexPen 100 UNIT/ML solution pen-injector sc pen, INJECT 6 UNITS SUBCUTANEOUSLY INTO THE APPROPRIATE AREA WITH MEALS PLUS 2:50>150. MAX DAILY DOSE 40 UNITS., Disp: 15 mL, Rfl: 1  •  ondansetron ODT (Zofran ODT) 4 MG disintegrating tablet, Place 1 tablet on the tongue Every 8 (Eight) Hours As Needed for Nausea., Disp: 15 tablet, Rfl: 1  •  True Comfort Twist Top Lancets misc, 1 Device 4 (Four) Times a Day., Disp: 400 each, Rfl: 3    Lab Results:   Office Visit on 11/10/2021   Component Date Value Ref Range Status   • Color 11/10/2021 Yellow  Yellow, Straw, Dark Yellow, Hetal Final   • Clarity, UA 11/10/2021 Cloudy* Clear Final   • Specific Gravity  11/10/2021 1.015  1.005 - 1.030 Final   • pH, Urine 11/10/2021 6.0  5.0 - 8.0 Final   • Leukocytes 11/10/2021 Negative  Negative Final   • Nitrite, UA 11/10/2021 Negative  Negative Final   • Protein, POC 11/10/2021 Negative  Negative mg/dL Final   • Glucose, UA 11/10/2021 1+* Negative, 1000 mg/dL (3+) mg/dL Final   • Ketones, UA 11/10/2021 Negative  Negative Final   • Urobilinogen, UA 11/10/2021 Normal  Normal Final   • Bilirubin 11/10/2021 Negative  Negative Final   • Blood, UA 11/10/2021 Negative  Negative Final   • Lot Number 11/10/2021 [id number]   Final   • Expiration Date 11/10/2021 03/18/2023   Final       Mental Status Exam:   Hygiene:   good  Cooperation:  Cooperative  Eye Contact:  Good  Psychomotor Behavior:  Appropriate  Mood:within normal limits  Affect:  Appropriate  Hopelessness: Denies  Speech:  Minimal  Thought Process:  Goal directed  Thought Content:  Normal  Suicidal:  None  Homicidal:  None  Hallucinations:  None  Delusion:  None  Memory:  Intact  Orientation:  Person, Place, Time and Situation  Reliability:  good  Insight:   Good  Judgement:  Good  Impulse Control:  Good  Physical/Medical Issues:  No     PHQ-9 Depression Screening  Little interest or pleasure in doing things?     Feeling down, depressed, or hopeless?     Trouble falling or staying asleep, or sleeping too much?     Feeling tired or having little energy?     Poor appetite or overeating?     Feeling bad about yourself - or that you are a failure or have let yourself or your family down?     Trouble concentrating on things, such as reading the newspaper or watching television?     Moving or speaking so slowly that other people could have noticed? Or the opposite - being so fidgety or restless that you have been moving around a lot more than usual?     Thoughts that you would be better off dead, or of hurting yourself in some way?     PHQ-9 Total Score     If you checked off any problems, how difficult have these problems made it for you to do your work, take care of things at home, or get along with other people?          Assessment/Plan:  Diagnoses and all orders for this visit:    1. Attention deficit hyperactivity disorder, combined type (Primary)  -     amphetamine-dextroamphetamine XR (Adderall XR) 15 MG 24 hr capsule; Take 1 capsule by mouth Every Morning  Dispense: 30 capsule; Refill: 0    2. Generalized anxiety disorder      We will trial an increase of Adderall XR to 15 mg every morning.  [email or web address] video appointment start time 11:30 AM.  End time 11:41 AM.    A psychological evaluation was conducted in order to assess past and current level of functioning. Areas assessed included, but were not limited to: perception of social support, perception of ability to face and deal with challenges in life (positive functioning), anxiety symptoms, depressive symptoms, perspective on beliefs/belief system, coping skills for stress, intelligence level,  Therapeutic rapport was established. Interventions conducted today were geared towards incorporating medication management  along with support for continued therapy. Education was also provided as to the med management with this provider and what to expect in subsequent sessions.    We discussed risks, benefits,goals and side effects of the above medication and the patient was agreeable with the plan.Patient was educated on the importance of compliance with treatment and follow-up appointments. Patient is aware to contact the [facility] with any worsening of symptoms. To call for questions or concerns and return early if necessary. Patent is agreeable to go to the Emergency Department or call 911 should they begin SI/HI.     Treatment Plan:   Discussed risks, benefits, and alternatives of medication. Encouraged healthy habits (eating, exercise and sleep). Call if any questions or problems arise. Medication reconciled. Controlled substance monitoring report reviewed. Provided psychoeducation.. Discussed coping strategies and current stressors. Set appropriate boundaries and limits for patient's well-being. Use distraction techniques to improve symptoms. Access support networks.      Return in about 4 weeks (around 2/22/2022) for Follow Up 30 min, Video visit.    SONIYA Dunn

## 2022-02-01 ENCOUNTER — PRIOR AUTHORIZATION (OUTPATIENT)
Dept: BEHAVIORAL HEALTH | Facility: CLINIC | Age: 22
End: 2022-02-01

## 2022-02-01 ENCOUNTER — TELEPHONE (OUTPATIENT)
Dept: INTERNAL MEDICINE | Facility: CLINIC | Age: 22
End: 2022-02-01

## 2022-02-01 NOTE — TELEPHONE ENCOUNTER
PT STATED THE PHARMACY INFORMED HER SHE WOULD NEED A PA ON THE ADDERALL WHERE BHAKTI HAD UPPED HER RX

## 2022-02-22 DIAGNOSIS — E10.65 TYPE 1 DIABETES MELLITUS WITH HYPERGLYCEMIA: ICD-10-CM

## 2022-02-22 RX ORDER — PROCHLORPERAZINE 25 MG/1
SUPPOSITORY RECTAL
Qty: 1 EACH | Refills: 3 | Status: SHIPPED | OUTPATIENT
Start: 2022-02-22 | End: 2023-01-18 | Stop reason: SDUPTHER

## 2022-03-14 DIAGNOSIS — F90.2 ATTENTION DEFICIT HYPERACTIVITY DISORDER, COMBINED TYPE: ICD-10-CM

## 2022-03-14 RX ORDER — DEXTROAMPHETAMINE SACCHARATE, AMPHETAMINE ASPARTATE MONOHYDRATE, DEXTROAMPHETAMINE SULFATE AND AMPHETAMINE SULFATE 3.75; 3.75; 3.75; 3.75 MG/1; MG/1; MG/1; MG/1
15 CAPSULE, EXTENDED RELEASE ORAL EVERY MORNING
Qty: 30 CAPSULE | Refills: 0 | Status: SHIPPED | OUTPATIENT
Start: 2022-03-14 | End: 2022-04-11 | Stop reason: SDUPTHER

## 2022-03-14 NOTE — TELEPHONE ENCOUNTER
Incoming Refill Request      Medication requested (name and dose): ADDERALL 15     Pharmacy where request should be sent: [facility] IN [place]   Additional details provided by patient: STATED 3 DOSES LEFT     Best call back number: [phone]  Does the patient have less than a 3 day supply:  [x] Yes  [] No    Skinny Govea Rep  03/14/22, 13:53 EDT

## 2022-03-14 NOTE — TELEPHONE ENCOUNTER
Rx Refill Note  Requested Prescriptions      No prescriptions requested or ordered in this encounter      Last office visit with prescribing clinician: 1/25/2022     Next office visit with prescribing clinician: 3/22/2022            Meredith Byers MA  03/14/22, 14:15 EDT

## 2022-03-17 ENCOUNTER — OFFICE VISIT (OUTPATIENT)
Dept: ENDOCRINOLOGY | Facility: CLINIC | Age: 22
End: 2022-03-17

## 2022-03-17 VITALS
BODY MASS INDEX: 29.44 KG/M2 | OXYGEN SATURATION: 95 % | HEART RATE: 87 BPM | SYSTOLIC BLOOD PRESSURE: 110 MMHG | DIASTOLIC BLOOD PRESSURE: 73 MMHG | WEIGHT: 160 LBS | HEIGHT: 62 IN

## 2022-03-17 DIAGNOSIS — E10.9 TYPE 1 DIABETES MELLITUS WITHOUT COMPLICATION: Primary | ICD-10-CM

## 2022-03-17 LAB
EXPIRATION DATE: ABNORMAL
EXPIRATION DATE: NORMAL
GLUCOSE BLDC GLUCOMTR-MCNC: 192 MG/DL (ref 70–130)
HBA1C MFR BLD: 6 %
Lab: ABNORMAL
Lab: NORMAL

## 2022-03-17 PROCEDURE — 99213 OFFICE O/P EST LOW 20 MIN: CPT | Performed by: INTERNAL MEDICINE

## 2022-03-17 PROCEDURE — 83036 HEMOGLOBIN GLYCOSYLATED A1C: CPT | Performed by: INTERNAL MEDICINE

## 2022-03-17 PROCEDURE — 95251 CONT GLUC MNTR ANALYSIS I&R: CPT | Performed by: INTERNAL MEDICINE

## 2022-03-17 RX ORDER — INSULIN ASPART 100 [IU]/ML
2-10 INJECTION, SOLUTION INTRAVENOUS; SUBCUTANEOUS
Qty: 30 ML | Refills: 1 | Status: SHIPPED | OUTPATIENT
Start: 2022-03-17 | End: 2022-09-15 | Stop reason: SDUPTHER

## 2022-03-17 RX ORDER — INSULIN GLARGINE 100 [IU]/ML
10 INJECTION, SOLUTION SUBCUTANEOUS NIGHTLY
Qty: 15 ML | Refills: 1 | Status: SHIPPED | OUTPATIENT
Start: 2022-03-17 | End: 2022-09-15 | Stop reason: SDUPTHER

## 2022-03-17 NOTE — PROGRESS NOTES
"Chief Complaint   Patient presents with   • Diabetes          HPI   Ruby Ramos is a 21 y.o. female had concerns including Diabetes.    She is checking blood sugars 4+ times per day with Dexcom.  Data reviewed from 3/4/2022 through 3/17/2022 shows an average glucose of 171 with a standard deviation of 39.  64% of the time spent in range, 32% high, 3% very high, 0% very low, less than 1% low.  She has occasional postprandial hyperglycemia.  Suspect this is either due to inaccurate estimate of carbs or not taking insulin early enough before the meal.  Current medications for diabetes include Basaglar 10 units daily, NovoLog 2 to 3 units with meals +1: 50 greater than 150. Max dose of 10 units.     The following portions of the patient's history were reviewed and updated as appropriate: allergies, current medications, past family history, past medical history, past social history, past surgical history and problem list.      Review of Systems   Constitutional: Negative.    Endocrine:        See HPI        Physical Exam  Vitals reviewed.   Constitutional:       Appearance: Normal appearance.   Cardiovascular:      Rate and Rhythm: Normal rate.   Pulmonary:      Effort: Pulmonary effort is normal.   Neurological:      General: No focal deficit present.      Mental Status: She is alert. Mental status is at baseline.   Psychiatric:         Mood and Affect: Mood normal.         Behavior: Behavior normal.        /73   Pulse 87   Ht 157.5 cm (62\")   Wt 72.6 kg (160 lb)   SpO2 95%   BMI 29.26 kg/m²      Labs and imaging    CMP:  Lab Results   Component Value Date    BUN 10 08/03/2021    CREATININE 0.76 08/03/2021    EGFRIFNONA 96 08/03/2021    BCR 13.2 08/03/2021     08/03/2021    K 3.7 08/03/2021    CO2 21.9 (L) 08/03/2021    CALCIUM 10.0 08/03/2021    ALBUMIN 5.20 08/03/2021    LABIL2 1.7 03/12/2015    BILITOT 0.9 08/03/2021    ALKPHOS 80 08/03/2021    AST 14 08/03/2021    ALT 14 08/03/2021     Lipid " Panel:  Lab Results   Component Value Date    TRIG 45 03/12/2015    HDL 47 03/12/2015    VLDL 9 03/12/2015    LDL 83 03/12/2015     HbA1c:  Lab Results   Component Value Date    HGBA1C 6.0 03/17/2022    HGBA1C 4.7 06/15/2021     Glucose:    Lab Results   Component Value Date    POCGLU 192 (A) 03/17/2022     TSH:  Lab Results   Component Value Date    TSH 2.610 01/21/2021       Assessment and plan  Diagnoses and all orders for this visit:    1. Type 1 diabetes mellitus without complication (HCC) (Primary)  DM1 diagnosed in 2021, confirmed with antibodies.  Mostly controlled with A1c 6.0.  Only occasional postprandial hyperglycemia due to the timing of insulin or inadequate estimate of carb intake.  No changes to the insulin regimen today.  Continue Basaglar 10 units nightly, NovoLog 2 to 10 units with meals.  Discussed alternate prandial insulin though patient declined at this time.  Continue Dexcom.        Labs are due and orders were entered.  Ophtho exam is due and patient was encouraged to schedule..  Monofilament up-to-date from June.  -     POC Glucose, Blood  -     POC Glycosylated Hemoglobin (Hb A1C)  -     insulin aspart (NovoLOG FlexPen) 100 UNIT/ML solution pen-injector sc pen; Inject 2-10 Units under the skin into the appropriate area as directed 3 (Three) Times a Day With Meals.  Dispense: 30 mL; Refill: 1  -     CBC (No Diff); Future  -     Comprehensive Metabolic Panel; Future  -     Lipid Panel; Future  -     Microalbumin / Creatinine Urine Ratio - Urine, Clean Catch; Future  -     TSH; Future  -     Insulin Glargine (BASAGLAR KWIKPEN) 100 UNIT/ML injection pen; Inject 10 Units under the skin into the appropriate area as directed Every Night.  Dispense: 15 mL; Refill: 1          Return in about 4 months (around 7/17/2022) for next scheduled follow up. The patient was instructed to contact the clinic with any interval questions or concerns.    Layla Corral,    Endocrinologist    Please note that  portions of this note were completed with a voice recognition program.

## 2022-03-30 RX ORDER — FLUCONAZOLE 150 MG/1
150 TABLET ORAL ONCE
Qty: 1 TABLET | Refills: 0 | Status: SHIPPED | OUTPATIENT
Start: 2022-03-30 | End: 2022-03-30

## 2022-04-01 ENCOUNTER — OFFICE VISIT (OUTPATIENT)
Dept: INTERNAL MEDICINE | Facility: CLINIC | Age: 22
End: 2022-04-01

## 2022-04-01 VITALS
DIASTOLIC BLOOD PRESSURE: 61 MMHG | TEMPERATURE: 97.7 F | OXYGEN SATURATION: 100 % | HEIGHT: 62 IN | SYSTOLIC BLOOD PRESSURE: 110 MMHG | BODY MASS INDEX: 29.4 KG/M2 | WEIGHT: 159.8 LBS | HEART RATE: 98 BPM

## 2022-04-01 DIAGNOSIS — R45.4 IRRITABILITY: ICD-10-CM

## 2022-04-01 DIAGNOSIS — L70.0 ACNE VULGARIS: ICD-10-CM

## 2022-04-01 DIAGNOSIS — R53.83 FATIGUE, UNSPECIFIED TYPE: Primary | ICD-10-CM

## 2022-04-01 PROCEDURE — 99213 OFFICE O/P EST LOW 20 MIN: CPT | Performed by: NURSE PRACTITIONER

## 2022-04-01 NOTE — PROGRESS NOTES
Chief Complaint / Reason:      Chief Complaint   Patient presents with   • Cyst     On right side of neck   • Fatigue       Subjective     HPI  Patient presents today with complaints of cyst on right lateral neck fatigue and states that her acne has flared up but could be related to stress on menstrual cycle.  She also complains about irritability but denies SI HI.  Also complains about fatigue that she has no energy at all.  Patient is a current everyday vape user.  History taken from: patient    PMH/FH/Social History were reviewed and updated appropriately in the electronic medical record.   Past Medical History:   Diagnosis Date   • Celiac disease    • Diabetes mellitus (HCC)    • Diabetes mellitus type I (HCC)    • GERD (gastroesophageal reflux disease)      Past Surgical History:   Procedure Laterality Date   • ENDOSCOPY     • ENDOSCOPY N/A 4/14/2021    Procedure: ESOPHAGOGASTRODUODENOSCOPY WITH BIOPSY;  Surgeon: Wolfgang Patiño MD;  Location: [facility] END[facility]COPY;  Service: Gastroenterology;  Laterality: N/A;   • HERNIA REPAIR       Social History     Socioeconomic History   • Marital status: Single   Tobacco Use   • Smoking status: Current Every Day Smoker     Years: 2.00     Types: Electronic Cigarette   • Smokeless tobacco: Never Used   Vaping Use   • Vaping Use: Every day   • Substances: Nicotine, Flavoring   • Devices: Pre-filled or refillable cartridge, Refillable tank   Substance and Sexual Activity   • Alcohol use: Yes     Comment: occ   • Drug use: Never   • Sexual activity: Defer     Family History   Problem Relation Age of Onset   • Hypertension Mother    • Hypertension Father    • Diabetes Father    • Cancer Maternal Grandmother    • Cancer Paternal Grandmother    • Colon cancer Neg Hx        Review of Systems:   Review of Systems      All other systems were reviewed and are negative.  Exceptions are noted in the subjective or above.      Objective     Vital Signs  Vitals:    04/01/22 1003    BP: 110/61   Pulse: 98   Temp: 97.7 °F (36.5 °C)   SpO2: 100%       Body mass index is 29.23 kg/m².    Physical Exam  Vitals and nursing note reviewed.   Constitutional:       General: She is not in acute distress.     Appearance: She is well-developed.   Cardiovascular:      Rate and Rhythm: Normal rate and regular rhythm.      Pulses: Normal pulses.      Heart sounds: Normal heart sounds.   Pulmonary:      Effort: Pulmonary effort is normal.      Breath sounds: Normal breath sounds. No wheezing.   Chest:      Chest wall: No tenderness.   Skin:     General: Skin is warm and dry.      Capillary Refill: Capillary refill takes less than 2 seconds.      Coloration: Skin is not pale.      Findings: Rash present. No erythema.   Neurological:      Mental Status: She is alert and oriented to person, place, and time.   Psychiatric:         Behavior: Behavior normal.         Thought Content: Thought content normal.         Judgment: Judgment normal.              Results Review:    I reviewed the patient's new clinical results.       Medication Review:     Current Outpatient Medications:   •  amphetamine-dextroamphetamine (Adderall) 10 MG tablet, Take 10 mg orally every afternoon., Disp: 30 tablet, Rfl: 0  •  amphetamine-dextroamphetamine XR (Adderall XR) 15 MG 24 hr capsule, Take 1 capsule by mouth Every Morning, Disp: 30 capsule, Rfl: 0  •  Continuous Blood Gluc Sensor (Dexcom G6 Sensor), USE TO CHECK BLOOD GLUCOSE ONCE DAILY OR AS DIRECTED. CHANGE EVERY 10 DAYS, Disp: 3 each, Rfl: 11  •  Continuous Blood Gluc Transmit (Dexcom G6 Transmitter) misc, USE AS DIRECTED TO CHECK BLOOD SUGAR. CHANGE EVERY 90 DAYS., Disp: 1 each, Rfl: 3  •  glucose blood test strip, Four times daily, Disp: 400 each, Rfl: 3  •  insulin aspart (NovoLOG FlexPen) 100 UNIT/ML solution pen-injector sc pen, Inject 2-10 Units under the skin into the appropriate area as directed 3 (Three) Times a Day With Meals., Disp: 30 mL, Rfl: 1  •  Insulin Glargine  (BASAGLAR KWIKPEN) 100 UNIT/ML injection pen, Inject 10 Units under the skin into the appropriate area as directed Every Night., Disp: 15 mL, Rfl: 1  •  Insulin Pen Needle 32G X 4 MM misc, 1 each 4 (Four) Times a Day., Disp: 400 each, Rfl: 3  •  multivitamin with minerals tablet tablet, Take 1 tablet by mouth Daily., Disp: , Rfl:   •  ondansetron ODT (Zofran ODT) 4 MG disintegrating tablet, Place 1 tablet on the tongue Every 8 (Eight) Hours As Needed for Nausea., Disp: 15 tablet, Rfl: 1  •  True Comfort Twist Top Lancets misc, 1 Device 4 (Four) Times a Day., Disp: 400 each, Rfl: 3    Diagnoses and all orders for this visit:    Fatigue, unspecified type  -     Testosterone (Free & Total), LC / MS  -     Estradiol  -     17-Hydroxyprogesterone  -     Cortisol - AM    Irritability  -     Testosterone (Free & Total), LC / MS  -     Estradiol  -     17-Hydroxyprogesterone  -     Cortisol - AM    Acne vulgaris          Return if symptoms worsen or fail to improve.    Isamar Leon, APRN  04/01/2022

## 2022-04-11 DIAGNOSIS — F90.2 ATTENTION DEFICIT HYPERACTIVITY DISORDER, COMBINED TYPE: ICD-10-CM

## 2022-04-11 RX ORDER — DEXTROAMPHETAMINE SACCHARATE, AMPHETAMINE ASPARTATE MONOHYDRATE, DEXTROAMPHETAMINE SULFATE AND AMPHETAMINE SULFATE 3.75; 3.75; 3.75; 3.75 MG/1; MG/1; MG/1; MG/1
15 CAPSULE, EXTENDED RELEASE ORAL EVERY MORNING
Qty: 30 CAPSULE | Refills: 0 | Status: SHIPPED | OUTPATIENT
Start: 2022-04-11 | End: 2022-04-21 | Stop reason: SDUPTHER

## 2022-04-11 NOTE — TELEPHONE ENCOUNTER
Rx Refill Note  Requested Prescriptions     Pending Prescriptions Disp Refills   • amphetamine-dextroamphetamine XR (Adderall XR) 15 MG 24 hr capsule 30 capsule 0     Sig: Take 1 capsule by mouth Every Morning      Last office visit with prescribing clinician: 1/25/2022      Next office visit with prescribing clinician: 4/21/2022            Meredith Byers MA  04/11/22, 12:33 EDT

## 2022-04-11 NOTE — TELEPHONE ENCOUNTER
Incoming Refill Request      Medication requested (name and dose):   ADDREALL XR 15MG    Pharmacy where request should be sent:   [facility]    Additional details provided by patient:   PT HAS 3 PILLS LEFT    Best call back number:   [phone]    Does the patient have less than a 3 day supply:  [] Yes  [x] No    Skinny Johnson Rep  04/11/22, 12:04 EDT

## 2022-04-12 ENCOUNTER — LAB (OUTPATIENT)
Dept: LAB | Facility: HOSPITAL | Age: 22
End: 2022-04-12

## 2022-04-12 DIAGNOSIS — E10.9 TYPE 1 DIABETES MELLITUS WITHOUT COMPLICATION: ICD-10-CM

## 2022-04-12 DIAGNOSIS — K90.0 CELIAC DISEASE: ICD-10-CM

## 2022-04-12 PROCEDURE — 36415 COLL VENOUS BLD VENIPUNCTURE: CPT | Performed by: NURSE PRACTITIONER

## 2022-04-21 ENCOUNTER — OFFICE VISIT (OUTPATIENT)
Dept: BEHAVIORAL HEALTH | Facility: CLINIC | Age: 22
End: 2022-04-21

## 2022-04-21 VITALS
WEIGHT: 157 LBS | SYSTOLIC BLOOD PRESSURE: 100 MMHG | DIASTOLIC BLOOD PRESSURE: 62 MMHG | HEIGHT: 62 IN | BODY MASS INDEX: 28.89 KG/M2

## 2022-04-21 DIAGNOSIS — F41.1 GENERALIZED ANXIETY DISORDER: ICD-10-CM

## 2022-04-21 DIAGNOSIS — F90.2 ATTENTION DEFICIT HYPERACTIVITY DISORDER, COMBINED TYPE: Primary | ICD-10-CM

## 2022-04-21 DIAGNOSIS — F34.9 PERSISTENT MOOD (AFFECTIVE) DISORDER, UNSPECIFIED: ICD-10-CM

## 2022-04-21 PROCEDURE — 99213 OFFICE O/P EST LOW 20 MIN: CPT | Performed by: NURSE PRACTITIONER

## 2022-04-21 RX ORDER — DEXTROAMPHETAMINE SACCHARATE, AMPHETAMINE ASPARTATE, DEXTROAMPHETAMINE SULFATE AND AMPHETAMINE SULFATE 2.5; 2.5; 2.5; 2.5 MG/1; MG/1; MG/1; MG/1
TABLET ORAL
Qty: 30 TABLET | Refills: 0 | Status: SHIPPED | OUTPATIENT
Start: 2022-04-21 | End: 2022-06-15 | Stop reason: SDUPTHER

## 2022-04-21 RX ORDER — DEXTROAMPHETAMINE SACCHARATE, AMPHETAMINE ASPARTATE MONOHYDRATE, DEXTROAMPHETAMINE SULFATE AND AMPHETAMINE SULFATE 3.75; 3.75; 3.75; 3.75 MG/1; MG/1; MG/1; MG/1
15 CAPSULE, EXTENDED RELEASE ORAL EVERY MORNING
Qty: 30 CAPSULE | Refills: 0 | Status: SHIPPED | OUTPATIENT
Start: 2022-04-21 | End: 2022-06-15 | Stop reason: SDUPTHER

## 2022-04-21 RX ORDER — ARIPIPRAZOLE 10 MG/1
10 TABLET ORAL DAILY
Qty: 30 TABLET | Refills: 3 | Status: SHIPPED | OUTPATIENT
Start: 2022-04-21 | End: 2022-10-04

## 2022-04-21 NOTE — PROGRESS NOTES
Patient Name: Rbuy Ramos  MRN: [id number]   :  2000     Chief Complaint:      ICD-10-CM ICD-9-CM   1. Attention deficit hyperactivity disorder, combined type  F90.2 314.01   2. Generalized anxiety disorder  F41.1 300.02   3. Persistent mood (affective) disorder, unspecified ([facility])  F34.9 296.90       History of Present Illness: Ruby Ramos is a 21 y.o. female is here today for medication management follow up.  Patient states she wonders about bipolar disorder.  Patient states she is very emotional.  1 to 2 months she will be happy going to the gym and eating healthy then she will have a depressive swing and feel hopeless and is very impulsive makes poor decisions.  Randomly moved.  At times drinks excessively.  Randomly quit job.    The following portions of the patient's history were reviewed and updated as appropriate: allergies, current medications, past family history, past medical history, past social history, past surgical history and problem list.    Review of Systems;;  Review of Systems   Constitutional: Negative for activity change, appetite change, fatigue, unexpected weight gain and unexpected weight loss.   Respiratory: Negative for shortness of breath and wheezing.    Gastrointestinal: Negative for constipation, diarrhea, nausea and vomiting.   Musculoskeletal: Negative for gait problem.   Skin: Negative for dry skin and rash.   Neurological: Negative for dizziness, speech difficulty, weakness, light-headedness, headache, memory problem and confusion.   Psychiatric/Behavioral: Positive for depressed mood. Negative for agitation, behavioral problems, decreased concentration, dysphoric mood, hallucinations, self-injury, sleep disturbance, suicidal ideas, negative for hyperactivity and stress. The patient is nervous/anxious.        Physical Exam;;  Physical Exam  Vitals and nursing note reviewed.   Constitutional:       General: She is not in acute distress.     Appearance: She is  "well-developed. She is not diaphoretic.   HENT:      Head: Normocephalic and atraumatic.   Eyes:      Conjunctiva/sclera: Conjunctivae normal.   Cardiovascular:      Rate and Rhythm: Normal rate.   Pulmonary:      Effort: Pulmonary effort is normal. No respiratory distress.   Musculoskeletal:         General: Normal range of motion.      Cervical back: Full passive range of motion without pain and normal range of motion.   Skin:     General: Skin is warm and dry.   Neurological:      Mental Status: She is alert and oriented to person, place, and time.   Psychiatric:         Mood and Affect: Mood is anxious and depressed. Affect is not labile, blunt, angry or inappropriate.         Speech: Speech is not rapid and pressured or tangential.         Behavior: Behavior normal. Behavior is not agitated, slowed, aggressive, withdrawn, hyperactive or combative. Behavior is cooperative.         Thought Content: Thought content normal. Thought content is not paranoid or delusional. Thought content does not include homicidal or suicidal ideation. Thought content does not include homicidal or suicidal plan.         Judgment: Judgment normal.       Blood pressure 100/62, height 157.5 cm (62\"), weight 71.2 kg (157 lb), not currently breastfeeding.  Body mass index is 28.72 kg/m².    Current Medications;;    Current Outpatient Medications:   •  amphetamine-dextroamphetamine (Adderall) 10 MG tablet, Take 10 mg orally every afternoon., Disp: 30 tablet, Rfl: 0  •  amphetamine-dextroamphetamine XR (Adderall XR) 15 MG 24 hr capsule, Take 1 capsule by mouth Every Morning, Disp: 30 capsule, Rfl: 0  •  Continuous Blood Gluc Sensor (Dexcom G6 Sensor), USE TO CHECK BLOOD GLUCOSE ONCE DAILY OR AS DIRECTED. CHANGE EVERY 10 DAYS, Disp: 3 each, Rfl: 11  •  Continuous Blood Gluc Transmit (Dexcom G6 Transmitter) misc, USE AS DIRECTED TO CHECK BLOOD SUGAR. CHANGE EVERY 90 DAYS., Disp: 1 each, Rfl: 3  •  glucose blood test strip, Four times daily, " Disp: 400 each, Rfl: 3  •  insulin aspart (NovoLOG FlexPen) 100 UNIT/ML solution pen-injector sc pen, Inject 2-10 Units under the skin into the appropriate area as directed 3 (Three) Times a Day With Meals., Disp: 30 mL, Rfl: 1  •  Insulin Glargine (BASAGLAR KWIKPEN) 100 UNIT/ML injection pen, Inject 10 Units under the skin into the appropriate area as directed Every Night., Disp: 15 mL, Rfl: 1  •  Insulin Pen Needle 32G X 4 MM misc, 1 each 4 (Four) Times a Day., Disp: 400 each, Rfl: 3  •  multivitamin with minerals tablet tablet, Take 1 tablet by mouth Daily., Disp: , Rfl:   •  ondansetron ODT (Zofran ODT) 4 MG disintegrating tablet, Place 1 tablet on the tongue Every 8 (Eight) Hours As Needed for Nausea., Disp: 15 tablet, Rfl: 1  •  True Comfort Twist Top Lancets misc, 1 Device 4 (Four) Times a Day., Disp: 400 each, Rfl: 3  •  ARIPiprazole (ABILIFY) 10 MG tablet, Take 1 tablet by mouth Daily., Disp: 30 tablet, Rfl: 3    Lab Results:   Office Visit on 03/17/2022   Component Date Value Ref Range Status   • Glucose 03/17/2022 192 (A) 70 - 130 mg/dL Final   • Lot Number 03/17/2022 [id number]   Final   • Expiration Date 03/17/2022 7/14/22   Final   • Hemoglobin A1C 03/17/2022 6.0  % Final   • Lot Number 03/17/2022 [id number]   Final   • Expiration Date 03/17/2022 11/17/23   Final       Mental Status Exam:   Hygiene:   good  Cooperation:  Cooperative  Eye Contact:  Good  Psychomotor Behavior:  Appropriate  Mood:anxious and depressed  Affect:  Appropriate  Hopelessness: Denies  Speech:  Normal  Thought Process:  Goal directed  Thought Content:  Normal  Suicidal:  None  Homicidal:  None  Hallucinations:  None  Delusion:  None  Memory:  Intact  Orientation:  Person, Place, Time and Situation  Reliability:  good  Insight:  Good  Judgement:  Good  Impulse Control:  Good  Physical/Medical Issues:  No     PHQ-9 Depression Screening  Little interest or pleasure in doing things? 3-->nearly every day   Feeling down, depressed, or  hopeless? 3-->nearly every day   Trouble falling or staying asleep, or sleeping too much? 3-->nearly every day   Feeling tired or having little energy? 2-->more than half the days   Poor appetite or overeating? 3-->nearly every day   Feeling bad about yourself - or that you are a failure or have let yourself or your family down? 3-->nearly every day   Trouble concentrating on things, such as reading the newspaper or watching television? 2-->more than half the days   Moving or speaking so slowly that other people could have noticed? Or the opposite - being so fidgety or restless that you have been moving around a lot more than usual? 2-->more than half the days   Thoughts that you would be better off dead, or of hurting yourself in some way? 1-->several days   PHQ-9 Total Score 22   If you checked off any problems, how difficult have these problems made it for you to do your work, take care of things at home, or get along with other people? extremely difficult        Assessment/Plan:  Diagnoses and all orders for this visit:    1. Attention deficit hyperactivity disorder, combined type (Primary)  -     amphetamine-dextroamphetamine (Adderall) 10 MG tablet; Take 10 mg orally every afternoon.  Dispense: 30 tablet; Refill: 0  -     amphetamine-dextroamphetamine XR (Adderall XR) 15 MG 24 hr capsule; Take 1 capsule by mouth Every Morning  Dispense: 30 capsule; Refill: 0    2. Generalized anxiety disorder    3. Persistent mood (affective) disorder, unspecified (HCC)  -     ARIPiprazole (ABILIFY) 10 MG tablet; Take 1 tablet by mouth Daily.  Dispense: 30 tablet; Refill: 3      We will titrate to Abilify 10 mg daily.  Note to monitor any changes in blood sugar.    A psychological evaluation was conducted in order to assess past and current level of functioning. Areas assessed included, but were not limited to: perception of social support, perception of ability to face and deal with challenges in life (positive functioning),  anxiety symptoms, depressive symptoms, perspective on beliefs/belief system, coping skills for stress, intelligence level,  Therapeutic rapport was established. Interventions conducted today were geared towards incorporating medication management along with support for continued therapy. Education was also provided as to the med management with this provider and what to expect in subsequent sessions.    We discussed risks, benefits,goals and side effects of the above medication and the patient was agreeable with the plan.Patient was educated on the importance of compliance with treatment and follow-up appointments. Patient is aware to contact the [facility] with any worsening of symptoms. To call for questions or concerns and return early if necessary. Patent is agreeable to go to the Emergency Department or call 911 should they begin SI/HI.     Treatment Plan:   Discussed risks, benefits, and alternatives of medication. Encouraged healthy habits (eating, exercise and sleep). Call if any questions or problems arise. Medication reconciled. Controlled substance monitoring report reviewed. Provided psychoeducation.. Discussed coping strategies and current stressors. Set appropriate boundaries and limits for patient's well-being. Use distraction techniques to improve symptoms. Access support networks.      Return in about 2 months (around 6/21/2022) for Follow Up 30 min.    SONIYA Dunn

## 2022-05-18 ENCOUNTER — TELEPHONE (OUTPATIENT)
Dept: INTERNAL MEDICINE | Facility: CLINIC | Age: 22
End: 2022-05-18

## 2022-05-18 DIAGNOSIS — Z11.1 SCREENING-PULMONARY TB: Primary | ICD-10-CM

## 2022-05-18 NOTE — TELEPHONE ENCOUNTER
Caller: Ruby Ramos    Relationship: Self    Best call back number: [phone]    What orders are you requesting (i.e. 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

## 2022-06-03 ENCOUNTER — OFFICE VISIT (OUTPATIENT)
Dept: PSYCHIATRY | Facility: CLINIC | Age: 22
End: 2022-06-03

## 2022-06-03 DIAGNOSIS — F10.20 ALCOHOL USE DISORDER, MODERATE, DEPENDENCE: ICD-10-CM

## 2022-06-03 DIAGNOSIS — F31.9 BIPOLAR 1 DISORDER: Primary | ICD-10-CM

## 2022-06-03 PROCEDURE — 90791 PSYCH DIAGNOSTIC EVALUATION: CPT | Performed by: COUNSELOR
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
## 2022-06-10 ENCOUNTER — OFFICE VISIT (OUTPATIENT)
Dept: PSYCHIATRY | Facility: CLINIC | Age: 22
End: 2022-06-10

## 2022-06-10 DIAGNOSIS — F31.9 BIPOLAR 1 DISORDER: Primary | ICD-10-CM

## 2022-06-10 DIAGNOSIS — F10.20 ALCOHOL USE DISORDER, MODERATE, DEPENDENCE: ICD-10-CM

## 2022-06-10 PROCEDURE — 90837 PSYTX W PT 60 MINUTES: CPT | Performed by: COUNSELOR

## 2022-06-10 NOTE — PROGRESS NOTES
Date:06/10/2022   Patient Name: Ruby Ramos  : 2000   MRN: [id number]   Time IN: 10:15 AM    Time OUT: 11:12 AM      Referring Provider: Isamar Leon APRN    PROGRESS NOTE    History of Present Illness:   Ruyb Ramos is a 22 y.o. female who is being seen today for follow up individual Psychotherapy session.     Chief Complaint:  Pt struggles with depression and anxiety, with manic episodes.  This week pt lost her maternal grandmother.  Pt reports struggling somewhat with added depression and anxiety due to this event and planning for the .  Pt feels that she is going through stages of grief appropriately however did have some impact on her mood.  Current anxieties in engaging in relationships thru dating due mental health struggles, attachment difficulties as well as low self-esteem.  Pt reports side effects of med's such as restless leg, motivation however tired which makes it difficult to follow through.    Pt reported a depressive mood state yesterday lasting for four hours. Pt reports difficulty feeling love and care for others when depressed.      ICD-10-CM ICD-9-CM   1. Bipolar 1 disorder (HCC)  F31.9 296.7   2. Alcohol use disorder, moderate, dependence (HCC)  F10.20 303.90          Clinical Maneuvering/Intervention:   Therapist discussed with pt trauma hx, associated symptoms and triggers.  Discussed with pt ways to express emotions appropriately such as not reacting out of anger, take time to think through triggers and process to avoid negative consequences of actions.  Discussed I statements to share emotions.  Disccused need for setting boundaries and being able to say no to protect her own well being and peace of mind.  Rationalized negative thought patterns regarding entering into new relationships. Also discussed coping skills for patient to implement such as taking time out to calm down/process and challenge negative thoughts, take deep breaths.  Pt mentioned wanting to  cut back on drinking alcohol, this was encouraged in session by therapist to avoid all together if/when possible due to impact it may be having on her anxiety/depression as well as family history of alcoholism, or ways to begin to cut back.  Pt will discuss medication side effects with her provider.      Allowed patient to freely discuss issues without interruption or judgment. Provided safe, confidential environment to facilitate the development of positive therapeutic relationship and encourage open, honest communication. Assisted patient in identifying risk factors which would indicate the need for higher level of care including thoughts to harm self or others and/or self-harming behavior and encouraged patient to contact this office, call 911, or present to the nearest emergency room should any of these events occur. Discussed crisis intervention services and means to access.     Patient adamantly and convincingly denies current suicidal or homicidal ideation or perceptual disturbance.    Mental Status Exam:   Hygiene:   good  Cooperation:  Cooperative  Eye Contact:  Good  Psychomotor Behavior:  Appropriate  Affect:  Appropriate  Mood: anxious  Speech:  Normal  Thought Process:  Goal directed and Linear  Thought Content:  Normal  Suicidal:  None  Homicidal:  None  Hallucinations:  None  Delusion:  None  Memory:  Intact  Orientation:  Person, Place, Time and Situation  Reliability:  good  Insight:  Fair  Judgement:  Fair  Impulse Control:  Fair    Patient's Support Network Includes:  brother and friend    Functional Status: Severe impairment    Progress toward goal: Not at goal    Prognosis: Fair with Ongoing Treatment     Medications:     Current Outpatient Medications:   •  amphetamine-dextroamphetamine (Adderall) 10 MG tablet, Take 10 mg orally every afternoon., Disp: 30 tablet, Rfl: 0  •  amphetamine-dextroamphetamine XR (Adderall XR) 15 MG 24 hr capsule, Take 1 capsule by mouth Every Morning, Disp: 30  "capsule, Rfl: 0  •  ARIPiprazole (ABILIFY) 10 MG tablet, Take 1 tablet by mouth Daily., Disp: 30 tablet, Rfl: 3  •  Continuous Blood Gluc Sensor (Dexcom G6 Sensor), USE TO CHECK BLOOD GLUCOSE ONCE DAILY OR AS DIRECTED. CHANGE EVERY 10 DAYS, Disp: 3 each, Rfl: 11  •  Continuous Blood Gluc Transmit (Dexcom G6 Transmitter) misc, USE AS DIRECTED TO CHECK BLOOD SUGAR. CHANGE EVERY 90 DAYS., Disp: 1 each, Rfl: 3  •  glucose blood test strip, Four times daily, Disp: 400 each, Rfl: 3  •  insulin aspart (NovoLOG FlexPen) 100 UNIT/ML solution pen-injector sc pen, Inject 2-10 Units under the skin into the appropriate area as directed 3 (Three) Times a Day With Meals., Disp: 30 mL, Rfl: 1  •  Insulin Glargine (BASAGLAR KWIKPEN) 100 UNIT/ML injection pen, Inject 10 Units under the skin into the appropriate area as directed Every Night., Disp: 15 mL, Rfl: 1  •  Insulin Pen Needle 32G X 4 MM misc, 1 each 4 (Four) Times a Day., Disp: 400 each, Rfl: 3  •  multivitamin with minerals tablet tablet, Take 1 tablet by mouth Daily., Disp: , Rfl:   •  ondansetron ODT (Zofran ODT) 4 MG disintegrating tablet, Place 1 tablet on the tongue Every 8 (Eight) Hours As Needed for Nausea., Disp: 15 tablet, Rfl: 1  •  True Comfort Twist Top Lancets misc, 1 Device 4 (Four) Times a Day., Disp: 400 each, Rfl: 3    Visit Diagnosis/Orders Placed This Visit:    ICD-10-CM ICD-9-CM   1. Bipolar 1 disorder (HCC)  F31.9 296.7   2. Alcohol use disorder, moderate, dependence (HCC)  F10.20 303.90        PLAN:  1. Safety: No acute safety concerns  2. Risk Assessment: Risk of self-harm acutely is low. Risk of self-harm chronically is also low, but could be further elevated in the event of treatment noncompliance and/or AODA.    Crisis Plan:  Symptoms and/or behaviors to indicate a crisis: Feeling sad or low, Extreme mood changes; including uncontrollable \"highs\" or euphoria, Prolonged irritability or anger, Isolation and Thinking about suicide    What calming " techniques or other strategies will patient use to de-esclate and stay safe: slow down, breathe, visualize calming self, think it though, listen to music, change focus, take a walk, talk to brother or friend.    Who is one person patient can contact to assist with de-escalation? Brother, friend    Treatment Plan/Goals: Patient will continue supportive psychotherapy efforts and medication regimen as prescribed. Therapist will provide Cognitive Behavioral Therapy to assist patient in improving functioning and gaining coping skills, maintaining stability, and avoiding decompensation and the need for higher level of care. Plan for treatment was discussed during today's visit. Patient ackowledged and verbally consented to continue with current treatment plan and was educated on the importance of compliance with treatment and follow-up appointments.     Patient will contact this office, call 911 or present to the nearest emergency room should suicidal or homicidal ideations occur.     Follow Up:   Return in about 1 week (around 6/17/2022) for Therapy session.      Ruby Conde LPCC [facility]

## 2022-06-15 DIAGNOSIS — F90.2 ATTENTION DEFICIT HYPERACTIVITY DISORDER, COMBINED TYPE: ICD-10-CM

## 2022-06-15 RX ORDER — DEXTROAMPHETAMINE SACCHARATE, AMPHETAMINE ASPARTATE MONOHYDRATE, DEXTROAMPHETAMINE SULFATE AND AMPHETAMINE SULFATE 3.75; 3.75; 3.75; 3.75 MG/1; MG/1; MG/1; MG/1
15 CAPSULE, EXTENDED RELEASE ORAL EVERY MORNING
Qty: 30 CAPSULE | Refills: 0 | Status: SHIPPED | OUTPATIENT
Start: 2022-06-15 | End: 2022-06-21 | Stop reason: DRUGHIGH

## 2022-06-15 RX ORDER — DEXTROAMPHETAMINE SACCHARATE, AMPHETAMINE ASPARTATE, DEXTROAMPHETAMINE SULFATE AND AMPHETAMINE SULFATE 2.5; 2.5; 2.5; 2.5 MG/1; MG/1; MG/1; MG/1
TABLET ORAL
Qty: 30 TABLET | Refills: 0 | Status: SHIPPED | OUTPATIENT
Start: 2022-06-15 | End: 2022-07-25 | Stop reason: SDUPTHER

## 2022-06-15 NOTE — TELEPHONE ENCOUNTER
Incoming Refill Request      Medication requested (name and dose): ADDERALL XR 15MG  ADDERALL 10MG    Pharmacy where request should be sent: [facility]    Additional details provided by patient: N/A    Best call back number: [phone]    Does the patient have less than a 3 day supply:  [] Yes  [x] No    Soheila Gutierrez  06/15/22, 10:40 EDT

## 2022-06-17 ENCOUNTER — OFFICE VISIT (OUTPATIENT)
Dept: PSYCHIATRY | Facility: CLINIC | Age: 22
End: 2022-06-17

## 2022-06-17 DIAGNOSIS — F31.9 BIPOLAR 1 DISORDER: Primary | ICD-10-CM

## 2022-06-17 DIAGNOSIS — F10.20 ALCOHOL USE DISORDER, MODERATE, DEPENDENCE: ICD-10-CM

## 2022-06-17 DIAGNOSIS — F41.1 GENERALIZED ANXIETY DISORDER: ICD-10-CM

## 2022-06-17 PROCEDURE — 90834 PSYTX W PT 45 MINUTES: CPT | Performed by: COUNSELOR

## 2022-06-17 NOTE — PROGRESS NOTES
Date:2022   Patient Name: Ruby Ramos  : 2000   MRN: [id number]   Time IN: 10:27 AM    Time OUT: 11:15 AM     Referring Provider: Isamar Leon APRN    PROGRESS NOTE    History of Present Illness:   Ruby Ramos is a 22 y.o. female who is being seen today for follow up individual Psychotherapy session.     Chief Complaint:  Pt has a hx of bipolar, cycles between depressive and manic episodes.  Pt reports she recently stopped taking her medication cold turkey.  Pt reports she had an episode which she had a nightmare and felt 'paranoid' and left her house att 230 am and drove around, scared to be at her apt.  Pt reports she slept three hours in a three day span.  Pt had only been taking her meds for a month prior.  Pt reports that symptoms have worsened since getting older.  Pt continues to experience a great deal of anxiety and several days a week is using alcohol.            ICD-10-CM ICD-9-CM   1. Bipolar 1 disorder ([facility])  F31.9 296.7   2. Generalized anxiety disorder  F41.1 300.02   3. Alcohol use disorder, moderate, dependence ([facility])  F10.20 303.90          Clinical Maneuvering/Intervention:   Assisted patient in processing above session content; acknowledged and normalized patient’s thoughts, feelings, and concerns by utilizing a person-centered approach in efforts to build appropriate rapport and a positive therapeutic relationship with open and honest communication.  Rationalized patient thought process regarding mood changes, symptoms and risks of quitting meds without med provider advice.  Worked through negative thought patterns regarding being on medication.  Pt reports she started her meds back this past Tuesday and has a follow up with med provider this coming Tuesday.  Discussed triggers associated with patient's anxiety.  Pt reports anxious thought patterns towards meeting life goals and feels she should be able to accomplish everything at once. Pt worries that her mental  health will never stabilize enough that she can meet her goals successfully.  Also discussed reacting impulsively and difficulty making appropriate/thought thru decisions when in manic like states.  Pt reports during highs wanting to do and fix everything as to make up for low periods when she felt like she couldn't do anything.  Pt reports she has attempted to use means for distraction such as exercise which she reports helps a lot, and journaling.  Therapist assisted pt in identifing specific negative thought patterns and worked on challenging them using reframing/perception testing.  Therapist encouraged pt to get in habit of practice challenging her worries day to day to avoid spending a great deal of time ruminating on them and to reduce their impact on functioning.  Therapist also encouraged pt to practice trying to slow down, take time to think/process before reacting impulsivley and making a decision.  Therapist encouraged pt to avoid alcohol consumption - pt indicated a desire to quit and reports she is cutting back.  Therapist discussed short term and long term impact/risk of alcohol use with her mental health.    Allowed patient to freely discuss issues without interruption or judgment. Provided safe, confidential environment to facilitate the development of positive therapeutic relationship and encourage open, honest communication. Assisted patient in identifying risk factors which would indicate the need for higher level of care including thoughts to harm self or others and/or self-harming behavior and encouraged patient to contact this office, call 911, or present to the nearest emergency room should any of these events occur. Discussed crisis intervention services and means to access.   Patient adamantly and convincingly denies current suicidal or homicidal ideation or perceptual disturbance.      Mental Status Exam:   Hygiene:   good  Cooperation:  Cooperative  Eye Contact:  Good  Psychomotor  Behavior:  Appropriate  Affect:  Appropriate  Mood: normal  Speech:  Normal  Thought Process:  Goal directed and Linear  Thought Content:  Normal  Suicidal:  None  Homicidal:  None  Hallucinations:  None  Delusion:  None  Memory:  Intact  Orientation:  Person, Place, Time and Situation  Reliability:  good  Insight:  Fair  Judgement:  Impaired  Impulse Control:  Impaired  Physical/Medical Issues:  Yes diabetes.      Patient's Support Network Includes:  Brother    Functional Status: Severe impairment    Progress toward goal: Not at goal    Prognosis: Fair with Ongoing Treatment     Medications:     Current Outpatient Medications:   •  amphetamine-dextroamphetamine (Adderall) 10 MG tablet, Take 10 mg orally every afternoon., Disp: 30 tablet, Rfl: 0  •  amphetamine-dextroamphetamine XR (Adderall XR) 15 MG 24 hr capsule, Take 1 capsule by mouth Every Morning, Disp: 30 capsule, Rfl: 0  •  ARIPiprazole (ABILIFY) 10 MG tablet, Take 1 tablet by mouth Daily., Disp: 30 tablet, Rfl: 3  •  Continuous Blood Gluc Sensor (Dexcom G6 Sensor), USE TO CHECK BLOOD GLUCOSE ONCE DAILY OR AS DIRECTED. CHANGE EVERY 10 DAYS, Disp: 3 each, Rfl: 11  •  Continuous Blood Gluc Transmit (Dexcom G6 Transmitter) misc, USE AS DIRECTED TO CHECK BLOOD SUGAR. CHANGE EVERY 90 DAYS., Disp: 1 each, Rfl: 3  •  glucose blood test strip, Four times daily, Disp: 400 each, Rfl: 3  •  insulin aspart (NovoLOG FlexPen) 100 UNIT/ML solution pen-injector sc pen, Inject 2-10 Units under the skin into the appropriate area as directed 3 (Three) Times a Day With Meals., Disp: 30 mL, Rfl: 1  •  Insulin Glargine (BASAGLAR KWIKPEN) 100 UNIT/ML injection pen, Inject 10 Units under the skin into the appropriate area as directed Every Night., Disp: 15 mL, Rfl: 1  •  Insulin Pen Needle 32G X 4 MM misc, 1 each 4 (Four) Times a Day., Disp: 400 each, Rfl: 3  •  multivitamin with minerals tablet tablet, Take 1 tablet by mouth Daily., Disp: , Rfl:   •  ondansetron ODT (Zofran ODT)  "4 MG disintegrating tablet, Place 1 tablet on the tongue Every 8 (Eight) Hours As Needed for Nausea., Disp: 15 tablet, Rfl: 1  •  True Comfort Twist Top Lancets misc, 1 Device 4 (Four) Times a Day., Disp: 400 each, Rfl: 3    Visit Diagnosis/Orders Placed This Visit:    ICD-10-CM ICD-9-CM   1. Bipolar 1 disorder ([facility])  F31.9 296.7   2. Generalized anxiety disorder  F41.1 300.02   3. Alcohol use disorder, moderate, dependence ([facility])  F10.20 303.90        PLAN:  1. Safety: No acute safety concerns  Pt denied SI, intent or planning at this time.  2. Risk Assessment: Risk of self-harm acutely is low. Risk of self-harm is low at this time however could change depending on mood state.  Pt will go to ER if SI with intent occurs and will schedule an earlier therapy apt if symptoms worsen.    Crisis Plan:  Symptoms and/or behaviors to indicate a crisis: Excessive worry or fear, Feeling sad or low, Extreme mood changes; including uncontrollable \"highs\" or euphoria, Isolation, Lack of sleep, Difficulty perceiving reality  and Thinking about suicide    What calming techniques or other strategies will patient use to de-esclate and stay safe: slow down, breathe, visualize calming self, think it though, listen to music, change focus,     Who is one person patient can contact to assist with de-escalation? Brother    Treatment Plan/Goals: Patient will continue supportive psychotherapy efforts and medication regimen as prescribed. Therapist will provide Cognitive Behavioral Therapy to assist patient in improving functioning and gaining coping skills, maintaining stability, and avoiding decompensation and the need for higher level of care. Plan for treatment was discussed during today's visit. Patient ackowledged and verbally consented to continue with current treatment plan and was educated on the importance of compliance with treatment and follow-up appointments.     Patient will contact this office, call 911 or present to the nearest " emergency room should suicidal or homicidal ideations occur.     Follow Up:   Return in about 2 weeks (around 7/1/2022).      [facility]

## 2022-06-21 ENCOUNTER — OFFICE VISIT (OUTPATIENT)
Dept: BEHAVIORAL HEALTH | Facility: CLINIC | Age: 22
End: 2022-06-21

## 2022-06-21 VITALS
SYSTOLIC BLOOD PRESSURE: 110 MMHG | BODY MASS INDEX: 28.71 KG/M2 | WEIGHT: 156 LBS | DIASTOLIC BLOOD PRESSURE: 70 MMHG | HEIGHT: 62 IN

## 2022-06-21 DIAGNOSIS — F41.1 GENERALIZED ANXIETY DISORDER: ICD-10-CM

## 2022-06-21 DIAGNOSIS — F90.2 ATTENTION DEFICIT HYPERACTIVITY DISORDER, COMBINED TYPE: Primary | ICD-10-CM

## 2022-06-21 DIAGNOSIS — F34.9 PERSISTENT MOOD (AFFECTIVE) DISORDER, UNSPECIFIED: ICD-10-CM

## 2022-06-21 PROCEDURE — 99213 OFFICE O/P EST LOW 20 MIN: CPT

## 2022-06-21 RX ORDER — DEXTROAMPHETAMINE SACCHARATE, AMPHETAMINE ASPARTATE MONOHYDRATE, DEXTROAMPHETAMINE SULFATE AND AMPHETAMINE SULFATE 5; 5; 5; 5 MG/1; MG/1; MG/1; MG/1
20 CAPSULE, EXTENDED RELEASE ORAL EVERY MORNING
Qty: 30 CAPSULE | Refills: 0 | Status: SHIPPED | OUTPATIENT
Start: 2022-06-21 | End: 2022-07-25 | Stop reason: SDUPTHER

## 2022-06-21 NOTE — PROGRESS NOTES
Follow Up Office Visit    Patient Name: Ruby Ramos  : 2000   MRN: [id number]   Care Team: Patient Care Team:  Isamar Leon APRN as PCP - General (Family Medicine)  Reid Garcia MD as Consulting Physician (General Surgery)  Ruby Conde [facility] as Counselor (Behavioral Health)        Chief Complaint:    Chief Complaint   Patient presents with   • ADHD   • Anxiety   • Med Management   • mood disregulation        History of Present Illness: Ruby Ramos is a 22 y.o. female who is here today for a medication management follow up. Patient states that since starting the Abilify her mood has been much better and has not fluctuated as much. She did endorse some restless legs when she initially started it so she stopped it cold turkey and quickly realized that wasn't a good idea and started taking it again. She denies having restless legs since re-starting it. She would like to increase her morning dose of Adderall as previously discusses.     Subjective   Review of Systems:    Review of Systems   All other systems reviewed and are negative.      Current Medications:   Current Outpatient Medications   Medication Sig Dispense Refill   • amphetamine-dextroamphetamine (Adderall) 10 MG tablet Take 10 mg orally every afternoon. 30 tablet 0   • ARIPiprazole (ABILIFY) 10 MG tablet Take 1 tablet by mouth Daily. 30 tablet 3   • Continuous Blood Gluc Sensor (Dexcom G6 Sensor) USE TO CHECK BLOOD GLUCOSE ONCE DAILY OR AS DIRECTED. CHANGE EVERY 10 DAYS 3 each 11   • Continuous Blood Gluc Transmit (Dexcom G6 Transmitter) misc USE AS DIRECTED TO CHECK BLOOD SUGAR. CHANGE EVERY 90 DAYS. 1 each 3   • glucose blood test strip Four times daily 400 each 3   • insulin aspart (NovoLOG FlexPen) 100 UNIT/ML solution pen-injector sc pen Inject 2-10 Units under the skin into the appropriate area as directed 3 (Three) Times a Day With Meals. 30 mL 1   • Insulin Glargine (BASAGLAR KWIKPEN) 100 UNIT/ML injection pen Inject  10 Units under the skin into the appropriate area as directed Every Night. 15 mL 1   • Insulin Pen Needle 32G X 4 MM misc 1 each 4 (Four) Times a Day. 400 each 3   • multivitamin with minerals tablet tablet Take 1 tablet by mouth Daily.     • ondansetron ODT (Zofran ODT) 4 MG disintegrating tablet Place 1 tablet on the tongue Every 8 (Eight) Hours As Needed for Nausea. 15 tablet 1   • True Comfort Twist Top Lancets misc 1 Device 4 (Four) Times a Day. 400 each 3   • amphetamine-dextroamphetamine XR (Adderall XR) 20 MG 24 hr capsule Take 1 capsule by mouth Every Morning 30 capsule 0     No current facility-administered medications for this visit.       Mental Status Exam:   Hygiene:   good  Cooperation:  Cooperative  Eye Contact:  Good  Psychomotor Behavior:  Appropriate  Affect:  Appropriate  Mood: normal  Speech:  Normal  Thought Process:  Goal directed and Linear  Thought Content:  Normal  Suicidal:  None  Homicidal:  None  Hallucinations:  None  Delusion:  None  Memory:  Intact  Orientation:  Person, Place, Time and Situation  Reliability:  good  Insight:  Good  Judgement:  Good  Impulse Control:  Good  Physical/Medical Issues:  No      PHQ-2/PHQ-9: Depression Screening  Little Interest or Pleasure in Doing Things: 0-->not at all  Feeling Down, Depressed or Hopeless: 1-->several days  PHQ-2 Total Score: 1  PHQ-9: Brief Depression Severity Measure Score: 1    PHQ-9 Score:   PHQ-9 Total Score: 1    SILVIANO-7  Over the last two weeks, how often have you been bothered by the following problems?  Feeling nervous, anxious or on edge: Nearly every day  Not being able to stop or control worrying: More than half the days  Worrying too much about different things: More than half the days  Trouble Relaxing: Nearly every day  Being so restless that it is hard to sit still: More than half the days  Becoming easily annoyed or irritable: More than half the days  Feeling afraid as if something awful might happen: Several days  SILVIANO 7  Total Score: 15     .Screening for Adults With ADHD - (1-6)  1. How often do you have trouble wrapping up the final details of a project, once the challenging parts have been done?: Often  2. How often do you have difficulty getting things in order when you have to do a task that requires organization?: Often  3. How often do you have problems remembering appointments or obligations : Often  4. When you have a task that requires a lot of thought, how often do you avoid or delay getting started ?: Very Often  5. How often do you fidget or squirm with your hands or feet when you have to sit down for a long time?: Very Often  6. How often do you feel overly active and compelled to do things, like you were driven by a motor?: Very Often  7. How often do you make careless mistakes when you have to work on a boring or difficult project?: Often  8. How often do have difficulty keeping your attention when you are doing boring or repetitive work?: Often  9. How often do you have difficulty concentrating on what people say to you, even when they are speaking to you: Often  10.How often do you misplace or have difficulty finding things at home or at work?: Often  11.How often are you distracted by activity or noise around you?: Very Often  12.How often do you leave your seat in meetings or other situations in which you are expected to remain seated?: Rarely  13.How often do you feel restless or fidgety?: Very Often  14.How often do you have difficulty unwinding and relaxing when you have time to yourself?: Very Often  15.How often do you find yourself talking too much when you are in social situations?: Very Often  16.When you’re in a conversation, how often do you find yourself finishing the sentences of the people you are talking to, before they can finish them themselves?: Very Often  17.How often do you have difficulty waiting your turn in situations when turn taking is required?: Sometimes  18.How often do you interrupt  "others when they are busy?: Rarely      Objective   Vital Signs:   /70   Ht 157.5 cm (62\")   Wt 70.8 kg (156 lb)   BMI 28.53 kg/m²       Assessment / Plan    Diagnoses and all orders for this visit:    1. Attention deficit hyperactivity disorder, combined type (Primary)  -     amphetamine-dextroamphetamine XR (Adderall XR) 20 MG 24 hr capsule; Take 1 capsule by mouth Every Morning  Dispense: 30 capsule; Refill: 0    2. Generalized anxiety disorder    3. Persistent mood (affective) disorder, unspecified (HCC)       Continue Abilify as prescribed. Will increased morning dose of Adderall XR to 20 mg.           MEDS ORDERED DURING VISIT:  New Medications Ordered This Visit   Medications   • amphetamine-dextroamphetamine XR (Adderall XR) 20 MG 24 hr capsule     Sig: Take 1 capsule by mouth Every Morning     Dispense:  30 capsule     Refill:  0     Please DC Adderall XR 15mg         Follow Up   Return in about 8 weeks (around 8/16/2022).  Patient was given instructions and counseling regarding her condition or for health maintenance advice. Please see specific information pulled into the AVS if appropriate.     TREATMENT PLAN/GOALS: Continue supportive psychotherapy efforts and medications as indicated. Treatment and medication options discussed during today's visit. Patient acknowledged and verbally consented to continue with current treatment plan and was educated on the importance of compliance with treatment and follow-up appointments.    MEDICATION ISSUES:  Discussed medication options and treatment plan of prescribed medication as well as the risks, benefits, and side effects including potential falls, possible impaired driving and metabolic adversities among others. Patient is agreeable to call the office with any worsening of symptoms or onset of side effects. Patient is agreeable to call 911 or go to the nearest ER should he/she begin having SI/HI.    [facility] " JOHN  [facility]  [address] [address]  [phone]    June 21, 2022 09:06 EDT

## 2022-06-24 NOTE — PROGRESS NOTES
"Chief Complaint   Patient presents with   • Diabetes          HPI   Ruby Ramos is a 20 y.o. female had concerns including Diabetes.    Is using Dexcom. Reviewed from 2/24/2021 through 3/9/2021 average glucose is 133 with a standard deviation of 39.  Estimated A1c 6.5.  She has occasional postprandial hyperglycemia. Reports that she most often administers her insulin at the start of the meal.     Few weeks ago she had postprandial hypoglycemia.  She decrease the dose of Humalog from 6 units with meals to 5 units.  She takes 1: 50 greater than 150.  She is on Basaglar    The following portions of the patient's history were reviewed and updated as appropriate: allergies, current medications, past family history, past medical history, past social history, past surgical history and problem list.      Review of Systems   Constitutional: Negative.    Endocrine:        See HPI        Physical Exam  Vitals reviewed.   Constitutional:       Appearance: Normal appearance.   Cardiovascular:      Rate and Rhythm: Normal rate.   Pulmonary:      Effort: Pulmonary effort is normal.   Neurological:      Mental Status: She is alert.   Psychiatric:         Mood and Affect: Mood normal.         Behavior: Behavior normal.        /70 (BP Location: Left arm, Patient Position: Sitting, Cuff Size: Adult)   Pulse 80   Temp 98.2 °F (36.8 °C) (Infrared)   Ht 157.5 cm (62\")   Wt 76.6 kg (168 lb 12.8 oz)   BMI 30.87 kg/m²      Labs and imaging    CMP:  Lab Results   Component Value Date    GLU 82 03/12/2015    BUN 5 (L) 01/26/2021    CREATININE 0.41 (L) 01/26/2021    EGFRIFNONA >150 01/26/2021    BCR 12.2 01/26/2021     01/26/2021    K 3.8 01/26/2021    CO2 24.0 01/26/2021    CALCIUM 9.4 01/26/2021    ALBUMIN 4.60 01/21/2021    LABIL2 1.7 03/12/2015    BILITOT 0.5 01/21/2021    ALKPHOS 131 (H) 01/21/2021    AST 14 01/21/2021    ALT 18 01/21/2021     Lipid Panel:  Lab Results   Component Value Date    TRIG 45 03/12/2015    " comfortable appearance/cooperative HDL 47 03/12/2015    VLDL 9 03/12/2015    LDL 83 03/12/2015     HbA1c:  Lab Results   Component Value Date    HGBA1C 11.50 (H) 01/21/2021    HGBA1C 11.2 01/21/2021     Glucose:    Lab Results   Component Value Date    POCGLU 186 (H) 01/24/2021     TSH:  Lab Results   Component Value Date    TSH 2.610 01/21/2021     Lab Results   Component Value Date    GAD65 2263.9 (H) 01/26/2021    LABIA2 8.5 (H) 01/26/2021         Assessment and plan  Diagnoses and all orders for this visit:    1. Type 1 diabetes mellitus without complication (CMS/HCC) (Primary)  Diagnosed this year.  Confirmed with antibodies.  Improved with only occasional postprandial hyperglycemia.  Suspect due to predominantly carb based meals and timing of insulin.   Average glucose now 130s.  A1c will be checked at follow-up visit.    Continue Basaglar 14 units daily and Humalog 5 units with meals.  Recommend that she administer the Humalog 15 minutes prior to the start of the meal.  Depending on the types of meals consumed she may require more or less Humalog.  Continue 1: 50 greater than 150 correction.  Check CMP and lipid panel at follow-up visit.  Urine microalbumin creatinine ratio also be checked.  Ophtho exam should be updated yearly.  Check monofilament at follow-up visit.  -     Insulin Glargine (BASAGLAR KWIKPEN) 100 UNIT/ML injection pen; Inject 14 Units under the skin into the appropriate area as directed Every Night. New medication prescribed today, has not started taking yet.  Dispense: 15 mL; Refill: 1  -     insulin aspart (novoLOG FLEXPEN) 100 UNIT/ML solution pen-injector sc pen; Inject 5 Units under the skin into the appropriate area as directed 3 (Three) Times a Day With Meals. 5 units with meals plus 1:50>150, MDD 40 units  Dispense: 15 mL; Refill: 1    2. Hypoglycemia  Recent hypoglycemia has since resolved after decreasing the dose of Humalog with meals.  Recommended administration of the insulin 15 minutes prior to the start of the  meal and avoiding predominantly carb-based meals which can result in postprandial hyperglycemia and delayed hypoglycemia.       Return in about 3 months (around 6/9/2021) for next scheduled follow up. The patient was instructed to contact the clinic with any interval questions or concerns.    Layla Corral, DO   Endocrinologist    Please note that portions of this document were completed with a voice recognition program. Efforts were made to edit the dictations, but occasionally words are mis-transcribed.     comfortable appearance/cooperative confused @ time/anxious/comfortable appearance/cooperative

## 2022-07-01 ENCOUNTER — OFFICE VISIT (OUTPATIENT)
Dept: PSYCHIATRY | Facility: CLINIC | Age: 22
End: 2022-07-01

## 2022-07-01 DIAGNOSIS — F10.20: ICD-10-CM

## 2022-07-01 DIAGNOSIS — F41.1 GENERALIZED ANXIETY DISORDER: ICD-10-CM

## 2022-07-01 DIAGNOSIS — F31.9 BIPOLAR 1 DISORDER: Primary | ICD-10-CM

## 2022-07-01 PROCEDURE — 90837 PSYTX W PT 60 MINUTES: CPT | Performed by: COUNSELOR

## 2022-07-01 NOTE — PROGRESS NOTES
Date:2022   Patient Name: Ruby Ramos  : 2000   MRN: [id number]   Time IN: 8:08 AM    Time OUT: 9:02 AM      Referring Provider: Isamar Leon APRN    PROGRESS NOTE    History of Present Illness:   Ruby Ramos is a 22 y.o. female who is being seen today for follow up individual Psychotherapy session.     Chief Complaint:  Pt has a hx of bipolar, cycles between depressive and manic episodes.  Pt is back on her medication.  Pt reports her depression has been stable. Pt reports anxiety has been high past week and that she has been getting overwhelmed easily.  Pt has been able to attend work regularly at this time (usually calls in 1x per week).  Pt reports emotions are more stable and judgement is better.  Pt continues to struggle with impulsivity.      ICD-10-CM ICD-9-CM   1. Bipolar 1 disorder (HCC)  F31.9 296.7   2. Generalized anxiety disorder  F41.1 300.02   3. Moderate alcohol dependence (HCC)  F10.20 303.90          Clinical Maneuvering/Intervention:   Assisted patient in processing above session content; acknowledged and normalized patient’s thoughts, feelings, and concerns by utilizing a person-centered approach in efforts to build appropriate rapport and a positive therapeutic relationship with open and honest communication.  Rationalized patient thought process regarding .  Discussed triggers associated with patient's anxiety and depression.  Pt has been practicing challenging her negative thought patterns and is keeping a journal to note feelings/negative thought patterns and ways to reframe.   Pt is working on cutting back alcohol consumption and not depending on it for coping.  Pt over spends during manic states as well as uses this as a source of coping.  Therapist encouraged more appropriate ways to cope.  Discussed mindfulness activities such and grounding in session and provided pt with exercises to take home.  Mindfulness will assist client in not getting stuck in worries  about the past or the future.         Allowed patient to freely discuss issues without interruption or judgment.  Assisted patient in identifying risk factors which would indicate the need for higher level of care including thoughts to harm self or others and/or self-harming behavior and encouraged patient to contact this office, call 911, or present to the nearest emergency room should any of these events occur. Discussed crisis intervention services and means to access. Patient adamantly and convincingly denies current suicidal or homicidal ideation or perceptual disturbance.    Assessment Scores:     (Scales based on 0 - 10 with 10 being the worst)  Depression: 3 Anxiety: 8       Mental Status Exam:   Hygiene:   good  Cooperation:  Cooperative  Eye Contact:  Good  Psychomotor Behavior:  Appropriate  Affect:  Appropriate  Mood: anxious  Speech:  Normal  Thought Process:  Goal directed and Linear  Thought Content:  Normal and Mood congruent  Suicidal:  None  Homicidal:  None  Hallucinations:  None  Delusion:  None  Memory:  Intact  Orientation:  Person, Place, Time and Situation  Reliability:  good  Insight:  Fair  Judgement:  Fair  Impulse Control:  Fair  Physical/Medical Issues:  Yes diabetes     Patient's Support Network Includes:  brother parents    Functional Status: Moderate impairment     Progress toward goal: Not at goal    Prognosis: Fair with Ongoing Treatment     Medications:     Current Outpatient Medications:   •  amphetamine-dextroamphetamine (Adderall) 10 MG tablet, Take 10 mg orally every afternoon., Disp: 30 tablet, Rfl: 0  •  amphetamine-dextroamphetamine XR (Adderall XR) 20 MG 24 hr capsule, Take 1 capsule by mouth Every Morning, Disp: 30 capsule, Rfl: 0  •  ARIPiprazole (ABILIFY) 10 MG tablet, Take 1 tablet by mouth Daily., Disp: 30 tablet, Rfl: 3  •  Continuous Blood Gluc Sensor (Dexcom G6 Sensor), USE TO CHECK BLOOD GLUCOSE ONCE DAILY OR AS DIRECTED. CHANGE EVERY 10 DAYS, Disp: 3 each, Rfl:  "11  •  Continuous Blood Gluc Transmit (Dexcom G6 Transmitter) misc, USE AS DIRECTED TO CHECK BLOOD SUGAR. CHANGE EVERY 90 DAYS., Disp: 1 each, Rfl: 3  •  glucose blood test strip, Four times daily, Disp: 400 each, Rfl: 3  •  insulin aspart (NovoLOG FlexPen) 100 UNIT/ML solution pen-injector sc pen, Inject 2-10 Units under the skin into the appropriate area as directed 3 (Three) Times a Day With Meals., Disp: 30 mL, Rfl: 1  •  Insulin Glargine (BASAGLAR KWIKPEN) 100 UNIT/ML injection pen, Inject 10 Units under the skin into the appropriate area as directed Every Night., Disp: 15 mL, Rfl: 1  •  Insulin Pen Needle 32G X 4 MM misc, 1 each 4 (Four) Times a Day., Disp: 400 each, Rfl: 3  •  multivitamin with minerals tablet tablet, Take 1 tablet by mouth Daily., Disp: , Rfl:   •  ondansetron ODT (Zofran ODT) 4 MG disintegrating tablet, Place 1 tablet on the tongue Every 8 (Eight) Hours As Needed for Nausea., Disp: 15 tablet, Rfl: 1  •  True Comfort Twist Top Lancets misc, 1 Device 4 (Four) Times a Day., Disp: 400 each, Rfl: 3    Visit Diagnosis/Orders Placed This Visit:    ICD-10-CM ICD-9-CM   1. Bipolar 1 disorder (HCC)  F31.9 296.7   2. Generalized anxiety disorder  F41.1 300.02   3. Moderate alcohol dependence (HCC)  F10.20 303.90        PLAN:  1. Safety: No acute safety concerns  2. Risk Assessment: Risk of self-harm acutely is low. Risk of self-harm chronically is also low, but could be further elevated in the event of treatment noncompliance and/or AODA.    Crisis Plan:  Symptoms and/or behaviors to indicate a crisis: Excessive worry or fear, Feeling sad or low, Extreme mood changes; including uncontrollable \"highs\" or euphoria, Prolonged irritability or anger, Isolation, Lack of sleep, Abuse of substances and Thinking about suicide    What calming techniques or other strategies will patient use to de-esclate and stay safe: slow down, breathe, visualize calming self, think it though, listen to music, change focus, " take a walk    Who is one person patient can contact to assist with de-escalation? Brother    Treatment Plan/Goals: Patient will continue supportive psychotherapy efforts and medication regimen as prescribed. Therapist will provide Cognitive Behavioral Therapy to assist patient in improving functioning and gaining coping skills, maintaining stability, and avoiding decompensation and the need for higher level of care. Plan for treatment was discussed during today's visit. Patient ackowledged and verbally consented to continue with current treatment plan and was educated on the importance of compliance with treatment and follow-up appointments.     Patient will contact this office, call 911 or present to the nearest emergency room should suicidal or homicidal ideations occur.     Follow Up:   Return in about 2 weeks (around 7/15/2022) for Therapy session.      JIM Og   [facility]     This document has been electronically signed by JIM Og   July 1, 2022 09:05 EDT

## 2022-07-06 ENCOUNTER — TELEPHONE (OUTPATIENT)
Dept: INTERNAL MEDICINE | Facility: CLINIC | Age: 22
End: 2022-07-06

## 2022-07-06 NOTE — TELEPHONE ENCOUNTER
Patient believes that she is having side effects from Abilify.  She is feeling overly tired and like she has no energy.  She is having muscle pain and spasms and twitching eyes.  Please return call to patient and let her know what to do about medication.  She tried to get a sooner appointment but there is nothing open.  She is on the wait list for a sooner appointment.

## 2022-07-07 ENCOUNTER — OFFICE VISIT (OUTPATIENT)
Dept: INTERNAL MEDICINE | Facility: CLINIC | Age: 22
End: 2022-07-07

## 2022-07-07 VITALS
HEART RATE: 105 BPM | TEMPERATURE: 98.4 F | OXYGEN SATURATION: 100 % | WEIGHT: 162 LBS | HEIGHT: 62 IN | SYSTOLIC BLOOD PRESSURE: 124 MMHG | BODY MASS INDEX: 29.81 KG/M2 | DIASTOLIC BLOOD PRESSURE: 80 MMHG

## 2022-07-07 DIAGNOSIS — F31.9 BIPOLAR 1 DISORDER: ICD-10-CM

## 2022-07-07 DIAGNOSIS — R53.83 FATIGUE, UNSPECIFIED TYPE: ICD-10-CM

## 2022-07-07 DIAGNOSIS — E10.9 TYPE 1 DIABETES MELLITUS WITHOUT COMPLICATION: Primary | ICD-10-CM

## 2022-07-07 LAB
B-HCG UR QL: NEGATIVE
BILIRUB BLD-MCNC: NEGATIVE MG/DL
CLARITY, POC: CLEAR
COLOR UR: YELLOW
EXPIRATION DATE: ABNORMAL
EXPIRATION DATE: ABNORMAL
GLUCOSE BLDC GLUCOMTR-MCNC: 214 MG/DL (ref 70–130)
GLUCOSE UR STRIP-MCNC: ABNORMAL MG/DL
INTERNAL NEGATIVE CONTROL: NEGATIVE
INTERNAL POSITIVE CONTROL: POSITIVE
KETONES UR QL: NEGATIVE
LEUKOCYTE EST, POC: NEGATIVE
Lab: ABNORMAL
Lab: ABNORMAL
NITRITE UR-MCNC: NEGATIVE MG/ML
PH UR: 6 [PH] (ref 5–8)
PROT UR STRIP-MCNC: NEGATIVE MG/DL
RBC # UR STRIP: NEGATIVE /UL
SP GR UR: 1.02 (ref 1–1.03)
UROBILINOGEN UR QL: NORMAL

## 2022-07-07 PROCEDURE — 82962 GLUCOSE BLOOD TEST: CPT | Performed by: NURSE PRACTITIONER

## 2022-07-07 PROCEDURE — 81003 URINALYSIS AUTO W/O SCOPE: CPT | Performed by: NURSE PRACTITIONER

## 2022-07-07 PROCEDURE — 81025 URINE PREGNANCY TEST: CPT | Performed by: NURSE PRACTITIONER

## 2022-07-07 PROCEDURE — 99213 OFFICE O/P EST LOW 20 MIN: CPT | Performed by: NURSE PRACTITIONER

## 2022-07-07 NOTE — PROGRESS NOTES
Office Visit      Patient Name: Ruby Ramos  : 2000   MRN: [id number]   Care Team: Patient Care Team:  Isamar Leon APRN as PCP - General (Family Medicine)  Reid Garcia MD as Consulting Physician (General Surgery)  Ruby Conde LPCC as Counselor (Behavioral Health)  Beth House APRN as Nurse Practitioner (Behavioral Health)    Chief Complaint  Fatigue    Subjective     Subjective      Ruby Ramos presents to [facility] for DKA concerns.   Symptoms started 1.5 weeks ago.   Complains of major fatigue, nausea, mild abdominal pain, decreased appetite, shallow breathing, and dizziness.   Denies vision changes, shortness of breath, chest pain, recent illness, and non-compliance with her insulin.   Wearing a dexcom and blood glucose averaging in the mid 200's. Of note, has been higher since being on ariprazole per her report.    She is sexually active and does not use any protection against pregnancy. Believes her last period was 2 weeks ago.   Lab Results   Component Value Date    HGBA1C 6.0 2022     Recently her psychiatrist and therapist have concerns she has type 1 bipolar disorder. She has been taking ariprazole and has noticed weight gain, elevations in her blood glucose, and fatigue to the point she can hardly get out of bed. She has been in contact with her psych provider and did inform her to cut her dose in half which she started doing 1 day ago. She does feel somewhat better today. Failed therapy with Wellbutrin in the past also from bothersome side effects from the medication.      Review of Systems   Constitutional: Positive for fatigue. Negative for chills and fever.   HENT: Negative for congestion, postnasal drip, sinus pressure, sneezing, sore throat, swollen glands and trouble swallowing.    Respiratory: Negative for cough, shortness of breath and wheezing.    Cardiovascular: Negative for chest pain.   Gastrointestinal:  "Positive for abdominal pain and nausea. Negative for diarrhea and vomiting.   Skin: Negative for rash.   Neurological: Negative for headache.   Psychiatric/Behavioral: Positive for depressed mood. Negative for sleep disturbance.       Objective     Objective   Vital Signs:   /80   Pulse 105   Temp 98.4 °F (36.9 °C)   Ht 157 cm (61.81\")   Wt 73.5 kg (162 lb)   SpO2 100%   BMI 29.81 kg/m²     Physical Exam  Vitals and nursing note reviewed.   Constitutional:       General: She is not in acute distress.     Appearance: Normal appearance. She is not toxic-appearing.   Eyes:      Pupils: Pupils are equal, round, and reactive to light.   Cardiovascular:      Rate and Rhythm: Normal rate and regular rhythm.      Heart sounds: Normal heart sounds. No murmur heard.  Pulmonary:      Effort: Pulmonary effort is normal. No respiratory distress.      Breath sounds: Normal breath sounds. No wheezing.   Abdominal:      General: Bowel sounds are normal. There is no distension.      Palpations: Abdomen is soft.      Tenderness: There is no abdominal tenderness.   Musculoskeletal:      Cervical back: Neck supple. No tenderness.   Skin:     General: Skin is warm and dry.      Findings: No rash.   Neurological:      General: No focal deficit present.      Mental Status: She is alert.   Psychiatric:         Mood and Affect: Mood normal.         Behavior: Behavior normal.          Assessment / Plan      Assessment & Plan   Problem List Items Addressed This Visit        Endocrine and Metabolic    Type 1 diabetes mellitus without complication (HCC) - Primary    Relevant Orders    POCT Glucose (Completed)    POCT urinalysis dipstick, automated (Completed)    Low suspicion for DKA as cause of her symptoms and stable in office today. Has follow-up with endocrinology in 3 weeks, advised to keep and have labs previously ordered drawn. Continue monitoring via dexcom and compliance with insulin regimen encouraged.       Other Visit " Diagnoses     Fatigue, unspecified type        Relevant Orders    POCT Glucose (Completed)    POCT urinalysis dipstick, automated (Completed)    POCT pregnancy, urine (Completed)    Suspect multifactorial. Pregnancy test is negative. Recommend sleep hygiene and have previous labs drawn.     Bipolar 1 disorder (HCC)        Relevant Orders    GeneSight - Swab,    Aripirazole has done well for her mood but is causing bothersome side effects and has had difficulty tolerating medications in the past. Recommend genesight testing and tailor medication therapy based on results.  Continue 5mg for now and keep follow-up with psych. Return if symptoms worsen or fail to improve.            Follow Up   Return if symptoms worsen or fail to improve.  Patient was given instructions and counseling regarding her condition or for health maintenance advice. Please see specific information pulled into the AVS if appropriate.     SONIYA Gasca  [facility]

## 2022-07-13 ENCOUNTER — PATIENT MESSAGE (OUTPATIENT)
Dept: INTERNAL MEDICINE | Facility: CLINIC | Age: 22
End: 2022-07-13

## 2022-07-25 DIAGNOSIS — F90.2 ATTENTION DEFICIT HYPERACTIVITY DISORDER, COMBINED TYPE: ICD-10-CM

## 2022-07-25 RX ORDER — DEXTROAMPHETAMINE SACCHARATE, AMPHETAMINE ASPARTATE, DEXTROAMPHETAMINE SULFATE AND AMPHETAMINE SULFATE 2.5; 2.5; 2.5; 2.5 MG/1; MG/1; MG/1; MG/1
TABLET ORAL
Qty: 30 TABLET | Refills: 0 | Status: SHIPPED | OUTPATIENT
Start: 2022-07-25 | End: 2022-09-02 | Stop reason: SDUPTHER

## 2022-07-25 RX ORDER — DEXTROAMPHETAMINE SACCHARATE, AMPHETAMINE ASPARTATE MONOHYDRATE, DEXTROAMPHETAMINE SULFATE AND AMPHETAMINE SULFATE 5; 5; 5; 5 MG/1; MG/1; MG/1; MG/1
20 CAPSULE, EXTENDED RELEASE ORAL EVERY MORNING
Qty: 30 CAPSULE | Refills: 0 | Status: SHIPPED | OUTPATIENT
Start: 2022-07-25 | End: 2022-09-02 | Stop reason: SDUPTHER

## 2022-07-25 NOTE — TELEPHONE ENCOUNTER
Rx Refill Note  Requested Prescriptions     Pending Prescriptions Disp Refills   • amphetamine-dextroamphetamine XR (Adderall XR) 20 MG 24 hr capsule 30 capsule 0     Sig: Take 1 capsule by mouth Every Morning   • amphetamine-dextroamphetamine (Adderall) 10 MG tablet 30 tablet 0     Sig: Take 10 mg orally every afternoon.      Last office visit with prescribing clinician: 6/21/2022      Next office visit with prescribing clinician: 8/16/2022            Gabrielle Lyles LPN  07/25/22, 11:15 EDT

## 2022-07-25 NOTE — TELEPHONE ENCOUNTER
Incoming Refill Request      Medication requested (name and dose): Addereall XR 20mg and Adderall 10mg    Pharmacy where request should be sent: [facility]    Additional details provided by patient: 4 days left    Best call back number: on file    Does the patient have less than a 3 day supply:  [x] Yes  [] No    Jimmy Christianson  07/25/22, 10:26 EDT

## 2022-08-04 ENCOUNTER — OFFICE VISIT (OUTPATIENT)
Dept: PSYCHIATRY | Facility: CLINIC | Age: 22
End: 2022-08-04

## 2022-08-04 DIAGNOSIS — F41.1 GENERALIZED ANXIETY DISORDER: ICD-10-CM

## 2022-08-04 DIAGNOSIS — F31.30 BIPOLAR I DISORDER, MOST RECENT EPISODE DEPRESSED: Primary | ICD-10-CM

## 2022-08-04 PROCEDURE — 90837 PSYTX W PT 60 MINUTES: CPT | Performed by: COUNSELOR

## 2022-08-04 NOTE — PROGRESS NOTES
Date:2022   Patient Name: Ruby Ramos  : 2000   MRN: [id number]   Time IN: 11:17 AM    Time OUT: 12:11 PM      Referring Provider: Isamar Leon APRN    PROGRESS NOTE    History of Present Illness:   Ruby Ramos is a 22 y.o. female who is being seen today for follow up individual Psychotherapy session.     Chief Complaint: Pt has a hx of bipolar, cycles between depressive and manic episodes. Pt is currently in a depressive episode.  Pt reports a recent adverse affect to Abilify and was taking off by PCP.  Pts anxiety and depression is currently unmedicated.  Pt has not been to work this week due to depression and has been self isolating and sleeping.  Pt reports feeling numb.  Pt has decreased alcohol use significantly and reports she has not drank in past two weeks.                   ICD-10-CM ICD-9-CM   1. Bipolar I disorder, most recent episode depressed (HCC)  F31.30 296.50   2. Generalized anxiety disorder  F41.1 300.02        Clinical Maneuvering/Intervention: Assisted patient in processing above session content; acknowledged and normalized patient’s thoughts, feelings, and concerns to build appropriate rapport and a positive therapeutic relationship with open and honest communication.  Rationalized patient thought process regarding mental health symptoms and how they are impacting her functioning and life goals.  Discussed triggers associated with patient's anxiety/depression.  Therapist used reframing to address negative thought patterns due to low mood states/anxiety and encouraged pt to push herself to engage in daily responsibilities such as work, not self-isolate, avoid sleeping too much etc.  Therapist used psychoeducation to discuss benefits of psychotropics in treating/managing mental health symptoms.  Pt has knowledge of appropriate ways to cope however will have to engage herself them during difficult mood states.      Allowed patient to freely discuss issues without  interruption or judgment. Provided safe, confidential environment to facilitate the development of positive therapeutic relationship and encourage open, honest communication. Assisted patient in identifying risk factors which would indicate the need for higher level of care including thoughts to harm self or others and/or self-harming behavior and encouraged patient to contact this office, call 911, or present to the nearest emergency room should any of these events occur. Discussed crisis intervention services and means to access. Patient adamantly and convincingly denies current suicidal or homicidal ideation or perceptual disturbance.    Assessment Scores:     (Scales based on 0 - 10 with 10 being the worst)  Depression: 5 Anxiety: 3       Mental Status Exam:   Hygiene:   good  Cooperation:  Cooperative  Eye Contact:  Good  Psychomotor Behavior:  Appropriate  Affect:  Appropriate  Mood: depressed and anxious  Speech:  Normal  Thought Process:  Goal directed and Linear  Thought Content:  Normal  Suicidal:  None  Homicidal:  None  Hallucinations:  None  Delusion:  None  Memory:  Intact  Orientation:  Person, Place, Time and Situation  Reliability:  good  Insight:  Good  Judgement:  Fair  Impulse Control:  Fair and Impaired  Physical/Medical Issues:  No      Patient's Support Network Includes:  parents and extended family    Functional Status: Moderate impairment     Progress toward goal: Not at goal    Prognosis: Fair with Ongoing Treatment     Medications:     Current Outpatient Medications:   •  amphetamine-dextroamphetamine (Adderall) 10 MG tablet, Take 10 mg orally every afternoon., Disp: 30 tablet, Rfl: 0  •  amphetamine-dextroamphetamine XR (Adderall XR) 20 MG 24 hr capsule, Take 1 capsule by mouth Every Morning, Disp: 30 capsule, Rfl: 0  •  ARIPiprazole (ABILIFY) 10 MG tablet, Take 1 tablet by mouth Daily., Disp: 30 tablet, Rfl: 3  •  Continuous Blood Gluc Sensor (Dexcom G6 Sensor), USE TO CHECK BLOOD  "GLUCOSE ONCE DAILY OR AS DIRECTED. CHANGE EVERY 10 DAYS, Disp: 3 each, Rfl: 11  •  Continuous Blood Gluc Transmit (Dexcom G6 Transmitter) misc, USE AS DIRECTED TO CHECK BLOOD SUGAR. CHANGE EVERY 90 DAYS., Disp: 1 each, Rfl: 3  •  glucose blood test strip, Four times daily, Disp: 400 each, Rfl: 3  •  insulin aspart (NovoLOG FlexPen) 100 UNIT/ML solution pen-injector sc pen, Inject 2-10 Units under the skin into the appropriate area as directed 3 (Three) Times a Day With Meals., Disp: 30 mL, Rfl: 1  •  Insulin Glargine (BASAGLAR KWIKPEN) 100 UNIT/ML injection pen, Inject 10 Units under the skin into the appropriate area as directed Every Night., Disp: 15 mL, Rfl: 1  •  Insulin Pen Needle 32G X 4 MM misc, 1 each 4 (Four) Times a Day., Disp: 400 each, Rfl: 3  •  multivitamin with minerals tablet tablet, Take 1 tablet by mouth Daily., Disp: , Rfl:   •  ondansetron ODT (Zofran ODT) 4 MG disintegrating tablet, Place 1 tablet on the tongue Every 8 (Eight) Hours As Needed for Nausea., Disp: 15 tablet, Rfl: 1  •  True Comfort Twist Top Lancets misc, 1 Device 4 (Four) Times a Day., Disp: 400 each, Rfl: 3    Visit Diagnosis/Orders Placed This Visit:    ICD-10-CM ICD-9-CM   1. Bipolar I disorder, most recent episode depressed (HCC)  F31.30 296.50   2. Generalized anxiety disorder  F41.1 300.02        PLAN:  1. Safety: No acute safety concerns Denied SI, intent or planning.   2. Risk Assessment: Risk of self-harm acutely is low. Risk of self-harm chronically is also low, but could be further elevated in the event of treatment noncompliance and/or AODA.    Crisis Plan:  Symptoms and/or behaviors to indicate a crisis: Excessive worry or fear, Feeling sad or low, Extreme mood changes; including uncontrollable \"highs\" or euphoria, Isolation, Lack of sleep, Thinking about suicide and Self-doubt    What calming techniques or other strategies will patient use to de-escalate and stay safe: slow down, breathe, visualize calming self, " think it though, listen to music, change focus, take a walk    Who is one person patient can contact to assist with de-escalation? Brother    Treatment Plan/Goals: Patient will continue supportive psychotherapy efforts and medication regimen as prescribed. Therapist will provide Cognitive Behavioral Therapy to assist patient in improving functioning and gaining coping skills, maintaining stability, and avoiding decompensation and the need for higher level of care. Plan for treatment was discussed during today's visit. Patient acknowledged and verbally consented to continue with current treatment plan and was educated on the importance of compliance with treatment and follow-up appointments.     Patient will contact this office, call 911 or present to the nearest emergency room should suicidal or homicidal ideations occur.     Follow Up:   Return in about 2 weeks (around 8/18/2022) for Follow up Mabel Hughes mgt appt, Therapy session.      JIM Og   [facility]     This document has been electronically signed by JIM Og   August 4, 2022 14:27 EDT

## 2022-08-30 ENCOUNTER — OFFICE VISIT (OUTPATIENT)
Dept: PSYCHIATRY | Facility: CLINIC | Age: 22
End: 2022-08-30

## 2022-08-30 DIAGNOSIS — F31.9 BIPOLAR 1 DISORDER: ICD-10-CM

## 2022-08-30 DIAGNOSIS — F41.1 GENERALIZED ANXIETY DISORDER: Primary | ICD-10-CM

## 2022-08-30 PROCEDURE — 90837 PSYTX W PT 60 MINUTES: CPT | Performed by: COUNSELOR

## 2022-08-30 NOTE — PROGRESS NOTES
Date:2022   Patient Name: Ruby Ramos  : 2000   MRN: [id number]   Time IN: 10:11 AM    Time OUT: 11:12 AM      Referring Provider: Isamar Leon APRN    PROGRESS NOTE    History of Present Illness:   Ruby Ramos is a 22 y.o. female who is being seen today for follow up individual Psychotherapy session.     Chief Complaint:  Pt struggles with bipolar, manic episodes with depression, anxiety.  Pt reports she has been stable for past few weeks in regards to mood.  Pt remains to unmedicated other than adderall for ADHD.  Pt is waiting to get into her new provider with our clinic prior to trying a new medication.  Pt reports she noticed increase in sugar levels when on antipsychotics thus wants to avoid this class of medications.  Pt also has to be careful with medications that could cause weight gain. Pt reports even though her depression is currently stable she has lost motivation to go to the gym which is a big de-stresser for her.  Pt continues to deal with self doubt and is working through negative thought patterns.                  ICD-10-CM ICD-9-CM   1. Generalized anxiety disorder  F41.1 300.02   2. Bipolar 1 disorder (HCC)  F31.9 296.7        Clinical Maneuvering/Intervention:   Assisted patient in processing above session content; acknowledged and normalized patient’s thoughts, feelings, and concerns to build appropriate rapport and a positive therapeutic relationship with open and honest communication.  Processed and rationalized patients thoughts and feelings regarding life/familial stressors as well as her mental health symptoms.  Discussed triggers associated with patient's anxiety/depression. Therapist used psychoeducation to discuss benefits of psychotropics in treating/managing mental health symptoms as well as need to continue avoiding alcohol.  Therapist assisted pt in identifying irrational thought patterns and utilized thought stopping skills to address/challenge them,  some of which are stemming from childhood.  Discussed healthy boundaries with others to protect emotional well being.  Pt appears to have some appropriate supports in her life as well as means for healthy coping.        Allowed patient to freely discuss issues without interruption or judgment. Provided safe, confidential environment to facilitate the development of positive therapeutic relationship and encourage open, honest communication. Assisted patient in identifying risk factors which would indicate the need for higher level of care including thoughts to harm self or others and/or self-harming behavior and encouraged patient to contact this office, call 911, or present to the nearest emergency room should any of these events occur. Discussed crisis intervention services and means to access. Patient adamantly and convincingly denies current suicidal or homicidal ideation or perceptual disturbance.      Mental Status Exam:   Hygiene:   good  Cooperation:  Cooperative  Eye Contact:  Good  Psychomotor Behavior:  Appropriate  Affect:  Appropriate  Mood: normal  Speech:  Normal  Thought Process:  Goal directed and Linear  Thought Content:  Normal and Mood congruent  Suicidal:  None  Homicidal:  None  Hallucinations:  None  Delusion:  None  Memory:  Intact  Orientation:  Person, Place, Time and Situation  Reliability:  good  Insight:  Good  Judgement:  Fair  Impulse Control:  Fair  Physical/Medical Issues:  Yes diabetes      Patient's Support Network Includes:  parents and extended family    Functional Status: Moderate impairment     Progress toward goal: Not at goal    Prognosis: Good with Ongoing Treatment     Medications:     Current Outpatient Medications:   •  amphetamine-dextroamphetamine (Adderall) 10 MG tablet, Take 10 mg orally every afternoon., Disp: 30 tablet, Rfl: 0  •  amphetamine-dextroamphetamine XR (Adderall XR) 20 MG 24 hr capsule, Take 1 capsule by mouth Every Morning, Disp: 30 capsule, Rfl: 0  •   "ARIPiprazole (ABILIFY) 10 MG tablet, Take 1 tablet by mouth Daily., Disp: 30 tablet, Rfl: 3  •  Continuous Blood Gluc Sensor (Dexcom G6 Sensor), USE TO CHECK BLOOD GLUCOSE ONCE DAILY OR AS DIRECTED. CHANGE EVERY 10 DAYS, Disp: 3 each, Rfl: 11  •  Continuous Blood Gluc Transmit (Dexcom G6 Transmitter) misc, USE AS DIRECTED TO CHECK BLOOD SUGAR. CHANGE EVERY 90 DAYS., Disp: 1 each, Rfl: 3  •  glucose blood test strip, Four times daily, Disp: 400 each, Rfl: 3  •  insulin aspart (NovoLOG FlexPen) 100 UNIT/ML solution pen-injector sc pen, Inject 2-10 Units under the skin into the appropriate area as directed 3 (Three) Times a Day With Meals., Disp: 30 mL, Rfl: 1  •  Insulin Glargine (BASAGLAR KWIKPEN) 100 UNIT/ML injection pen, Inject 10 Units under the skin into the appropriate area as directed Every Night., Disp: 15 mL, Rfl: 1  •  Insulin Pen Needle 32G X 4 MM misc, 1 each 4 (Four) Times a Day., Disp: 400 each, Rfl: 3  •  multivitamin with minerals tablet tablet, Take 1 tablet by mouth Daily., Disp: , Rfl:   •  ondansetron ODT (Zofran ODT) 4 MG disintegrating tablet, Place 1 tablet on the tongue Every 8 (Eight) Hours As Needed for Nausea., Disp: 15 tablet, Rfl: 1  •  True Comfort Twist Top Lancets misc, 1 Device 4 (Four) Times a Day., Disp: 400 each, Rfl: 3    Visit Diagnosis/Orders Placed This Visit:    ICD-10-CM ICD-9-CM   1. Generalized anxiety disorder  F41.1 300.02   2. Bipolar 1 disorder (HCC)  F31.9 296.7        PLAN:  1. Safety: No acute safety concerns  2. Risk Assessment: Risk of self-harm acutely is low. Risk of self-harm chronically is also low, but could be further elevated in the event of treatment noncompliance and/or AODA.    Crisis Plan:  Symptoms and/or behaviors to indicate a crisis: Excessive worry or fear, Feeling sad or low, Extreme mood changes; including uncontrollable \"highs\" or euphoria, Isolation, Lack of sleep, Thinking about suicide and Self-doubt    What calming techniques or other " strategies will patient use to de-escalate and stay safe: slow down, breathe, visualize calming self, think it though, listen to music, change focus, take a walk    Who is one person patient can contact to assist with de-escalation? Brother    Treatment Plan/Goals: Patient will continue supportive psychotherapy efforts and medication regimen as prescribed. Therapist will provide Cognitive Behavioral Therapy to assist patient in improving functioning and gaining coping skills, maintaining stability, and avoiding decompensation and the need for higher level of care. Plan for treatment was discussed during today's visit. Patient acknowledged and verbally consented to continue with current treatment plan and was educated on the importance of compliance with treatment and follow-up appointments.     Patient will contact this office, call 911 or present to the nearest emergency room should suicidal or homicidal ideations occur.     Follow Up:   Return in about 2 weeks (around 9/13/2022) for Therapy session.      JIM Og   [facility]     This document has been electronically signed by JIM Og   August 30, 2022 11:05 EDT

## 2022-09-02 DIAGNOSIS — F90.2 ATTENTION DEFICIT HYPERACTIVITY DISORDER, COMBINED TYPE: ICD-10-CM

## 2022-09-02 NOTE — TELEPHONE ENCOUNTER
Incoming Refill Request      Medication requested (name and dose): Adderall XR 20mg & Adderall 10mg    Pharmacy where request should be sent: [facility]    Additional details provided by patient: has 5 day supply    Best call back number: in chart    Does the patient have less than a 3 day supply:  [] Yes  [x] No    Jimmy Christianson  09/02/22, 14:47 EDT

## 2022-09-06 RX ORDER — DEXTROAMPHETAMINE SACCHARATE, AMPHETAMINE ASPARTATE MONOHYDRATE, DEXTROAMPHETAMINE SULFATE AND AMPHETAMINE SULFATE 5; 5; 5; 5 MG/1; MG/1; MG/1; MG/1
20 CAPSULE, EXTENDED RELEASE ORAL EVERY MORNING
Qty: 30 CAPSULE | Refills: 0 | Status: SHIPPED | OUTPATIENT
Start: 2022-09-06 | End: 2022-10-12 | Stop reason: SDUPTHER

## 2022-09-06 RX ORDER — DEXTROAMPHETAMINE SACCHARATE, AMPHETAMINE ASPARTATE, DEXTROAMPHETAMINE SULFATE AND AMPHETAMINE SULFATE 2.5; 2.5; 2.5; 2.5 MG/1; MG/1; MG/1; MG/1
TABLET ORAL
Qty: 30 TABLET | Refills: 0 | Status: SHIPPED | OUTPATIENT
Start: 2022-09-06 | End: 2022-10-12 | Stop reason: SDUPTHER

## 2022-09-06 NOTE — TELEPHONE ENCOUNTER
Rx Refill Note  Requested Prescriptions     Pending Prescriptions Disp Refills   • amphetamine-dextroamphetamine XR (Adderall XR) 20 MG 24 hr capsule 30 capsule 0     Sig: Take 1 capsule by mouth Every Morning   • amphetamine-dextroamphetamine (Adderall) 10 MG tablet 30 tablet 0     Sig: Take 10 mg orally every afternoon.      Last office visit with prescribing clinician: 6/21/2022      Next office visit with prescribing clinician: Visit date not found - no show on 8/16.    Left voice mail requesting a call back to schedule with Beth

## 2022-09-15 ENCOUNTER — OFFICE VISIT (OUTPATIENT)
Dept: ENDOCRINOLOGY | Facility: CLINIC | Age: 22
End: 2022-09-15

## 2022-09-15 VITALS
HEIGHT: 62 IN | SYSTOLIC BLOOD PRESSURE: 114 MMHG | DIASTOLIC BLOOD PRESSURE: 72 MMHG | OXYGEN SATURATION: 99 % | BODY MASS INDEX: 30.18 KG/M2 | WEIGHT: 164 LBS | HEART RATE: 100 BPM

## 2022-09-15 DIAGNOSIS — L65.9 HAIR LOSS: ICD-10-CM

## 2022-09-15 DIAGNOSIS — L70.9 ACNE, UNSPECIFIED ACNE TYPE: ICD-10-CM

## 2022-09-15 DIAGNOSIS — N92.0 MENORRHAGIA WITH REGULAR CYCLE: ICD-10-CM

## 2022-09-15 DIAGNOSIS — G47.00 INSOMNIA, UNSPECIFIED TYPE: ICD-10-CM

## 2022-09-15 DIAGNOSIS — E10.65 TYPE 1 DIABETES MELLITUS WITH HYPERGLYCEMIA: Primary | ICD-10-CM

## 2022-09-15 LAB
EXPIRATION DATE: ABNORMAL
EXPIRATION DATE: NORMAL
GLUCOSE BLDC GLUCOMTR-MCNC: 149 MG/DL (ref 70–130)
HBA1C MFR BLD: 6 %
Lab: ABNORMAL
Lab: NORMAL

## 2022-09-15 PROCEDURE — 95251 CONT GLUC MNTR ANALYSIS I&R: CPT | Performed by: INTERNAL MEDICINE

## 2022-09-15 PROCEDURE — 99214 OFFICE O/P EST MOD 30 MIN: CPT | Performed by: INTERNAL MEDICINE

## 2022-09-15 PROCEDURE — 83036 HEMOGLOBIN GLYCOSYLATED A1C: CPT | Performed by: INTERNAL MEDICINE

## 2022-09-15 RX ORDER — INSULIN GLARGINE 100 [IU]/ML
12 INJECTION, SOLUTION SUBCUTANEOUS NIGHTLY
Qty: 15 ML | Refills: 1 | Status: SHIPPED | OUTPATIENT
Start: 2022-09-15 | End: 2023-01-18 | Stop reason: SDUPTHER

## 2022-09-15 RX ORDER — INSULIN ASPART 100 [IU]/ML
2-10 INJECTION, SOLUTION INTRAVENOUS; SUBCUTANEOUS
Qty: 30 ML | Refills: 1 | Status: SHIPPED | OUTPATIENT
Start: 2022-09-15 | End: 2023-01-18 | Stop reason: SDUPTHER

## 2022-09-15 NOTE — PROGRESS NOTES
"Chief Complaint   Patient presents with   • Diabetes          HPI   Ruby Ramos is a 22 y.o. female had concerns including Diabetes.    She is checking blood sugars 4+ times per day with CGM. Data reviewed from 9/2/22-9/15/22 shows an average BG of 157 with SD 46, has postprandial hyperglycemia.   Current medications for diabetes include basaglar 12 units daily and novolog 5-10 units with meals.    She has worsening acne, cystic acne on her back/hips, and hair loss.   Has poor sleep - has trouble falling to sleep. Doesn't fall asleep until 4/5 AM. May get only a few hours at night.   She has tried melatonin - didn't help and caused vivid dreams.   Has heavy cycles - long cycles. Occur monthly.   Doesn't want OCPs.   Hasn't seen gyn.     Has no hirsutism.     Celiac is controlled with diet recently.       The following portions of the patient's history were reviewed and updated as appropriate: allergies, current medications, past family history, past medical history, past social history, past surgical history and problem list.      Review of Systems   Constitutional: Negative.    Endocrine:        See HPI   Skin:        See HPI   Psychiatric/Behavioral: Positive for sleep disturbance.        Physical Exam  Vitals reviewed.   Constitutional:       Appearance: Normal appearance.   Cardiovascular:      Rate and Rhythm: Normal rate.   Pulmonary:      Effort: Pulmonary effort is normal.   Skin:     Comments: Healing acne scar over left flank   Neurological:      General: No focal deficit present.      Mental Status: She is alert. Mental status is at baseline.   Psychiatric:         Mood and Affect: Mood normal.         Behavior: Behavior normal.        /72   Pulse 100   Ht 157.5 cm (62\")   Wt 74.4 kg (164 lb)   SpO2 99%   BMI 30.00 kg/m²      Labs and imaging    CMP:  Lab Results   Component Value Date    BUN 10 08/03/2021    CREATININE 0.76 08/03/2021    EGFRIFNONA 96 08/03/2021    BCR 13.2 08/03/2021    NA " 138 08/03/2021    K 3.7 08/03/2021    CO2 21.9 (L) 08/03/2021    CALCIUM 10.0 08/03/2021    ALBUMIN 5.20 08/03/2021    LABIL2 1.7 03/12/2015    BILITOT 0.9 08/03/2021    ALKPHOS 80 08/03/2021    AST 14 08/03/2021    ALT 14 08/03/2021     Lipid Panel:  Lab Results   Component Value Date    TRIG 45 03/12/2015    HDL 47 03/12/2015    VLDL 9 03/12/2015    LDL 83 03/12/2015     HbA1c:  Lab Results   Component Value Date    HGBA1C 6.0 09/15/2022    HGBA1C 6.0 03/17/2022     Glucose:    Lab Results   Component Value Date    POCGLU 149 (A) 09/15/2022     TSH:  Lab Results   Component Value Date    TSH 2.610 01/21/2021       Assessment and plan  Diagnoses and all orders for this visit:    1. Type 1 diabetes mellitus with hyperglycemia (HCC) (Primary)  Mostly controlled with A1c 6.0 but with occasional postprandial hyperglycemia, due to sugar intake or delayed bolus.    No changes to current regimen.  Patient is not interested in a pump.  No changes recommended to current regimen.  Continue Basaglar 12 units nightly, NovoLog 5 to 10 units with meals.  Discussed changing sweetener in her coffee to artificial sweetener.  Be sure to take insulin doses 15 minutes prior to meals rather than waiting until after.  Continue use of Dexcom.  Labs are due and order was entered.  Check monofilament follow-up visit.  Ophtho exam should be updated yearly.  -     POC Glucose, Blood  -     POC Glycosylated Hemoglobin (Hb A1C)  -     CBC (No Diff); Future  -     Comprehensive Metabolic Panel; Future  -     Lipid Panel; Future  -     Microalbumin / Creatinine Urine Ratio - Urine, Clean Catch; Future  -     Insulin Glargine (BASAGLAR KWIKPEN) 100 UNIT/ML injection pen; Inject 12 Units under the skin into the appropriate area as directed Every Night.  Dispense: 15 mL; Refill: 1  -     Insulin Pen Needle 32G X 4 MM misc; 1 each 4 (Four) Times a Day.  Dispense: 400 each; Refill: 3  -     insulin aspart (NovoLOG FlexPen) 100 UNIT/ML solution  pen-injector sc pen; Inject 2-10 Units under the skin into the appropriate area as directed 3 (Three) Times a Day With Meals.  Dispense: 30 mL; Refill: 1    2. Hair loss  Could be in part due to insomnia, inadequate sleep.  Check TSH with next set of labs.  -     TSH; Future    3. Acne, unspecified acne type  Screen for hyperandrogenism.  No clinical evidence of hirsutism.  Also check IGF-I clinically low suspicion for acromegaly.  I have no suspicion for Cushing's syndrome or adrenal insufficiency.  Cortisol not indicated.  -     Insulin-like Growth Factor; Future  -     Testosterone (Free & Total), LC / MS; Future  -     17-Hydroxyprogesterone; Future  -     DHEA-Sulfate; Future    4. Insomnia, unspecified type  Will contribute to hair loss.  Recommended trial Unisom.    5. Menorrhagia with regular cycle  Heavy cycles, regular/monthly.  Estradiol levels do not need to be checked with regular menses.  Check iron panel.  Recommend GYN to evaluate for structural abnormality that may lead to heavy cycles.  -     Iron Profile; Future         Return in about 4 months (around 1/15/2023) for next scheduled follow up. The patient was instructed to contact the clinic with any interval questions or concerns.    Layla Corral, DO   Endocrinologist    Please note that portions of this note were completed with a voice recognition program.
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
## 2022-10-04 ENCOUNTER — OFFICE VISIT (OUTPATIENT)
Dept: PSYCHIATRY | Facility: CLINIC | Age: 22
End: 2022-10-04

## 2022-10-04 VITALS
HEART RATE: 118 BPM | SYSTOLIC BLOOD PRESSURE: 114 MMHG | DIASTOLIC BLOOD PRESSURE: 74 MMHG | HEIGHT: 62 IN | BODY MASS INDEX: 30.55 KG/M2 | WEIGHT: 166 LBS

## 2022-10-04 DIAGNOSIS — F41.1 GENERALIZED ANXIETY DISORDER: ICD-10-CM

## 2022-10-04 DIAGNOSIS — F31.30 BIPOLAR I DISORDER, MOST RECENT EPISODE DEPRESSED: Primary | ICD-10-CM

## 2022-10-04 DIAGNOSIS — Z79.899 MEDICATION MANAGEMENT: ICD-10-CM

## 2022-10-04 DIAGNOSIS — G47.09 OTHER INSOMNIA: ICD-10-CM

## 2022-10-04 PROCEDURE — 90792 PSYCH DIAG EVAL W/MED SRVCS: CPT | Performed by: NURSE PRACTITIONER

## 2022-10-04 RX ORDER — BUSPIRONE HYDROCHLORIDE 5 MG/1
5 TABLET ORAL 3 TIMES DAILY PRN
Qty: 90 TABLET | Refills: 2
Start: 2022-10-04 | End: 2022-10-12 | Stop reason: SDUPTHER

## 2022-10-04 RX ORDER — TRAZODONE HYDROCHLORIDE 50 MG/1
TABLET ORAL
Qty: 30 TABLET | Refills: 2 | Status: SHIPPED | OUTPATIENT
Start: 2022-10-04 | End: 2022-11-07

## 2022-10-04 RX ORDER — LAMOTRIGINE 25 MG/1
TABLET ORAL
Qty: 42 TABLET | Refills: 0 | Status: SHIPPED | OUTPATIENT
Start: 2022-10-04 | End: 2022-10-31 | Stop reason: SDUPTHER

## 2022-10-06 ENCOUNTER — PRIOR AUTHORIZATION (OUTPATIENT)
Dept: ENDOCRINOLOGY | Facility: CLINIC | Age: 22
End: 2022-10-06

## 2022-10-06 NOTE — TELEPHONE ENCOUNTER
Member should be able to get the drug/product without a PA at this time.  Drug  Dexcom G6 Transmitter  Form  [employer] [place] Medicaid ePA Form 2017 [place]

## 2022-10-10 ENCOUNTER — PRIOR AUTHORIZATION (OUTPATIENT)
Dept: ENDOCRINOLOGY | Facility: CLINIC | Age: 22
End: 2022-10-10

## 2022-10-10 NOTE — TELEPHONE ENCOUNTER
Approvedtoday  Your PA request has been approved. Additional information will be provided in the approval communication. (Message 1145)  Drug  Dexcom G6 Transmitter  Form  [employer] Electronic [place] Form (2017 [place])

## 2022-10-11 ENCOUNTER — OFFICE VISIT (OUTPATIENT)
Dept: PSYCHIATRY | Facility: CLINIC | Age: 22
End: 2022-10-11

## 2022-10-11 DIAGNOSIS — F41.1 GENERALIZED ANXIETY DISORDER: Primary | ICD-10-CM

## 2022-10-11 DIAGNOSIS — F31.9 BIPOLAR 1 DISORDER: ICD-10-CM

## 2022-10-11 PROCEDURE — 90837 PSYTX W PT 60 MINUTES: CPT | Performed by: COUNSELOR

## 2022-10-11 NOTE — PROGRESS NOTES
Date:10/11/2022   Patient Name: Ruby Ramos  : 2000   MRN: [id number]   Time IN: 12:36 PM    Time OUT: 1:35 PM      Referring Provider: Isamar Leon APRN    PROGRESS NOTE    History of Present Illness:   Ruby Ramos is a 22 y.o. female who is being seen today for follow up individual Psychotherapy session.  Pt struggles with bipolar, manic episodes with depression, anxiety.  Pt reports difficulty expressing her emotions and opinions with others.      Chief Complaint:  Pt was seen at our clinic recently for med mgt and was happy with her appt. Pt reports they are working on adjusting her meds.  Pt reports relational strain that stems from childhood with her mother as well as currently with a friend. Pt reports overwhelming emotions over the weekend due to such.             ICD-10-CM ICD-9-CM   1. Generalized anxiety disorder  F41.1 300.02   2. Bipolar 1 disorder (HCC)  F31.9 296.7        Clinical Maneuvering/Intervention:   Assisted patient in processing above session content; acknowledged and normalized patient’s thoughts, feelings, and concerns to build appropriate rapport and a positive therapeutic relationship with open and honest communication.  Rationalized patient thought process regarding childhood trauma, and relational struggles.  Discussed triggers associated with patient's anxiety/depression.  Also discussed coping skills for patient to implement such as making self a priority, healthy boundaries with others and ways to voice them to reduce anxiety and stressors.  Therapist used reframing in session to address maladaptive thought patterns.      Allowed patient to freely discuss issues without interruption or judgment. Provided safe, confidential environment to facilitate the development of positive therapeutic relationship and encourage open, honest communication. Assisted patient in identifying risk factors which would indicate the need for higher level of care including thoughts  to harm self or others and/or self-harming behavior and encouraged patient to contact this office, call 911, or present to the nearest emergency room should any of these events occur. Discussed crisis intervention services and means to access. Patient adamantly and convincingly denies current suicidal or homicidal ideation or perceptual disturbance.    Mental Status Exam:   Hygiene:   good  Cooperation:  Cooperative  Eye Contact:  Good  Psychomotor Behavior:  Appropriate  Affect:  Appropriate  Mood: normal  Speech:  Normal  Thought Process:  Goal directed and Linear  Thought Content:  Normal and Mood congruent  Suicidal:  None  Homicidal:  None  Hallucinations:  None  Delusion:  None  Memory:  Intact  Orientation:  Person, Place, Time and Situation  Reliability:  good  Insight:  Good  Judgement:  Good  Impulse Control:  Good  Physical/Medical Issues:  No      Patient's Support Network Includes:  extended family    Functional Status: Moderate impairment     Progress toward goal: Not at goal    Prognosis: Good with Ongoing Treatment     Medications:     Current Outpatient Medications:   •  amphetamine-dextroamphetamine (Adderall) 10 MG tablet, Take 10 mg orally every afternoon., Disp: 30 tablet, Rfl: 0  •  amphetamine-dextroamphetamine XR (Adderall XR) 20 MG 24 hr capsule, Take 1 capsule by mouth Every Morning, Disp: 30 capsule, Rfl: 0  •  busPIRone (BUSPAR) 5 MG tablet, Take 1 tablet by mouth 3 (Three) Times a Day As Needed (anxiety)., Disp: 90 tablet, Rfl: 2  •  Continuous Blood Gluc Sensor (Dexcom G6 Sensor), USE TO CHECK BLOOD GLUCOSE ONCE DAILY OR AS DIRECTED. CHANGE EVERY 10 DAYS, Disp: 3 each, Rfl: 11  •  Continuous Blood Gluc Transmit (Dexcom G6 Transmitter) misc, USE AS DIRECTED TO CHECK BLOOD SUGAR. CHANGE EVERY 90 DAYS., Disp: 1 each, Rfl: 3  •  glucose blood test strip, Four times daily, Disp: 400 each, Rfl: 3  •  insulin aspart (NovoLOG FlexPen) 100 UNIT/ML solution pen-injector sc pen, Inject 2-10 Units  "under the skin into the appropriate area as directed 3 (Three) Times a Day With Meals., Disp: 30 mL, Rfl: 1  •  Insulin Glargine (BASAGLAR KWIKPEN) 100 UNIT/ML injection pen, Inject 12 Units under the skin into the appropriate area as directed Every Night., Disp: 15 mL, Rfl: 1  •  Insulin Pen Needle 32G X 4 MM misc, 1 each 4 (Four) Times a Day., Disp: 400 each, Rfl: 3  •  lamoTRIgine (LaMICtal) 25 MG tablet, Take 1 tablet by mouth Every Night for 14 days, THEN 2 tablets Every Night for 14 days., Disp: 42 tablet, Rfl: 0  •  multivitamin with minerals tablet tablet, Take 1 tablet by mouth Daily., Disp: , Rfl:   •  ondansetron ODT (Zofran ODT) 4 MG disintegrating tablet, Place 1 tablet on the tongue Every 8 (Eight) Hours As Needed for Nausea., Disp: 15 tablet, Rfl: 1  •  traZODone (DESYREL) 50 MG tablet, Take 1/2 to 1 tablet nightly for insomnia, Disp: 30 tablet, Rfl: 2  •  True Comfort Twist Top Lancets misc, 1 Device 4 (Four) Times a Day., Disp: 400 each, Rfl: 3    Visit Diagnosis/Orders Placed This Visit:    ICD-10-CM ICD-9-CM   1. Generalized anxiety disorder  F41.1 300.02   2. Bipolar 1 disorder (HCC)  F31.9 296.7        PLAN:  1. Safety: No acute safety concerns  2. Risk Assessment: Risk of self-harm acutely is low. Risk of self-harm chronically is also low, but could be further elevated in the event of treatment noncompliance and/or AODA.    Crisis Plan:  Symptoms and/or behaviors to indicate a crisis: Excessive worry or fear, Feeling sad or low, Extreme mood changes; including uncontrollable \"highs\" or euphoria, Lack of sleep, Abuse of substances, Thinking about suicide and Self-doubt    What calming techniques or other strategies will patient use to de-escalate and stay safe: slow down, breathe, visualize calming self, think it though, listen to music, change focus, take a walk    Who is one person patient can contact to assist with de-escalation? Brother     Treatment Plan/Goals: Patient will continue " supportive psychotherapy efforts and medication regimen as prescribed. Therapist will provide Cognitive Behavioral Therapy to assist patient in improving functioning and gaining coping skills, maintaining stability, and avoiding decompensation and the need for higher level of care. Plan for treatment was discussed during today's visit. Patient acknowledged and verbally consented to continue with current treatment plan and was educated on the importance of compliance with treatment and follow-up appointments.     Patient will contact this office, call 911 or present to the nearest emergency room should suicidal or homicidal ideations occur.     Follow Up:   Return in about 4 weeks (around 11/8/2022) for Therapy session.      JIM Og   [facility]     This document has been electronically signed by JIM Og   October 11, 2022 13:35 EDT

## 2022-10-12 ENCOUNTER — TELEPHONE (OUTPATIENT)
Dept: PSYCHIATRY | Facility: CLINIC | Age: 22
End: 2022-10-12

## 2022-10-12 DIAGNOSIS — F90.2 ATTENTION DEFICIT HYPERACTIVITY DISORDER, COMBINED TYPE: ICD-10-CM

## 2022-10-12 DIAGNOSIS — F41.1 GENERALIZED ANXIETY DISORDER: ICD-10-CM

## 2022-10-12 RX ORDER — DEXTROAMPHETAMINE SACCHARATE, AMPHETAMINE ASPARTATE, DEXTROAMPHETAMINE SULFATE AND AMPHETAMINE SULFATE 2.5; 2.5; 2.5; 2.5 MG/1; MG/1; MG/1; MG/1
TABLET ORAL
Qty: 30 TABLET | Refills: 0 | Status: SHIPPED | OUTPATIENT
Start: 2022-10-12 | End: 2022-11-07 | Stop reason: SDUPTHER

## 2022-10-12 RX ORDER — BUSPIRONE HYDROCHLORIDE 5 MG/1
5 TABLET ORAL 3 TIMES DAILY PRN
Qty: 90 TABLET | Refills: 2
Start: 2022-10-12 | End: 2023-01-09

## 2022-10-12 RX ORDER — BUSPIRONE HYDROCHLORIDE 5 MG/1
5 TABLET ORAL 3 TIMES DAILY PRN
Qty: 90 TABLET | Refills: 2
Start: 2022-10-12 | End: 2022-10-12

## 2022-10-12 RX ORDER — DEXTROAMPHETAMINE SACCHARATE, AMPHETAMINE ASPARTATE MONOHYDRATE, DEXTROAMPHETAMINE SULFATE AND AMPHETAMINE SULFATE 5; 5; 5; 5 MG/1; MG/1; MG/1; MG/1
20 CAPSULE, EXTENDED RELEASE ORAL EVERY MORNING
Qty: 30 CAPSULE | Refills: 0 | Status: SHIPPED | OUTPATIENT
Start: 2022-10-12 | End: 2022-11-07 | Stop reason: SDUPTHER

## 2022-10-12 NOTE — TELEPHONE ENCOUNTER
PT SWITCHED CARE TO [facility] ON 10/04/2022 NEEDS A REFILL OF BOTH ADDERALLS SENT  TO [facility]  Rx Refill Note  Requested Prescriptions     Pending Prescriptions Disp Refills   • amphetamine-dextroamphetamine (Adderall) 10 MG tablet 30 tablet 0     Sig: Take 10 mg orally every afternoon.   • amphetamine-dextroamphetamine XR (Adderall XR) 20 MG 24 hr capsule 30 capsule 0     Sig: Take 1 capsule by mouth Every Morning      Last office visit with prescribing clinician: Visit date not found      Next office visit with prescribing clinician: Visit date not found            Jacy Lucas CMA  10/12/22, 14:22 EDT

## 2022-10-12 NOTE — TELEPHONE ENCOUNTER
----- [facility] sent at 10/11/2022  4:19 PM EDT -----  Zach Louie.  I did not realize Ellen was out.  Is this something you can check on?    Ruby   ----- Message -----  From: [facility]  Sent: 10/11/2022  12:41 PM EDT  To: SONIYA Jay    Can you please check on Ruby's Buspar?  The pharmacy is telling her it was not sent over.  Thank you!

## 2022-10-12 NOTE — TELEPHONE ENCOUNTER
Med had not been sent will resend now and make sure pharmacy and pt is aware.   Rx Refill Note  Requested Prescriptions     Signed Prescriptions Disp Refills   • busPIRone (BUSPAR) 5 MG tablet 90 tablet 2     Sig: Take 1 tablet by mouth 3 (Three) Times a Day As Needed (anxiety).     Authorizing Provider: [facility] POTTER     Ordering User: NICCI BARRAGAN      Last office visit with prescribing clinician: 10/4/2022      Next office visit with prescribing clinician: 11/7/2022            Nicci Barragan CMA  10/12/22, 07:36 EDT

## 2022-10-12 NOTE — TELEPHONE ENCOUNTER
Chart review completed and medication refill approved.   Patient's JANI report reviewed and deemed appropriate.  Patient counseled on use of controlled substances.

## 2022-10-31 DIAGNOSIS — F31.30 BIPOLAR I DISORDER, MOST RECENT EPISODE DEPRESSED: ICD-10-CM

## 2022-10-31 RX ORDER — LAMOTRIGINE 25 MG/1
25 TABLET ORAL NIGHTLY
Qty: 30 TABLET | Refills: 2 | Status: SHIPPED | OUTPATIENT
Start: 2022-10-31 | End: 2022-11-07

## 2022-10-31 NOTE — TELEPHONE ENCOUNTER
Can we see if the patient is tolerating this medication and is ready to increase the doses? I would send 100 mg tablets and she will take 1/2 for one week and then the whole tablet.

## 2022-10-31 NOTE — TELEPHONE ENCOUNTER
Rx Refill Note  Requested Prescriptions     Pending Prescriptions Disp Refills   • lamoTRIgine (LaMICtal) 25 MG tablet 42 tablet 0     Sig: Take 1 tablet by mouth Every Night for 14 days, THEN 2 tablets Every Night for 14 days.      Last office visit with prescribing clinician: 10/4/2022      Next office visit with prescribing clinician: 11/7/2022            Monica Araujo MA  10/31/22, 12:50 EDT

## 2022-11-06 NOTE — PROGRESS NOTES
"Chief Complaint  Bipolar I disorder, ADHD, anxiety, and insomnia      Subjective          Ruby Ramos presents to [facility] by herself for a follow up and medication check.    History of Present Illness: Ruby states, \"I am good.\" Ruby tells me she is seeing the benefits of Lamotrigine. She states, \"I feel like it is working well.\" She reports improved mood and less anxiety. She continues to struggle with sleep and is unsure if sleep is impacting her concentration. She wonders at times if Adderall does not need to be increased. Her blood glucose levels are doing well. She will see her PCP in January for a check up.  She is compliant with her psychiatric medications. She is taking Adderall XR, Adderall, Buspar, Lamotrigine, and Trazodone. She denies any side effects. She denies any SI/HI/AVH.    Current Medications:   Current Outpatient Medications   Medication Sig Dispense Refill   • amphetamine-dextroamphetamine (Adderall) 10 MG tablet Take 10 mg orally every afternoon. 30 tablet 0   • amphetamine-dextroamphetamine XR (Adderall XR) 20 MG 24 hr capsule Take 1 capsule by mouth Every Morning 30 capsule 0   • busPIRone (BUSPAR) 5 MG tablet Take 1 tablet by mouth 3 (Three) Times a Day As Needed (anxiety). 90 tablet 2   • Continuous Blood Gluc Sensor (Dexcom G6 Sensor) USE TO CHECK BLOOD GLUCOSE ONCE DAILY OR AS DIRECTED. CHANGE EVERY 10 DAYS 3 each 11   • Continuous Blood Gluc Transmit (Dexcom G6 Transmitter) misc USE AS DIRECTED TO CHECK BLOOD SUGAR. CHANGE EVERY 90 DAYS. 1 each 3   • glucose blood test strip Four times daily 400 each 3   • insulin aspart (NovoLOG FlexPen) 100 UNIT/ML solution pen-injector sc pen Inject 2-10 Units under the skin into the appropriate area as directed 3 (Three) Times a Day With Meals. 30 mL 1   • Insulin Glargine (BASAGLAR KWIKPEN) 100 UNIT/ML injection pen Inject 12 Units under the skin into the appropriate area as directed Every " "Night. 15 mL 1   • Insulin Pen Needle 32G X 4 MM misc 1 each 4 (Four) Times a Day. 400 each 3   • lamoTRIgine (LaMICtal) 100 MG tablet Take 0.5 tablets by mouth Every Night for 7 days, THEN 1 tablet Every Night for 23 days. 27 tablet 0   • multivitamin with minerals tablet tablet Take 1 tablet by mouth Daily.     • ondansetron ODT (Zofran ODT) 4 MG disintegrating tablet Place 1 tablet on the tongue Every 8 (Eight) Hours As Needed for Nausea. 15 tablet 1   • True Comfort Twist Top Lancets misc 1 Device 4 (Four) Times a Day. 400 each 3   • hydrOXYzine pamoate (VISTARIL) 25 MG capsule Take 1 capsule by mouth At Night As Needed for Anxiety. 30 capsule 2     No current facility-administered medications for this visit.         Objective   Vital Signs:   /80   Pulse 98   Ht 157.5 cm (62\")   Wt 73.9 kg (163 lb)   BMI 29.81 kg/m²     Physical Exam  Vitals and nursing note reviewed.   Constitutional:       Appearance: Normal appearance. She is well-developed and normal weight.   Musculoskeletal:         General: Normal range of motion.   Skin:     General: Skin is warm and dry.   Neurological:      Mental Status: She is alert and oriented to person, place, and time.   Psychiatric:         Attention and Perception: Attention normal.         Mood and Affect: Mood normal.         Speech: Speech normal.         Behavior: Behavior normal. Behavior is cooperative.         Thought Content: Thought content normal.         Cognition and Memory: Cognition normal.         Judgment: Judgment normal.        Result Review :     The following data was reviewed by: SONIYA Jay on 11/07/2022:    Office Visit with Ruby Conde [facility] (10/11/2022)           Assessment and Plan    Diagnoses and all orders for this visit:    1. Bipolar I disorder, most recent episode depressed (HCC) (Primary)  -     lamoTRIgine (LaMICtal) 100 MG tablet; Take 0.5 tablets by mouth Every Night for 7 days, THEN 1 tablet Every Night " for 23 days.  Dispense: 27 tablet; Refill: 0    2. Attention deficit hyperactivity disorder, combined type  -     amphetamine-dextroamphetamine (Adderall) 10 MG tablet; Take 10 mg orally every afternoon.  Dispense: 30 tablet; Refill: 0  -     amphetamine-dextroamphetamine XR (Adderall XR) 20 MG 24 hr capsule; Take 1 capsule by mouth Every Morning  Dispense: 30 capsule; Refill: 0    3. Generalized anxiety disorder    4. Other insomnia  -     hydrOXYzine pamoate (VISTARIL) 25 MG capsule; Take 1 capsule by mouth At Night As Needed for Anxiety.  Dispense: 30 capsule; Refill: 2         Mental Status Exam:   Hygiene:   good  Cooperation:  Cooperative  Eye Contact:  Good  Psychomotor Behavior:  Appropriate  Affect:  Appropriate  Mood: normal  Speech:  Normal  Thought Process:  Goal directed and Linear  Thought Content:  Normal  Suicidal:  None  Homicidal:  None  Hallucinations:  None  Delusion:  None  Memory:  Intact  Orientation:  Person, Place, Time and Situation  Reliability:  good  Insight:  Good  Judgement:  Good  Impulse Control:  Good  Physical/Medical Issues:  Yes Diabetes, celiac disease, headaches, back problems, hernia, and hair loss      PHQ-9 Score:   PHQ-9 Total Score: 13     Impression/Plan:  -This is a follow up and medication check. Ruby reports an improved mood and anxiety with Lamotrigine. She is not sleeping and we discussed possible options including Hydroxyzine Pamoate. I explained the mechanism of action and purpose. She agrees. She continues in therapy with Ruby. She is managing her blood glucose levels and will see her PCP in January. She is motivated to continue to make medication adjustments including using Hydroxyzine for sleep and increasing Lamotrigine further.   -Stop Trazodone due to perceived lack of efficacy.  -Initiate Hydroxyzine Pamoate 25 mg nightly for insomnia.  -Increase Lamotrigine to 50 mg nightly for a weke and then 100 mg nightly for depression and anxiety.  -Conitnue Buspar  5 mg three times daily as needed for anxiety. Patient has refills  -Continue Adderall XR 20 mg daily for ADHD symptoms.   -Continue Adderall 10 mg daily for breakthrough ADHD symptoms.   -Continue therapy with Ruby.  -The JANI report, reviewed through PDMP, of the past 12 months were reviewed and is appropriate.  The patient/guardian reports taking the medication only as prescribed.  The patient/guardian denies any abuse or misuse of the medication.  The patient/guardian denies any other substance use or issues.  There are no apparent substance related issues.  The patient reports no side effects of the current medication usage.  The patient/guardian has reported significant improvement with medication usage and wishes to continue medication as prescribed.  The patient/guardian is appropriate to continue with current medication usage at this time.  Reinforced risks and side effects of medication usage, patient and/or guardian verbalize understanding in their own words and are in agreement with current plan.  -Obtain UDS. I am expecting Ruby to be positive for amphetamine and negative for all other substances. I will review the official results.   -Patient will be obtaining labs from endocrinology and I will review those as the results become available.    MEDS ORDERED DURING VISIT:  New Medications Ordered This Visit   Medications   • amphetamine-dextroamphetamine (Adderall) 10 MG tablet     Sig: Take 10 mg orally every afternoon.     Dispense:  30 tablet     Refill:  0     Take 10 mg orally every afternoon.   • amphetamine-dextroamphetamine XR (Adderall XR) 20 MG 24 hr capsule     Sig: Take 1 capsule by mouth Every Morning     Dispense:  30 capsule     Refill:  0     Please DC Adderall XR 15mg   • lamoTRIgine (LaMICtal) 100 MG tablet     Sig: Take 0.5 tablets by mouth Every Night for 7 days, THEN 1 tablet Every Night for 23 days.     Dispense:  27 tablet     Refill:  0   • hydrOXYzine pamoate (VISTARIL) 25 MG  capsule     Sig: Take 1 capsule by mouth At Night As Needed for Anxiety.     Dispense:  30 capsule     Refill:  2         Follow Up   Return in about 2 months (around 1/7/2023) for Medication Check.  Patient was given instructions and counseling regarding her condition or for health maintenance advice. Please see specific information pulled into the AVS if appropriate.       TREATMENT PLAN/GOALS: Continue supportive psychotherapy efforts and medications as indicated. Treatment and medication options discussed during today's visit. Patient acknowledged and verbally consented to continue with current treatment plan and was educated on the importance of compliance with treatment and follow-up appointments.    MEDICATION ISSUES:  Discussed medication options and treatment plan of prescribed medication as well as the risks, benefits, and side effects including potential falls, possible impaired driving and metabolic adversities among others. Patient is agreeable to call the office with any worsening of symptoms or onset of side effects. Patient is agreeable to call 911 or go to the nearest ER should he/she begin having SI/HI.        This document has been electronically signed by SONIYA Valadez, PMHNP-BC  November 7, 2022 12:13 EST    Part of this note may be an electronic transcription/translation of spoken language to printed text using the Dragon Dictation System.

## 2022-11-07 ENCOUNTER — OFFICE VISIT (OUTPATIENT)
Dept: PSYCHIATRY | Facility: CLINIC | Age: 22
End: 2022-11-07

## 2022-11-07 VITALS
HEIGHT: 62 IN | BODY MASS INDEX: 30 KG/M2 | SYSTOLIC BLOOD PRESSURE: 122 MMHG | HEART RATE: 98 BPM | DIASTOLIC BLOOD PRESSURE: 80 MMHG | WEIGHT: 163 LBS

## 2022-11-07 DIAGNOSIS — F90.2 ATTENTION DEFICIT HYPERACTIVITY DISORDER, COMBINED TYPE: Chronic | ICD-10-CM

## 2022-11-07 DIAGNOSIS — G47.09 OTHER INSOMNIA: ICD-10-CM

## 2022-11-07 DIAGNOSIS — F31.30 BIPOLAR I DISORDER, MOST RECENT EPISODE DEPRESSED: Primary | Chronic | ICD-10-CM

## 2022-11-07 DIAGNOSIS — F41.1 GENERALIZED ANXIETY DISORDER: ICD-10-CM

## 2022-11-07 PROCEDURE — 99214 OFFICE O/P EST MOD 30 MIN: CPT | Performed by: NURSE PRACTITIONER

## 2022-11-07 RX ORDER — HYDROXYZINE PAMOATE 25 MG/1
25 CAPSULE ORAL NIGHTLY PRN
Qty: 30 CAPSULE | Refills: 2 | Status: SHIPPED | OUTPATIENT
Start: 2022-11-07 | End: 2023-01-09 | Stop reason: SDDI

## 2022-11-07 RX ORDER — DEXTROAMPHETAMINE SACCHARATE, AMPHETAMINE ASPARTATE, DEXTROAMPHETAMINE SULFATE AND AMPHETAMINE SULFATE 2.5; 2.5; 2.5; 2.5 MG/1; MG/1; MG/1; MG/1
TABLET ORAL
Qty: 30 TABLET | Refills: 0 | Status: SHIPPED | OUTPATIENT
Start: 2022-11-07 | End: 2023-01-06 | Stop reason: SDUPTHER

## 2022-11-07 RX ORDER — DEXTROAMPHETAMINE SACCHARATE, AMPHETAMINE ASPARTATE MONOHYDRATE, DEXTROAMPHETAMINE SULFATE AND AMPHETAMINE SULFATE 5; 5; 5; 5 MG/1; MG/1; MG/1; MG/1
20 CAPSULE, EXTENDED RELEASE ORAL EVERY MORNING
Qty: 30 CAPSULE | Refills: 0 | Status: SHIPPED | OUTPATIENT
Start: 2022-11-07 | End: 2023-01-09

## 2022-11-07 RX ORDER — LAMOTRIGINE 100 MG/1
TABLET ORAL
Qty: 27 TABLET | Refills: 0 | Status: SHIPPED | OUTPATIENT
Start: 2022-11-07 | End: 2022-12-06

## 2022-11-10 ENCOUNTER — OFFICE VISIT (OUTPATIENT)
Dept: INTERNAL MEDICINE | Facility: CLINIC | Age: 22
End: 2022-11-10

## 2022-11-10 VITALS
DIASTOLIC BLOOD PRESSURE: 70 MMHG | OXYGEN SATURATION: 99 % | SYSTOLIC BLOOD PRESSURE: 112 MMHG | HEIGHT: 62 IN | HEART RATE: 98 BPM | TEMPERATURE: 98 F | BODY MASS INDEX: 30 KG/M2 | WEIGHT: 163 LBS

## 2022-11-10 DIAGNOSIS — N89.8 VAGINAL DISCHARGE: ICD-10-CM

## 2022-11-10 DIAGNOSIS — Z11.3 SCREENING FOR STD (SEXUALLY TRANSMITTED DISEASE): ICD-10-CM

## 2022-11-10 DIAGNOSIS — R30.0 DYSURIA: Primary | ICD-10-CM

## 2022-11-10 LAB
BILIRUB BLD-MCNC: NEGATIVE MG/DL
CLARITY, POC: CLEAR
COLOR UR: YELLOW
EXPIRATION DATE: ABNORMAL
GLUCOSE UR STRIP-MCNC: NEGATIVE MG/DL
KETONES UR QL: NEGATIVE
LEUKOCYTE EST, POC: ABNORMAL
Lab: ABNORMAL
NITRITE UR-MCNC: NEGATIVE MG/ML
PH UR: 6 [PH] (ref 5–8)
PROT UR STRIP-MCNC: ABNORMAL MG/DL
RBC # UR STRIP: ABNORMAL /UL
SP GR UR: 1.03 (ref 1–1.03)
UROBILINOGEN UR QL: NORMAL

## 2022-11-10 PROCEDURE — 81003 URINALYSIS AUTO W/O SCOPE: CPT | Performed by: NURSE PRACTITIONER

## 2022-11-10 PROCEDURE — 99213 OFFICE O/P EST LOW 20 MIN: CPT | Performed by: NURSE PRACTITIONER

## 2022-11-10 NOTE — PROGRESS NOTES
"  Office Visit      Patient Name: Ruby Ramos  : 2000   MRN: [id number]   Care Team: Patient Care Team:  Isamar Leon APRN as PCP - General (Family Medicine)  Reid Garcia MD as Consulting Physician (General Surgery)  Ruby Conde [facility] as Counselor (Behavioral Health)  Beth House APRN as Nurse Practitioner (Behavioral Health)    Chief Complaint  Vaginal Discharge (Possible yeast infection. Also requesting an STI test. Burning with urination for one day.)    Subjective     Subjective      Ruby Ramos presents to [facility] for vaginal discharge.   Symptoms started 2 weeks ago.   Endorses white/greenish tint discharge, thick discharge, and external vaginal irritation.   Denies hematuria, urinary urgency, urinary frequency, nausea, vomiting, and abdominal pain. Did have dysuria for about 1 day but improved.   Has had a new sexual partner. She does not use condoms or birth control.   Symptoms are slowly improving.   LMP was 1 month ago, 3 days late for her period. Blood sugar has been controlled.   Lab Results   Component Value Date    HGBA1C 6.0 09/15/2022         Review of Systems   Constitutional: Negative for chills, fatigue and fever.   Respiratory: Negative for shortness of breath.    Cardiovascular: Negative for chest pain.   Gastrointestinal: Negative for abdominal distention, abdominal pain, constipation and nausea.   Genitourinary: Positive for dysuria, frequency, urgency and vaginal discharge. Negative for decreased libido, decreased urine volume, difficulty urinating, flank pain, hematuria and vaginal pain.   Neurological: Negative for headache.   Psychiatric/Behavioral: Negative for sleep disturbance.       Objective     Objective   Vital Signs:   /70   Pulse 98   Temp 98 °F (36.7 °C)   Ht 157.5 cm (62\")   Wt 73.9 kg (163 lb)   SpO2 99%   BMI 29.81 kg/m²     Physical Exam  Vitals and nursing note reviewed. "   Constitutional:       General: She is not in acute distress.     Appearance: Normal appearance.   Cardiovascular:      Rate and Rhythm: Normal rate and regular rhythm.      Heart sounds: Normal heart sounds. No murmur heard.  Pulmonary:      Effort: Pulmonary effort is normal. No respiratory distress.      Breath sounds: Normal breath sounds. No wheezing.   Abdominal:      General: Bowel sounds are normal. There is no distension.      Palpations: Abdomen is soft.      Tenderness: There is no abdominal tenderness. There is no right CVA tenderness or left CVA tenderness.   Skin:     General: Skin is warm and dry.      Findings: No rash.   Neurological:      Mental Status: She is alert.   Psychiatric:         Mood and Affect: Mood normal.         Behavior: Behavior normal.          Assessment / Plan      Assessment & Plan   Problem List Items Addressed This Visit    None  Visit Diagnoses     Dysuria    -  Primary    Relevant Orders    Ct, Ng, Mycoplasmas EDILMA, Urine - Urine, Clean Catch    NuSwab Vaginitis (VG) - Swab, Vagina    Urine Culture - Urine, Urine, Clean Catch    Screening for STD (sexually transmitted disease)        Relevant Orders    Ct, Ng, Mycoplasmas EDILMA, Urine - Urine, Clean Catch    NuSwab Vaginitis (VG) - Swab, Vagina    Vaginal discharge        Brief Urine Lab Results  (Last result in the past 365 days)      Color   Clarity   Blood   Leuk Est   Nitrite   Protein   CREAT   Urine HCG        11/10/22 1211 Yellow   Clear   3+   Large (3+)   Negative   Trace               Will send culture, currently asymptomatic from UTI standpoint therefore will hold on antibiotics for now. If she worsens will let me know and will call in. Vaginal swab pending, will treat as appropriate. Reassuring symptoms are improving without intervention. Urine pregnancy test negative, advised condoms in future for pregnancy and STD prevention. Avoid vaginal douching, scented vaginal products/soap, and tight glucose control  encouraged. Follow-up if not improving.              Follow Up   Return if symptoms worsen or fail to improve.  Patient was given instructions and counseling regarding her condition or for health maintenance advice. Please see specific information pulled into the AVS if appropriate.     SONIYA Gasca  [facility]

## 2022-11-12 LAB
BACTERIA UR CULT: NORMAL
BACTERIA UR CULT: NORMAL

## 2022-11-14 LAB
A VAGINAE DNA VAG QL NAA+PROBE: ABNORMAL SCORE
BVAB2 DNA VAG QL NAA+PROBE: ABNORMAL SCORE
C ALBICANS DNA VAG QL NAA+PROBE: POSITIVE
C GLABRATA DNA VAG QL NAA+PROBE: NEGATIVE
C TRACH RRNA UR QL NAA+PROBE: NEGATIVE
M GENITALIUM DNA UR QL NAA+PROBE: NEGATIVE
M HOMINIS DNA SPEC QL NAA+PROBE: NEGATIVE
MEGA1 DNA VAG QL NAA+PROBE: ABNORMAL SCORE
N GONORRHOEA RRNA UR QL NAA+PROBE: NEGATIVE
T VAGINALIS DNA VAG QL NAA+PROBE: NEGATIVE
UREAPLASMA DNA SPEC QL NAA+PROBE: POSITIVE

## 2022-11-15 DIAGNOSIS — B37.31 CANDIDAL VAGINITIS: ICD-10-CM

## 2022-11-15 DIAGNOSIS — N34.1 NONGONOCOCCAL URETHRITIS DUE TO UREAPLASMA UREALYTICUM: Primary | ICD-10-CM

## 2022-11-15 DIAGNOSIS — A49.3 NONGONOCOCCAL URETHRITIS DUE TO UREAPLASMA UREALYTICUM: Primary | ICD-10-CM

## 2022-11-15 RX ORDER — FLUCONAZOLE 150 MG/1
TABLET ORAL
Qty: 2 TABLET | Refills: 0 | Status: SHIPPED | OUTPATIENT
Start: 2022-11-15 | End: 2022-12-09

## 2022-11-15 RX ORDER — DOXYCYCLINE HYCLATE 100 MG/1
100 CAPSULE ORAL 2 TIMES DAILY
Qty: 14 CAPSULE | Refills: 0 | Status: SHIPPED | OUTPATIENT
Start: 2022-11-15 | End: 2022-11-22

## 2022-11-18 DIAGNOSIS — F90.2 ATTENTION DEFICIT HYPERACTIVITY DISORDER, COMBINED TYPE: Chronic | ICD-10-CM

## 2022-11-18 RX ORDER — DEXTROAMPHETAMINE SACCHARATE, AMPHETAMINE ASPARTATE, DEXTROAMPHETAMINE SULFATE AND AMPHETAMINE SULFATE 2.5; 2.5; 2.5; 2.5 MG/1; MG/1; MG/1; MG/1
TABLET ORAL
Qty: 30 TABLET | Refills: 0 | Status: CANCELLED | OUTPATIENT
Start: 2022-11-18

## 2022-11-18 NOTE — TELEPHONE ENCOUNTER
Rx Refill Note  Requested Prescriptions     Pending Prescriptions Disp Refills   • amphetamine-dextroamphetamine (Adderall) 10 MG tablet 30 tablet 0     Sig: Take 10 mg orally every afternoon.      Last office visit with prescribing clinician: Visit date not found      Next office visit with prescribing clinician: Visit date not found            Monica Araujo MA  11/18/22, 12:32 EST

## 2022-11-27 ENCOUNTER — PATIENT MESSAGE (OUTPATIENT)
Dept: INTERNAL MEDICINE | Facility: CLINIC | Age: 22
End: 2022-11-27

## 2022-11-29 NOTE — TELEPHONE ENCOUNTER
Pt called requesting to schedule appointment, scheduled for Friday at 11:30 with Macrina as requested

## 2022-12-06 DIAGNOSIS — E10.65 TYPE 1 DIABETES MELLITUS WITH HYPERGLYCEMIA: ICD-10-CM

## 2022-12-06 DIAGNOSIS — F31.30 BIPOLAR I DISORDER, MOST RECENT EPISODE DEPRESSED: Chronic | ICD-10-CM

## 2022-12-06 RX ORDER — PROCHLORPERAZINE 25 MG/1
SUPPOSITORY RECTAL
Qty: 3 EACH | Refills: 0 | Status: SHIPPED | OUTPATIENT
Start: 2022-12-06 | End: 2023-01-18 | Stop reason: SDUPTHER

## 2022-12-06 RX ORDER — LAMOTRIGINE 100 MG/1
100 TABLET ORAL NIGHTLY
Qty: 30 TABLET | Refills: 2 | Status: SHIPPED | OUTPATIENT
Start: 2022-12-06 | End: 2023-03-06 | Stop reason: SDUPTHER

## 2022-12-06 NOTE — TELEPHONE ENCOUNTER
Rx Refill Note  Requested Prescriptions     Pending Prescriptions Disp Refills   • lamoTRIgine (LaMICtal) 100 MG tablet [Pharmacy Med Name: lamoTRIgine Oral Tablet 100 MG] 27 tablet 0     Sig: take 1/2 tablet by mouth every night for 7 days then take 1 tablet once nightly      Last office visit with prescribing clinician: 11/7/2022      Next office visit with prescribing clinician: 1/9/2023            Monica Araujo MA  12/06/22, 12:11 EST

## 2022-12-07 ENCOUNTER — OFFICE VISIT (OUTPATIENT)
Dept: PSYCHIATRY | Facility: CLINIC | Age: 22
End: 2022-12-07

## 2022-12-07 DIAGNOSIS — F10.20 ALCOHOL USE DISORDER, MODERATE, DEPENDENCE: Primary | ICD-10-CM

## 2022-12-07 DIAGNOSIS — F41.1 GENERALIZED ANXIETY DISORDER: ICD-10-CM

## 2022-12-07 DIAGNOSIS — F31.30 BIPOLAR I DISORDER, MOST RECENT EPISODE DEPRESSED: ICD-10-CM

## 2022-12-07 PROCEDURE — 90837 PSYTX W PT 60 MINUTES: CPT | Performed by: COUNSELOR
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
## 2022-12-09 ENCOUNTER — OFFICE VISIT (OUTPATIENT)
Dept: INTERNAL MEDICINE | Facility: CLINIC | Age: 22
End: 2022-12-09

## 2022-12-09 VITALS
WEIGHT: 167 LBS | TEMPERATURE: 97.8 F | DIASTOLIC BLOOD PRESSURE: 68 MMHG | HEIGHT: 62 IN | BODY MASS INDEX: 30.73 KG/M2 | SYSTOLIC BLOOD PRESSURE: 102 MMHG | OXYGEN SATURATION: 99 % | HEART RATE: 95 BPM

## 2022-12-09 DIAGNOSIS — N34.1 NONGONOCOCCAL URETHRITIS DUE TO UREAPLASMA UREALYTICUM: Primary | ICD-10-CM

## 2022-12-09 DIAGNOSIS — A49.3 NONGONOCOCCAL URETHRITIS DUE TO UREAPLASMA UREALYTICUM: Primary | ICD-10-CM

## 2022-12-09 DIAGNOSIS — Z11.3 SCREENING EXAMINATION FOR STD (SEXUALLY TRANSMITTED DISEASE): ICD-10-CM

## 2022-12-09 PROCEDURE — 99213 OFFICE O/P EST LOW 20 MIN: CPT | Performed by: NURSE PRACTITIONER

## 2022-12-09 NOTE — PROGRESS NOTES
"  Office Visit      Patient Name: Ruby Ramos  : 2000   MRN: [id number]   Care Team: Patient Care Team:  Isamar Leon APRN as PCP - General (Family Medicine)  Reid Garcia MD as Consulting Physician (General Surgery)  Ruby Conde LPCC as Counselor (Behavioral Health)  Beth House APRN as Nurse Practitioner (Behavioral Health)    Chief Complaint  Diabetes    Subjective     Subjective      Ruby Ramos presents to [facility] for vaginal discharge follow-up.   Ureaplasma was positive last visit and given doxycycline, she finished the full antibiotics as prescribed and took diflucan.   Symptoms resolved but then returned 1 week later. She was experiencing vaginal irritation and an increase in discharge again. Now that has resolved.    She would like to be rechecked for ureaplasma today to ensure clearance.     Objective     Objective   Vital Signs:   /68   Pulse 95   Temp 97.8 °F (36.6 °C)   Ht 157.5 cm (62\")   Wt 75.8 kg (167 lb)   SpO2 99%   BMI 30.54 kg/m²     Physical Exam  Vitals and nursing note reviewed.   Constitutional:       General: She is not in acute distress.     Appearance: Normal appearance.   Cardiovascular:      Rate and Rhythm: Normal rate and regular rhythm.      Heart sounds: Normal heart sounds. No murmur heard.  Pulmonary:      Effort: Pulmonary effort is normal. No respiratory distress.      Breath sounds: Normal breath sounds. No wheezing.   Abdominal:      General: Bowel sounds are normal. There is no distension.      Palpations: Abdomen is soft.      Tenderness: There is no abdominal tenderness. There is no right CVA tenderness or left CVA tenderness.   Skin:     General: Skin is warm and dry.      Findings: No rash.   Neurological:      Mental Status: She is alert.   Psychiatric:         Mood and Affect: Mood normal.         Behavior: Behavior normal.        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 She would like checked for further STD through blood work today, ordered. Educated on normal vaginal discharge and causes for concern. Follow-up as needed.          Follow Up   Return if symptoms worsen or fail to improve.  Patient was given instructions and counseling regarding her condition or for health maintenance advice. Please see specific information pulled into the AVS if appropriate.     SONIYA Gasca  [facility] - [place]

## 2022-12-15 LAB
HCV AB S/CO SERPL IA: <0.1 S/CO RATIO (ref 0–0.9)
M GENITALIUM DNA SPEC QL NAA+PROBE: NEGATIVE
M HOMINIS DNA SPEC QL NAA+PROBE: NEGATIVE
RPR SER QL: NON REACTIVE
UREAPLASMA DNA SPEC QL NAA+PROBE: POSITIVE

## 2022-12-16 DIAGNOSIS — A49.3 NONGONOCOCCAL URETHRITIS DUE TO UREAPLASMA UREALYTICUM: Primary | ICD-10-CM

## 2022-12-16 DIAGNOSIS — N34.1 NONGONOCOCCAL URETHRITIS DUE TO UREAPLASMA UREALYTICUM: Primary | ICD-10-CM

## 2022-12-16 RX ORDER — CIPROFLOXACIN 250 MG/1
250 TABLET, FILM COATED ORAL 2 TIMES DAILY
Qty: 14 TABLET | Refills: 0 | Status: SHIPPED | OUTPATIENT
Start: 2022-12-16 | End: 2022-12-23

## 2022-12-17 LAB
ALBUMIN/CREAT UR: 6 MG/G CREAT (ref 0–29)
CREAT UR-MCNC: 82.8 MG/DL
MICROALBUMIN UR-MCNC: 4.6 UG/ML

## 2022-12-19 LAB
17OHP SERPL-MCNC: 38 NG/DL
ALBUMIN SERPL-MCNC: 5.3 G/DL (ref 3.5–5.2)
ALBUMIN/GLOB SERPL: 2.9 G/DL
ALP SERPL-CCNC: 63 U/L (ref 39–117)
ALT SERPL-CCNC: 16 U/L (ref 1–33)
AST SERPL-CCNC: 18 U/L (ref 1–32)
BILIRUB SERPL-MCNC: 0.5 MG/DL (ref 0–1.2)
BUN SERPL-MCNC: 11 MG/DL (ref 6–20)
BUN/CREAT SERPL: 14.3 (ref 7–25)
CALCIUM SERPL-MCNC: 9.9 MG/DL (ref 8.6–10.5)
CHLORIDE SERPL-SCNC: 100 MMOL/L (ref 98–107)
CHOLEST SERPL-MCNC: 161 MG/DL (ref 0–200)
CO2 SERPL-SCNC: 23.8 MMOL/L (ref 22–29)
CREAT SERPL-MCNC: 0.77 MG/DL (ref 0.57–1)
DHEA-S SERPL-MCNC: 696 UG/DL (ref 110–431.7)
EGFRCR SERPLBLD CKD-EPI 2021: 112 ML/MIN/1.73
ERYTHROCYTE [DISTWIDTH] IN BLOOD BY AUTOMATED COUNT: 12.4 % (ref 12.3–15.4)
GLOBULIN SER CALC-MCNC: 1.8 GM/DL
GLUCOSE SERPL-MCNC: 140 MG/DL (ref 65–99)
HCT VFR BLD AUTO: 37.1 % (ref 34–46.6)
HDLC SERPL-MCNC: 66 MG/DL (ref 40–60)
HGB BLD-MCNC: 12.8 G/DL (ref 12–15.9)
IGF-I SERPL-MCNC: 173 NG/ML (ref 101–347)
IRON SATN MFR SERPL: 19 % (ref 20–50)
IRON SERPL-MCNC: 81 MCG/DL (ref 37–145)
LDLC SERPL CALC-MCNC: 84 MG/DL (ref 0–100)
MCH RBC QN AUTO: 31 PG (ref 26.6–33)
MCHC RBC AUTO-ENTMCNC: 34.5 G/DL (ref 31.5–35.7)
MCV RBC AUTO: 89.8 FL (ref 79–97)
PLATELET # BLD AUTO: 309 10*3/MM3 (ref 140–450)
POTASSIUM SERPL-SCNC: 4.1 MMOL/L (ref 3.5–5.2)
PROT SERPL-MCNC: 7.1 G/DL (ref 6–8.5)
RBC # BLD AUTO: 4.13 10*6/MM3 (ref 3.77–5.28)
SODIUM SERPL-SCNC: 138 MMOL/L (ref 136–145)
TESTOST SERPL-MCNC: 36 NG/DL (ref 13–71)
TIBC SERPL-MCNC: 435 MCG/DL
TRIGL SERPL-MCNC: 54 MG/DL (ref 0–150)
TSH SERPL DL<=0.005 MIU/L-ACNC: 2.87 UIU/ML (ref 0.27–4.2)
UIBC SERPL-MCNC: 354 MCG/DL (ref 112–346)
VLDLC SERPL CALC-MCNC: 11 MG/DL (ref 5–40)
WBC # BLD AUTO: 9.08 10*3/MM3 (ref 3.4–10.8)

## 2022-12-20 DIAGNOSIS — L65.9 HAIR LOSS: Primary | ICD-10-CM

## 2023-01-06 DIAGNOSIS — F90.2 ATTENTION DEFICIT HYPERACTIVITY DISORDER, COMBINED TYPE: Chronic | ICD-10-CM

## 2023-01-06 DIAGNOSIS — F31.30 BIPOLAR I DISORDER, MOST RECENT EPISODE DEPRESSED: Chronic | ICD-10-CM

## 2023-01-06 RX ORDER — DEXTROAMPHETAMINE SACCHARATE, AMPHETAMINE ASPARTATE, DEXTROAMPHETAMINE SULFATE AND AMPHETAMINE SULFATE 2.5; 2.5; 2.5; 2.5 MG/1; MG/1; MG/1; MG/1
TABLET ORAL
Qty: 30 TABLET | Refills: 0 | Status: SHIPPED | OUTPATIENT
Start: 2023-01-06

## 2023-01-06 RX ORDER — LAMOTRIGINE 100 MG/1
100 TABLET ORAL NIGHTLY
Qty: 30 TABLET | Refills: 2 | Status: CANCELLED | OUTPATIENT
Start: 2023-01-06

## 2023-01-06 NOTE — TELEPHONE ENCOUNTER
Rx Refill Note  Requested Prescriptions     Pending Prescriptions Disp Refills   • amphetamine-dextroamphetamine (Adderall) 10 MG tablet 30 tablet 0     Sig: Take 10 mg orally every afternoon.   • lamoTRIgine (LaMICtal) 100 MG tablet 30 tablet 2     Sig: Take 1 tablet by mouth Every Night.      Last office visit with prescribing clinician: 11/7/2022      Next office visit with prescribing clinician: 1/9/2023            Monica Araujo MA  01/06/23, 12:54 EST

## 2023-01-06 NOTE — TELEPHONE ENCOUNTER
Patient should have refills of her Lamictal at her pharmacy.  Sending Adderall refill today.  Patient's JANI report reviewed and deemed appropriate.  Patient counseled on use of controlled substances.

## 2023-01-08 NOTE — PROGRESS NOTES
Chief Complaint  Bipolar I disorder, ADHD, anxiety, insomnia, and alcohol use disorder, moderate      Subjective          Ruby Ramos presents to [facility] by herself for a follow up and medication check.    History of Present Illness: Ruby states, \"I have been pretty good.\"  Ruby tells me that she has ongoing anxiety which has improved somewhat.  She did not feel hydroxyzine was effective but feels BuSpar has been.  She continues with insomnia.  She feels her mood is stabilizing with the addition of lamotrigine and therapy with Ruby.  She denies any problems with appetite and tells me that her diabetes has been well managed.  She does endorse symptoms of being restless and fidgety at times but continues with Adderall XR. She is compliant with her psychiatric medications. She is taking Adderall XR, Adderall, Buspar, and lamotrigine. She denies any side effects. She denies any SI/HI/AVH.    Current Medications:   Current Outpatient Medications   Medication Sig Dispense Refill   • amphetamine-dextroamphetamine (Adderall) 10 MG tablet Take 10 mg orally every afternoon. 30 tablet 0   • busPIRone (BUSPAR) 10 MG tablet Take 1 tablet by mouth 2 (Two) Times a Day. 60 tablet 2   • Continuous Blood Gluc Sensor (Dexcom G6 Sensor) USE TO CHECK BLOOD GLUCOSE ONCE DAILY OR AS DIRECTED AND CHANGE EVERY 10 DAYS 3 each 0   • Continuous Blood Gluc Transmit (Dexcom G6 Transmitter) misc USE AS DIRECTED TO CHECK BLOOD SUGAR. CHANGE EVERY 90 DAYS. 1 each 3   • glucose blood test strip Four times daily 400 each 3   • insulin aspart (NovoLOG FlexPen) 100 UNIT/ML solution pen-injector sc pen Inject 2-10 Units under the skin into the appropriate area as directed 3 (Three) Times a Day With Meals. 30 mL 1   • Insulin Glargine (BASAGLAR KWIKPEN) 100 UNIT/ML injection pen Inject 12 Units under the skin into the appropriate area as directed Every Night. 15 mL 1   • Insulin Pen Needle 32G X 4  MM misc 1 each 4 (Four) Times a Day. 400 each 3   • lamoTRIgine (LaMICtal) 100 MG tablet Take 1 tablet by mouth Every Night. 30 tablet 2   • multivitamin with minerals tablet tablet Take 1 tablet by mouth Daily.     • ondansetron ODT (Zofran ODT) 4 MG disintegrating tablet Place 1 tablet on the tongue Every 8 (Eight) Hours As Needed for Nausea. 15 tablet 1   • True Comfort Twist Top Lancets misc 1 Device 4 (Four) Times a Day. 400 each 3   • amphetamine-dextroamphetamine XR (ADDERALL XR) 25 MG 24 hr capsule Take 1 capsule by mouth Daily 30 capsule 0     No current facility-administered medications for this visit.         Objective   Vital Signs:   /78   Pulse 100   Ht 157.5 cm (62\")   Wt 73.9 kg (163 lb)   BMI 29.81 kg/m²     Physical Exam  Vitals and nursing note reviewed.   Constitutional:       Appearance: Normal appearance. She is well-developed and normal weight.   Musculoskeletal:         General: Normal range of motion.   Skin:     General: Skin is warm and dry.   Neurological:      Mental Status: She is alert and oriented to person, place, and time.   Psychiatric:         Attention and Perception: Attention normal.         Mood and Affect: Mood normal.         Speech: Speech normal.         Behavior: Behavior normal. Behavior is cooperative.         Thought Content: Thought content normal.         Cognition and Memory: Cognition normal.         Judgment: Judgment normal.        Result Review :     The following data was reviewed by: SONIYA Jay on 01/09/2023:  Genital Mycoplasmas EDILMA, Swab - , (12/12/2022 10:00)  Comprehensive Metabolic Panel (12/14/2022 10:05)  CBC (No Diff) (12/14/2022 10:05)  Lipid Panel (12/14/2022 10:05)  Iron Profile (12/14/2022 10:05)  DHEA-Sulfate (12/14/2022 10:05)  Testosterone (12/14/2022 10:05)  TSH (12/14/2022 10:05)  Insulin-like Growth Factor (12/14/2022 10:05)  17-Hydroxyprogesterone (12/14/2022 10:05)  RPR (12/14/2022 10:07)  Hepatitis C antibody  (12/14/2022 10:07)  Microalbumin / Creatinine Urine Ratio - (12/16/2022 09:25)  DHEA-Sulfate (12/20/2022 09:07)    Office Visit with Ruby Conde LPCC (12/07/2022)  Office Visit with Macrina Godinez APRN (12/09/2022)         Assessment and Plan    Diagnoses and all orders for this visit:    1. Attention deficit hyperactivity disorder, combined type (Primary)  -     Cancel: Compliance Drug Analysis, Ur - Urine, Clean Catch; Future  -     Compliance Drug Analysis, Ur - Urine, Clean Catch  -     amphetamine-dextroamphetamine XR (ADDERALL XR) 25 MG 24 hr capsule; Take 1 capsule by mouth Daily  Dispense: 30 capsule; Refill: 0    2. Medication management  -     Cancel: Compliance Drug Analysis, Ur - Urine, Clean Catch; Future  -     Compliance Drug Analysis, Ur - Urine, Clean Catch    3. Generalized anxiety disorder  -     busPIRone (BUSPAR) 10 MG tablet; Take 1 tablet by mouth 2 (Two) Times a Day.  Dispense: 60 tablet; Refill: 2    4. Bipolar I disorder, most recent episode depressed (HCC)    5. Other insomnia         Mental Status Exam:   Hygiene:   good  Cooperation:  Cooperative  Eye Contact:  Good  Psychomotor Behavior:  Appropriate  Affect:  Appropriate  Mood: normal  Speech:  Normal  Thought Process:  Goal directed and Linear  Thought Content:  Normal  Suicidal:  None  Homicidal:  None  Hallucinations:  None  Delusion:  None  Memory:  Intact  Orientation:  Person, Place, Time and Situation  Reliability:  good  Insight:  Good  Judgement:  Good  Impulse Control:  Good  Physical/Medical Issues:  Yes Diabetes, celiac disease, headaches, back problems, hernia, and hair loss      PHQ-9 Score:   PHQ-9 Total Score: 13     Impression/Plan:  -This is a follow up and medication check. Ruby reports ongoing anxiety.  She did not find the hydroxyzine helpful for anxiety for insomnia.  She continues to have difficulty with sleep.  She feels lamotrigine is good for mood stabilization and BuSpar has been helpful for  anxiety.  She reports some restless and fidgety symptoms with ADHD but feels her Adderall is working well.  Therefore, we discussed possibly using a medication such as Lunesta for sleep.  She expresses concerns for using controlled substances to treat her insomnia.  She is trying to implement good sleep hygiene measures and would like to research the medication for future use.  I suggested increasing BuSpar for further benefits on anxiety.  She verbalizes agreement.  -Stop Hydroxyzine Pamoate due to patient's perceived lack of efficacy.  -Increase Buspar to 10 mg twice times daily as needed for anxiety.   -Continue lamotrigine 100 mg nightly for depression and anxiety.  -Continue Adderall XR 20 mg daily for ADHD symptoms.  Patient has refills.  -Continue Adderall 10 mg daily for breakthrough ADHD symptoms.  Patient has refills.  -Continue therapy with Ruby.  -The JANI report, reviewed through PDMP, of the past 12 months were reviewed and is appropriate.  The patient/guardian reports taking the medication only as prescribed.  The patient/guardian denies any abuse or misuse of the medication.  The patient/guardian denies any other substance use or issues.  There are no apparent substance related issues.  The patient reports no side effects of the current medication usage.  The patient/guardian has reported significant improvement with medication usage and wishes to continue medication as prescribed.  The patient/guardian is appropriate to continue with current medication usage at this time.  Reinforced risks and side effects of medication usage, patient and/or guardian verbalize understanding in their own words and are in agreement with current plan.  -Obtain UDS. I am expecting Ruby to be positive for amphetamine and negative for all other substances. I will review the official results.       MEDS ORDERED DURING VISIT:  New Medications Ordered This Visit   Medications   • busPIRone (BUSPAR) 10 MG tablet      Sig: Take 1 tablet by mouth 2 (Two) Times a Day.     Dispense:  60 tablet     Refill:  2   • amphetamine-dextroamphetamine XR (ADDERALL XR) 25 MG 24 hr capsule     Sig: Take 1 capsule by mouth Daily     Dispense:  30 capsule     Refill:  0         Follow Up   Return in about 2 months (around 3/9/2023) for Medication Check.  Patient was given instructions and counseling regarding her condition or for health maintenance advice. Please see specific information pulled into the AVS if appropriate.       TREATMENT PLAN/GOALS: Continue supportive psychotherapy efforts and medications as indicated. Treatment and medication options discussed during today's visit. Patient acknowledged and verbally consented to continue with current treatment plan and was educated on the importance of compliance with treatment and follow-up appointments.    MEDICATION ISSUES:  Discussed medication options and treatment plan of prescribed medication as well as the risks, benefits, and side effects including potential falls, possible impaired driving and metabolic adversities among others. Patient is agreeable to call the office with any worsening of symptoms or onset of side effects. Patient is agreeable to call 911 or go to the nearest ER should he/she begin having SI/HI.        This document has been electronically signed by SONIYA Valadez, PMHNP-BC  January 10, 2023 07:29 EST    Part of this note may be an electronic transcription/translation of spoken language to printed text using the Dragon Dictation System.

## 2023-01-09 ENCOUNTER — OFFICE VISIT (OUTPATIENT)
Dept: PSYCHIATRY | Facility: CLINIC | Age: 23
End: 2023-01-09
Payer: COMMERCIAL

## 2023-01-09 VITALS
HEIGHT: 62 IN | DIASTOLIC BLOOD PRESSURE: 78 MMHG | SYSTOLIC BLOOD PRESSURE: 116 MMHG | HEART RATE: 100 BPM | WEIGHT: 163 LBS | BODY MASS INDEX: 30 KG/M2

## 2023-01-09 DIAGNOSIS — Z79.899 MEDICATION MANAGEMENT: ICD-10-CM

## 2023-01-09 DIAGNOSIS — F90.2 ATTENTION DEFICIT HYPERACTIVITY DISORDER, COMBINED TYPE: Primary | Chronic | ICD-10-CM

## 2023-01-09 DIAGNOSIS — G47.09 OTHER INSOMNIA: Chronic | ICD-10-CM

## 2023-01-09 DIAGNOSIS — F41.1 GENERALIZED ANXIETY DISORDER: Chronic | ICD-10-CM

## 2023-01-09 DIAGNOSIS — F31.30 BIPOLAR I DISORDER, MOST RECENT EPISODE DEPRESSED: Chronic | ICD-10-CM

## 2023-01-09 PROCEDURE — 99214 OFFICE O/P EST MOD 30 MIN: CPT | Performed by: NURSE PRACTITIONER

## 2023-01-09 RX ORDER — BUSPIRONE HYDROCHLORIDE 10 MG/1
10 TABLET ORAL 2 TIMES DAILY
Qty: 60 TABLET | Refills: 2
Start: 2023-01-09

## 2023-01-09 RX ORDER — DEXTROAMPHETAMINE SACCHARATE, AMPHETAMINE ASPARTATE MONOHYDRATE, DEXTROAMPHETAMINE SULFATE AND AMPHETAMINE SULFATE 6.25; 6.25; 6.25; 6.25 MG/1; MG/1; MG/1; MG/1
25 CAPSULE, EXTENDED RELEASE ORAL DAILY
Qty: 30 CAPSULE | Refills: 0 | Status: SHIPPED | OUTPATIENT
Start: 2023-01-09 | End: 2023-02-23 | Stop reason: SDUPTHER

## 2023-01-11 ENCOUNTER — TELEPHONE (OUTPATIENT)
Dept: PSYCHIATRY | Facility: CLINIC | Age: 23
End: 2023-01-11
Payer: MEDICAID

## 2023-01-11 DIAGNOSIS — G47.09 OTHER INSOMNIA: Primary | ICD-10-CM

## 2023-01-11 RX ORDER — ESZOPICLONE 2 MG/1
2 TABLET, FILM COATED ORAL NIGHTLY PRN
Qty: 30 TABLET | Refills: 1 | Status: SHIPPED | OUTPATIENT
Start: 2023-01-11 | End: 2024-01-11

## 2023-01-14 LAB — DHEA-S SERPL-MCNC: 612 UG/DL (ref 110–431.7)

## 2023-01-16 LAB — DRUGS UR: NORMAL

## 2023-01-18 ENCOUNTER — OFFICE VISIT (OUTPATIENT)
Dept: ENDOCRINOLOGY | Facility: CLINIC | Age: 23
End: 2023-01-18
Payer: COMMERCIAL

## 2023-01-18 VITALS
HEIGHT: 62 IN | OXYGEN SATURATION: 99 % | DIASTOLIC BLOOD PRESSURE: 78 MMHG | BODY MASS INDEX: 30.14 KG/M2 | WEIGHT: 163.8 LBS | SYSTOLIC BLOOD PRESSURE: 104 MMHG | HEART RATE: 108 BPM

## 2023-01-18 DIAGNOSIS — E10.65 TYPE 1 DIABETES MELLITUS WITH HYPERGLYCEMIA: Primary | ICD-10-CM

## 2023-01-18 DIAGNOSIS — R79.89 ELEVATED DEHYDROEPIANDROSTERONE (DHEA) LEVEL: ICD-10-CM

## 2023-01-18 LAB
EXPIRATION DATE: ABNORMAL
EXPIRATION DATE: NORMAL
GLUCOSE BLDC GLUCOMTR-MCNC: 206 MG/DL (ref 70–130)
HBA1C MFR BLD: 5.9 %
Lab: ABNORMAL
Lab: NORMAL

## 2023-01-18 PROCEDURE — 99214 OFFICE O/P EST MOD 30 MIN: CPT | Performed by: INTERNAL MEDICINE

## 2023-01-18 PROCEDURE — 95251 CONT GLUC MNTR ANALYSIS I&R: CPT | Performed by: INTERNAL MEDICINE

## 2023-01-18 PROCEDURE — 83036 HEMOGLOBIN GLYCOSYLATED A1C: CPT | Performed by: INTERNAL MEDICINE

## 2023-01-18 RX ORDER — INSULIN ASPART 100 [IU]/ML
INJECTION, SOLUTION INTRAVENOUS; SUBCUTANEOUS
Qty: 45 ML | Refills: 1 | Status: SHIPPED | OUTPATIENT
Start: 2023-01-18

## 2023-01-18 RX ORDER — PROCHLORPERAZINE 25 MG/1
1 SUPPOSITORY RECTAL
Qty: 1 EACH | Refills: 3 | Status: SHIPPED | OUTPATIENT
Start: 2023-01-18

## 2023-01-18 RX ORDER — PROCHLORPERAZINE 25 MG/1
SUPPOSITORY RECTAL
Qty: 9 EACH | Refills: 3 | Status: SHIPPED | OUTPATIENT
Start: 2023-01-18

## 2023-01-18 RX ORDER — INSULIN GLARGINE 100 [IU]/ML
14 INJECTION, SOLUTION SUBCUTANEOUS NIGHTLY
Qty: 15 ML | Refills: 1 | Status: SHIPPED | OUTPATIENT
Start: 2023-01-18

## 2023-01-18 NOTE — PROGRESS NOTES
Chief Complaint   Patient presents with   • Diabetes     3mo fu, T1DM          HPI   Ruby Ramos is a 22 y.o. female had concerns including Diabetes (3mo fu, T1DM).    She is checking blood sugars 4+ times per day with CGM. Data reviewed from the last two weeks shows average glucose 173 with SD of 57. In target range 62%, high 27%, very high 10%, low <1%, very low <1%.   Pattern of: postprandial hyperglycemia    Hasn't been eating as healthy the last few months.     Current medications for diabetes include Basaglar 14 units daily, NovoLog 6 to 15 units with meals.  This morning had roland toast and protein shake. She took 8 units this morning. Went to the gym and her BG dropped while there.   Tries to get her BG to 250 before exercise, sometimes skips insulin before exercise.     Still not interested in a pump. Wants to work on diet.     Labs were checked for signs of hyperandrogenism.  DHEA-S has been elevated, above 600 on 2 occasions.  Menses in the past have been quite heavy - in the last three months they have been lighter and a few days.  No hirsutism.  Started using antibacterial soap for the body and this has helped. Facial breakouts are worsening.       The following portions of the patient's history were reviewed and updated as appropriate: allergies, current medications, past family history, past medical history, past social history, past surgical history and problem list.      Review of Systems   Constitutional: Negative.    Endocrine:        See HPI   Skin:        See HPI   Psychiatric/Behavioral: Positive for sleep disturbance.        Physical Exam  Vitals reviewed.   Constitutional:       Appearance: Normal appearance.   Cardiovascular:      Rate and Rhythm: Normal rate.   Pulmonary:      Effort: Pulmonary effort is normal.   Neurological:      General: No focal deficit present.      Mental Status: She is alert. Mental status is at baseline.   Psychiatric:         Mood and Affect: Mood normal.          "Behavior: Behavior normal.        /78   Pulse 108   Ht 157.5 cm (62\")   Wt 74.3 kg (163 lb 12.8 oz)   SpO2 99%   BMI 29.96 kg/m²      Labs and imaging    CMP:  Lab Results   Component Value Date    BUN 11 12/14/2022    CREATININE 0.77 12/14/2022    EGFRIFNONA 96 08/03/2021    BCR 14.3 12/14/2022     12/14/2022    K 4.1 12/14/2022    CO2 23.8 12/14/2022    CALCIUM 9.9 12/14/2022    PROTENTOTREF 7.1 12/14/2022    ALBUMIN 5.30 (H) 12/14/2022    LABGLOBREF 1.8 12/14/2022    LABIL2 2.9 12/14/2022    BILITOT 0.5 12/14/2022    ALKPHOS 63 12/14/2022    AST 18 12/14/2022    ALT 16 12/14/2022     Lipid Panel:  Lab Results   Component Value Date    TRIG 54 12/14/2022    HDL 66 (H) 12/14/2022    VLDL 11 12/14/2022    LDL 84 12/14/2022     HbA1c:  Lab Results   Component Value Date    HGBA1C 5.9 01/18/2023    HGBA1C 6.0 09/15/2022     Glucose:    Lab Results   Component Value Date    POCGLU 206 (A) 01/18/2023     Microalbumin:  Lab Results   Component Value Date    MALBCRERATIO 6 12/16/2022     TSH:  Lab Results   Component Value Date    TSH 2.870 12/14/2022     Lab Results   Component Value Date    DHEASO4 612.0 (H) 01/13/2023    DHEASO4 696.0 (H) 12/14/2022    17HYDROXY 38 12/14/2022    TESTOSTEROTT 36 12/14/2022    INSLIKE 173 12/14/2022     Assessment and plan  Diagnoses and all orders for this visit:    1. Type 1 diabetes mellitus with hyperglycemia (HCC) (Primary)  Uncontrolled with hyperglycemia.  A1c 5.9 though seems incongruent with Dexcom data where estimated A1c is 7.5.  No complications from diabetes.    Patient not interested in insulin pump.    Continue Basaglar 14 units nightly.  Change NovoLog to 1 unit for every 5 g of carbohydrates +1: 20 greater than 140.  Continue Dexcom CGM.  Call office if persistently high BG.  Labs are up-to-date from December including normal urine microalbumin.  Ophtho exam should be updated yearly.  Monofilament will be checked at follow-up visit.  -     POC Glucose, " Blood  -     POC Glycosylated Hemoglobin (Hb A1C)  -     insulin aspart (NovoLOG FlexPen) 100 UNIT/ML solution pen-injector sc pen; 1 unit for every 5 grams of carbs and 1:20>140, MDD 45 units  Dispense: 45 mL; Refill: 1  -     Continuous Blood Gluc Transmit (Dexcom G6 Transmitter) misc; 1 each by Other route Every 3 (Three) Months.  Dispense: 1 each; Refill: 3  -     Continuous Blood Gluc Sensor (Dexcom G6 Sensor); by Other route Every 10 (Ten) Days.  Dispense: 9 each; Refill: 3  -     Insulin Glargine (BASAGLAR KWIKPEN) 100 UNIT/ML injection pen; Inject 14 Units under the skin into the appropriate area as directed Every Night.  Dispense: 15 mL; Refill: 1    2. Elevated dehydroepiandrosterone (DHEA) level  Patient has some clinical signs of hyperandrogenism with increasing acne, both on her body and on face.  No hirsutism.  Menses are becoming lighter and and shorter duration when they were previously heavy and long duration.  DHEA level has been greater than 600 on 2 occasions.  Screen for adrenal abnormality with CT.  IGF-I was normal.  Screen for Cushing's with late-night salivary cortisol.  Testosterone and 17 hydroxyprogesterone levels were normal.  -     CT Abdomen Adrenals With & Without Contrast; Future  -     Basic Metabolic Panel; Future  -     Salivary Cortisol, MS - Saliva, Oral Cavity; Future         Return in about 3 months (around 4/18/2023) for next scheduled follow up. The patient was instructed to contact the clinic with any interval questions or concerns.    Layla Corral, DO   Endocrinologist    Please note that portions of this note were completed with a voice recognition program.

## 2023-02-03 ENCOUNTER — HOSPITAL ENCOUNTER (OUTPATIENT)
Dept: CT IMAGING | Facility: HOSPITAL | Age: 23
Discharge: HOME | End: 2023-02-03
Admitting: INTERNAL MEDICINE
Payer: MEDICAID

## 2023-02-03 DIAGNOSIS — R79.89 ELEVATED DEHYDROEPIANDROSTERONE (DHEA) LEVEL: ICD-10-CM

## 2023-02-03 PROCEDURE — 74170 CT ABD WO CNTRST FLWD CNTRST: CPT

## 2023-02-03 PROCEDURE — 0 IOPAMIDOL PER 1 ML: Performed by: INTERNAL MEDICINE

## 2023-02-03 RX ADMIN — IOPAMIDOL 100 ML: 510 INJECTION, SOLUTION INTRAVASCULAR at 19:54

## 2023-02-09 ENCOUNTER — OFFICE VISIT (OUTPATIENT)
Dept: PSYCHIATRY | Facility: CLINIC | Age: 23
End: 2023-02-09
Payer: COMMERCIAL

## 2023-02-09 DIAGNOSIS — F31.9 BIPOLAR 1 DISORDER: Primary | ICD-10-CM

## 2023-02-09 DIAGNOSIS — Z63.4 BEREAVEMENT: ICD-10-CM

## 2023-02-09 DIAGNOSIS — F41.1 GENERALIZED ANXIETY DISORDER: ICD-10-CM

## 2023-02-09 PROCEDURE — 90834 PSYTX W PT 45 MINUTES: CPT | Performed by: COUNSELOR

## 2023-02-09 SDOH — SOCIAL STABILITY - SOCIAL INSECURITY: DISSAPEARANCE AND DEATH OF FAMILY MEMBER: Z63.4

## 2023-02-09 NOTE — PROGRESS NOTES
Date:2023   Patient Name: Ruby Ramos  : 2000   MRN: [id number]   Time IN: 12:45 PM    Time OUT: 1:32 PM    Referring Provider: Isamar Leon APRN    PROGRESS NOTE    History of Present Illness:   Ruby Ramos is a 22 y.o. female who is being seen today for follow up individual Psychotherapy session.     Chief Complaint:  Pt reports her father passed two days going following hospitalization from infection, going on life support and heart failure.  Pt states that they had to make the decision to take him off of life support following multiple strokes.  Pt is helping her mother plan  arrangements.  Pt reports she has had good control over her mental health since October.  Pt reports Lunesta is helping with sleep.  Pt reports she has almost had panic attacks in the last two weeks in which it feels like she cannot swallow/her throat is closing off.          ICD-10-CM ICD-9-CM   1. Bipolar 1 disorder (HCC)  F31.9 296.7   2. Generalized anxiety disorder  F41.1 300.02   3. Bereavement  Z63.4 V62.82        Clinical Maneuvering/Intervention:   Assisted patient in processing above session content; acknowledged and normalized patient’s thoughts, feelings, and concerns to build appropriate rapport and a positive therapeutic relationship with open and honest communication.  Processed and rationalized patients thoughts and feelings regarding grief and loss/MH functioning.  Discussed triggers associated with patient's anxiety/grief.  Also discussed coping skills for patient to implement such as allowing self to grieve, Journaling (gave pt forty days of grief prompts), leaning on positive supports, avoid high risk activities/alcohol.      Allowed patient to freely discuss issues without interruption or judgment. Provided safe, confidential environment to facilitate the development of positive therapeutic relationship and encourage open, honest communication. Assisted patient in identifying risk  factors which would indicate the need for higher level of care including thoughts to harm self or others and/or self-harming behavior and encouraged patient to contact this office, call 911, or present to the nearest emergency room should any of these events occur. Discussed crisis intervention services and means to access. Patient adamantly and convincingly denies current suicidal or homicidal ideation or perceptual disturbance.    Mental Status Exam:   Hygiene:   good  Cooperation:  Cooperative  Eye Contact:  Good  Psychomotor Behavior:  Appropriate  Affect:  Appropriate  Mood: sad  Speech:  Normal  Thought Process:  Goal directed and Linear  Thought Content:  Mood congruent  Suicidal:  None  Homicidal:  None  Hallucinations:  None  Delusion:  None  Memory:  Intact  Orientation:  Person, Place, Time and Situation  Reliability:  good  Insight:  Good  Judgement:  Good  Impulse Control:  Fair  Physical/Medical Issues:  Yes    Patient's Support Network Includes:  mother and extended family    Functional Status: Moderate impairment     Progress toward goal: Not at goal    Prognosis: Good with Ongoing Treatment     Medications:     Current Outpatient Medications:   •  amphetamine-dextroamphetamine (Adderall) 10 MG tablet, Take 10 mg orally every afternoon., Disp: 30 tablet, Rfl: 0  •  amphetamine-dextroamphetamine XR (ADDERALL XR) 25 MG 24 hr capsule, Take 1 capsule by mouth Daily, Disp: 30 capsule, Rfl: 0  •  busPIRone (BUSPAR) 10 MG tablet, Take 1 tablet by mouth 2 (Two) Times a Day., Disp: 60 tablet, Rfl: 2  •  Continuous Blood Gluc Sensor (Dexcom G6 Sensor), by Other route Every 10 (Ten) Days., Disp: 9 each, Rfl: 3  •  Continuous Blood Gluc Transmit (Dexcom G6 Transmitter) misc, 1 each by Other route Every 3 (Three) Months., Disp: 1 each, Rfl: 3  •  eszopiclone (Lunesta) 2 MG tablet, Take 1 tablet by mouth At Night As Needed for Sleep. Take immediately before bedtime, Disp: 30 tablet, Rfl: 1  •  glucose blood test  "strip, Four times daily, Disp: 400 each, Rfl: 3  •  insulin aspart (NovoLOG FlexPen) 100 UNIT/ML solution pen-injector sc pen, 1 unit for every 5 grams of carbs and 1:20>140, MDD 45 units, Disp: 45 mL, Rfl: 1  •  Insulin Glargine (BASAGLAR KWIKPEN) 100 UNIT/ML injection pen, Inject 14 Units under the skin into the appropriate area as directed Every Night., Disp: 15 mL, Rfl: 1  •  Insulin Pen Needle 32G X 4 MM misc, 1 each 4 (Four) Times a Day., Disp: 400 each, Rfl: 3  •  lamoTRIgine (LaMICtal) 100 MG tablet, Take 1 tablet by mouth Every Night., Disp: 30 tablet, Rfl: 2  •  multivitamin with minerals tablet tablet, Take 1 tablet by mouth Daily., Disp: , Rfl:   •  ondansetron ODT (Zofran ODT) 4 MG disintegrating tablet, Place 1 tablet on the tongue Every 8 (Eight) Hours As Needed for Nausea., Disp: 15 tablet, Rfl: 1  •  True Comfort Twist Top Lancets misc, 1 Device 4 (Four) Times a Day., Disp: 400 each, Rfl: 3    Visit Diagnosis/Orders Placed This Visit:    ICD-10-CM ICD-9-CM   1. Bipolar 1 disorder (HCC)  F31.9 296.7   2. Generalized anxiety disorder  F41.1 300.02   3. Bereavement  Z63.4 V62.82        PLAN:  1. Safety: No acute safety concerns  2. Risk Assessment: Risk of self-harm acutely is low. Risk of self-harm chronically is also low, but could be further elevated in the event of treatment noncompliance and/or AODA.    Crisis Plan:  Symptoms and/or behaviors to indicate a crisis: Excessive worry or fear, Feeling sad or low, Extreme mood changes; including uncontrollable \"highs\" or euphoria, Lack of sleep and Abuse of substances    What calming techniques or other strategies will patient use to de-escalate and stay safe: slow down, breathe, visualize calming self, think it though, listen to music, change focus, take a walk    Who is one person patient can contact to assist with de-escalation? Brother    Treatment Plan/Goals: Patient will continue supportive psychotherapy efforts and medication regimen as " prescribed. Therapist will provide Cognitive Behavioral Therapy to assist patient in improving functioning and gaining coping skills, maintaining stability, and avoiding decompensation and the need for higher level of care. Plan for treatment was discussed during today's visit. Patient acknowledged and verbally consented to continue with current treatment plan and was educated on the importance of compliance with treatment and follow-up appointments.     Patient will contact this office, call 911 or present to the nearest emergency room should suicidal or homicidal ideations occur.     Follow Up:   Return in about 4 weeks (around 3/9/2023) for Therapy session.      JIM Og   [facility]     This document has been electronically signed by JIM Og   February 9, 2023 13:33 EST

## 2023-02-23 DIAGNOSIS — F90.2 ATTENTION DEFICIT HYPERACTIVITY DISORDER, COMBINED TYPE: Chronic | ICD-10-CM

## 2023-02-23 RX ORDER — DEXTROAMPHETAMINE SACCHARATE, AMPHETAMINE ASPARTATE MONOHYDRATE, DEXTROAMPHETAMINE SULFATE AND AMPHETAMINE SULFATE 6.25; 6.25; 6.25; 6.25 MG/1; MG/1; MG/1; MG/1
25 CAPSULE, EXTENDED RELEASE ORAL DAILY
Qty: 30 CAPSULE | Refills: 0 | Status: SHIPPED | OUTPATIENT
Start: 2023-02-23 | End: 2024-02-23

## 2023-02-23 NOTE — TELEPHONE ENCOUNTER
Rx Refill Note  Requested Prescriptions     Pending Prescriptions Disp Refills   • amphetamine-dextroamphetamine XR (ADDERALL XR) 25 MG 24 hr capsule 30 capsule 0     Sig: Take 1 capsule by mouth Daily      Last office visit with prescribing clinician: 1/9/2023      Next office visit with prescribing clinician: 3/8/2023            Monica Araujo MA  02/23/23, 14:40 EST

## 2023-03-06 DIAGNOSIS — F31.30 BIPOLAR I DISORDER, MOST RECENT EPISODE DEPRESSED: Chronic | ICD-10-CM

## 2023-03-06 RX ORDER — LAMOTRIGINE 100 MG/1
100 TABLET ORAL NIGHTLY
Qty: 30 TABLET | Refills: 2 | Status: SHIPPED | OUTPATIENT
Start: 2023-03-06 | End: 2023-03-09 | Stop reason: SDUPTHER

## 2023-03-06 NOTE — TELEPHONE ENCOUNTER
Rx Refill Note  Requested Prescriptions     Pending Prescriptions Disp Refills   • lamoTRIgine (LaMICtal) 100 MG tablet 30 tablet 2     Sig: Take 1 tablet by mouth Every Night.      Last office visit with prescribing clinician: 1/9/2023      Next office visit with prescribing clinician: 3/8/2023            Monica Araujo MA  03/06/23, 14:03 EST

## 2023-03-09 ENCOUNTER — OFFICE VISIT (OUTPATIENT)
Dept: PSYCHIATRY | Facility: CLINIC | Age: 23
End: 2023-03-09
Payer: MEDICAID

## 2023-03-09 ENCOUNTER — TELEPHONE (OUTPATIENT)
Dept: PSYCHIATRY | Facility: CLINIC | Age: 23
End: 2023-03-09
Payer: MEDICAID

## 2023-03-09 DIAGNOSIS — Z63.4 BEREAVEMENT: ICD-10-CM

## 2023-03-09 DIAGNOSIS — F41.1 GENERALIZED ANXIETY DISORDER: ICD-10-CM

## 2023-03-09 DIAGNOSIS — F31.30 BIPOLAR I DISORDER, MOST RECENT EPISODE DEPRESSED: Primary | ICD-10-CM

## 2023-03-09 DIAGNOSIS — F31.30 BIPOLAR I DISORDER, MOST RECENT EPISODE DEPRESSED: Chronic | ICD-10-CM

## 2023-03-09 PROCEDURE — 1159F MED LIST DOCD IN RCRD: CPT | Performed by: COUNSELOR

## 2023-03-09 PROCEDURE — 90837 PSYTX W PT 60 MINUTES: CPT | Performed by: COUNSELOR

## 2023-03-09 PROCEDURE — 1160F RVW MEDS BY RX/DR IN RCRD: CPT | Performed by: COUNSELOR

## 2023-03-09 RX ORDER — LAMOTRIGINE 150 MG/1
150 TABLET ORAL NIGHTLY
Qty: 30 TABLET | Refills: 2 | Status: SHIPPED | OUTPATIENT
Start: 2023-03-09

## 2023-03-09 SDOH — SOCIAL STABILITY - SOCIAL INSECURITY: DISSAPEARANCE AND DEATH OF FAMILY MEMBER: Z63.4

## 2023-03-09 NOTE — TELEPHONE ENCOUNTER
Ruby Horta requested an increase in her Lamotrigine while meeting with Ruby. Can you let her know I increased and sent to [facility]? Thank you.

## 2023-03-09 NOTE — PROGRESS NOTES
Date:2023   Patient Name: Ruby Ramos  : 2000   MRN: [id number]   Time IN: 12:36 PM    Time OUT: 1:51 PM     Referring Provider: Isamar Leon APRN    PROGRESS NOTE    History of Present Illness:   Ruby Ramos is a 22 y.o. female who is being seen today for follow up individual Psychotherapy session.     Chief Complaint: Pts father passed away recently suddenly due to infection going on life support and heart failure.  Pt reports she has been up and down with her moods.  Pt feels that she can see signs of a manic episode starting or a low mood as well and is having somewhat more self control. Pt feels that Lamictal is helping.  Pt feels that this is the most stable she has been.  Pt reports a month ago during a depression episode she quit her job.  Pt feels that this may be both a positive and negative change.  Pt reports Lunesta is no longer helping.  Pt is getting her cortisol levels tested.  Pts doctor is working to rule out PCOS.  Pt reports social anxiety affects her ability to communicate with others and go into stores.             ICD-10-CM ICD-9-CM   1. Bipolar I disorder, most recent episode depressed (HCC)  F31.30 296.50   2. Generalized anxiety disorder  F41.1 300.02   3. Bereavement  Z63.4 V62.82        Clinical Maneuvering/Intervention:   Assisted patient in processing above session content; acknowledged and normalized patient’s thoughts, feelings, and concerns to build appropriate rapport and a positive therapeutic relationship with open and honest communication.  Processed and rationalized patients thoughts and feelings regarding grief/current MH functioning.  Discussed triggers associated with patient's anxiety/grief/miri/depression.  Also discussed coping skills for patient to implement such as reframing negative thoughts, pushing self to engage in anxiety provoking activities such as going to stores/talking with others.  Therapist recommended that pt not making any  major decisions during a manic or depressive episode and to not taking medications without provider recommendation        Allowed patient to freely discuss issues without interruption or judgment. Provided safe, confidential environment to facilitate the development of positive therapeutic relationship and encourage open, honest communication. Assisted patient in identifying risk factors which would indicate the need for higher level of care including thoughts to harm self or others and/or self-harming behavior and encouraged patient to contact this office, call 911, or present to the nearest emergency room should any of these events occur. Discussed crisis intervention services and means to access. Patient adamantly and convincingly denies current suicidal or homicidal ideation or perceptual disturbance.      Mental Status Exam:   Hygiene:   good  Cooperation:  Cooperative  Eye Contact:  Good  Psychomotor Behavior:  Appropriate  Affect:  Appropriate  Mood: normal  Speech:  Normal  Thought Process:  Goal directed and Linear  Thought Content:  Normal and Mood congruent  Suicidal:  None  Homicidal:  None  Hallucinations:  None  Delusion:  None  Memory:  Intact  Orientation:  Person, Place, Time and Situation  Reliability:  good  Insight:  Good  Judgement:  Good  Impulse Control:  Fair  Physical/Medical Issues:  Yes    Patient's Support Network Includes:  extended family    Functional Status: Moderate impairment     Progress toward goal: Not at goal    Prognosis: Good with Ongoing Treatment     Medications:     Current Outpatient Medications:   •  amphetamine-dextroamphetamine (Adderall) 10 MG tablet, Take 10 mg orally every afternoon., Disp: 30 tablet, Rfl: 0  •  amphetamine-dextroamphetamine XR (ADDERALL XR) 25 MG 24 hr capsule, Take 1 capsule by mouth Daily, Disp: 30 capsule, Rfl: 0  •  busPIRone (BUSPAR) 10 MG tablet, Take 1 tablet by mouth 2 (Two) Times a Day., Disp: 60 tablet, Rfl: 2  •  Continuous Blood Gluc  "Sensor (Dexcom G6 Sensor), by Other route Every 10 (Ten) Days., Disp: 9 each, Rfl: 3  •  Continuous Blood Gluc Transmit (Dexcom G6 Transmitter) misc, 1 each by Other route Every 3 (Three) Months., Disp: 1 each, Rfl: 3  •  eszopiclone (Lunesta) 2 MG tablet, Take 1 tablet by mouth At Night As Needed for Sleep. Take immediately before bedtime, Disp: 30 tablet, Rfl: 1  •  glucose blood test strip, Four times daily, Disp: 400 each, Rfl: 3  •  insulin aspart (NovoLOG FlexPen) 100 UNIT/ML solution pen-injector sc pen, 1 unit for every 5 grams of carbs and 1:20>140, MDD 45 units, Disp: 45 mL, Rfl: 1  •  Insulin Glargine (BASAGLAR KWIKPEN) 100 UNIT/ML injection pen, Inject 14 Units under the skin into the appropriate area as directed Every Night., Disp: 15 mL, Rfl: 1  •  Insulin Pen Needle 32G X 4 MM misc, 1 each 4 (Four) Times a Day., Disp: 400 each, Rfl: 3  •  lamoTRIgine (LaMICtal) 100 MG tablet, Take 1 tablet by mouth Every Night., Disp: 30 tablet, Rfl: 2  •  multivitamin with minerals tablet tablet, Take 1 tablet by mouth Daily., Disp: , Rfl:   •  ondansetron ODT (Zofran ODT) 4 MG disintegrating tablet, Place 1 tablet on the tongue Every 8 (Eight) Hours As Needed for Nausea., Disp: 15 tablet, Rfl: 1  •  True Comfort Twist Top Lancets misc, 1 Device 4 (Four) Times a Day., Disp: 400 each, Rfl: 3    Visit Diagnosis/Orders Placed This Visit:    ICD-10-CM ICD-9-CM   1. Bipolar I disorder, most recent episode depressed (HCC)  F31.30 296.50   2. Generalized anxiety disorder  F41.1 300.02   3. Bereavement  Z63.4 V62.82        PLAN:  1. Safety: No acute safety concerns  2. Risk Assessment: Risk of self-harm acutely is low. Risk of self-harm chronically is also low, but could be further elevated in the event of treatment noncompliance and/or AODA.    Crisis Plan:  Symptoms and/or behaviors to indicate a crisis: Excessive worry or fear, Feeling sad or low, Extreme mood changes; including uncontrollable \"highs\" or euphoria, " Isolation and Lack of sleep    What calming techniques or other strategies will patient use to de-escalate and stay safe: slow down, breathe, visualize calming self, think it though, listen to music, change focus, take a walk    Who is one person patient can contact to assist with de-escalation? Brother    Treatment Plan/Goals: Patient will continue supportive psychotherapy efforts and medication regimen as prescribed. Therapist will provide Cognitive Behavioral Therapy to assist patient in improving functioning and gaining coping skills, maintaining stability, and avoiding decompensation and the need for higher level of care. Plan for treatment was discussed during today's visit. Patient acknowledged and verbally consented to continue with current treatment plan and was educated on the importance of compliance with treatment and follow-up appointments.     Patient will contact this office, call 911 or present to the nearest emergency room should suicidal or homicidal ideations occur.     Follow Up:   Return in about 4 weeks (around 4/6/2023).      JIM Og   [facility]     This document has been electronically signed by JIM Og   March 9, 2023 13:55 EST

## 2023-03-14 LAB
BUN SERPL-MCNC: 8 MG/DL (ref 6–20)
BUN/CREAT SERPL: 11.9 (ref 7–25)
CALCIUM SERPL-MCNC: 9.9 MG/DL (ref 8.6–10.5)
CHLORIDE SERPL-SCNC: 100 MMOL/L (ref 98–107)
CO2 SERPL-SCNC: 24.4 MMOL/L (ref 22–29)
CREAT SERPL-MCNC: 0.67 MG/DL (ref 0.57–1)
EGFRCR SERPLBLD CKD-EPI 2021: 126.9 ML/MIN/1.73
GLUCOSE SERPL-MCNC: 260 MG/DL (ref 65–99)
POTASSIUM SERPL-SCNC: 4.9 MMOL/L (ref 3.5–5.2)
SODIUM SERPL-SCNC: 136 MMOL/L (ref 136–145)

## 2023-03-19 LAB — CORTIS SAL-MCNC: 0.32 UG/DL

## 2023-03-20 DIAGNOSIS — R79.89 ELEVATED DEHYDROEPIANDROSTERONE (DHEA) LEVEL: Primary | ICD-10-CM

## 2023-04-02 LAB — CORTIS SAL-MCNC: 0.04 UG/DL

## 2023-04-06 ENCOUNTER — OFFICE VISIT (OUTPATIENT)
Dept: PSYCHIATRY | Facility: CLINIC | Age: 23
End: 2023-04-06
Payer: MEDICAID

## 2023-04-06 DIAGNOSIS — F41.1 GENERALIZED ANXIETY DISORDER: ICD-10-CM

## 2023-04-06 DIAGNOSIS — Z63.4 BEREAVEMENT: ICD-10-CM

## 2023-04-06 DIAGNOSIS — F31.9 BIPOLAR 1 DISORDER: Primary | ICD-10-CM

## 2023-04-06 DIAGNOSIS — F90.2 ATTENTION DEFICIT HYPERACTIVITY DISORDER, COMBINED TYPE: ICD-10-CM

## 2023-04-06 PROCEDURE — 90837 PSYTX W PT 60 MINUTES: CPT | Performed by: COUNSELOR

## 2023-04-06 PROCEDURE — 1160F RVW MEDS BY RX/DR IN RCRD: CPT | Performed by: COUNSELOR

## 2023-04-06 PROCEDURE — 1159F MED LIST DOCD IN RCRD: CPT | Performed by: COUNSELOR

## 2023-04-06 SDOH — SOCIAL STABILITY - SOCIAL INSECURITY: DISSAPEARANCE AND DEATH OF FAMILY MEMBER: Z63.4

## 2023-04-06 NOTE — PROGRESS NOTES
Date:2023   Patient Name: Ruby Ramos  : 2000   MRN: [id number]   Time IN: 11:37 AM    Time OUT: 12:46 PM      Referring Provider: Isamar Leon APRN    PROGRESS NOTE    History of Present Illness:   Ruby Ramos is a 22 y.o. female who is being seen today for follow up individual Psychotherapy session.     Chief Complaint:  Pt struggles with bipolar depression, anxiety and ADHD.  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  Pt reports work is helping with socializing and building confidence.  Blood work showed that cortisol was high however follow up blood work showed normal levels.  Pt is taking lamictal 150.  Pt reports two depressive episodes since last session however they weren't as low as usual. Pt reports sleep has improved.  Anxiety remains high however pt is not taking buspar daily.  Pt reports still getting emotional at times when thinking about her father who passed.   Pt reports she is trying to be more mindful of hygiene.         ICD-10-CM ICD-9-CM   1. Bipolar 1 disorder  F31.9 296.7   2. Generalized anxiety disorder  F41.1 300.02   3. Attention deficit hyperactivity disorder, combined type  F90.2 314.01   4. Bereavement  Z63.4 V62.82      Clinical Maneuvering/Intervention:   Assisted patient in processing above session content; acknowledged and normalized patient’s thoughts, feelings, and concerns to build appropriate rapport and a positive therapeutic relationship with open and honest communication.  Processed and rationalized patients thoughts and feelings regarding starting new job, grief, current MH functioning and how she is managing.  Discussed triggers associated with patient's anxiety/depression/grief.  Also discussed coping skills for patient to implement such as developing a daily routine, engaging in self-care, reframing negative thoughts, continue to allow self to appropriately grief loss, continue with goal setting  and looking forward.    Allowed patient to freely discuss issues without interruption or judgment. Provided safe, confidential environment to facilitate the development of positive therapeutic relationship and encourage open, honest communication. Assisted patient in identifying risk factors which would indicate the need for higher level of care including thoughts to harm self or others and/or self-harming behavior and encouraged patient to contact this office, call 911, or present to the nearest emergency room should any of these events occur. Discussed crisis intervention services and means to access. Patient adamantly and convincingly denies current suicidal or homicidal ideation or perceptual disturbance.    Mental Status Exam:   Hygiene:   good  Cooperation:  Cooperative  Eye Contact:  Good  Psychomotor Behavior:  Appropriate  Affect:  Appropriate  Mood: normal  Speech:  Normal  Thought Process:  Goal directed and Linear  Thought Content:  Normal  Suicidal:  None  Homicidal:  None  Hallucinations:  None  Delusion:  None  Memory:  Intact  Orientation:  Person, Place, Time and Situation  Reliability:  good  Insight:  Good  Judgement:  Good  Impulse Control:  Good  Physical/Medical Issues:  Yes    Patient's Support Network Includes:  mother and extended family    Functional Status: Moderate impairment     Progress toward goal: Not at goal    Prognosis: Good with Ongoing Treatment     Medications:     Current Outpatient Medications:   •  amphetamine-dextroamphetamine (Adderall) 10 MG tablet, Take 10 mg orally every afternoon., Disp: 30 tablet, Rfl: 0  •  amphetamine-dextroamphetamine XR (ADDERALL XR) 25 MG 24 hr capsule, Take 1 capsule by mouth Daily, Disp: 30 capsule, Rfl: 0  •  busPIRone (BUSPAR) 10 MG tablet, Take 1 tablet by mouth 2 (Two) Times a Day., Disp: 60 tablet, Rfl: 2  •  Continuous Blood Gluc Sensor (Dexcom G6 Sensor), by Other route Every 10 (Ten) Days., Disp: 9 each, Rfl: 3  •  Continuous Blood Gluc  "Transmit (Dexcom G6 Transmitter) misc, 1 each by Other route Every 3 (Three) Months., Disp: 1 each, Rfl: 3  •  eszopiclone (Lunesta) 2 MG tablet, Take 1 tablet by mouth At Night As Needed for Sleep. Take immediately before bedtime, Disp: 30 tablet, Rfl: 1  •  glucose blood test strip, Four times daily, Disp: 400 each, Rfl: 3  •  insulin aspart (NovoLOG FlexPen) 100 UNIT/ML solution pen-injector sc pen, 1 unit for every 5 grams of carbs and 1:20>140, MDD 45 units, Disp: 45 mL, Rfl: 1  •  Insulin Glargine (BASAGLAR KWIKPEN) 100 UNIT/ML injection pen, Inject 14 Units under the skin into the appropriate area as directed Every Night., Disp: 15 mL, Rfl: 1  •  Insulin Pen Needle 32G X 4 MM misc, 1 each 4 (Four) Times a Day., Disp: 400 each, Rfl: 3  •  lamoTRIgine (LaMICtal) 150 MG tablet, Take 1 tablet by mouth Every Night., Disp: 30 tablet, Rfl: 2  •  multivitamin with minerals tablet tablet, Take 1 tablet by mouth Daily., Disp: , Rfl:   •  ondansetron ODT (Zofran ODT) 4 MG disintegrating tablet, Place 1 tablet on the tongue Every 8 (Eight) Hours As Needed for Nausea., Disp: 15 tablet, Rfl: 1  •  True Comfort Twist Top Lancets [facility], 1 Device 4 (Four) Times a Day., Disp: 400 each, Rfl: 3    Visit Diagnosis/Orders Placed This Visit:    ICD-10-CM ICD-9-CM   1. Bipolar 1 disorder  F31.9 296.7   2. Generalized anxiety disorder  F41.1 300.02   3. Attention deficit hyperactivity disorder, combined type  F90.2 314.01   4. Bereavement  Z63.4 V62.82        PLAN:  1. Safety: No acute safety concerns  2. Risk Assessment: Risk of self-harm acutely is low. Risk of self-harm chronically is also low, but could be further elevated in the event of treatment noncompliance and/or AODA.    Crisis Plan:  Symptoms and/or behaviors to indicate a crisis: Excessive worry or fear, Feeling sad or low, Extreme mood changes; including uncontrollable \"highs\" or euphoria, Lack of sleep and Abuse of substances    What calming techniques or other " strategies will patient use to de-escalate and stay safe: slow down, breathe, visualize calming self, think it though, listen to music, change focus, take a walk    Who is one person patient can contact to assist with de-escalation? brother    Treatment Plan/Goals: Patient will continue supportive psychotherapy efforts and medication regimen as prescribed. Therapist will provide Cognitive Behavioral Therapy to assist patient in improving functioning and gaining coping skills, maintaining stability, and avoiding decompensation and the need for higher level of care. Plan for treatment was discussed during today's visit. Patient acknowledged and verbally consented to continue with current treatment plan and was educated on the importance of compliance with treatment and follow-up appointments.     Patient will contact this office, call 911 or present to the nearest emergency room should suicidal or homicidal ideations occur.     Follow Up:   Return in about 4 weeks (around 5/4/2023) for Therapy session.      JIM Og   [facility]     This document has been electronically signed by JIM Og   April 6, 2023 12:59 EDT

## 2023-04-10 NOTE — PROGRESS NOTES
"Chief Complaint  Bipolar I disorder, ADHD, anxiety, insomnia, bereavement, and alcohol use disorder, moderate, in sustained remission      Subjective          Ruby Ramos presents to [facility] by herself for a follow up and medication check.    History of Present Illness: Ruby states, \"I have been good.\" Ruby tells me she has been more depressed over the weekend than she has in a long time. She endorsed passive, fleeting suicidal ideations such as \"I don't want to be here\" and \"I don't want to deal with this anymore.\" She adamantly denies intent or plan for suicide and tells me all weapons are in a safe with a lock and she cannot access. She tells me her family is a protective factor against suicide. One reason she feels down is losing her father a few months ago and states, \"it hit me hard this weekend.\" She tells me it was sudden and they had been close so she is grieving.  She denies any cravings for alcohol and she has remained sober from alcohol. She tells me her anxiety is constant and she is not getting relief. The anxiety is aggravated in social situations and she thinks constantly about what she will say to others and feels she has to keep two steps ahead in a conversation. She uses deep breathing and listening to music to help anxiety. 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 This is helping. She reports good focus, concentration, and attention with her stimulant. She is not using the IR dose. She   She is compliant with her psychiatric medications. She is taking Adderall XR, Buspar, and lamotrigine. She denies any side effects. She denies any SI/HI/AVH.    Current Medications:   Current Outpatient Medications   Medication Sig Dispense Refill   • amphetamine-dextroamphetamine (Adderall) 10 MG tablet Take 10 mg orally every afternoon. 30 tablet 0   • amphetamine-dextroamphetamine XR (ADDERALL XR) 25 MG 24 hr capsule Take 1 capsule " "by mouth Daily 30 capsule 0   • busPIRone (BUSPAR) 15 MG tablet Take 1 tablet by mouth 3 (Three) Times a Day. 90 tablet 2   • Continuous Blood Gluc Sensor (Dexcom G6 Sensor) by Other route Every 10 (Ten) Days. 9 each 3   • Continuous Blood Gluc Transmit (Dexcom G6 Transmitter) misc 1 each by Other route Every 3 (Three) Months. 1 each 3   • glucose blood test strip Four times daily 400 each 3   • insulin aspart (NovoLOG FlexPen) 100 UNIT/ML solution pen-injector sc pen 1 unit for every 5 grams of carbs and 1:20>140, MDD 45 units 45 mL 1   • Insulin Glargine (BASAGLAR KWIKPEN) 100 UNIT/ML injection pen Inject 14 Units under the skin into the appropriate area as directed Every Night. 15 mL 1   • Insulin Pen Needle 32G X 4 MM misc 1 each 4 (Four) Times a Day. 400 each 3   • lamoTRIgine (LaMICtal) 150 MG tablet Take 1 tablet by mouth Every Night. 30 tablet 2   • multivitamin with minerals tablet tablet Take 1 tablet by mouth Daily.     • ondansetron ODT (Zofran ODT) 4 MG disintegrating tablet Place 1 tablet on the tongue Every 8 (Eight) Hours As Needed for Nausea. 15 tablet 1   • True Comfort Twist Top Lancets misc 1 Device 4 (Four) Times a Day. 400 each 3     No current facility-administered medications for this visit.         Objective   Vital Signs:   /72   Pulse 76   Ht 157.5 cm (62\")   Wt 76.2 kg (168 lb)   BMI 30.73 kg/m²     Physical Exam  Vitals and nursing note reviewed.   Constitutional:       Appearance: Normal appearance. She is well-developed and normal weight.   Musculoskeletal:         General: Normal range of motion.   Skin:     General: Skin is warm and dry.   Neurological:      General: No focal deficit present.      Mental Status: She is alert and oriented to person, place, and time.   Psychiatric:         Attention and Perception: Attention normal.         Mood and Affect: Mood is depressed.         Speech: Speech normal.         Behavior: Behavior normal. Behavior is cooperative.         " Thought Content: Thought content includes suicidal (passive without intent or plan) ideation.         Cognition and Memory: Cognition normal.         Judgment: Judgment normal.        Result Review :     The following data was reviewed by: SONIYA Jay on 04/12/2023:  DHEA-Sulfate (12/14/2022 10:05)  Salivary Cortisol, MS - , (03/24/2023 16:56)    Office Visit with Ruby [facility]de [facility] (04/06/2023)  Office Visit with Ruby [facility] (03/09/2023)           Assessment and Plan    Diagnoses and all orders for this visit:    1. Bipolar 1 disorder (Primary)    2. Generalized anxiety disorder  -     busPIRone (BUSPAR) 15 MG tablet; Take 1 tablet by mouth 3 (Three) Times a Day.  Dispense: 90 tablet; Refill: 2    3. Attention deficit hyperactivity disorder, combined type  -     amphetamine-dextroamphetamine XR (ADDERALL XR) 25 MG 24 hr capsule; Take 1 capsule by mouth Daily  Dispense: 30 capsule; Refill: 0    4. Bereavement    5. Other insomnia    6. Alcohol use disorder, moderate, in sustained remission         Mental Status Exam:   Hygiene:   good  Cooperation:  Cooperative  Eye Contact:  Good  Psychomotor Behavior:  Appropriate  Affect:  Appropriate  Mood: depressed  Speech:  Normal  Thought Process:  Goal directed and Linear  Thought Content:  Normal  Suicidal:  Suicidal Ideation and passive without intent or plan  Homicidal:  None  Hallucinations:  None  Delusion:  None  Memory:  Intact  Orientation:  Person, Place, Time and Situation  Reliability:  good  Insight:  Good  Judgement:  Good  Impulse Control:  Good  Physical/Medical Issues:  Yes Diabetes, celiac disease, headaches, back problems, hernia, and hair loss      PHQ-9 Score:   PHQ-9 Total Score: 14     Impression/Plan:  -This is a follow up and medication check. Ruby reports feeling down with passive suicidal ideations. She feels the loss of her father has hit her and she is grieving. She identifies family as protective against  suicide and tells me all weapons are locked in a safe and she has no access. She is anxious as well. 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 She inquires about what time she should take the stimulant which gives her good focus, concentration, and attention. She denies cravings for alcohol and maintains sobriety. She is motivated to adjust medications for improved anxiety. We discussed possible options including increasing Buspar further or adding a daily medication for serotonin effects or an as needed option. She feels increasing buspar will be helpful.  -Hold Adderall 10 mg daily for breakthrough ADHD symptoms.  Patient has refills.   -Increase Buspar to 15 mg 3 times daily as needed for anxiety.   -Continue lamotrigine 100 mg nightly for depression and anxiety. Patient has refills.   -Continue Adderall XR 20 mg daily for ADHD symptoms.  Patient will take about 9:30 PM.   -Continue therapy with Ruby.  -The JANI report, reviewed through PDMP, of the past 12 months were reviewed and is appropriate.  The patient/guardian reports taking the medication only as prescribed.  The patient/guardian denies any abuse or misuse of the medication.  The patient/guardian denies any other substance use or issues.  There are no apparent substance related issues.  The patient reports no side effects of the current medication usage.  The patient/guardian has reported significant improvement with medication usage and wishes to continue medication as prescribed.  The patient/guardian is appropriate to continue with current medication usage at this time.  Reinforced risks and side effects of medication usage, patient and/or guardian verbalize understanding in their own words and are in agreement with current plan.  -Last UDS 1/9/23. Positive for amphetamine as expected.      MEDS ORDERED DURING VISIT:  New Medications Ordered This Visit   Medications   • busPIRone (BUSPAR) 15 MG tablet     Sig: Take 1 tablet by  mouth 3 (Three) Times a Day.     Dispense:  90 tablet     Refill:  2   • amphetamine-dextroamphetamine XR (ADDERALL XR) 25 MG 24 hr capsule     Sig: Take 1 capsule by mouth Daily     Dispense:  30 capsule     Refill:  0         Follow Up   Return in about 2 months (around 6/12/2023) for Medication Check.  Patient was given instructions and counseling regarding her condition or for health maintenance advice. Please see specific information pulled into the AVS if appropriate.       TREATMENT PLAN/GOALS: Continue supportive psychotherapy efforts and medications as indicated. Treatment and medication options discussed during today's visit. Patient acknowledged and verbally consented to continue with current treatment plan and was educated on the importance of compliance with treatment and follow-up appointments.    MEDICATION ISSUES:  Discussed medication options and treatment plan of prescribed medication as well as the risks, benefits, and side effects including potential falls, possible impaired driving and metabolic adversities among others. Patient is agreeable to call the office with any worsening of symptoms or onset of side effects. Patient is agreeable to call 911 or go to the nearest ER should he/she begin having SI/HI.        This document has been electronically signed by SONIYA Valadez, PMHNP-BC  April 12, 2023 21:07 EDT    Part of this note may be an electronic transcription/translation of spoken language to printed text using the Dragon Dictation System.

## 2023-04-12 ENCOUNTER — OFFICE VISIT (OUTPATIENT)
Dept: PSYCHIATRY | Facility: CLINIC | Age: 23
End: 2023-04-12
Payer: MEDICAID

## 2023-04-12 VITALS
DIASTOLIC BLOOD PRESSURE: 72 MMHG | WEIGHT: 168 LBS | SYSTOLIC BLOOD PRESSURE: 116 MMHG | HEART RATE: 76 BPM | BODY MASS INDEX: 30.91 KG/M2 | HEIGHT: 62 IN

## 2023-04-12 DIAGNOSIS — F31.9 BIPOLAR 1 DISORDER: Primary | Chronic | ICD-10-CM

## 2023-04-12 DIAGNOSIS — F41.1 GENERALIZED ANXIETY DISORDER: Chronic | ICD-10-CM

## 2023-04-12 DIAGNOSIS — F90.2 ATTENTION DEFICIT HYPERACTIVITY DISORDER, COMBINED TYPE: Chronic | ICD-10-CM

## 2023-04-12 DIAGNOSIS — G47.09 OTHER INSOMNIA: Chronic | ICD-10-CM

## 2023-04-12 DIAGNOSIS — Z63.4 BEREAVEMENT: ICD-10-CM

## 2023-04-12 DIAGNOSIS — F10.21 ALCOHOL USE DISORDER, MODERATE, IN SUSTAINED REMISSION: ICD-10-CM

## 2023-04-12 PROCEDURE — 99214 OFFICE O/P EST MOD 30 MIN: CPT | Performed by: NURSE PRACTITIONER

## 2023-04-12 PROCEDURE — 1159F MED LIST DOCD IN RCRD: CPT | Performed by: NURSE PRACTITIONER

## 2023-04-12 PROCEDURE — 1160F RVW MEDS BY RX/DR IN RCRD: CPT | Performed by: NURSE PRACTITIONER

## 2023-04-12 RX ORDER — BUSPIRONE HYDROCHLORIDE 15 MG/1
15 TABLET ORAL 3 TIMES DAILY
Qty: 90 TABLET | Refills: 2
Start: 2023-04-12

## 2023-04-12 RX ORDER — DEXTROAMPHETAMINE SACCHARATE, AMPHETAMINE ASPARTATE MONOHYDRATE, DEXTROAMPHETAMINE SULFATE AND AMPHETAMINE SULFATE 6.25; 6.25; 6.25; 6.25 MG/1; MG/1; MG/1; MG/1
25 CAPSULE, EXTENDED RELEASE ORAL DAILY
Qty: 30 CAPSULE | Refills: 0 | Status: SHIPPED | OUTPATIENT
Start: 2023-04-12 | End: 2024-04-11

## 2023-04-12 SDOH — SOCIAL STABILITY - SOCIAL INSECURITY: DISSAPEARANCE AND DEATH OF FAMILY MEMBER: Z63.4

## 2023-04-20 ENCOUNTER — OFFICE VISIT (OUTPATIENT)
Dept: ENDOCRINOLOGY | Facility: CLINIC | Age: 23
End: 2023-04-20
Payer: MEDICAID

## 2023-04-20 VITALS
BODY MASS INDEX: 30.18 KG/M2 | DIASTOLIC BLOOD PRESSURE: 64 MMHG | SYSTOLIC BLOOD PRESSURE: 102 MMHG | HEIGHT: 62 IN | HEART RATE: 135 BPM | OXYGEN SATURATION: 99 % | WEIGHT: 164 LBS

## 2023-04-20 DIAGNOSIS — E28.8 HYPERANDROGENISM: ICD-10-CM

## 2023-04-20 DIAGNOSIS — E10.65 TYPE 1 DIABETES MELLITUS WITH HYPERGLYCEMIA: Primary | ICD-10-CM

## 2023-04-20 LAB
EXPIRATION DATE: NORMAL
EXPIRATION DATE: NORMAL
GLUCOSE BLDC GLUCOMTR-MCNC: 88 MG/DL (ref 70–130)
HBA1C MFR BLD: 5.6 %
Lab: NORMAL
Lab: NORMAL

## 2023-04-20 RX ORDER — INSULIN ASPART 100 [IU]/ML
INJECTION, SOLUTION INTRAVENOUS; SUBCUTANEOUS
Qty: 45 ML | Refills: 1 | Status: SHIPPED | OUTPATIENT
Start: 2023-04-20

## 2023-04-20 RX ORDER — INSULIN GLARGINE 100 [IU]/ML
12 INJECTION, SOLUTION SUBCUTANEOUS NIGHTLY
Qty: 15 ML | Refills: 1 | Status: SHIPPED | OUTPATIENT
Start: 2023-04-20

## 2023-04-20 NOTE — PROGRESS NOTES
Chief Complaint   Patient presents with   • Diabetes          HPI   Ruby Ramos is a 22 y.o. female had concerns including Diabetes.    She is checking blood sugars 4+ times per day with CGM. Data reviewed from the last two weeks shows average glucose 156 with standard deviation of 48. In target range 75%, high 19%, very high 5%, low less than 1%, very low less than 1%.   Pattern of: Postprandial hyperglycemia, the last few days having more hypoglycemia and BG is trending lower    Current medications for diabetes include basaglar 14 units and novolog 1 unit for every 5 grams of carbs plus 1:20>140.    Sleep is poor. Is now working night shift because she wasn't sleeping well.     Is struggling with weight management. Just started monitoring her intake a few days ago and BGs are running a bit low.  Has had to snack on high sugar-containing snacks to get BGs back up. Limiting to 2000 calories and healthy eating. Exercising 5 days per week - weights and cardio.     Has struggled with acne. In the past was having cystic acne on hips and back but that has improved.     Hair loss started in the last month. Last had COVID a long time ago. Her father passed recently, in February.     The following portions of the patient's history were reviewed and updated as appropriate: allergies, current medications, past family history, past medical history, past social history, past surgical history and problem list.      Review of Systems   Constitutional: Positive for fatigue.   Endocrine:        Hair loss  See HPI        Physical Exam  Vitals reviewed.   Constitutional:       Appearance: Normal appearance.   Cardiovascular:      Rate and Rhythm: Tachycardia present.      Pulses:           Dorsalis pedis pulses are 2+ on the right side and 2+ on the left side.   Pulmonary:      Effort: Pulmonary effort is normal.   Feet:      Right foot:      Skin integrity: Skin integrity normal.      Toenail Condition: Right toenails are normal.  "     Left foot:      Skin integrity: Skin integrity normal.      Toenail Condition: Left toenails are normal.      Comments: Diabetic Foot Exam Performed and Monofilament Test Performed    Monofilament 5/5 bilaterally  Neurological:      General: No focal deficit present.      Mental Status: She is alert. Mental status is at baseline.   Psychiatric:         Mood and Affect: Mood normal.         Behavior: Behavior normal.        /64   Pulse (!) 135   Ht 157.5 cm (62\")   Wt 74.4 kg (164 lb)   SpO2 99%   BMI 30.00 kg/m²      Labs and imaging    CMP:  Lab Results   Component Value Date    BUN 8 03/13/2023    CREATININE 0.67 03/13/2023    EGFRIFNONA 96 08/03/2021    BCR 11.9 03/13/2023     03/13/2023    K 4.9 03/13/2023    CO2 24.4 03/13/2023    CALCIUM 9.9 03/13/2023    PROTENTOTREF 7.1 12/14/2022    ALBUMIN 5.30 (H) 12/14/2022    LABGLOBREF 1.8 12/14/2022    LABIL2 2.9 12/14/2022    BILITOT 0.5 12/14/2022    ALKPHOS 63 12/14/2022    AST 18 12/14/2022    ALT 16 12/14/2022     Lipid Panel:  Lab Results   Component Value Date    TRIG 54 12/14/2022    HDL 66 (H) 12/14/2022    VLDL 11 12/14/2022    LDL 84 12/14/2022     HbA1c:  Lab Results   Component Value Date    HGBA1C 5.6 04/20/2023    HGBA1C 5.9 01/18/2023     Glucose:    Lab Results   Component Value Date    POCGLU 88 04/20/2023     Microalbumin:  Lab Results   Component Value Date    MALBCRERATIO 6 12/16/2022     TSH:  Lab Results   Component Value Date    TSH 2.870 12/14/2022     Lab Results   Component Value Date    DHEASO4 612.0 (H) 01/13/2023    DHEASO4 696.0 (H) 12/14/2022    17HYDROXY 38 12/14/2022     Lab Results   Component Value Date    CORTSAL 0.044 03/24/2023    CORTSAL 0.324 03/13/2023         CT SCAN OF THE ABDOMEN AND PELVIS WITH/WITHOUT CONTRAST    2/3/2023 7:33  PM      HISTORY: Elevated DHEAS.     COMPARISON: None     PROCEDURE: Axial images were obtained from the lung bases to the pubic  symphysis by computed tomography, before and " after IV contrast infusion.  Delayed imaging was also obtained. Adrenal protocol. This study was  performed with techniques to keep radiation doses as low as reasonably  achievable, (ALARA). Individualized dose reduction techniques using  automated exposure control or adjustment of mA and/or kV according to  the patient size were employed.     FINDINGS:      Clear lung bases.     Liver, spleen, gallbladder, pancreas, and kidneys are   normal.      Both adrenal glands are normal. No nodule.     Gastrointestinal tract is unremarkable other than moderate stool in the  ascending and transverse colon. Normal appendix. No free fluid or  adenopathy.     2.4 cm physiologic left ovarian follicle with trace free fluid in the  cul-de-sac. Urinary bladder is unremarkable. Osseous structures are  unremarkable.        IMPRESSION:  Normal CT abdomen/pelvis. No adrenal tumor identified.     This report was signed and finalized on 2/4/2023 12:16 PM by Dr Eusebio Da Silva DO.      Assessment and plan  Diagnoses and all orders for this visit:    1. Type 1 diabetes mellitus with hyperglycemia (Primary)  Mostly controlled with A1c today 5.6, estimated A1c per Dexcom 7.0.  No complications from diabetes.  She has postprandial hyperglycemia, in the last few nights has had hypoglycemia as well since changing her diet.  Decrease Basaglar to 12 units nightly.  Continue NovoLog 1: 5 g carbs +1: 20 greater than 140.  Patient is considered OmniPod but does not want a pump at this time.  Continue Dexcom.  Labs are up-to-date from December.  Ophtho exam is due and patient was encouraged to schedule.  Monofilament updated today.  -     POC Glucose, Blood  -     POC Glycosylated Hemoglobin (Hb A1C)  -     insulin aspart (NovoLOG FlexPen) 100 UNIT/ML solution pen-injector sc pen; 1 unit for every 5 grams of carbs and 1:20>140, MDD 45 units  Dispense: 45 mL; Refill: 1  -     Insulin Glargine (BASAGLAR KWIKPEN) 100 UNIT/ML injection pen; Inject 12 Units  under the skin into the appropriate area as directed Every Night.  Dispense: 15 mL; Refill: 1    2.  Hyperandrogenism  With cystic acne, hair loss (which is more acute and may be unrelated).  Menses have been regular, a bit lighter in recent months.  Testing confirmed elevated DHEA-S, normal testosterone and 17 hydroxyprogesterone.  CT imaging of the abdomen showed no adrenal abnormality.    We discussed the possibility of adding spironolactone but she is not on a reliable contraceptive, therefore, this is not an option.  In addition, her acne has improved and I suspect hair loss is multifactorial related to stress, inadequate sleep.         Return in about 4 months (around 8/20/2023) for next scheduled follow up. The patient was instructed to contact the clinic with any interval questions or concerns.    Layla Corral, DO   Endocrinologist    Please note that portions of this note were completed with a voice recognition program.

## 2023-05-21 ENCOUNTER — HOSPITAL ENCOUNTER (EMERGENCY)
Facility: HOSPITAL | Age: 23
Discharge: HOME | End: 2023-05-22
Attending: EMERGENCY MEDICINE | Admitting: EMERGENCY MEDICINE
Payer: MEDICAID

## 2023-05-21 DIAGNOSIS — F32.A DEPRESSION, UNSPECIFIED DEPRESSION TYPE: Primary | ICD-10-CM

## 2023-05-21 LAB
ALBUMIN SERPL-MCNC: 4.8 G/DL (ref 3.5–5.2)
ALBUMIN/GLOB SERPL: 1.9 G/DL
ALP SERPL-CCNC: 78 U/L (ref 39–117)
ALT SERPL W P-5'-P-CCNC: 17 U/L (ref 1–33)
ANION GAP SERPL CALCULATED.3IONS-SCNC: 15.3 MMOL/L (ref 5–15)
APAP SERPL-MCNC: <5 MCG/ML (ref 0–30)
AST SERPL-CCNC: 14 U/L (ref 1–32)
B-HCG UR QL: NEGATIVE
BASOPHILS # BLD AUTO: 0.08 10*3/MM3 (ref 0–0.2)
BASOPHILS NFR BLD AUTO: 0.6 % (ref 0–1.5)
BILIRUB SERPL-MCNC: 0.4 MG/DL (ref 0–1.2)
BUN SERPL-MCNC: 19 MG/DL (ref 6–20)
BUN/CREAT SERPL: 21.8 (ref 7–25)
CALCIUM SPEC-SCNC: 9.3 MG/DL (ref 8.6–10.5)
CHLORIDE SERPL-SCNC: 97 MMOL/L (ref 98–107)
CO2 SERPL-SCNC: 21.7 MMOL/L (ref 22–29)
CREAT SERPL-MCNC: 0.87 MG/DL (ref 0.57–1)
DEPRECATED RDW RBC AUTO: 37.9 FL (ref 37–54)
EGFRCR SERPLBLD CKD-EPI 2021: 96.1 ML/MIN/1.73
EOSINOPHIL # BLD AUTO: 0.13 10*3/MM3 (ref 0–0.4)
EOSINOPHIL NFR BLD AUTO: 1 % (ref 0.3–6.2)
ERYTHROCYTE [DISTWIDTH] IN BLOOD BY AUTOMATED COUNT: 12.3 % (ref 12.3–15.4)
ETHANOL BLD-MCNC: <10 MG/DL (ref 0–10)
ETHANOL UR QL: <0.01 %
GLOBULIN UR ELPH-MCNC: 2.5 GM/DL
GLUCOSE SERPL-MCNC: 262 MG/DL (ref 65–99)
HCT VFR BLD AUTO: 36.2 % (ref 34–46.6)
HGB BLD-MCNC: 13 G/DL (ref 12–15.9)
IMM GRANULOCYTES # BLD AUTO: 0.05 10*3/MM3 (ref 0–0.05)
IMM GRANULOCYTES NFR BLD AUTO: 0.4 % (ref 0–0.5)
LYMPHOCYTES # BLD AUTO: 2.18 10*3/MM3 (ref 0.7–3.1)
LYMPHOCYTES NFR BLD AUTO: 17.1 % (ref 19.6–45.3)
MAGNESIUM SERPL-MCNC: 1.6 MG/DL (ref 1.6–2.6)
MCH RBC QN AUTO: 30.5 PG (ref 26.6–33)
MCHC RBC AUTO-ENTMCNC: 35.9 G/DL (ref 31.5–35.7)
MCV RBC AUTO: 85 FL (ref 79–97)
MONOCYTES # BLD AUTO: 0.78 10*3/MM3 (ref 0.1–0.9)
MONOCYTES NFR BLD AUTO: 6.1 % (ref 5–12)
NEUTROPHILS NFR BLD AUTO: 74.8 % (ref 42.7–76)
NEUTROPHILS NFR BLD AUTO: 9.53 10*3/MM3 (ref 1.7–7)
NRBC BLD AUTO-RTO: 0 /100 WBC (ref 0–0.2)
PLATELET # BLD AUTO: 287 10*3/MM3 (ref 140–450)
PMV BLD AUTO: 9 FL (ref 6–12)
POTASSIUM SERPL-SCNC: 4 MMOL/L (ref 3.5–5.2)
PROT SERPL-MCNC: 7.3 G/DL (ref 6–8.5)
RBC # BLD AUTO: 4.26 10*6/MM3 (ref 3.77–5.28)
SALICYLATES SERPL-MCNC: <0.3 MG/DL
SODIUM SERPL-SCNC: 134 MMOL/L (ref 136–145)
WBC NRBC COR # BLD: 12.75 10*3/MM3 (ref 3.4–10.8)

## 2023-05-21 PROCEDURE — 87635 SARS-COV-2 COVID-19 AMP PRB: CPT | Performed by: EMERGENCY MEDICINE

## 2023-05-21 PROCEDURE — 99285 EMERGENCY DEPT VISIT HI MDM: CPT

## 2023-05-21 PROCEDURE — 80306 DRUG TEST PRSMV INSTRMNT: CPT | Performed by: EMERGENCY MEDICINE

## 2023-05-21 PROCEDURE — 83735 ASSAY OF MAGNESIUM: CPT | Performed by: EMERGENCY MEDICINE

## 2023-05-21 PROCEDURE — 80179 DRUG ASSAY SALICYLATE: CPT | Performed by: EMERGENCY MEDICINE

## 2023-05-21 PROCEDURE — 85025 COMPLETE CBC W/AUTO DIFF WBC: CPT | Performed by: EMERGENCY MEDICINE

## 2023-05-21 PROCEDURE — 80143 DRUG ASSAY ACETAMINOPHEN: CPT | Performed by: EMERGENCY MEDICINE

## 2023-05-21 PROCEDURE — 82077 ASSAY SPEC XCP UR&BREATH IA: CPT | Performed by: EMERGENCY MEDICINE

## 2023-05-21 PROCEDURE — 80053 COMPREHEN METABOLIC PANEL: CPT | Performed by: EMERGENCY MEDICINE

## 2023-05-21 PROCEDURE — 81001 URINALYSIS AUTO W/SCOPE: CPT | Performed by: EMERGENCY MEDICINE

## 2023-05-21 PROCEDURE — 81025 URINE PREGNANCY TEST: CPT | Performed by: EMERGENCY MEDICINE

## 2023-05-21 PROCEDURE — 93005 ELECTROCARDIOGRAM TRACING: CPT | Performed by: EMERGENCY MEDICINE

## 2023-05-21 PROCEDURE — 36415 COLL VENOUS BLD VENIPUNCTURE: CPT

## 2023-05-22 VITALS
RESPIRATION RATE: 18 BRPM | HEART RATE: 121 BPM | SYSTOLIC BLOOD PRESSURE: 120 MMHG | WEIGHT: 165 LBS | DIASTOLIC BLOOD PRESSURE: 78 MMHG | BODY MASS INDEX: 30.36 KG/M2 | HEIGHT: 62 IN | TEMPERATURE: 98.5 F | OXYGEN SATURATION: 98 %

## 2023-05-22 LAB
AMPHET+METHAMPHET UR QL: NEGATIVE
AMPHETAMINES UR QL: NEGATIVE
BACTERIA UR QL AUTO: ABNORMAL /HPF
BARBITURATES UR QL SCN: NEGATIVE
BENZODIAZ UR QL SCN: NEGATIVE
BILIRUB UR QL STRIP: NEGATIVE
BUPRENORPHINE SERPL-MCNC: NEGATIVE NG/ML
CANNABINOIDS SERPL QL: NEGATIVE
CLARITY UR: ABNORMAL
COCAINE UR QL: NEGATIVE
COLOR UR: YELLOW
GLUCOSE UR STRIP-MCNC: ABNORMAL MG/DL
HGB UR QL STRIP.AUTO: NEGATIVE
HYALINE CASTS UR QL AUTO: ABNORMAL /LPF
KETONES UR QL STRIP: ABNORMAL
LEUKOCYTE ESTERASE UR QL STRIP.AUTO: ABNORMAL
METHADONE UR QL SCN: NEGATIVE
MUCOUS THREADS URNS QL MICRO: ABNORMAL /HPF
NITRITE UR QL STRIP: NEGATIVE
OPIATES UR QL: NEGATIVE
OXYCODONE UR QL SCN: NEGATIVE
PCP UR QL SCN: NEGATIVE
PH UR STRIP.AUTO: 6 [PH] (ref 5–8)
PROPOXYPH UR QL: NEGATIVE
PROT UR QL STRIP: NEGATIVE
RBC # UR STRIP: ABNORMAL /HPF
REF LAB TEST METHOD: ABNORMAL
SARS-COV-2 RNA RESP QL NAA+PROBE: NOT DETECTED
SP GR UR STRIP: >=1.03 (ref 1–1.03)
SQUAMOUS #/AREA URNS HPF: ABNORMAL /HPF
TRICYCLICS UR QL SCN: NEGATIVE
UROBILINOGEN UR QL STRIP: ABNORMAL
WBC # UR STRIP: ABNORMAL /HPF

## 2023-05-22 RX ADMIN — NICOTINE 1 PATCH: 7 PATCH TRANSDERMAL at 02:44

## 2023-05-22 NOTE — ED PROVIDER NOTES
TRIAGE CHIEF COMPLAINT:     Nursing and triage notes reviewed    Chief Complaint   Patient presents with   • Psychiatric Evaluation      HPI: Ruby Ramos is a 23 y.o. female who presents to the emergency department complaining of suicidal ideations.  Patient has a history of bipolar disorder and states that for the past several days she has been feeling very depressed and has been having suicidal thoughts.  She states her father  several months ago and this has made her mental illness worse as she states he was her biggest support.    REVIEW OF SYSTEMS: All other systems reviewed and are negative     PAST MEDICAL HISTORY:   Past Medical History:   Diagnosis Date   • Celiac disease    • Diabetes mellitus    • Diabetes mellitus type I    • GERD (gastroesophageal reflux disease)    • Homozygous MTHFR mutation [id number]         FAMILY HISTORY:   Family History   Problem Relation Age of Onset   • Hypertension Mother    • Hypertension Father    • Diabetes Father    • Cancer Maternal Grandmother    • Cancer Paternal Grandmother    • Colon cancer Neg Hx         SOCIAL HISTORY:   Social History     Socioeconomic History   • Marital status: Single   Tobacco Use   • Smoking status: Every Day   • Smokeless tobacco: Never   • Tobacco comments:     E-cig   Vaping Use   • Vaping Use: Every day   • Substances: Nicotine, Flavoring   • Devices: Disposable   Substance and Sexual Activity   • Alcohol use: Yes     Comment: occ   • Drug use: Never   • Sexual activity: Defer        SURGICAL HISTORY:   Past Surgical History:   Procedure Laterality Date   • ENDOSCOPY     • ENDOSCOPY N/A 2021    Procedure: ESOPHAGOGASTRODUODENOSCOPY WITH BIOPSY;  Surgeon: Wolfgang Patiño MD;  Location: [facility] ENDOSCOPY;  Service: Gastroenterology;  Laterality: N/A;   • HERNIA REPAIR          CURRENT MEDICATIONS:      Medication List      CONTINUE taking these medications    Dexcom G6 Sensor  by Other route Every 10 (Ten) Days.     Dexcom G6  Transmitter misc  1 each by Other route Every 3 (Three) Months.     Insulin Pen Needle 32G X 4 MM misc  1 each 4 (Four) Times a Day.     True Comfort Twist Top Lancets misc  1 Device 4 (Four) Times a Day.        ASK your doctor about these medications    * amphetamine-dextroamphetamine 10 MG tablet  Commonly known as: Adderall  Take 10 mg orally every afternoon.     * amphetamine-dextroamphetamine XR 25 MG 24 hr capsule  Commonly known as: ADDERALL XR  Take 1 capsule by mouth Daily     BASAGLAR KWIKPEN 100 UNIT/ML injection pen  Inject 12 Units under the skin into the appropriate area as directed Every Night.     busPIRone 15 MG tablet  Commonly known as: BUSPAR  Take 1 tablet by mouth 3 (Three) Times a Day.     glucose blood test strip  Four times daily     lamoTRIgine 150 MG tablet  Commonly known as: LaMICtal  Take 1 tablet by mouth Every Night.     multivitamin with minerals tablet tablet     NovoLOG FlexPen 100 UNIT/ML solution pen-injector sc pen  Generic drug: insulin aspart  1 unit for every 5 grams of carbs and 1:20>140, MDD 45 units     ondansetron ODT 4 MG disintegrating tablet  Commonly known as: Zofran ODT  Place 1 tablet on the tongue Every 8 (Eight) Hours As Needed for Nausea.         * This list has 2 medication(s) that are the same as other medications prescribed for you. Read the directions carefully, and ask your doctor or other care provider to review them with you.                 ALLERGIES: Gluten meal and Mucinex [guaifenesin er]     PHYSICAL EXAM:   VITAL SIGNS:   Vitals:    05/21/23 2300   BP: 120/78   Pulse: (!) 121   Resp: 18   Temp: 98.5 °F (36.9 °C)   SpO2: 99%      CONSTITUTIONAL: Awake, oriented, appears nontoxic, tearful, depressed mood  HENT: Atraumatic, normocephalic, oral mucosa pink and moist, airway patent. Nares patent without drainage. External ears normal.   EYES: Conjunctivae clear   NECK: Trachea midline, nontender, supple   CARDIOVASCULAR: Tachycardic with a regular rhythm,  No murmurs, rubs, gallops   PULMONARY/CHEST: Clear to auscultation, no rhonchi, wheezes, or rales. Symmetrical breath sounds.  ABDOMINAL: Nondistended, soft, nontender - no rebound or guarding.    NEUROLOGIC: Nonfocal, moving all four extremities, no gross sensory or motor deficits.   EXTREMITIES: No clubbing, cyanosis, or edema   SKIN: Warm, Dry, No erythema, No rash     ED COURSE / MEDICAL DECISION MAKING:   Ruby Ramos is a 23 y.o. female who presents to the emergency department for evaluation of a psychiatric evaluation due to suicidal thoughts.  Patient is nondistressed on arrival in the emergency department.  Vital signs are stable.  Physical examination reveals mild tachycardia but otherwise is largely unremarkable.    Differential diagnosis includes suicidal thoughts, stress response, bipolar disorder among other etiologies.    EKG and medical screening laboratory tests including a CBC, CMP, urinalysis, urine pregnancy screen, drug screen was ordered for further evaluation of the patient's presentation.    Diagnostic information from other sources: Chart review    Interventions: Suicide watch    EKG interpreted by me reveals sinus tachycardia with rate of 119 bpm.  There are few nonspecific findings this is an atypical appearing EKG.    Narrative: Presents with suicidal thoughts.  Patient initially expresses suicidal thoughts, however she then starts stating she is not having any suicidal thoughts while in the emergency department.  Patient's labs are largely unremarkable and she is medically clear for behavioral health evaluation.  She was evaluated by the therapist and we all felt patient would benefit from inpatient evaluation.  We spoke to patient's mother who also felt this would be appropriate.  Patient was adamantly opposed to this at least in the short-term and stated she would be willing to do this if she were allowed to go home first.  We had a long conversation with patient and her mother.   Given she is not currently expressing any suicidal thoughts it was felt to be difficult to force patient to go the inpatient route.  We did safety plan with mother and family and ultimately decided that they would explore inpatient options at home however if her symptoms worsen her mother stated she would bring her back.    Re-evaluation: Resting comfortably    Plan for disposition is discharge with close outpatient follow-up and safety planning    DECISION TO DISCHARGE/ADMIT: see ED care timeline     FINAL IMPRESSION:   1 --depression  2 --   3 --     Electronically signed by: Natalia Bailey MD, 5/21/2023 23:36 VIRGIET       Natalia Bailey MD  05/22/23 0439
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
## 2023-05-31 ENCOUNTER — OFFICE VISIT (OUTPATIENT)
Dept: PSYCHIATRY | Facility: CLINIC | Age: 23
End: 2023-05-31

## 2023-05-31 DIAGNOSIS — Z63.4 BEREAVEMENT: ICD-10-CM

## 2023-05-31 DIAGNOSIS — F90.2 ATTENTION DEFICIT HYPERACTIVITY DISORDER, COMBINED TYPE: ICD-10-CM

## 2023-05-31 DIAGNOSIS — F31.9 BIPOLAR 1 DISORDER: Primary | ICD-10-CM

## 2023-05-31 DIAGNOSIS — F41.1 GENERALIZED ANXIETY DISORDER: ICD-10-CM

## 2023-05-31 SDOH — SOCIAL STABILITY - SOCIAL INSECURITY: DISSAPEARANCE AND DEATH OF FAMILY MEMBER: Z63.4
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
## 2023-06-11 NOTE — PROGRESS NOTES
"Chief Complaint  Bipolar I disorder, ADHD, anxiety, insomnia, bereavement, and alcohol use disorder, moderate, in sustained remission      Subjective          Ruby Ramos presents to [facility] by herself for a follow up and medication check.    History of Present Illness: Ruby states, \"I not been the best.  I have been struggling over the last 2 months.\"  Ruby tells me that she was having suicidal ideations due to grief and increased depressive symptoms.  She met with her therapist Ruby and is starting to feel better.  She recognized that her third shift job was affecting her mood because she was more isolated from friends and family and could not engage in self-care activities.  She went back to a day shift position but still works night shift on Sundays.  She notes that she is still only sleeping about 2 hours per night.  She tells me that she has tried medications in the past for insomnia like Lunesta but it lost efficacy.  She notes that her appetite has been increased due to depressive symptoms and is making an impact on her blood glucose level.  She reports good focus, concentration, and attention from her stimulant but does feel that the XR could be adjusted for further benefits.  She has only been using BuSpar as needed but does feel the 15 mg dose is effective.  She denies any medical changes. She is compliant with her psychiatric medications. She is taking Adderall XR, Adderall, Buspar, and lamotrigine. She denies any side effects. She denies any SI/HI/AVH.    Current Medications:   Current Outpatient Medications   Medication Sig Dispense Refill    amphetamine-dextroamphetamine (Adderall) 10 MG tablet Take 10 mg orally every afternoon. 30 tablet 0    busPIRone (BUSPAR) 15 MG tablet Take 1 tablet by mouth 3 (Three) Times a Day. 90 tablet 2    Continuous Blood Gluc Sensor (Dexcom G6 Sensor) by Other route Every 10 (Ten) Days. 9 each 3    Continuous Blood " "Gluc Transmit (Dexcom G6 Transmitter) misc 1 each by Other route Every 3 (Three) Months. 1 each 3    glucose blood test strip Four times daily 400 each 3    insulin aspart (NovoLOG FlexPen) 100 UNIT/ML solution pen-injector sc pen 1 unit for every 5 grams of carbs and 1:20>140, MDD 45 units 45 mL 1    Insulin Glargine (BASAGLAR KWIKPEN) 100 UNIT/ML injection pen Inject 12 Units under the skin into the appropriate area as directed Every Night. 15 mL 1    Insulin Pen Needle 32G X 4 MM misc 1 each 4 (Four) Times a Day. 400 each 3    lamoTRIgine (LaMICtal) 200 MG tablet Take 1 tablet by mouth Every Night. 30 tablet 2    multivitamin with minerals tablet tablet Take 1 tablet by mouth Daily.      True Comfort Twist Top Lancets misc 1 Device 4 (Four) Times a Day. 400 each 3    amphetamine-dextroamphetamine XR (ADDERALL XR) 30 MG 24 hr capsule Take 1 capsule by mouth Daily 30 capsule 0    ondansetron ODT (Zofran ODT) 4 MG disintegrating tablet Place 1 tablet on the tongue Every 8 (Eight) Hours As Needed for Nausea. (Patient not taking: Reported on 6/12/2023) 15 tablet 1    sertraline (Zoloft) 25 MG tablet Take 1 tablet by mouth Every Night for 14 days, THEN 2 tablets Every Night for 14 days. 42 tablet 0     No current facility-administered medications for this visit.         Objective   Vital Signs:   /76   Pulse 94   Ht 157.5 cm (62\")   Wt 75.8 kg (167 lb)   BMI 30.54 kg/m²     Physical Exam  Vitals and nursing note reviewed.   Constitutional:       Appearance: Normal appearance. She is well-developed and normal weight.   Musculoskeletal:         General: Normal range of motion.   Skin:     General: Skin is warm and dry.   Neurological:      General: No focal deficit present.      Mental Status: She is alert and oriented to person, place, and time.   Psychiatric:         Attention and Perception: Attention normal.         Mood and Affect: Mood is depressed.         Speech: Speech normal.         Behavior: " Behavior normal. Behavior is cooperative.         Thought Content: Thought content includes suicidal (passive without intent or plan) ideation.         Cognition and Memory: Cognition normal.         Judgment: Judgment normal.      Result Review :     The following data was reviewed by: SONIYA Jay on 06/12/2023:  CBC & Differential (05/21/2023 23:31)  Comprehensive Metabolic Panel (05/21/2023 23:31)  Acetaminophen Level (05/21/2023 23:31)  Ethanol (05/21/2023 23:31)  Salicylate Level (05/21/2023 23:31)  Magnesium (05/21/2023 23:31)  Pregnancy, Urine - Urine, Clean Catch (05/21/2023 23:50)  Urinalysis With Microscopic If Indicated (No Culture) - Urine, Clean Catch (05/21/2023 23:50)  Urine Drug Screen - Urine, Clean Catch (05/21/2023 23:50)  Urinalysis, Microscopic Only - Urine, Clean Catch (05/21/2023 23:50)  ED with Natalia Bailey MD (05/21/2023)  [facility]       Assessment and Plan    Diagnoses and all orders for this visit:    1. Bipolar I disorder, most recent episode depressed (Primary)  -     lamoTRIgine (LaMICtal) 200 MG tablet; Take 1 tablet by mouth Every Night.  Dispense: 30 tablet; Refill: 2    2. Attention deficit hyperactivity disorder, combined type  -     amphetamine-dextroamphetamine (Adderall) 10 MG tablet; Take 10 mg orally every afternoon.  Dispense: 30 tablet; Refill: 0  -     amphetamine-dextroamphetamine XR (ADDERALL XR) 30 MG 24 hr capsule; Take 1 capsule by mouth Daily  Dispense: 30 capsule; Refill: 0    3. Generalized anxiety disorder  -     sertraline (Zoloft) 25 MG tablet; Take 1 tablet by mouth Every Night for 14 days, THEN 2 tablets Every Night for 14 days.  Dispense: 42 tablet; Refill: 0    4. Other insomnia    5. Bereavement         Mental Status Exam:   Hygiene:   good  Cooperation:  Cooperative  Eye Contact:  Good  Psychomotor Behavior:  Appropriate  Affect:  Appropriate  Mood: depressed  Speech:   Normal  Thought Process:  Goal directed and Linear  Thought Content:  Normal  Suicidal:  Suicidal Ideation and passive without intent or plan  Homicidal:  None  Hallucinations:  None  Delusion:  None  Memory:  Intact  Orientation:  Person, Place, Time and Situation  Reliability:  good  Insight:  Good  Judgement:  Good  Impulse Control:  Good  Physical/Medical Issues:  Yes Diabetes, celiac disease, headaches, back problems, hernia, and hair loss       PHQ-9 Score:   PHQ-9 Total Score: 22     Impression/Plan:  -This is a follow up and medication check. Ruby reports ongoing symptoms of depression.  She reports feeling suicidal and had an emergency department visit.  Since then, she has met with her therapist Ruby and identified some areas of life she could change.  She is now working day shift and back to engaging in self-care activities at night.  She continues to work 1 night shift a week but feels that it is better overall.  She is motivated to make medication adjustments to improve depressive symptoms, improve ADHD symptoms, and treat insomnia if possible.  She has tried medications in the past for sleep but did not feel efficacy lasted for very long.  Since she is using BuSpar only as needed, I suggested that we add a low-dose Zoloft to treat mood, sleep, and anxiety.  I explained the mechanism of action and purpose, as well as the risk versus benefits and potential adverse effects.  She agrees to try.  She would also like to increase Adderall to 30 mg to improve focus, concentration, and attention.  We discussed taking stimulants on a more regular basis to help anxiety symptoms.  We will also increase lamotrigine to for support during medication changes.  She will meet back in 6 weeks to assess and continue therapy as well.  -Initiate Zoloft 25 mg nightly for 2 weeks then increase to 50 mg nightly for bipolar depression and anxiety.  -Increase lamotrigine to 200 mg nightly for depression and anxiety.    -Increase Adderall XR to 30 mg daily for ADHD symptoms.    -Continue Adderall 10 mg daily for breakthrough ADHD symptoms.   -Continue Buspar 15 mg daily as needed for anxiety.  Patient has refills.   -Continue therapy with Ruby.  -The JANI report, reviewed through PDMP, of the past 12 months were reviewed and is appropriate.  The patient/guardian reports taking the medication only as prescribed.  The patient/guardian denies any abuse or misuse of the medication.  The patient/guardian denies any other substance use or issues.  There are no apparent substance related issues.  The patient reports no side effects of the current medication usage.  The patient/guardian has reported significant improvement with medication usage and wishes to continue medication as prescribed.  The patient/guardian is appropriate to continue with current medication usage at this time.  Reinforced risks and side effects of medication usage, patient and/or guardian verbalize understanding in their own words and are in agreement with current plan.  -Last UDS 1/9/23. Positive for amphetamine as expected.      MEDS ORDERED DURING VISIT:  New Medications Ordered This Visit   Medications    lamoTRIgine (LaMICtal) 200 MG tablet     Sig: Take 1 tablet by mouth Every Night.     Dispense:  30 tablet     Refill:  2    amphetamine-dextroamphetamine (Adderall) 10 MG tablet     Sig: Take 10 mg orally every afternoon.     Dispense:  30 tablet     Refill:  0     Take 10 mg orally every afternoon.    sertraline (Zoloft) 25 MG tablet     Sig: Take 1 tablet by mouth Every Night for 14 days, THEN 2 tablets Every Night for 14 days.     Dispense:  42 tablet     Refill:  0    amphetamine-dextroamphetamine XR (ADDERALL XR) 30 MG 24 hr capsule     Sig: Take 1 capsule by mouth Daily     Dispense:  30 capsule     Refill:  0         Follow Up   Return in about 4 weeks (around 7/10/2023) for Medication Check.  Patient was given instructions and counseling  regarding her condition or for health maintenance advice. Please see specific information pulled into the AVS if appropriate.       TREATMENT PLAN/GOALS: Continue supportive psychotherapy efforts and medications as indicated. Treatment and medication options discussed during today's visit. Patient acknowledged and verbally consented to continue with current treatment plan and was educated on the importance of compliance with treatment and follow-up appointments.    MEDICATION ISSUES:  Discussed medication options and treatment plan of prescribed medication as well as the risks, benefits, and side effects including potential falls, possible impaired driving and metabolic adversities among others. Patient is agreeable to call the office with any worsening of symptoms or onset of side effects. Patient is agreeable to call 911 or go to the nearest ER should he/she begin having SI/HI.        This document has been electronically signed by SONIYA Valadez, PMHNP-BC  June 12, 2023 08:44 EDT    Part of this note may be an electronic transcription/translation of spoken language to printed text using the Dragon Dictation System.

## 2023-06-12 ENCOUNTER — OFFICE VISIT (OUTPATIENT)
Dept: PSYCHIATRY | Facility: CLINIC | Age: 23
End: 2023-06-12
Payer: MEDICAID

## 2023-06-12 VITALS
WEIGHT: 167 LBS | SYSTOLIC BLOOD PRESSURE: 110 MMHG | HEIGHT: 62 IN | DIASTOLIC BLOOD PRESSURE: 76 MMHG | HEART RATE: 94 BPM | BODY MASS INDEX: 30.73 KG/M2

## 2023-06-12 DIAGNOSIS — F90.2 ATTENTION DEFICIT HYPERACTIVITY DISORDER, COMBINED TYPE: Chronic | ICD-10-CM

## 2023-06-12 DIAGNOSIS — F41.1 GENERALIZED ANXIETY DISORDER: Chronic | ICD-10-CM

## 2023-06-12 DIAGNOSIS — Z63.4 BEREAVEMENT: ICD-10-CM

## 2023-06-12 DIAGNOSIS — F31.30 BIPOLAR I DISORDER, MOST RECENT EPISODE DEPRESSED: Primary | Chronic | ICD-10-CM

## 2023-06-12 DIAGNOSIS — G47.09 OTHER INSOMNIA: Chronic | ICD-10-CM

## 2023-06-12 PROCEDURE — 99214 OFFICE O/P EST MOD 30 MIN: CPT | Performed by: NURSE PRACTITIONER

## 2023-06-12 PROCEDURE — 1160F RVW MEDS BY RX/DR IN RCRD: CPT | Performed by: NURSE PRACTITIONER

## 2023-06-12 PROCEDURE — 1159F MED LIST DOCD IN RCRD: CPT | Performed by: NURSE PRACTITIONER

## 2023-06-12 RX ORDER — SERTRALINE HYDROCHLORIDE 25 MG/1
TABLET, FILM COATED ORAL
Qty: 42 TABLET | Refills: 0 | Status: SHIPPED | OUTPATIENT
Start: 2023-06-12 | End: 2023-07-10

## 2023-06-12 RX ORDER — DEXTROAMPHETAMINE SACCHARATE, AMPHETAMINE ASPARTATE, DEXTROAMPHETAMINE SULFATE AND AMPHETAMINE SULFATE 2.5; 2.5; 2.5; 2.5 MG/1; MG/1; MG/1; MG/1
TABLET ORAL
Qty: 30 TABLET | Refills: 0 | Status: SHIPPED | OUTPATIENT
Start: 2023-06-12

## 2023-06-12 RX ORDER — LAMOTRIGINE 200 MG/1
200 TABLET ORAL NIGHTLY
Qty: 30 TABLET | Refills: 2 | Status: SHIPPED | OUTPATIENT
Start: 2023-06-12

## 2023-06-12 RX ORDER — DEXTROAMPHETAMINE SACCHARATE, AMPHETAMINE ASPARTATE MONOHYDRATE, DEXTROAMPHETAMINE SULFATE AND AMPHETAMINE SULFATE 7.5; 7.5; 7.5; 7.5 MG/1; MG/1; MG/1; MG/1
30 CAPSULE, EXTENDED RELEASE ORAL DAILY
Qty: 30 CAPSULE | Refills: 0 | Status: SHIPPED | OUTPATIENT
Start: 2023-06-12 | End: 2024-06-11

## 2023-06-12 SDOH — SOCIAL STABILITY - SOCIAL INSECURITY: DISSAPEARANCE AND DEATH OF FAMILY MEMBER: Z63.4

## 2023-06-15 ENCOUNTER — OFFICE VISIT (OUTPATIENT)
Dept: PSYCHIATRY | Facility: CLINIC | Age: 23
End: 2023-06-15
Payer: MEDICAID

## 2023-06-15 DIAGNOSIS — Z63.4 BEREAVEMENT: ICD-10-CM

## 2023-06-15 DIAGNOSIS — F41.1 GENERALIZED ANXIETY DISORDER: ICD-10-CM

## 2023-06-15 DIAGNOSIS — F90.2 ATTENTION DEFICIT HYPERACTIVITY DISORDER, COMBINED TYPE: ICD-10-CM

## 2023-06-15 DIAGNOSIS — F31.30 BIPOLAR I DISORDER, MOST RECENT EPISODE DEPRESSED: Primary | ICD-10-CM

## 2023-06-15 SDOH — SOCIAL STABILITY - SOCIAL INSECURITY: DISSAPEARANCE AND DEATH OF FAMILY MEMBER: Z63.4

## 2023-06-15 NOTE — PROGRESS NOTES
Date:06/15/2023   Patient Name: Ruby Ramos  : 2000   MRN: [id number]   Time IN: 11:05 AM    Time OUT: 12:04 PM      Referring Provider: Isamar Leon APRN    PROGRESS NOTE    History of Present Illness:   Ruby Ramos is a 23 y.o. female who is being seen today for follow up individual Psychotherapy session.     Chief Complaint:  Pt has switched her job hours to [employer] and will start part time at previous  job.  Pt reports relational stressors with mother that begun shortly following her fathers death as well as throughout her life.  Pt was previously struggling with a depressive episode - severe.  Pt continues to struggle with grief and loss of her father.            ICD-10-CM ICD-9-CM   1. Bipolar I disorder, most recent episode depressed  F31.30 296.50   2. Generalized anxiety disorder  F41.1 300.02   3. Attention deficit hyperactivity disorder, combined type  F90.2 314.01   4. Bereavement  Z63.4 V62.82        Clinical Maneuvering/Intervention:   Assisted patient in processing above session content; acknowledged and normalized patient’s thoughts, feelings, and concerns to build appropriate rapport and a positive therapeutic relationship with open and honest communication.  Processed and rationalized patients thoughts and feelings regarding grief/loss, MH functioning, relational stressors with mother.  Discussed triggers associated with patient's anxiety/depression/grief.  Also discussed coping skills for patient to implement such as possibly trying other shifts at work than third, avoid too much downtime/isolation, lean on positive supports, thought stopping skills for negative thought patterns (reframing/socratic questioning/positive self talk), engaging in methods for self-care, hobbies for pleasure/distraction, time outside/sunlight.    Allowed patient to freely discuss issues without interruption or judgment. Provided safe, confidential environment to facilitate the development of  positive therapeutic relationship and encourage open, honest communication. Assisted patient in identifying risk factors which would indicate the need for higher level of care including thoughts to harm self or others and/or self-harming behavior and encouraged patient to contact this office, call 911, or present to the nearest emergency room should any of these events occur. Discussed crisis intervention services and means to access. Patient adamantly and convincingly denies current suicidal or homicidal ideation or perceptual disturbance.    Mental Status Exam:   Hygiene:   good  Cooperation:  Cooperative  Eye Contact:  Good  Psychomotor Behavior:  Appropriate  Affect:  Appropriate  Mood: normal  Speech:  Normal  Thought Process:  Goal directed and Linear  Thought Content:  Normal  Suicidal:  None  Homicidal:  None  Hallucinations:  None  Delusion:  None  Memory:  Intact  Orientation:  Person, Place, Time, and Situation  Reliability:  good  Insight:  Good  Judgement:  Fair  Impulse Control:  Fair  Physical/Medical Issues:  Yes        Patient's Support Network Includes:  extended family    Functional Status: Moderate impairment     Progress toward goal: Not at goal    Prognosis: Good with Ongoing Treatment     Medications:     Current Outpatient Medications:     amphetamine-dextroamphetamine (Adderall) 10 MG tablet, Take 10 mg orally every afternoon., Disp: 30 tablet, Rfl: 0    amphetamine-dextroamphetamine XR (ADDERALL XR) 30 MG 24 hr capsule, Take 1 capsule by mouth Daily, Disp: 30 capsule, Rfl: 0    busPIRone (BUSPAR) 15 MG tablet, Take 1 tablet by mouth 3 (Three) Times a Day., Disp: 90 tablet, Rfl: 2    Continuous Blood Gluc Sensor (Dexcom G6 Sensor), by Other route Every 10 (Ten) Days., Disp: 9 each, Rfl: 3    Continuous Blood Gluc Transmit (Dexcom G6 Transmitter) misc, 1 each by Other route Every 3 (Three) Months., Disp: 1 each, Rfl: 3    glucose blood test strip, Four times daily, Disp: 400 each, Rfl: 3     "insulin aspart (NovoLOG FlexPen) 100 UNIT/ML solution pen-injector sc pen, 1 unit for every 5 grams of carbs and 1:20>140, MDD 45 units, Disp: 45 mL, Rfl: 1    Insulin Glargine (BASAGLAR KWIKPEN) 100 UNIT/ML injection pen, Inject 12 Units under the skin into the appropriate area as directed Every Night., Disp: 15 mL, Rfl: 1    Insulin Pen Needle 32G X 4 MM misc, 1 each 4 (Four) Times a Day., Disp: 400 each, Rfl: 3    lamoTRIgine (LaMICtal) 200 MG tablet, Take 1 tablet by mouth Every Night., Disp: 30 tablet, Rfl: 2    multivitamin with minerals tablet tablet, Take 1 tablet by mouth Daily., Disp: , Rfl:     ondansetron ODT (Zofran ODT) 4 MG disintegrating tablet, Place 1 tablet on the tongue Every 8 (Eight) Hours As Needed for Nausea. (Patient not taking: Reported on 6/12/2023), Disp: 15 tablet, Rfl: 1    sertraline (Zoloft) 25 MG tablet, Take 1 tablet by mouth Every Night for 14 days, THEN 2 tablets Every Night for 14 days., Disp: 42 tablet, Rfl: 0    True Comfort Twist Top Lancets misc, 1 Device 4 (Four) Times a Day., Disp: 400 each, Rfl: 3    Visit Diagnosis/Orders Placed This Visit:    ICD-10-CM ICD-9-CM   1. Bipolar I disorder, most recent episode depressed  F31.30 296.50   2. Generalized anxiety disorder  F41.1 300.02   3. Attention deficit hyperactivity disorder, combined type  F90.2 314.01   4. Bereavement  Z63.4 V62.82        PLAN:  Safety: No acute safety concerns observed or reported in todays session.  Pt recently struggled with SI/intent and planning during last depressive episode.  Pt reports stable at this time and that she feels she would seek ER if needed.  Risk Assessment: Risk of self-harm acutely is low. Risk of self-harm chronically is also low, but could be further elevated in the event of treatment noncompliance and/or AODA.    Crisis Plan:  Symptoms and/or behaviors to indicate a crisis: Excessive worry or fear, Feeling sad or low, Extreme mood changes; including uncontrollable \"highs\" or " euphoria, Isolation, Lack of sleep, Abuse of substances, Thinking about suicide, and Self-doubt    What calming techniques or other strategies will patient use to de-escalate and stay safe: slow down, breathe, visualize calming self, think it though, listen to music, change focus, take a walk    Who is one person patient can contact to assist with de-escalation? Brother, friend     Treatment Plan/Goals: Patient will continue supportive psychotherapy efforts and medication regimen as prescribed. Therapist will provide Cognitive Behavioral Therapy to assist patient in improving functioning and gaining coping skills, maintaining stability, and avoiding decompensation and the need for higher level of care. Plan for treatment was discussed during today's visit. Patient acknowledged and verbally consented to continue with current treatment plan and was educated on the importance of compliance with treatment and follow-up appointments.     Patient will contact this office, call 911 or present to the nearest emergency room should suicidal or homicidal ideations occur.     Follow Up:   Return in about 2 weeks (around 6/29/2023) for Therapy session.      JIM Og   [facility]     This document has been electronically signed by JIM Og   June 19, 2023 11:31 EDT

## 2023-07-24 NOTE — PROGRESS NOTES
"This provider is located at [facility] ,[address], [address] in [place], [place],using a secure [employer] Video Visit through [employer]. Patient is being seen remotely via telehealth at their home address in [place], and stated they are in a secure environment for this session. The patient's condition being diagnosed/treated is appropriate for telemedicine. The provider identified herself as well as her credentials.   The patient, and/or patients guardian, consent to be seen remotely, and when consent is given they understand that the consent allows for patient identifiable information to be sent to a third party as needed.   They may refuse to be seen remotely at any time. The electronic data is encrypted and password protected, and the patient and/or guardian has been advised of the potential risks to privacy not withstanding such measures.     Chief Complaint  Bipolar I disorder, ADHD, anxiety, insomnia, bereavement, and alcohol use disorder, moderate, in sustained remission      Subjective          Ruby Ramos presents via [employer] Video visit through [employer] by herself for a follow up and medication check.    History of Present Illness: Ruby states, \"I am pretty good.\" Ruby tells me she has seen benefits on her depression with Zoloft. She notes that it may have effects on her blood glucose which has been higher and her appetite which has been decreased. She also sees improvement in anxiety. She has been journaling when she finds the anxiety is \"out of control.\" She is having an easier time falling asleep but will wake through the night and cannot return to sleep. She is working day shift now and this has also helped sleep. She denies any other medical changes. She continues in therapy but does not have any appointments scheduled.  She is compliant with her psychiatric medications. She is taking Zoloft, Adderall XR, Adderall, Buspar, and lamotrigine. She denies any " side effects. She denies any SI/HI/AVH.    Current Medications:   Current Outpatient Medications   Medication Sig Dispense Refill    amphetamine-dextroamphetamine (Adderall) 10 MG tablet Take 10 mg orally every afternoon. 30 tablet 0    amphetamine-dextroamphetamine XR (ADDERALL XR) 30 MG 24 hr capsule Take 1 capsule by mouth Daily 30 capsule 0    busPIRone (BUSPAR) 15 MG tablet Take 1 tablet by mouth 3 (Three) Times a Day. 90 tablet 2    Continuous Blood Gluc Sensor (Dexcom G6 Sensor) by Other route Every 10 (Ten) Days. 9 each 3    Continuous Blood Gluc Transmit (Dexcom G6 Transmitter) misc 1 each by Other route Every 3 (Three) Months. 1 each 3    glucose blood test strip Four times daily 400 each 3    insulin aspart (NovoLOG FlexPen) 100 UNIT/ML solution pen-injector sc pen 1 unit for every 5 grams of carbs and 1:20>140, MDD 45 units 45 mL 1    Insulin Glargine (BASAGLAR KWIKPEN) 100 UNIT/ML injection pen Inject 12 Units under the skin into the appropriate area as directed Every Night. 15 mL 1    Insulin Pen Needle 32G X 4 MM misc 1 each 4 (Four) Times a Day. 400 each 3    multivitamin with minerals tablet tablet Take 1 tablet by mouth Daily.      ondansetron ODT (Zofran ODT) 4 MG disintegrating tablet Place 1 tablet on the tongue Every 8 (Eight) Hours As Needed for Nausea. 15 tablet 1    sertraline (ZOLOFT) 100 MG tablet Take 1 tablet by mouth Every Night. 30 tablet 2    True Comfort Twist Top Lancets misc 1 Device 4 (Four) Times a Day. 400 each 3    lamoTRIgine  MG tablet sustained-release 24 hour Take 200 mg by mouth Every Night. 30 tablet 2     No current facility-administered medications for this visit.         Objective   Vital Signs:   There were no vitals taken for this visit.    Physical Exam  Nursing note reviewed. Vitals reviewed: No vitals to review due to nature of telehealth visit.  Constitutional:       Appearance: Normal appearance. She is well-developed and normal weight.   Neurological:       General: No focal deficit present.      Mental Status: She is alert and oriented to person, place, and time.   Psychiatric:         Attention and Perception: Attention normal.         Mood and Affect: Mood is depressed (improved).         Speech: Speech normal.         Behavior: Behavior normal. Behavior is cooperative.         Thought Content: Thought content normal.         Cognition and Memory: Cognition normal.         Judgment: Judgment normal.      Result Review :     The following data was reviewed by: SONIYA Jay on 07/27/2023:       Assessment and Plan    Diagnoses and all orders for this visit:    1. Bipolar I disorder, most recent episode depressed (Primary)  -     lamoTRIgine  MG tablet sustained-release 24 hour; Take 200 mg by mouth Every Night.  Dispense: 30 tablet; Refill: 2    2. Generalized anxiety disorder  -     sertraline (ZOLOFT) 100 MG tablet; Take 1 tablet by mouth Every Night.  Dispense: 30 tablet; Refill: 2    3. Attention deficit hyperactivity disorder, combined type  -     amphetamine-dextroamphetamine XR (ADDERALL XR) 30 MG 24 hr capsule; Take 1 capsule by mouth Daily  Dispense: 30 capsule; Refill: 0  -     amphetamine-dextroamphetamine (Adderall) 10 MG tablet; Take 10 mg orally every afternoon.  Dispense: 30 tablet; Refill: 0    4. Other insomnia         Mental Status Exam:   Hygiene:   good  Cooperation:  Cooperative  Eye Contact:  Good  Psychomotor Behavior:  Appropriate  Affect:  Appropriate  Mood: depressed and improved  Speech:  Normal  Thought Process:  Goal directed and Linear  Thought Content:  Normal  Suicidal:  None  Homicidal:  None  Hallucinations:  None  Delusion:  None  Memory:  Intact  Orientation:  Person, Place, Time and Situation  Reliability:  good  Insight:  Good  Judgement:  Good  Impulse Control:  Good  Physical/Medical Issues:  Yes Diabetes, celiac disease, headaches, back problems, hernia, and hair loss       PHQ-9 Score:   PHQ-9 Total  Score:       Impression/Plan:  -This is a follow up and medication check. Ruby reports improved symptoms of depression and anxiety. She notes her appetite decreased but blood glucose levels have been higher since starting the medication. She is implementing coping skills for anxiety which helps as well. She is working day shift which has helped mood and sleep. She continues to wake through the night and cannot fall back to sleep. She is motivated to increase Zoloft for further benefits on mood and anxiety and will monitor blood glucose levels. She will experience benefits on sleep as well with the increase. She inquires about the next dose adjustment on Adderall but does not feel ready for change and I would encouraged no increase until sleep resumes to normal. Lastly, I suggested changing Lamotrigine to ER which will give longer effects with the medication on mood and anxiety as well.   -Initiate Zoloft to 100 mg nightly for bipolar depression and anxiety.  -Change lamotrigine to  mg nightly for depression and anxiety.   -Continue Adderall XR 30 mg daily for ADHD symptoms.    -Continue Adderall 10 mg daily for breakthrough ADHD symptoms.   -Continue Buspar 15 mg daily as needed for anxiety.  Patient has refills.   -Continue therapy with Ruby.  -The JANI report, reviewed through PDMP, of the past 12 months were reviewed and is appropriate.  The patient/guardian reports taking the medication only as prescribed.  The patient/guardian denies any abuse or misuse of the medication.  The patient/guardian denies any other substance use or issues.  There are no apparent substance related issues.  The patient reports no side effects of the current medication usage.  The patient/guardian has reported significant improvement with medication usage and wishes to continue medication as prescribed.  The patient/guardian is appropriate to continue with current medication usage at this time.  Reinforced risks and side  effects of medication usage, patient and/or guardian verbalize understanding in their own words and are in agreement with current plan.  -Last UDS 1/9/23. Positive for amphetamine as expected.      MEDS ORDERED DURING VISIT:  New Medications Ordered This Visit   Medications    lamoTRIgine  MG tablet sustained-release 24 hour     Sig: Take 200 mg by mouth Every Night.     Dispense:  30 tablet     Refill:  2    sertraline (ZOLOFT) 100 MG tablet     Sig: Take 1 tablet by mouth Every Night.     Dispense:  30 tablet     Refill:  2    amphetamine-dextroamphetamine XR (ADDERALL XR) 30 MG 24 hr capsule     Sig: Take 1 capsule by mouth Daily     Dispense:  30 capsule     Refill:  0    amphetamine-dextroamphetamine (Adderall) 10 MG tablet     Sig: Take 10 mg orally every afternoon.     Dispense:  30 tablet     Refill:  0     Take 10 mg orally every afternoon.         Follow Up   Return in about 2 months (around 9/27/2023) for Medication Check.  Patient was given instructions and counseling regarding her condition or for health maintenance advice. Please see specific information pulled into the AVS if appropriate.       TREATMENT PLAN/GOALS: Continue supportive psychotherapy efforts and medications as indicated. Treatment and medication options discussed during today's visit. Patient acknowledged and verbally consented to continue with current treatment plan and was educated on the importance of compliance with treatment and follow-up appointments.    MEDICATION ISSUES:  Discussed medication options and treatment plan of prescribed medication as well as the risks, benefits, and side effects including potential falls, possible impaired driving and metabolic adversities among others. Patient is agreeable to call the office with any worsening of symptoms or onset of side effects. Patient is agreeable to call 911 or go to the nearest ER should he/she begin having SI/HI.        This document has been electronically signed by  SONIYA Valadez, PMHNP-BC  July 28, 2023 09:09 EDT    Part of this note may be an electronic transcription/translation of spoken language to printed text using the Dragon Dictation System.

## 2023-07-27 ENCOUNTER — TELEMEDICINE (OUTPATIENT)
Dept: PSYCHIATRY | Facility: CLINIC | Age: 23
End: 2023-07-27
Payer: MEDICAID

## 2023-07-27 DIAGNOSIS — G47.09 OTHER INSOMNIA: Chronic | ICD-10-CM

## 2023-07-27 DIAGNOSIS — F90.2 ATTENTION DEFICIT HYPERACTIVITY DISORDER, COMBINED TYPE: Chronic | ICD-10-CM

## 2023-07-27 DIAGNOSIS — F41.1 GENERALIZED ANXIETY DISORDER: Chronic | ICD-10-CM

## 2023-07-27 DIAGNOSIS — F31.30 BIPOLAR I DISORDER, MOST RECENT EPISODE DEPRESSED: Primary | Chronic | ICD-10-CM

## 2023-07-27 PROCEDURE — 1159F MED LIST DOCD IN RCRD: CPT | Performed by: NURSE PRACTITIONER

## 2023-07-27 PROCEDURE — 99214 OFFICE O/P EST MOD 30 MIN: CPT | Performed by: NURSE PRACTITIONER

## 2023-07-27 PROCEDURE — 1160F RVW MEDS BY RX/DR IN RCRD: CPT | Performed by: NURSE PRACTITIONER

## 2023-07-27 RX ORDER — LAMOTRIGINE 200 MG/1
200 TABLET, EXTENDED RELEASE ORAL NIGHTLY
Qty: 30 TABLET | Refills: 2 | Status: SHIPPED | OUTPATIENT
Start: 2023-07-27

## 2023-07-27 RX ORDER — DEXTROAMPHETAMINE SACCHARATE, AMPHETAMINE ASPARTATE, DEXTROAMPHETAMINE SULFATE AND AMPHETAMINE SULFATE 2.5; 2.5; 2.5; 2.5 MG/1; MG/1; MG/1; MG/1
TABLET ORAL
Qty: 30 TABLET | Refills: 0 | Status: SHIPPED | OUTPATIENT
Start: 2023-07-27

## 2023-07-27 RX ORDER — SERTRALINE HYDROCHLORIDE 100 MG/1
100 TABLET, FILM COATED ORAL NIGHTLY
Qty: 30 TABLET | Refills: 2 | Status: SHIPPED | OUTPATIENT
Start: 2023-07-27

## 2023-07-27 RX ORDER — DEXTROAMPHETAMINE SACCHARATE, AMPHETAMINE ASPARTATE MONOHYDRATE, DEXTROAMPHETAMINE SULFATE AND AMPHETAMINE SULFATE 7.5; 7.5; 7.5; 7.5 MG/1; MG/1; MG/1; MG/1
30 CAPSULE, EXTENDED RELEASE ORAL DAILY
Qty: 30 CAPSULE | Refills: 0 | Status: SHIPPED | OUTPATIENT
Start: 2023-07-27 | End: 2024-07-26

## 2023-07-30 ENCOUNTER — HOSPITAL ENCOUNTER (INPATIENT)
Facility: HOSPITAL | Age: 23
LOS: 1 days | Discharge: HOME | DRG: 918 | End: 2023-07-31
Attending: EMERGENCY MEDICINE
Payer: MEDICAID

## 2023-07-30 DIAGNOSIS — F10.920 ACUTE ALCOHOLIC INTOXICATION WITHOUT COMPLICATION: ICD-10-CM

## 2023-07-30 DIAGNOSIS — T38.3X2A INSULIN OVERDOSE, INTENTIONAL SELF-HARM, INITIAL ENCOUNTER: Primary | ICD-10-CM

## 2023-07-30 PROBLEM — T38.3X1A INSULIN OVERDOSE: Status: ACTIVE | Noted: 2023-07-30

## 2023-07-30 LAB
ALBUMIN SERPL-MCNC: 4.3 G/DL (ref 3.5–5.2)
ALBUMIN/GLOB SERPL: 2 G/DL
ALP SERPL-CCNC: 64 U/L (ref 39–117)
ALT SERPL W P-5'-P-CCNC: 11 U/L (ref 1–33)
AMPHET+METHAMPHET UR QL: NEGATIVE
AMPHETAMINES UR QL: NEGATIVE
ANION GAP SERPL CALCULATED.3IONS-SCNC: 15.2 MMOL/L (ref 5–15)
ANION GAP SERPL CALCULATED.3IONS-SCNC: 16.1 MMOL/L (ref 5–15)
APAP SERPL-MCNC: <5 MCG/ML (ref 0–30)
AST SERPL-CCNC: 10 U/L (ref 1–32)
B-HCG UR QL: NEGATIVE
BARBITURATES UR QL SCN: NEGATIVE
BASOPHILS # BLD AUTO: 0.06 10*3/MM3 (ref 0–0.2)
BASOPHILS NFR BLD AUTO: 0.6 % (ref 0–1.5)
BENZODIAZ UR QL SCN: NEGATIVE
BILIRUB SERPL-MCNC: 0.3 MG/DL (ref 0–1.2)
BILIRUB UR QL STRIP: NEGATIVE
BUN SERPL-MCNC: 6 MG/DL (ref 6–20)
BUN SERPL-MCNC: 8 MG/DL (ref 6–20)
BUN/CREAT SERPL: 11.3 (ref 7–25)
BUN/CREAT SERPL: 15.7 (ref 7–25)
BUPRENORPHINE SERPL-MCNC: NEGATIVE NG/ML
CALCIUM SPEC-SCNC: 8.4 MG/DL (ref 8.6–10.5)
CALCIUM SPEC-SCNC: 8.4 MG/DL (ref 8.6–10.5)
CANNABINOIDS SERPL QL: NEGATIVE
CHLORIDE SERPL-SCNC: 102 MMOL/L (ref 98–107)
CHLORIDE SERPL-SCNC: 110 MMOL/L (ref 98–107)
CLARITY UR: CLEAR
CO2 SERPL-SCNC: 17.9 MMOL/L (ref 22–29)
CO2 SERPL-SCNC: 20.8 MMOL/L (ref 22–29)
COCAINE UR QL: NEGATIVE
COLOR UR: YELLOW
CREAT SERPL-MCNC: 0.51 MG/DL (ref 0.57–1)
CREAT SERPL-MCNC: 0.53 MG/DL (ref 0.57–1)
DEPRECATED RDW RBC AUTO: 39.6 FL (ref 37–54)
EGFRCR SERPLBLD CKD-EPI 2021: 133.5 ML/MIN/1.73
EGFRCR SERPLBLD CKD-EPI 2021: 134.7 ML/MIN/1.73
EOSINOPHIL # BLD AUTO: 0.06 10*3/MM3 (ref 0–0.4)
EOSINOPHIL NFR BLD AUTO: 0.6 % (ref 0.3–6.2)
ERYTHROCYTE [DISTWIDTH] IN BLOOD BY AUTOMATED COUNT: 12.5 % (ref 12.3–15.4)
ETHANOL BLD-MCNC: 175 MG/DL (ref 0–10)
ETHANOL UR QL: 0.17 %
GLOBULIN UR ELPH-MCNC: 2.1 GM/DL
GLUCOSE BLDC GLUCOMTR-MCNC: 113 MG/DL (ref 70–130)
GLUCOSE BLDC GLUCOMTR-MCNC: 119 MG/DL (ref 70–130)
GLUCOSE BLDC GLUCOMTR-MCNC: 122 MG/DL (ref 70–130)
GLUCOSE BLDC GLUCOMTR-MCNC: 139 MG/DL (ref 70–130)
GLUCOSE BLDC GLUCOMTR-MCNC: 143 MG/DL (ref 70–130)
GLUCOSE BLDC GLUCOMTR-MCNC: 154 MG/DL (ref 70–130)
GLUCOSE BLDC GLUCOMTR-MCNC: 164 MG/DL (ref 70–130)
GLUCOSE BLDC GLUCOMTR-MCNC: 172 MG/DL (ref 70–130)
GLUCOSE BLDC GLUCOMTR-MCNC: 178 MG/DL (ref 70–130)
GLUCOSE BLDC GLUCOMTR-MCNC: 180 MG/DL (ref 70–130)
GLUCOSE BLDC GLUCOMTR-MCNC: 247 MG/DL (ref 70–130)
GLUCOSE BLDC GLUCOMTR-MCNC: 258 MG/DL (ref 70–130)
GLUCOSE BLDC GLUCOMTR-MCNC: 285 MG/DL (ref 70–130)
GLUCOSE BLDC GLUCOMTR-MCNC: 55 MG/DL (ref 70–130)
GLUCOSE SERPL-MCNC: 254 MG/DL (ref 65–99)
GLUCOSE SERPL-MCNC: 58 MG/DL (ref 65–99)
GLUCOSE UR STRIP-MCNC: NEGATIVE MG/DL
HCT VFR BLD AUTO: 32.7 % (ref 34–46.6)
HGB BLD-MCNC: 11.7 G/DL (ref 12–15.9)
HGB UR QL STRIP.AUTO: NEGATIVE
IMM GRANULOCYTES # BLD AUTO: 0.05 10*3/MM3 (ref 0–0.05)
IMM GRANULOCYTES NFR BLD AUTO: 0.5 % (ref 0–0.5)
KETONES UR QL STRIP: NEGATIVE
LEUKOCYTE ESTERASE UR QL STRIP.AUTO: NEGATIVE
LYMPHOCYTES # BLD AUTO: 2.55 10*3/MM3 (ref 0.7–3.1)
LYMPHOCYTES NFR BLD AUTO: 25.2 % (ref 19.6–45.3)
MAGNESIUM SERPL-MCNC: 1.9 MG/DL (ref 1.6–2.6)
MCH RBC QN AUTO: 31.2 PG (ref 26.6–33)
MCHC RBC AUTO-ENTMCNC: 35.8 G/DL (ref 31.5–35.7)
MCV RBC AUTO: 87.2 FL (ref 79–97)
METHADONE UR QL SCN: NEGATIVE
MONOCYTES # BLD AUTO: 0.85 10*3/MM3 (ref 0.1–0.9)
MONOCYTES NFR BLD AUTO: 8.4 % (ref 5–12)
NEUTROPHILS NFR BLD AUTO: 6.56 10*3/MM3 (ref 1.7–7)
NEUTROPHILS NFR BLD AUTO: 64.7 % (ref 42.7–76)
NITRITE UR QL STRIP: NEGATIVE
NRBC BLD AUTO-RTO: 0 /100 WBC (ref 0–0.2)
OPIATES UR QL: NEGATIVE
OXYCODONE UR QL SCN: NEGATIVE
PCP UR QL SCN: NEGATIVE
PH UR STRIP.AUTO: 5.5 [PH] (ref 5–8)
PLATELET # BLD AUTO: 264 10*3/MM3 (ref 140–450)
PMV BLD AUTO: 8.8 FL (ref 6–12)
POTASSIUM SERPL-SCNC: 3.4 MMOL/L (ref 3.5–5.2)
POTASSIUM SERPL-SCNC: 4.2 MMOL/L (ref 3.5–5.2)
POTASSIUM SERPL-SCNC: 4.6 MMOL/L (ref 3.5–5.2)
PROPOXYPH UR QL: NEGATIVE
PROT SERPL-MCNC: 6.4 G/DL (ref 6–8.5)
PROT UR QL STRIP: NEGATIVE
RBC # BLD AUTO: 3.75 10*6/MM3 (ref 3.77–5.28)
SALICYLATES SERPL-MCNC: <0.3 MG/DL
SODIUM SERPL-SCNC: 138 MMOL/L (ref 136–145)
SODIUM SERPL-SCNC: 144 MMOL/L (ref 136–145)
SP GR UR STRIP: <=1.005 (ref 1–1.03)
TRICYCLICS UR QL SCN: NEGATIVE
UROBILINOGEN UR QL STRIP: NORMAL
WBC NRBC COR # BLD: 10.13 10*3/MM3 (ref 3.4–10.8)

## 2023-07-30 PROCEDURE — 93005 ELECTROCARDIOGRAM TRACING: CPT | Performed by: INTERNAL MEDICINE

## 2023-07-30 PROCEDURE — 96376 TX/PRO/DX INJ SAME DRUG ADON: CPT

## 2023-07-30 PROCEDURE — 82948 REAGENT STRIP/BLOOD GLUCOSE: CPT

## 2023-07-30 PROCEDURE — 80306 DRUG TEST PRSMV INSTRMNT: CPT | Performed by: EMERGENCY MEDICINE

## 2023-07-30 PROCEDURE — 96368 THER/DIAG CONCURRENT INF: CPT

## 2023-07-30 PROCEDURE — 82077 ASSAY SPEC XCP UR&BREATH IA: CPT | Performed by: EMERGENCY MEDICINE

## 2023-07-30 PROCEDURE — 63710000001 ONDANSETRON ODT 4 MG TABLET DISPERSIBLE: Performed by: INTERNAL MEDICINE

## 2023-07-30 PROCEDURE — 96365 THER/PROPH/DIAG IV INF INIT: CPT

## 2023-07-30 PROCEDURE — 99285 EMERGENCY DEPT VISIT HI MDM: CPT

## 2023-07-30 PROCEDURE — 80179 DRUG ASSAY SALICYLATE: CPT | Performed by: EMERGENCY MEDICINE

## 2023-07-30 PROCEDURE — 25010000002 ONDANSETRON PER 1 MG: Performed by: EMERGENCY MEDICINE

## 2023-07-30 PROCEDURE — 80143 DRUG ASSAY ACETAMINOPHEN: CPT | Performed by: EMERGENCY MEDICINE

## 2023-07-30 PROCEDURE — 99223 1ST HOSP IP/OBS HIGH 75: CPT | Performed by: INTERNAL MEDICINE

## 2023-07-30 PROCEDURE — 25010000002 THIAMINE PER 100 MG: Performed by: INTERNAL MEDICINE

## 2023-07-30 PROCEDURE — 36415 COLL VENOUS BLD VENIPUNCTURE: CPT

## 2023-07-30 PROCEDURE — 85025 COMPLETE CBC W/AUTO DIFF WBC: CPT | Performed by: EMERGENCY MEDICINE

## 2023-07-30 PROCEDURE — 80053 COMPREHEN METABOLIC PANEL: CPT | Performed by: EMERGENCY MEDICINE

## 2023-07-30 PROCEDURE — 84132 ASSAY OF SERUM POTASSIUM: CPT | Performed by: INTERNAL MEDICINE

## 2023-07-30 PROCEDURE — 96375 TX/PRO/DX INJ NEW DRUG ADDON: CPT

## 2023-07-30 PROCEDURE — 81003 URINALYSIS AUTO W/O SCOPE: CPT | Performed by: EMERGENCY MEDICINE

## 2023-07-30 PROCEDURE — 81025 URINE PREGNANCY TEST: CPT | Performed by: EMERGENCY MEDICINE

## 2023-07-30 PROCEDURE — 96366 THER/PROPH/DIAG IV INF ADDON: CPT

## 2023-07-30 PROCEDURE — 83735 ASSAY OF MAGNESIUM: CPT | Performed by: EMERGENCY MEDICINE

## 2023-07-30 RX ORDER — ACETAMINOPHEN 325 MG/1
650 TABLET ORAL EVERY 6 HOURS PRN
Status: DISCONTINUED | OUTPATIENT
Start: 2023-07-30 | End: 2023-07-31 | Stop reason: HOSPADM

## 2023-07-30 RX ORDER — AMOXICILLIN 250 MG
2 CAPSULE ORAL 2 TIMES DAILY
Status: DISCONTINUED | OUTPATIENT
Start: 2023-07-30 | End: 2023-07-31 | Stop reason: HOSPADM

## 2023-07-30 RX ORDER — BISACODYL 10 MG
10 SUPPOSITORY, RECTAL RECTAL DAILY PRN
Status: DISCONTINUED | OUTPATIENT
Start: 2023-07-30 | End: 2023-07-31 | Stop reason: HOSPADM

## 2023-07-30 RX ORDER — LAMOTRIGINE 100 MG/1
200 TABLET ORAL NIGHTLY
Status: DISCONTINUED | OUTPATIENT
Start: 2023-07-30 | End: 2023-07-31 | Stop reason: HOSPADM

## 2023-07-30 RX ORDER — POTASSIUM CHLORIDE 20 MEQ/1
40 TABLET, EXTENDED RELEASE ORAL EVERY 4 HOURS
Status: COMPLETED | OUTPATIENT
Start: 2023-07-30 | End: 2023-07-30

## 2023-07-30 RX ORDER — ENOXAPARIN SODIUM 100 MG/ML
40 INJECTION SUBCUTANEOUS DAILY
Status: DISCONTINUED | OUTPATIENT
Start: 2023-07-30 | End: 2023-07-31 | Stop reason: HOSPADM

## 2023-07-30 RX ORDER — POLYETHYLENE GLYCOL 3350 17 G/17G
17 POWDER, FOR SOLUTION ORAL DAILY PRN
Status: DISCONTINUED | OUTPATIENT
Start: 2023-07-30 | End: 2023-07-31 | Stop reason: HOSPADM

## 2023-07-30 RX ORDER — DEXTROSE MONOHYDRATE 25 G/50ML
50 INJECTION, SOLUTION INTRAVENOUS
Status: DISCONTINUED | OUTPATIENT
Start: 2023-07-30 | End: 2023-07-31 | Stop reason: HOSPADM

## 2023-07-30 RX ORDER — DEXTROSE MONOHYDRATE 100 MG/ML
250 INJECTION, SOLUTION INTRAVENOUS CONTINUOUS
Status: DISCONTINUED | OUTPATIENT
Start: 2023-07-30 | End: 2023-07-30

## 2023-07-30 RX ORDER — SODIUM CHLORIDE 9 MG/ML
100 INJECTION, SOLUTION INTRAVENOUS CONTINUOUS
Status: DISCONTINUED | OUTPATIENT
Start: 2023-07-30 | End: 2023-07-31 | Stop reason: HOSPADM

## 2023-07-30 RX ORDER — SODIUM CHLORIDE 0.9 % (FLUSH) 0.9 %
10 SYRINGE (ML) INJECTION EVERY 12 HOURS SCHEDULED
Status: DISCONTINUED | OUTPATIENT
Start: 2023-07-30 | End: 2023-07-31 | Stop reason: HOSPADM

## 2023-07-30 RX ORDER — BISACODYL 5 MG/1
5 TABLET, DELAYED RELEASE ORAL DAILY PRN
Status: DISCONTINUED | OUTPATIENT
Start: 2023-07-30 | End: 2023-07-31 | Stop reason: HOSPADM

## 2023-07-30 RX ORDER — SODIUM CHLORIDE 0.9 % (FLUSH) 0.9 %
10 SYRINGE (ML) INJECTION AS NEEDED
Status: DISCONTINUED | OUTPATIENT
Start: 2023-07-30 | End: 2023-07-31 | Stop reason: HOSPADM

## 2023-07-30 RX ORDER — THIAMINE HYDROCHLORIDE 100 MG/ML
200 INJECTION, SOLUTION INTRAMUSCULAR; INTRAVENOUS EVERY 8 HOURS SCHEDULED
Status: DISCONTINUED | OUTPATIENT
Start: 2023-07-30 | End: 2023-07-31 | Stop reason: HOSPADM

## 2023-07-30 RX ORDER — ONDANSETRON 2 MG/ML
4 INJECTION INTRAMUSCULAR; INTRAVENOUS ONCE
Status: COMPLETED | OUTPATIENT
Start: 2023-07-30 | End: 2023-07-30

## 2023-07-30 RX ORDER — MULTIPLE VITAMINS W/ MINERALS TAB 9MG-400MCG
1 TAB ORAL DAILY
Status: DISCONTINUED | OUTPATIENT
Start: 2023-07-30 | End: 2023-07-31 | Stop reason: HOSPADM

## 2023-07-30 RX ORDER — NITROGLYCERIN 0.4 MG/1
0.4 TABLET SUBLINGUAL
Status: DISCONTINUED | OUTPATIENT
Start: 2023-07-30 | End: 2023-07-31 | Stop reason: HOSPADM

## 2023-07-30 RX ORDER — BUSPIRONE HYDROCHLORIDE 15 MG/1
15 TABLET ORAL 3 TIMES DAILY
Status: DISCONTINUED | OUTPATIENT
Start: 2023-07-30 | End: 2023-07-31 | Stop reason: HOSPADM

## 2023-07-30 RX ORDER — ONDANSETRON 4 MG/1
4 TABLET, ORALLY DISINTEGRATING ORAL EVERY 8 HOURS PRN
Status: DISCONTINUED | OUTPATIENT
Start: 2023-07-30 | End: 2023-07-31 | Stop reason: HOSPADM

## 2023-07-30 RX ORDER — SODIUM CHLORIDE 9 MG/ML
40 INJECTION, SOLUTION INTRAVENOUS AS NEEDED
Status: DISCONTINUED | OUTPATIENT
Start: 2023-07-30 | End: 2023-07-31 | Stop reason: HOSPADM

## 2023-07-30 RX ADMIN — SODIUM CHLORIDE 100 ML/HR: 9 INJECTION, SOLUTION INTRAVENOUS at 14:01

## 2023-07-30 RX ADMIN — DEXTROSE MONOHYDRATE 50 ML: 25 INJECTION, SOLUTION INTRAVENOUS at 05:18

## 2023-07-30 RX ADMIN — ACETAMINOPHEN 650 MG: 325 TABLET, FILM COATED ORAL at 20:00

## 2023-07-30 RX ADMIN — DEXTROSE MONOHYDRATE 250 ML/HR: 100 INJECTION, SOLUTION INTRAVENOUS at 08:10

## 2023-07-30 RX ADMIN — Medication 1 TABLET: at 08:07

## 2023-07-30 RX ADMIN — ONDANSETRON 4 MG: 2 INJECTION INTRAMUSCULAR; INTRAVENOUS at 04:06

## 2023-07-30 RX ADMIN — POTASSIUM CHLORIDE 40 MEQ: 1500 TABLET, EXTENDED RELEASE ORAL at 12:26

## 2023-07-30 RX ADMIN — SODIUM CHLORIDE 500 ML: 9 INJECTION, SOLUTION INTRAVENOUS at 12:27

## 2023-07-30 RX ADMIN — SENNOSIDES AND DOCUSATE SODIUM 2 TABLET: 50; 8.6 TABLET ORAL at 08:07

## 2023-07-30 RX ADMIN — POTASSIUM CHLORIDE 40 MEQ: 1500 TABLET, EXTENDED RELEASE ORAL at 08:07

## 2023-07-30 RX ADMIN — THIAMINE HYDROCHLORIDE 200 MG: 200 INJECTION, SOLUTION INTRAMUSCULAR; INTRAVENOUS at 21:21

## 2023-07-30 RX ADMIN — ACETAMINOPHEN 650 MG: 325 TABLET, FILM COATED ORAL at 08:07

## 2023-07-30 RX ADMIN — BUSPIRONE HYDROCHLORIDE 15 MG: 5 TABLET ORAL at 08:07

## 2023-07-30 RX ADMIN — ONDANSETRON 4 MG: 4 TABLET, ORALLY DISINTEGRATING ORAL at 08:31

## 2023-07-30 RX ADMIN — DEXTROSE MONOHYDRATE 100 ML/HR: 100 INJECTION, SOLUTION INTRAVENOUS at 04:30

## 2023-07-30 RX ADMIN — LAMOTRIGINE 200 MG: 100 TABLET ORAL at 20:00

## 2023-07-30 RX ADMIN — Medication 10 ML: at 08:08

## 2023-07-30 RX ADMIN — SERTRALINE 100 MG: 50 TABLET, FILM COATED ORAL at 20:00

## 2023-07-30 RX ADMIN — FOLIC ACID 1 MG: 5 INJECTION, SOLUTION INTRAMUSCULAR; INTRAVENOUS; SUBCUTANEOUS at 08:17

## 2023-07-30 RX ADMIN — DEXTROSE MONOHYDRATE 50 ML: 25 INJECTION, SOLUTION INTRAVENOUS at 04:05

## 2023-07-30 RX ADMIN — THIAMINE HYDROCHLORIDE 200 MG: 200 INJECTION, SOLUTION INTRAMUSCULAR; INTRAVENOUS at 14:00

## 2023-07-30 RX ADMIN — THIAMINE HYDROCHLORIDE 200 MG: 200 INJECTION, SOLUTION INTRAMUSCULAR; INTRAVENOUS at 08:10

## 2023-07-30 NOTE — ED NOTES
"Pt's mother requested to speak with nursing staff and ER MD.  While speaking with mother in quiet room, pt's mother disclosed she is concerned that pt is suicidal.  Pt denied SI initially in triage.  Pt then tried to facetime mother and pt's mother asked pt if she tried to take to much insulin to kill herself and pt responded \"yes\".  ER MD then asked pt how much insulin she took and pt hung up facetime call.  Entered pt's room and asked her how much insulin she had taken and she said \"10 units\".  Upon entering pt's room, pt denies SI.  Asked pt why she told her mother she wanted to kill herself and pt responded, \"to mess with her\".  ER MD to bedside and discussed plan of care with pt--pt agreeable to plan of care.  Security to bedside at this time, suicide precautions initiated.    "

## 2023-07-30 NOTE — ED PROVIDER NOTES
Subjective   History of Present Illness  23-year-old type I diabetic presents to the ED for chief complaint of hyperglycemia.  Patient notes that she was at the bar consuming alcohol and her blood glucose level dropped.  Blood glucose level was measured at 60.    After initial assessment the patient's mother arrived and indicated that she believed her daughter may have actually attempted suicide.  She states that she believes her daughter gave her self too much insulin intentionally.  She has been having a hard time lately after her father's untimely death.  She has been having some suicidal thoughts.  Patient's mother believes that she injected herself with insulin after consuming alcohol tonight and feeling brave enough to actually commit suicide.  The patient's mother called her on her phone when I was in the room and the patient did not realize I was with the mother.  The mother asked the patient if she intentionally took too much insulin and the patient said yes.  Upon my return to the room in further questioning the patient she states that she was joking with her mother.    Review of Systems   Endocrine:        Hypoglycemia   All other systems reviewed and are negative.    Past Medical History:   Diagnosis Date    Celiac disease     Diabetes mellitus     Diabetes mellitus type I     GERD (gastroesophageal reflux disease)     Homozygous MTHFR mutation C677T        Allergies   Allergen Reactions    Gluten Meal Other (See Comments)     Celiac disease    Mucinex [Guaifenesin Er] Nausea Only and Dizziness       Past Surgical History:   Procedure Laterality Date    ENDOSCOPY      ENDOSCOPY N/A 4/14/2021    Procedure: ESOPHAGOGASTRODUODENOSCOPY WITH BIOPSY;  Surgeon: Wolfgang Patiño MD;  Location: [facility] ENDOSCOPY;  Service: Gastroenterology;  Laterality: N/A;    HERNIA REPAIR         Family History   Problem Relation Age of Onset    Hypertension Mother     Hypertension Father     Diabetes Father     Cancer  Maternal Grandmother     Cancer Paternal Grandmother     Colon cancer Neg Hx        Social History     Socioeconomic History    Marital status: Single   Tobacco Use    Smoking status: Never     Passive exposure: Never    Smokeless tobacco: Never    Tobacco comments:     E-cig   Vaping Use    Vaping Use: Every day    Substances: Nicotine, Flavoring    Devices: Disposable    Passive vaping exposure: Yes   Substance and Sexual Activity    Alcohol use: Yes     Comment: occ    Drug use: Never    Sexual activity: Defer           Objective   Physical Exam  Vitals and nursing note reviewed.   Constitutional:       Appearance: Normal appearance.   HENT:      Head: Normocephalic and atraumatic.   Cardiovascular:      Rate and Rhythm: Normal rate.      Pulses: Normal pulses.   Pulmonary:      Effort: Pulmonary effort is normal.      Breath sounds: Normal breath sounds.   Abdominal:      Palpations: Abdomen is soft.      Tenderness: There is no abdominal tenderness.   Musculoskeletal:         General: No tenderness.   Neurological:      General: No focal deficit present.      Mental Status: She is alert and oriented to person, place, and time.       Procedures           ED Course  ED Course as of 07/31/23 0142   Sun Jul 30, 2023   0749 EKG interpreted by me reveals sinus tachycardia rate of 105, [PF]      ED Course User Index  [PF] Christopher Shepherd, DO GUNTER reviewed by Rohith Phoenix DO, Gilbert, Cody B, DO, Pais, Roshan, MD       Medical Decision Making  Problems Addressed:  Acute alcoholic intoxication without complication: complicated acute illness or injury  Insulin overdose, intentional self-harm, initial encounter: complicated acute illness or injury    Amount and/or Complexity of Data Reviewed  Labs: ordered.    Risk  Prescription drug management.  Decision regarding hospitalization.      Data interpreted: Nursing notes reviewed vital signs reviewed Labs independently interpreted  interpretative by me.  Imaging interpretative by me.  EKG independently interpreted by me    Oxygen saturations included.    Counseling discussed the results above with the patient regarding need for admission or discharge.  Patient understands and agrees with plan of care      23-year-old female initially presented to the ED for evaluation of hyperglycemia.  Patient stated that she gave herself 10 units of insulin earlier because she thought her blood glucose was going to be high while consuming alcohol.  Her friends noted that her glucose was low on her Dexcom and that she was woozy so they brought her to the ED.  Later the patient's mother was concerned that she may have intentionally attempted to overdose on insulin.  Although the patient denied this it seemed likely after she had persistent hypoglycemia despite dextrose, 3 hours juices and multiple other snacks.  At that point she was given an amp of D50 and started on D10 at 100 an hour.  Again she had another low glucose reading.  She was given another amp of D50 and D10 was increased.  At this point I do have high suspicion of intentional overdose.  Her alcohol level was elevated so she was unable to initially be assessed by behavioral health.  Given her persistent hypoglycemia in the setting of presumed intentional insulin overdose I discussed the case with the hospitalist and she will be admitted for further evaluation and medical management.  Further labs without significant acute abnormality    Critical Care  Performed by: Phani Sams DO  Authorized by: Phani Sams DO     Critical care provider statement:     Critical care time (minutes): 35    Critical care time was exclusive of:  Separately billable procedures and treating other patients    Critical care was necessary to treat or prevent imminent or life-threatening deterioration of the following conditions: Intentional insulin overdose, suicide attempt, depression, persistent hypoglycemia,  other    Critical care was time spent personally by me on the following activities:  Ordering and performing treatments and interventions, development of treatment plan with patient or surrogate, discussions with consultants, evaluation of patient's response to treatment, examination of patient, ordering and review of laboratory studies, ordering and review of radiographic studies, pulse oximetry, re-evaluation of patient's condition and review of old charts      Final diagnoses:   Insulin overdose, intentional self-harm, initial encounter   Acute alcoholic intoxication without complication       ED Disposition  ED Disposition       ED Disposition   Decision to Admit    Condition   --    Comment   Level of Care: Critical Care [6]   Diagnosis: Insulin overdose [538036]   Admitting Physician: NATALYA MCLAIN [[id number]]   Attending Physician: NATALYA MCLAIN [[id number]]   Certification: I Certify That Inpatient Hospital Services Are Medically Necessary For Greater Than 2 Midnights                 No follow-up provider specified.       Medication List      No changes were made to your prescriptions during this visit.            Phani Sams, DO  07/30/23 0630       Phani Sams, DO  07/31/23 0142

## 2023-07-30 NOTE — ED NOTES
Waiting on an ICU bed to become available. There are currently no overflow Pts at this time per house.

## 2023-07-30 NOTE — ED NOTES
"Patient presented to ED via EMS as she was drinking at the Bar and her glucose dropped. Therapist was called due to Patient reporting to mom that she tried to Overdose on Insulin to kill herself.  Patient EtOH was 175 mg/dl  at 0358. Patient was not medically cleared for assessment as her insulin level was not stabilized.      Therapist did speak with patient's mother for Collateral Contact. Divya Ramos [phone]. Therapist explained that she would not be able to speak about patient's treatment or assessment unless patient signs and CATHIE. Therapist explained that she would listen and take notes of what Patient's mother wanted to report.    Divya reported that patient is  Diagnosed with Bi-polar and ADHD. Patient is on Lamictal and Zoloft. Mother reported that patient has history of threatening to overdose on insulin. Patient's father passed away  in Feb 2023. Mother reported that patient has had a \" hard time with her dad's death\".    Mother reported that she believes her daughters' plan was to get drunk then overdose on insulin, go home and go to sleep and not wake up. Patient's mother was tearful while speaking with Therapist about patient's behavior.     Luis JEFFERS 07/30/2023     Luis Galindo  07/30/23 0434    "

## 2023-07-30 NOTE — PROGRESS NOTES
[facility]    Name:  Ruby Ramos   Age:  23 y.o.  Sex:  female  :  2000  MRN:  [id number]   Visit Number:  [id number]  Admission Date:  2023  Date Of Service:  23  Primary Care Physician:  Isamar Leon APRN    Patient was seen and examined this while she is in the emergency room waiting for a bed. Pertinent laboratory and radiology data were reviewed.  She currently has a one-on-one sitter.    Vital signs:    Vital Signs (last 24 hours)          0700   0659  0700   1401   Most Recent      Temp (°F)   98.3       98.3 (36.8)  0237    Heart Rate 109 -  119    78 -  108     78  1336    Resp 14 -  20       14  0631    /46 -  126/80    91/54 -  122/55     91/54  1336    SpO2 (%) 95 -  99    96 -  100     100  1336     Patient admitted for hypoglycemia.  She states that she was in a bar drinking with her friends when her sugar was high and she gave her 10 units of short-acting insulin and subsequently developed hypoglycemia.  She states that she has had problems with labile sugars.  She was diagnosed with type 1 diabetes mellitus at the age of 20.  She follows up with endocrinology in [place].    Patient currently denies any suicidal ideation or uncontrolled depression symptoms.  She follows up with [facility] behavioral health services.    She has been on 10% dextrose through the day but is currently having sugars in the 200s.  We will switch her IV fluids to normal saline and continue to monitor overnight.  We will consult behavioral health services in the morning tomorrow.  If her blood sugars remained stable, she should be able to go home tomorrow.      Isaac Wahl MD  23  14:01 EDT    Dictated utilizing Dragon dictation.

## 2023-07-30 NOTE — H&P
[facility]   HISTORY AND PHYSICAL      Name:  Ruby Ramos   Age:  23 y.o.  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  She comes in today with hypoglycemia.  She reports that she accidentally took too much of her insulin however it was reported by her mother that this may have been intentional.  Patient reports she takes 16 units of basal insulin in the evening which she usually takes.  She reports she took extra 10 units of NovoLog while she was out drinking.  Her friends noted her as being severely symptomatic.  They were also very concerned by the low glucose readings on her Dexcom.  Patient reports minimal recollection of these events.  In the ER she was required p.o. carbohydrates as well as 2 A of D50 and D10 at a rate greater than 200 currently with stable blood sugars in the 170s to 180s.  She elects to be full code.    Pertinent findings: Blood sugar 178, potassium 3.4, anion gap 16.1, bicarb 17.9, creatinine 0.51 CBC reviewed, urinalysis reviewed hCG negative alcohol percent 0.175 at admission.  UDS negative.    ED Medications:    Medications   sodium chloride 0.9 % flush 10 mL (has no administration in time range)   dextrose (D50W) (25 g/50 mL) IV injection 50 mL (50 mL Intravenous Given 23 2918)   dextrose 10 % infusion (250 mL/hr Intravenous Rate/Dose Change 23 2832)   ondansetron (ZOFRAN) injection 4 mg (4 mg Intravenous Given 23 9242)       Edited by:  Rohith Phoenix, DO at 7/30/2023 0645     Review Of Systems:    All systems were reviewed and negative except as mentioned in history of presenting illness, assessment and plan.    Past Medical History: Patient  has a past medical history of Celiac disease, Diabetes mellitus, Diabetes mellitus type I, GERD (gastroesophageal reflux disease), and Homozygous MTHFR mutation C677T.    Past Surgical History: Patient  has a past surgical history that includes Hernia repair; Esophagogastroduodenoscopy; and Esophagogastroduodenoscopy (N/A, 4/14/2021).    Social History: Patient  reports that she has never smoked. She has never been exposed to tobacco smoke. She has never used smokeless tobacco. She reports current alcohol use. She reports that she does not use drugs.    Family History:  Patient's family history has been reviewed and found to be noncontributory.     Allergies:      Gluten meal and Mucinex [guaifenesin er]    Home Medications:    Prior to Admission Medications       Prescriptions Last Dose Informant Patient Reported? Taking?    amphetamine-dextroamphetamine (Adderall) 10 MG tablet   No No    Take 10 mg orally every afternoon.    amphetamine-dextroamphetamine XR (ADDERALL XR) 30 MG 24 hr capsule   No No    Take 1 capsule by mouth Daily    busPIRone (BUSPAR) 15 MG tablet   No No    Take 1 tablet by mouth 3 (Three) Times a Day.    Continuous Blood Gluc Sensor (Dexcom G6 Sensor)   No No    by Other route Every 10 (Ten) Days.    Continuous Blood Gluc Transmit (Dexcom G6 Transmitter) misc   No No    1 each by Other route Every 3 (Three) Months.    glucose blood test strip   No No    Four times daily    insulin aspart (NovoLOG FlexPen) 100 UNIT/ML solution pen-injector sc pen   No No    1 unit for every 5 grams of carbs and 1:20>140, MDD 45 units    Insulin Glargine (BASAGLAR KWIKPEN) 100 UNIT/ML injection pen   No No    Inject 12 Units under the skin into the appropriate area as directed Every Night.    Insulin  "Pen Needle 32G X 4 MM misc   No No    1 each 4 (Four) Times a Day.    lamoTRIgine  MG tablet sustained-release 24 hour   No No    Take 200 mg by mouth Every Night.    multivitamin with minerals tablet tablet   Yes No    Take 1 tablet by mouth Daily.    ondansetron ODT (Zofran ODT) 4 MG disintegrating tablet   No No    Place 1 tablet on the tongue Every 8 (Eight) Hours As Needed for Nausea.    sertraline (ZOLOFT) 100 MG tablet   No No    Take 1 tablet by mouth Every Night.    True Comfort Twist Top Lancets misc   No No    1 Device 4 (Four) Times a Day.              Vital Signs:  Temp:  [98.3 °F (36.8 °C)] 98.3 °F (36.8 °C)  Heart Rate:  [109-119] 109  Resp:  [14-20] 14  BP: (107-126)/(46-80) 107/46        07/30/23  0237   Weight: 75.8 kg (167 lb)     Body mass index is 30.54 kg/m².    Physical Exam:     Most recent vital Signs: /46   Pulse 109   Temp 98.3 °F (36.8 °C)   Resp 14   Ht 157.5 cm (62\")   Wt 75.8 kg (167 lb)   SpO2 97%   BMI 30.54 kg/m²     Constitutional: Awake, alert  Eyes: PERRLA, sclerae anicteric, no conjunctival injection  HENT: NCAT, mucous membranes moist  Neck: Supple, no thyromegaly, no lymphadenopathy, trachea midline  Respiratory: Clear to auscultation bilaterally, nonlabored respirations   Cardiovascular: RRR, no murmurs, rubs, or gallops, palpable pedal pulses bilaterally  Gastrointestinal: Positive bowel sounds, soft, nontender, nondistended  Musculoskeletal: No bilateral ankle edema, no clubbing or cyanosis to extremities  Psychiatric: Appropriate affect, cooperative  Neurologic: Oriented x 3, speech clear  Skin: No rashes  Edited by: Rohith Phoenix DO at 7/30/2023 0645      Laboratory data:    I have reviewed the labs done in the emergency room.    Results from last 7 days   Lab Units 07/30/23  0336   SODIUM mmol/L 144   POTASSIUM mmol/L 3.4*   CHLORIDE mmol/L 110*   CO2 mmol/L 17.9*   BUN mg/dL 8   CREATININE mg/dL 0.51*   CALCIUM mg/dL 8.4*   BILIRUBIN mg/dL 0.3 "   ALK PHOS U/L 64   ALT (SGPT) U/L 11   AST (SGOT) U/L 10   GLUCOSE mg/dL 58*     Results from last 7 days   Lab Units 07/30/23  0336   WBC 10*3/mm3 10.13   HEMOGLOBIN g/dL 11.7*   HEMATOCRIT % 32.7*   PLATELETS 10*3/mm3 264                             Results from last 7 days   Lab Units 07/30/23  0355   COLOR UA  Yellow   GLUCOSE UA  Negative   KETONES UA  Negative   LEUKOCYTES UA  Negative   PH, URINE  5.5   BILIRUBIN UA  Negative   UROBILINOGEN UA  0.2 E.U./dL       Pain Management Panel  More data exists         Latest Ref Rng & Units 7/30/2023 5/21/2023   Pain Management Panel   Amphetamine, Urine Qual Negative Negative  Negative    Barbiturates Screen, Urine Negative Negative  Negative    Benzodiazepine Screen, Urine Negative Negative  Negative    Buprenorphine, Screen, Urine Negative Negative  Negative    Cocaine Screen, Urine Negative Negative  Negative    Methadone Screen , Urine Negative Negative  Negative    Methamphetamine, Ur Negative Negative  Negative        EKG:      Will order EKG    Radiology:    No radiology results for the last 3 days    Assessment/Plan:      Insulin overdose    Type 1 diabetes mellitus without complication    Celiac disease    -We will admit the patient to ICU status for every Accu-Cheks and D10, will give electrolyte replacement per protocol, basal insulin 16 units will dose in the evening, patient takes anywhere from 5 to 15 units Premeal based on how much carbs she takes in.  Would recommend Glucomander for her with a starting dose of 5 Premeal and 16 basal once she is off the D10 and showing stable blood sugars.  We will place a consult for behavioral health.  Continue suicide precautions. She takes Lamictal ER and Adderall XR are not formulary so she would need these brought from home if she desires to take while inpatient.  Monitor CIWA.    Disposition: Anticipate discharge to behavioral health    Prophylaxis: Lovenox    Edited by: Rohith Phoenix, DO at 7/30/2023  0652             Risk Assessment: High  DVT Prophylaxis: Lovenox  Code Status:   Code Status and Medical Interventions:   Ordered at: 07/30/23 0652     Code Status (Patient has no pulse and is not breathing):    CPR (Attempt to Resuscitate)     Medical Interventions (Patient has pulse or is breathing):    Full Support     Release to patient:    Routine Release      Diet:   Dietary Orders (From admission, onward)       Start     Ordered    07/30/23 0540  Diet: Gastrointestinal Diets; Gluten-Sensitive; Safe Tray; Texture: Regular Texture (IDDSI 7); Fluid Consistency: Thin (IDDSI 0)  Diet Effective Now        References:    Diet Order [place]   Question Answer Comment   Diets: Gastrointestinal Diets    Gastrointestinal Diet: Gluten-Sensitive    Diet Modifiers / Additional Diets: Safe Tray    Texture: Regular Texture (IDDSI 7)    Fluid Consistency: Thin (IDDSI 0)        07/30/23 0539                     Advance Care Planning   ACP discussion was held with the patient during this visit. Patient does not have an advance directive, information provided.           Rohith Phoenix DO  07/30/23  06:52 EDT    Dictated utilizing Dragon dictation.

## 2023-07-30 NOTE — PLAN OF CARE
Goal Outcome Evaluation:  Plan of Care Reviewed With: patient        Progress: no change     Received from ICU nurse.  Report given.  No reports of pain or any signs of distress.  One on one sitter at bedside.

## 2023-07-30 NOTE — ED NOTES
Spoke with Immanuel NAJERA, who states he will place an order to assess patient tomorrow morning. Patient does continue to deny any suicidal attempt/thoughts/plans.

## 2023-07-31 ENCOUNTER — READMISSION MANAGEMENT (OUTPATIENT)
Dept: CALL CENTER | Facility: HOSPITAL | Age: 23
End: 2023-07-31
Payer: MEDICAID

## 2023-07-31 VITALS
HEART RATE: 73 BPM | RESPIRATION RATE: 16 BRPM | DIASTOLIC BLOOD PRESSURE: 55 MMHG | OXYGEN SATURATION: 100 % | HEIGHT: 62 IN | SYSTOLIC BLOOD PRESSURE: 100 MMHG | BODY MASS INDEX: 28.76 KG/M2 | TEMPERATURE: 97.6 F | WEIGHT: 156.31 LBS

## 2023-07-31 PROBLEM — T38.3X1A INSULIN OVERDOSE: Status: RESOLVED | Noted: 2023-07-30 | Resolved: 2023-07-31

## 2023-07-31 LAB
ANION GAP SERPL CALCULATED.3IONS-SCNC: 10.9 MMOL/L (ref 5–15)
BUN SERPL-MCNC: 7 MG/DL (ref 6–20)
BUN/CREAT SERPL: 12.3 (ref 7–25)
CALCIUM SPEC-SCNC: 8.5 MG/DL (ref 8.6–10.5)
CHLORIDE SERPL-SCNC: 105 MMOL/L (ref 98–107)
CO2 SERPL-SCNC: 21.1 MMOL/L (ref 22–29)
CREAT SERPL-MCNC: 0.57 MG/DL (ref 0.57–1)
EGFRCR SERPLBLD CKD-EPI 2021: 131.1 ML/MIN/1.73
GLUCOSE BLDC GLUCOMTR-MCNC: 119 MG/DL (ref 70–130)
GLUCOSE BLDC GLUCOMTR-MCNC: 119 MG/DL (ref 70–130)
GLUCOSE BLDC GLUCOMTR-MCNC: 121 MG/DL (ref 70–130)
GLUCOSE BLDC GLUCOMTR-MCNC: 121 MG/DL (ref 70–130)
GLUCOSE BLDC GLUCOMTR-MCNC: 124 MG/DL (ref 70–130)
GLUCOSE BLDC GLUCOMTR-MCNC: 126 MG/DL (ref 70–130)
GLUCOSE BLDC GLUCOMTR-MCNC: 131 MG/DL (ref 70–130)
GLUCOSE BLDC GLUCOMTR-MCNC: 132 MG/DL (ref 70–130)
GLUCOSE BLDC GLUCOMTR-MCNC: 139 MG/DL (ref 70–130)
GLUCOSE BLDC GLUCOMTR-MCNC: 212 MG/DL (ref 70–130)
GLUCOSE SERPL-MCNC: 130 MG/DL (ref 65–99)
POTASSIUM SERPL-SCNC: 4.3 MMOL/L (ref 3.5–5.2)
SODIUM SERPL-SCNC: 137 MMOL/L (ref 136–145)

## 2023-07-31 PROCEDURE — 82948 REAGENT STRIP/BLOOD GLUCOSE: CPT

## 2023-07-31 PROCEDURE — 80048 BASIC METABOLIC PNL TOTAL CA: CPT | Performed by: INTERNAL MEDICINE

## 2023-07-31 PROCEDURE — 96376 TX/PRO/DX INJ SAME DRUG ADON: CPT

## 2023-07-31 PROCEDURE — 25010000002 THIAMINE PER 100 MG: Performed by: INTERNAL MEDICINE

## 2023-07-31 PROCEDURE — 99238 HOSP IP/OBS DSCHRG MGMT 30/<: CPT | Performed by: INTERNAL MEDICINE

## 2023-07-31 RX ADMIN — THIAMINE HYDROCHLORIDE 200 MG: 200 INJECTION, SOLUTION INTRAMUSCULAR; INTRAVENOUS at 06:05

## 2023-07-31 RX ADMIN — FOLIC ACID 1 MG: 5 INJECTION, SOLUTION INTRAMUSCULAR; INTRAVENOUS; SUBCUTANEOUS at 09:20

## 2023-07-31 RX ADMIN — Medication 1 TABLET: at 09:20
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
## 2023-07-31 NOTE — PAYOR COMM NOTE
"TO:[facility]  FROM:JAY EASTMAN, RN PHONE [phone]  INPT NOTIFICATION AND CLINICALS  TAX ID [id number] NPI [id number] DX CODE: [id number]    Dharmesh Jurado (23 y.o. Female)       Date of Birth   2000    Social Security Number       Address   [address] [place] [id number]    Home Phone   [phone]    MRN   [id number]       Alevism   Religious    Marital Status   Single                            Admission Date   23    Admission Type   Emergency    Admitting Provider       Attending Provider       Department, Room/Bed   [facility] TELEMETRY 4, 430/1       Discharge Date   2023    Discharge Disposition   Home or Self Care    Discharge Destination                                 Attending Provider: (none)   Allergies: Gluten Meal, Mucinex [Guaifenesin Er]    Isolation: None   Infection: None   Code Status: CPR    Ht: 157.5 cm (62\")   Wt: 70.9 kg (156 lb 4.9 oz)    Admission Cmt: None   Principal Problem: None                  Active Insurance as of 2023       Primary Coverage       Payor Plan Insurance Group Employer/Plan Group    ANTHE[place] MEDICAID [place] MED[place]ID [id number]       Payor Plan Address Payor Plan Phone Number Payor Plan Fax Number Effective Dates    [address] [address] [phone]  2020 - None Entered    [facility] [id number]         Subscriber Name Subscriber Birth Date Member ID       DHARMESH JURADO 2000 [id number]                     Emergency Contacts        (Rel.) Home Phone Work Phone Mobile Phone    Divya Jurado (Mother) [phone] -- --                 History & Physical        Rohith Phoenix DO at 23 0652              [facility] HOSPITALIST   HISTORY AND PHYSICAL      Name:  Dharmesh Jurado   Age:  23 y.o.  Sex:  female  :  2000  MRN:  [id number]   Visit Number:  [id number]  Admission Date:  2023  Date Of Service:  23  Primary Care Physician:  Edward" SONIYA Hernández    Chief Complaint:     Hypoglycemia    History Of Presenting Illness:      23-year-old insulin-dependent diabetic tevin antibody positive type I diabetic recently seen in the emergency department for depression and suicidal ideation status post the death of her father was offered inpatient treatment declined and did not meet criteria for involuntary hold at that time.  She comes in today with hypoglycemia.  She reports that she accidentally took too much of her insulin however it was reported by her mother that this may have been intentional.  Patient reports she takes 16 units of basal insulin in the evening which she usually takes.  She reports she took extra 10 units of NovoLog while she was out drinking.  Her friends noted her as being severely symptomatic.  They were also very concerned by the low glucose readings on her Dexcom.  Patient reports minimal recollection of these events.  In the ER she was required p.o. carbohydrates as well as 2 A of D50 and D10 at a rate greater than 200 currently with stable blood sugars in the 170s to 180s.  She elects to be full code.    Pertinent findings: Blood sugar 178, potassium 3.4, anion gap 16.1, bicarb 17.9, creatinine 0.51 CBC reviewed, urinalysis reviewed hCG negative alcohol percent 0.175 at admission.  UDS negative.    ED Medications:    Medications   sodium chloride 0.9 % flush 10 mL (has no administration in time range)   dextrose (D50W) (25 g/50 mL) IV injection 50 mL (50 mL Intravenous Given 7/30/23 0518)   dextrose 10 % infusion (250 mL/hr Intravenous Rate/Dose Change 7/30/23 0532)   ondansetron (ZOFRAN) injection 4 mg (4 mg Intravenous Given 7/30/23 4966)       Edited by: Rohith Phoenix DO at 7/30/2023 4458     Review Of Systems:    All systems were reviewed and negative except as mentioned in history of presenting illness, assessment and plan.    Past Medical History: Patient  has a past medical history of Celiac disease, Diabetes  mellitus, Diabetes mellitus type I, GERD (gastroesophageal reflux disease), and Homozygous MTHFR mutation C677T.    Past Surgical History: Patient  has a past surgical history that includes Hernia repair; Esophagogastroduodenoscopy; and Esophagogastroduodenoscopy (N/A, 4/14/2021).    Social History: Patient  reports that she has never smoked. She has never been exposed to tobacco smoke. She has never used smokeless tobacco. She reports current alcohol use. She reports that she does not use drugs.    Family History:  Patient's family history has been reviewed and found to be noncontributory.     Allergies:      Gluten meal and Mucinex [guaifenesin er]    Home Medications:    Prior to Admission Medications       Prescriptions Last Dose Informant Patient Reported? Taking?    amphetamine-dextroamphetamine (Adderall) 10 MG tablet   No No    Take 10 mg orally every afternoon.    amphetamine-dextroamphetamine XR (ADDERALL XR) 30 MG 24 hr capsule   No No    Take 1 capsule by mouth Daily    busPIRone (BUSPAR) 15 MG tablet   No No    Take 1 tablet by mouth 3 (Three) Times a Day.    Continuous Blood Gluc Sensor (Dexcom G6 Sensor)   No No    by Other route Every 10 (Ten) Days.    Continuous Blood Gluc Transmit (Dexcom G6 Transmitter) misc   No No    1 each by Other route Every 3 (Three) Months.    glucose blood test strip   No No    Four times daily    insulin aspart (NovoLOG FlexPen) 100 UNIT/ML solution pen-injector sc pen   No No    1 unit for every 5 grams of carbs and 1:20>140, MDD 45 units    Insulin Glargine (BASAGLAR KWIKPEN) 100 UNIT/ML injection pen   No No    Inject 12 Units under the skin into the appropriate area as directed Every Night.    Insulin Pen Needle 32G X 4 MM misc   No No    1 each 4 (Four) Times a Day.    lamoTRIgine  MG tablet sustained-release 24 hour   No No    Take 200 mg by mouth Every Night.    multivitamin with minerals tablet tablet   Yes No    Take 1 tablet by mouth Daily.    ondansetron  "ODT (Zofran ODT) 4 MG disintegrating tablet   No No    Place 1 tablet on the tongue Every 8 (Eight) Hours As Needed for Nausea.    sertraline (ZOLOFT) 100 MG tablet   No No    Take 1 tablet by mouth Every Night.    True Comfort Twist Top Lancets misc   No No    1 Device 4 (Four) Times a Day.              Vital Signs:  Temp:  [98.3 °F (36.8 °C)] 98.3 °F (36.8 °C)  Heart Rate:  [109-119] 109  Resp:  [14-20] 14  BP: (107-126)/(46-80) 107/46        07/30/23  0237   Weight: 75.8 kg (167 lb)     Body mass index is 30.54 kg/m².    Physical Exam:     Most recent vital Signs: /46   Pulse 109   Temp 98.3 °F (36.8 °C)   Resp 14   Ht 157.5 cm (62\")   Wt 75.8 kg (167 lb)   SpO2 97%   BMI 30.54 kg/m²     Constitutional: Awake, alert  Eyes: PERRLA, sclerae anicteric, no conjunctival injection  HENT: NCAT, mucous membranes moist  Neck: Supple, no thyromegaly, no lymphadenopathy, trachea midline  Respiratory: Clear to auscultation bilaterally, nonlabored respirations   Cardiovascular: RRR, no murmurs, rubs, or gallops, palpable pedal pulses bilaterally  Gastrointestinal: Positive bowel sounds, soft, nontender, nondistended  Musculoskeletal: No bilateral ankle edema, no clubbing or cyanosis to extremities  Psychiatric: Appropriate affect, cooperative  Neurologic: Oriented x 3, speech clear  Skin: No rashes  Edited by: Rohith Phoenix DO at 7/30/2023 0602      Laboratory data:    I have reviewed the labs done in the emergency room.    Results from last 7 days   Lab Units 07/30/23  0336   SODIUM mmol/L 144   POTASSIUM mmol/L 3.4*   CHLORIDE mmol/L 110*   CO2 mmol/L 17.9*   BUN mg/dL 8   CREATININE mg/dL 0.51*   CALCIUM mg/dL 8.4*   BILIRUBIN mg/dL 0.3   ALK PHOS U/L 64   ALT (SGPT) U/L 11   AST (SGOT) U/L 10   GLUCOSE mg/dL 58*     Results from last 7 days   Lab Units 07/30/23  0336   WBC 10*3/mm3 10.13   HEMOGLOBIN g/dL 11.7*   HEMATOCRIT % 32.7*   PLATELETS 10*3/mm3 264                             Results from last " 7 days   Lab Units 07/30/23  0355   COLOR UA  Yellow   GLUCOSE UA  Negative   KETONES UA  Negative   LEUKOCYTES UA  Negative   PH, URINE  5.5   BILIRUBIN UA  Negative   UROBILINOGEN UA  0.2 E.U./dL       Pain Management Panel  More data exists         Latest Ref Rng & Units 7/30/2023 5/21/2023   Pain Management Panel   Amphetamine, Urine Qual Negative Negative  Negative    Barbiturates Screen, Urine Negative Negative  Negative    Benzodiazepine Screen, Urine Negative Negative  Negative    Buprenorphine, Screen, Urine Negative Negative  Negative    Cocaine Screen, Urine Negative Negative  Negative    Methadone Screen , Urine Negative Negative  Negative    Methamphetamine, Ur Negative Negative  Negative        EKG:      Will order EKG    Radiology:    No radiology results for the last 3 days    Assessment/Plan:      Insulin overdose    Type 1 diabetes mellitus without complication    Celiac disease    -We will admit the patient to ICU status for every Accu-Cheks and D10, will give electrolyte replacement per protocol, basal insulin 16 units will dose in the evening, patient takes anywhere from 5 to 15 units Premeal based on how much carbs she takes in.  Would recommend Glucomander for her with a starting dose of 5 Premeal and 16 basal once she is off the D10 and showing stable blood sugars.  We will place a consult for behavioral health.  Continue suicide precautions. She takes Lamictal ER and Adderall XR are not formulary so she would need these brought from home if she desires to take while inpatient.  Monitor CIWA.    Disposition: Anticipate discharge to behavioral health    Prophylaxis: Lovenox    Edited by: Rohith Phoenix, DO at 7/30/2023 0652             Risk Assessment: High  DVT Prophylaxis: Lovenox  Code Status:   Code Status and Medical Interventions:   Ordered at: 07/30/23 0652     Code Status (Patient has no pulse and is not breathing):    CPR (Attempt to Resuscitate)     Medical Interventions  (Patient has pulse or is breathing):    Full Support     Release to patient:    Routine Release      Diet:   Dietary Orders (From admission, onward)       Start     Ordered    07/30/23 0540  Diet: Gastrointestinal Diets; Gluten-Sensitive; Safe Tray; Texture: Regular Texture (IDDSI 7); Fluid Consistency: Thin (IDDSI 0)  Diet Effective Now        References:    Diet Order Crosswalk   Question Answer Comment   Diets: Gastrointestinal Diets    Gastrointestinal Diet: Gluten-Sensitive    Diet Modifiers / Additional Diets: Safe Tray    Texture: Regular Texture (IDDSI 7)    Fluid Consistency: Thin (IDDSI 0)        07/30/23 0539                     Advance Care Planning   ACP discussion was held with the patient during this visit. Patient does not have an advance directive, information provided.           Rohith Phoenix DO  07/30/23  06:52 EDT    Dictated utilizing Dragon dictation.    Electronically signed by Rohith Phoenix DO at 07/30/23 0654          Emergency Department Notes        Sherrell Han, RN at 07/30/23 1418          Spoke with Immanuel KIANA, who states he will place an order to assess patient tomorrow morning. Patient does continue to deny any suicidal attempt/thoughts/plans.    Electronically signed by Sherrell Han RN at 07/30/23 1427       Sherrell Han RN at 07/30/23 1105          Notified Dr. Wahl of patients soft pressures in the 90's/50's. Orders for one 500 bolus of NS.     Electronically signed by Sherrell Han RN at 07/30/23 1249       Armaan Acosta PCT at 07/30/23 0804       Summary:Bed Assignment Update                 Waiting on an ICU bed to become available. There are currently no overflow Pts at this time per house.     Electronically signed by Armaan Acosta, PCT at 07/30/23 0805       Milligan, Bryanna, RN at 07/30/23 7306          Immanuel with behavioral health notified of patient consult.    Electronically signed by Milligan, Bryanna, RN at 07/30/23 0970       Aldo  "Stephon BROWN at 07/30/23 0610          Dr. Phoenix called per Dr. Sams with answer. Call transferred.    Electronically signed by Stephon Carlson at 07/30/23 0632       Luis Galindo at 07/30/23 0425          Patient presented to ED via EMS as she was drinking at the Bar and her glucose dropped. Therapist was called due to Patient reporting to mom that she tried to Overdose on Insulin to kill herself.  Patient EtOH was 175 mg/dl  at 0358. Patient was not medically cleared for assessment as her insulin level was not stabilized.      Therapist did speak with patient's mother for Collateral Contact. Divya Ramos [phone]. Therapist explained that she would not be able to speak about patient's treatment or assessment unless patient signs and CATHIE. Therapist explained that she would listen and take notes of what Patient's mother wanted to report.    Divya reported that patient is  Diagnosed with Bi-polar and ADHD. Patient is on Lamictal and Zoloft. Mother reported that patient has history of threatening to overdose on insulin. Patient's father passed away  in Feb 2023. Mother reported that patient has had a \" hard time with her dad's death\".    Mother reported that she believes her daughters' plan was to get drunk then overdose on insulin, go home and go to sleep and not wake up. Patient's mother was tearful while speaking with Therapist about patient's behavior.     Luis JEFFERS 07/30/2023     Luis Galindo  07/30/23 0434      Electronically signed by Luis Galindo at 07/30/23 0434       Cheri Valente, PCT at 07/30/23 0332           for patient, requested to call when pt is discharged. [phone], Stephanie Blake.     Electronically signed by Cheri Valente, PCT at 07/30/23 0333       Stephon Carlson at 07/30/23 0325          Security called at this time to sit at bedside per Cheri, RN    Electronically signed by Stephon Carlson at 07/30/23 0325       Cheri Pappas, RN at 07/30/23 0320  " "        Pt's mother requested to speak with nursing staff and ER MD.  While speaking with mother in quiet room, pt's mother disclosed she is concerned that pt is suicidal.  Pt denied SI initially in triage.  Pt then tried to facetime mother and pt's mother asked pt if she tried to take to much insulin to kill herself and pt responded \"yes\".  ER MD then asked pt how much insulin she took and pt hung up facetime call.  Entered pt's room and asked her how much insulin she had taken and she said \"10 units\".  Upon entering pt's room, pt denies SI.  Asked pt why she told her mother she wanted to kill herself and pt responded, \"to mess with her\".  ER MD to bedside and discussed plan of care with pt--pt agreeable to plan of care.  Security to bedside at this time, suicide precautions initiated.      Electronically signed by Cheri Pappas RN at 07/30/23 9976       Phani Sams DO at 07/30/23 5841          Subjective   History of Present Illness  23-year-old type I diabetic presents to the ED for chief complaint of hyperglycemia.  Patient notes that she was at the bar consuming alcohol and her blood glucose level dropped.  Blood glucose level was measured at 60.    After initial assessment the patient's mother arrived and indicated that she believed her daughter may have actually attempted suicide.  She states that she believes her daughter gave her self too much insulin intentionally.  She has been having a hard time lately after her father's untimely death.  She has been having some suicidal thoughts.  Patient's mother believes that she injected herself with insulin after consuming alcohol tonight and feeling brave enough to actually commit suicide.  The patient's mother called her on her phone when I was in the room and the patient did not realize I was with the mother.  The mother asked the patient if she intentionally took too much insulin and the patient said yes.  Upon my return to the room in further " questioning the patient she states that she was joking with her mother.    Review of Systems   Endocrine:        Hypoglycemia   All other systems reviewed and are negative.    Past Medical History:   Diagnosis Date    Celiac disease     Diabetes mellitus     Diabetes mellitus type I     GERD (gastroesophageal reflux disease)     Homozygous MTHFR mutation C677T        Allergies   Allergen Reactions    Gluten Meal Other (See Comments)     Celiac disease    Mucinex [Guaifenesin Er] Nausea Only and Dizziness       Past Surgical History:   Procedure Laterality Date    ENDOSCOPY      ENDOSCOPY N/A 4/14/2021    Procedure: ESOPHAGOGASTRODUODENOSCOPY WITH BIOPSY;  Surgeon: Wolfgang Patiño MD;  Location: [facility] ENDOSCOPY;  Service: Gastroenterology;  Laterality: N/A;    HERNIA REPAIR         Family History   Problem Relation Age of Onset    Hypertension Mother     Hypertension Father     Diabetes Father     Cancer Maternal Grandmother     Cancer Paternal Grandmother     Colon cancer Neg Hx        Social History     Socioeconomic History    Marital status: Single   Tobacco Use    Smoking status: Never     Passive exposure: Never    Smokeless tobacco: Never    Tobacco comments:     E-cig   Vaping Use    Vaping Use: Every day    Substances: Nicotine, Flavoring    Devices: Disposable    Passive vaping exposure: Yes   Substance and Sexual Activity    Alcohol use: Yes     Comment: occ    Drug use: Never    Sexual activity: Defer           Objective   Physical Exam  Vitals and nursing note reviewed.   Constitutional:       Appearance: Normal appearance.   HENT:      Head: Normocephalic and atraumatic.   Cardiovascular:      Rate and Rhythm: Normal rate.      Pulses: Normal pulses.   Pulmonary:      Effort: Pulmonary effort is normal.      Breath sounds: Normal breath sounds.   Abdominal:      Palpations: Abdomen is soft.      Tenderness: There is no abdominal tenderness.   Musculoskeletal:         General: No tenderness.    Neurological:      General: No focal deficit present.      Mental Status: She is alert and oriented to person, place, and time.       Procedures          ED Course  ED Course as of 07/31/23 0142   Sun Jul 30, 2023   0749 EKG interpreted by me reveals sinus tachycardia rate of 105, [PF]      ED Course User Index  [PF] Christopher Shepherd DO KASPER reviewed by Rohith Phoenix DO, Gilbert, Cody B, DO, Pais, Roshan, MD       Medical Decision Making  Problems Addressed:  Acute alcoholic intoxication without complication: complicated acute illness or injury  Insulin overdose, intentional self-harm, initial encounter: complicated acute illness or injury    Amount and/or Complexity of Data Reviewed  Labs: ordered.    Risk  Prescription drug management.  Decision regarding hospitalization.      Data interpreted: Nursing notes reviewed vital signs reviewed Labs independently interpreted interpretative by me.  Imaging interpretative by me.  EKG independently interpreted by me    Oxygen saturations included.    Counseling discussed the results above with the patient regarding need for admission or discharge.  Patient understands and agrees with plan of care      23-year-old female initially presented to the ED for evaluation of hyperglycemia.  Patient stated that she gave herself 10 units of insulin earlier because she thought her blood glucose was going to be high while consuming alcohol.  Her friends noted that her glucose was low on her Dexcom and that she was woozy so they brought her to the ED.  Later the patient's mother was concerned that she may have intentionally attempted to overdose on insulin.  Although the patient denied this it seemed likely after she had persistent hypoglycemia despite dextrose, 3 hours juices and multiple other snacks.  At that point she was given an amp of D50 and started on D10 at 100 an hour.  Again she had another low glucose reading.  She was given  another amp of D50 and D10 was increased.  At this point I do have high suspicion of intentional overdose.  Her alcohol level was elevated so she was unable to initially be assessed by behavioral health.  Given her persistent hypoglycemia in the setting of presumed intentional insulin overdose I discussed the case with the hospitalist and she will be admitted for further evaluation and medical management.  Further labs without significant acute abnormality    Critical Care  Performed by: Phani Sams DO  Authorized by: Phani Sams DO     Critical care provider statement:     Critical care time (minutes): 35    Critical care time was exclusive of:  Separately billable procedures and treating other patients    Critical care was necessary to treat or prevent imminent or life-threatening deterioration of the following conditions: Intentional insulin overdose, suicide attempt, depression, persistent hypoglycemia, other    Critical care was time spent personally by me on the following activities:  Ordering and performing treatments and interventions, development of treatment plan with patient or surrogate, discussions with consultants, evaluation of patient's response to treatment, examination of patient, ordering and review of laboratory studies, ordering and review of radiographic studies, pulse oximetry, re-evaluation of patient's condition and review of old charts      Final diagnoses:   Insulin overdose, intentional self-harm, initial encounter   Acute alcoholic intoxication without complication       ED Disposition  ED Disposition       ED Disposition   Decision to Admit    Condition   --    Comment   Level of Care: Critical Care [6]   Diagnosis: Insulin overdose [765202]   Admitting Physician: NATALYA MCLAIN [[id number]]   Attending Physician: NATALYA MCLAIN [[id number]]   Certification: I Certify That Inpatient Hospital Services Are Medically Necessary For Greater Than 2 Midnights                 No  follow-up provider specified.       Medication List      No changes were made to your prescriptions during this visit.            Phani Sams,   07/30/23 0630       Phani Sams DO  07/31/23 0142      Electronically signed by Phani Sams DO at 07/31/23 0142       Constanza Aiken, RN at 07/30/23 0238          Pt is eating chips and drinking OJ at this time. Glucose is 91.    Electronically signed by Constanza Aiken, RN at 07/30/23 0239       Vital Signs (last day) before discharge       Date/Time Temp Temp src Pulse Resp BP Patient Position SpO2    07/31/23 0700 97.6 (36.4) Oral 73 16 100/55 Lying 100    07/31/23 0555 -- -- -- -- 94/43 Lying --    07/31/23 0503 -- -- -- -- 100/59 Lying --    07/31/23 0413 97.5 (36.4) Oral 73 18 101/55 Lying 100    07/31/23 0253 -- -- -- -- 103/68 Lying --    07/31/23 0152 -- -- -- -- 96/60 Lying --    07/31/23 0100 -- -- -- -- 95/38 Lying --    07/30/23 2355 98.6 (37) Oral 90 18 99/49 Lying --    07/30/23 2253 -- -- -- -- 97/54 Lying --    07/30/23 2157 -- -- -- -- 100/56 Lying --    07/30/23 2052 -- -- -- -- 96/51 Lying --    07/30/23 1940 98.9 (37.2) Oral 85 18 103/58 Lying 100    07/30/23 1531 -- -- -- -- 112/66 Lying --    07/30/23 1500 98.8 (37.1) Temporal -- -- -- -- --    07/30/23 1407 -- -- 93 -- 112/62 -- 100    07/30/23 1336 -- -- 78 -- 91/54 -- 100    07/30/23 1236 -- -- 93 -- 93/53 -- 100    07/30/23 1129 -- -- 95 -- 99/62 -- 100    07/30/23 1028 -- -- 93 -- 94/52 -- 100    07/30/23 09:28:27 -- -- -- -- 101/53 -- --    07/30/23 0927 -- -- 94 -- 101/53 -- 99    07/30/23 0830 -- -- 108 -- -- -- 97    07/30/23 0826 -- -- -- -- 104/52 -- --    07/30/23 0813 -- -- 103 -- -- -- 98    07/30/23 0758 -- -- 103 -- 122/55 -- 96    07/30/23 0731 -- -- 103 -- -- -- 96    07/30/23 0726 -- -- 104 -- 115/58 -- 96    07/30/23 0719 -- -- 103 -- -- -- 97    07/30/23 0708 -- -- -- -- -- -- 96    07/30/23 0640 -- -- -- -- -- -- 97    07/30/23 0631 -- -- 109 14 107/46 -- --     07/30/23 0601 -- -- -- -- 115/47 -- 95    07/30/23 05:55:01 -- -- 114 16 -- -- 96    07/30/23 0531 -- -- 111 18 114/50 -- 95    07/30/23 0237 98.3 (36.8) -- 119 20 126/80 -- 99          Current Facility-Administered Medications   Medication Dose Route Frequency Provider Last Rate Last Admin    acetaminophen (TYLENOL) tablet 650 mg  650 mg Oral Q6H PRN Isaac Wahl MD   650 mg at 07/2000    sennosides-docusate (PERICOLACE) 8.6-50 MG per tablet 2 tablet  2 tablet Oral BID Isaac Wahl MD   2 tablet at 07/30/23 0807    And    polyethylene glycol (MIRALAX) packet 17 g  17 g Oral Daily PRN Isaac Wahl MD        And    bisacodyl (DULCOLAX) EC tablet 5 mg  5 mg Oral Daily PRN Isaac Wahl MD        And    bisacodyl (DULCOLAX) suppository 10 mg  10 mg Rectal Daily PRN Isaac Wahl MD        busPIRone (BUSPAR) tablet 15 mg  15 mg Oral TID Isaac Wahl MD   15 mg at 07/30/23 0807    Calcium Replacement - Follow Nurse / BPA Driven Protocol   Does not apply PRN Isaac Wahl MD        dextrose (D50W) (25 g/50 mL) IV injection 50 mL  50 mL Intravenous Q1H PRN Isaac Wahl MD   50 mL at 07/30/23 0518    Enoxaparin Sodium (LOVENOX) syringe 40 mg  40 mg Subcutaneous Daily Isaac Wahl MD        folic acid 1 mg in sodium chloride 0.9 % 50 mL IVPB  1 mg Intravenous Daily Isaac Wahl  mL/hr at 07/31/23 0920 1 mg at 07/31/23 0920    lamoTRIgine (LaMICtal) tablet 200 mg  200 mg Oral Nightly Isaac Wahl MD   200 mg at 07/2000    Magnesium Standard Dose Replacement - Follow Nurse / BPA Driven Protocol   Does not apply PRN Isaac Wahl MD        multivitamin with minerals 1 tablet  1 tablet Oral Daily Isaac Wahl MD   1 tablet at 07/31/23 0920    nitroglycerin (NITROSTAT) SL tablet 0.4 mg  0.4 mg Sublingual Q5 Min PRN Isaac Wahl MD        ondansetron ODT (ZOFRAN-ODT) disintegrating tablet 4 mg  4 mg Oral Q8H PRN Isaac Wahl MD   4 mg at 07/30/23 0831    Phosphorus Replacement - Follow Nurse / BPA  Driven Protocol   Does not apply PRN Isaac Wahl MD        Potassium Replacement - Follow Nurse / BPA Driven Protocol   Does not apply PRN Isaac Wahl MD        sertraline (ZOLOFT) tablet 100 mg  100 mg Oral Nightly Isaac Wahl MD   100 mg at 07/2000    sodium chloride 0.9 % flush 10 mL  10 mL Intravenous PRN Isaac Wahl MD        sodium chloride 0.9 % flush 10 mL  10 mL Intravenous Q12H Isaac Wahl MD   10 mL at 07/30/23 0808    sodium chloride 0.9 % flush 10 mL  10 mL Intravenous PRN Isaac Wahl MD        sodium chloride 0.9 % infusion 40 mL  40 mL Intravenous PRN Isaac Wahl MD        sodium chloride 0.9 % infusion  100 mL/hr Intravenous Continuous Isaac Wahl  mL/hr at 07/30/23 1401 100 mL/hr at 07/30/23 1401    thiamine (B-1) injection 200 mg  200 mg Intravenous Q8H Isaac Wahl MD   200 mg at 07/31/23 0605    Followed by    [START ON 8/4/2023] thiamine (VITAMIN B-1) tablet 100 mg  100 mg Oral Daily Isaac Wahl MD         Current Outpatient Medications   Medication Sig Dispense Refill    amphetamine-dextroamphetamine (Adderall) 10 MG tablet Take 10 mg orally every afternoon. 30 tablet 0    amphetamine-dextroamphetamine XR (ADDERALL XR) 30 MG 24 hr capsule Take 1 capsule by mouth Daily 30 capsule 0    lamoTRIgine  MG tablet sustained-release 24 hour Take 200 mg by mouth Every Night. 30 tablet 2    multivitamin with minerals tablet tablet Take 1 tablet by mouth Daily.      sertraline (ZOLOFT) 100 MG tablet Take 1 tablet by mouth Every Night. 30 tablet 2    busPIRone (BUSPAR) 15 MG tablet Take 1 tablet by mouth 3 (Three) Times a Day. 90 tablet 2    Continuous Blood Gluc Sensor (Dexcom G6 Sensor) by Other route Every 10 (Ten) Days. 9 each 3    Continuous Blood Gluc Transmit (Dexcom G6 Transmitter) misc 1 each by Other route Every 3 (Three) Months. 1 each 3    glucose blood test strip Four times daily 400 each 3    insulin aspart (NovoLOG FlexPen) 100 UNIT/ML solution  pen-injector sc pen 1 unit for every 5 grams of carbs and 1:20>140, MDD 45 units 45 mL 1    Insulin Glargine (BASAGLAR KWIKPEN) 100 UNIT/ML injection pen Inject 12 Units under the skin into the appropriate area as directed Every Night. 15 mL 1    Insulin Pen Needle 32G X 4 MM misc 1 each 4 (Four) Times a Day. 400 each 3    ondansetron ODT (Zofran ODT) 4 MG disintegrating tablet Place 1 tablet on the tongue Every 8 (Eight) Hours As Needed for Nausea. 15 tablet 1    True Comfort Twist Top Lancets misc 1 Device 4 (Four) Times a Day. 400 each 3     Lab Results (last 48 hours)       Procedure Component Value Units Date/Time    POC Glucose Once [[id number]]  (Abnormal) Collected: 07/31/23 0939    Specimen: Blood Updated: 07/31/23 1007     Glucose 212 mg/dL      Comment: Serial Number: [id number]:  [id number]       POC Glucose Once [[id number]]  (Abnormal) Collected: 07/31/23 0829    Specimen: Blood Updated: 07/31/23 1007     Glucose 132 mg/dL      Comment: Serial Number: [id number]:  [id number]       POC Glucose Once [[id number]]  (Abnormal) Collected: 07/31/23 0729    Specimen: Blood Updated: 07/31/23 1006     Glucose 139 mg/dL      Comment: Serial Number: [id number]:  [id number]       Basic Metabolic Panel [[id number]]  (Abnormal) Collected: 07/31/23 0529    Specimen: Blood Updated: 07/31/23 0602     Glucose 130 mg/dL      BUN 7 mg/dL      Creatinine 0.57 mg/dL      Sodium 137 mmol/L      Potassium 4.3 mmol/L      Chloride 105 mmol/L      CO2 21.1 mmol/L      Calcium 8.5 mg/dL      BUN/Creatinine Ratio 12.3     Anion Gap 10.9 mmol/L      eGFR 131.1 mL/min/1.73     Narrative:      GFR Normal >60  Chronic Kidney Disease <60  Kidney Failure <15      POC Glucose Once [[id number]]  (Normal) Collected: 07/31/23 0553    Specimen: Blood Updated: 07/31/23 0601     Glucose 121 mg/dL      Comment: Serial Number: [id number]:  [id number]       POC Glucose Once [[id number]]  (Normal) Collected: 07/31/23 0501    Specimen:  Blood Updated: 07/31/23 0600     Glucose 124 mg/dL      Comment: Serial Number: [id number]:  [id number]       POC Glucose Once [[id number]]  (Normal) Collected: 07/31/23 0402    Specimen: Blood Updated: 07/31/23 0600     Glucose 119 mg/dL      Comment: Serial Number: [id number]:  [id number]       POC Glucose Once [[id number]]  (Normal) Collected: 07/31/23 0256    Specimen: Blood Updated: 07/31/23 0600     Glucose 126 mg/dL      Comment: Serial Number: [id number]:  [id number]       POC Glucose Once [[id number]]  (Normal) Collected: 07/31/23 0157    Specimen: Blood Updated: 07/31/23 0600     Glucose 121 mg/dL      Comment: Serial Number: [id number]:  [id number]       POC Glucose Once [[id number]]  (Abnormal) Collected: 07/31/23 0103    Specimen: Blood Updated: 07/31/23 0600     Glucose 131 mg/dL      Comment: Serial Number: [id number]:  [id number]       POC Glucose Once [[id number]]  (Normal) Collected: 07/31/23 0009    Specimen: Blood Updated: 07/31/23 0017     Glucose 119 mg/dL      Comment: Serial Number: [id number]:  [id number]       POC Glucose Once [[id number]]  (Normal) Collected: 07/30/23 2257    Specimen: Blood Updated: 07/30/23 2306     Glucose 113 mg/dL      Comment: Serial Number: [id number]:  [id number]       POC Glucose Once [[id number]]  (Normal) Collected: 07/30/23 2200    Specimen: Blood Updated: 07/30/23 2305     Glucose 122 mg/dL      Comment: Serial Number: [id number]:  [id number]       POC Glucose Once [[id number]]  (Normal) Collected: 07/30/23 2058    Specimen: Blood Updated: 07/30/23 2305     Glucose 119 mg/dL      Comment: Serial Number: [id number]:  [id number]       POC Glucose Once [[id number]]  (Abnormal) Collected: 07/2000    Specimen: Blood Updated: 07/30/23 2305     Glucose 139 mg/dL      Comment: Serial Number: [id number]:  [id number]       Potassium [[id number]]  (Normal) Collected: 07/30/23 1628    Specimen: Blood Updated: 07/30/23 1718     Potassium  4.6 mmol/L     POC Glucose Once [[id number]]  (Abnormal) Collected: 07/30/23 1619    Specimen: Blood Updated: 07/30/23 1620     Glucose 164 mg/dL      Comment: Serial Number: [id number]:  [id number]       POC Glucose Once [[id number]]  (Abnormal) Collected: 07/30/23 1352    Specimen: Blood Updated: 07/30/23 1354     Glucose 143 mg/dL      Comment: Serial Number: [id number]:  [id number]       POC Glucose Once [[id number]]  (Abnormal) Collected: 07/30/23 1225    Specimen: Blood Updated: 07/30/23 1231     Glucose 247 mg/dL      Comment: Serial Number: [id number]:  [id number]       POC Glucose Once [[id number]]  (Abnormal) Collected: 07/30/23 0938    Specimen: Blood Updated: 07/30/23 0941     Glucose 285 mg/dL      Comment: Serial Number: [id number]:  [id number]       Basic Metabolic Panel [[id number]]  (Abnormal) Collected: 07/30/23 0823    Specimen: Blood Updated: 07/30/23 0845     Glucose 254 mg/dL      BUN 6 mg/dL      Creatinine 0.53 mg/dL      Sodium 138 mmol/L      Potassium 4.2 mmol/L      Chloride 102 mmol/L      CO2 20.8 mmol/L      Calcium 8.4 mg/dL      BUN/Creatinine Ratio 11.3     Anion Gap 15.2 mmol/L      eGFR 133.5 mL/min/1.73     Narrative:      GFR Normal >60  Chronic Kidney Disease <60  Kidney Failure <15      POC Glucose Once [[id number]]  (Abnormal) Collected: 07/30/23 0814    Specimen: Blood Updated: 07/30/23 0830     Glucose 258 mg/dL      Comment: Serial Number: [id number]:  [id number]       POC Glucose Once [[id number]]  (Abnormal) Collected: 07/30/23 0717    Specimen: Blood Updated: 07/30/23 0721     Glucose 172 mg/dL      Comment: Serial Number: [id number]:  [id number]       POC Glucose Once [[id number]]  (Abnormal) Collected: 07/30/23 0632    Specimen: Blood Updated: 07/30/23 0637     Glucose 180 mg/dL      Comment: Serial Number: [id number]:  [id number]       POC Glucose Once [[id number]]  (Abnormal) Collected: 07/30/23 0602    Specimen: Blood Updated: 07/30/23 0605      Glucose 178 mg/dL      Comment: Serial Number: [id number]:  [id number]       POC Glucose Once [[id number]]  (Abnormal) Collected: 07/30/23 0516    Specimen: Blood Updated: 07/30/23 0526     Glucose 55 mg/dL      Comment: Serial Number: [id number]:  [id number]       Urine Drug Screen - Urine, Clean Catch [[id number]]  (Normal) Collected: 07/30/23 0355    Specimen: Urine, Clean Catch Updated: 07/30/23 0514     THC, Screen, Urine Negative     Phencyclidine (PCP), Urine Negative     Cocaine Screen, Urine Negative     Methamphetamine, Ur Negative     Opiate Screen Negative     Amphetamine Screen, Urine Negative     Benzodiazepine Screen, Urine Negative     Tricyclic Antidepressants Screen Negative     Methadone Screen, Urine Negative     Barbiturates Screen, Urine Negative     Oxycodone Screen, Urine Negative     Propoxyphene Screen Negative     Buprenorphine, Screen, Urine Negative    Narrative:      Limitations of this procedure include the possibility of false positives due to interfering substances in the urine sample. Clinical data should be correlated with any questionable result. Positive results should be considered Presumptive Positive until results are confirmed with another methodology such as HPLC or GCMS.    Urinalysis With Microscopic If Indicated (No Culture) - Urine, Clean Catch [[id number]]  (Normal) Collected: 07/30/23 0355    Specimen: Urine, Clean Catch Updated: 07/30/23 0507     Color, UA Yellow     Appearance, UA Clear     pH, UA 5.5     Specific Gravity, UA <=1.005     Glucose, UA Negative     Ketones, UA Negative     Bilirubin, UA Negative     Blood, UA Negative     Protein, UA Negative     Leuk Esterase, UA Negative     Nitrite, UA Negative     Urobilinogen, UA 0.2 E.U./dL    Narrative:      Urine microscopic not indicated.    Pregnancy, Urine - Urine, Clean Catch [[id number]]  (Normal) Collected: 07/30/23 0355    Specimen: Urine, Clean Catch Updated: 07/30/23 0507     HCG, Urine QL Negative     POC Glucose Once [[id number]]  (Abnormal) Collected: 07/30/23 0433    Specimen: Blood Updated: 07/30/23 0436     Glucose 154 mg/dL      Comment: Serial Number: [id number]:  [id number]       Acetaminophen Level [[id number]]  (Normal) Collected: 07/30/23 0336    Specimen: Blood Updated: 07/30/23 0358     Acetaminophen <5.0 mcg/mL     Narrative:      Toxic = Greater than 150 mcg/mL    Ethanol [[id number]]  (Abnormal) Collected: 07/30/23 0336    Specimen: Blood Updated: 07/30/23 0358     Ethanol 175 mg/dL      Ethanol % 0.175 %     Narrative:      This result is for medical use only and should not be used for forensic purposes.    Salicylate Level [[id number]]  (Normal) Collected: 07/30/23 0336    Specimen: Blood Updated: 07/30/23 0358     Salicylate <0.3 mg/dL     Comprehensive Metabolic Panel [[id number]]  (Abnormal) Collected: 07/30/23 0336    Specimen: Blood Updated: 07/30/23 0358     Glucose 58 mg/dL      BUN 8 mg/dL      Creatinine 0.51 mg/dL      Sodium 144 mmol/L      Potassium 3.4 mmol/L      Chloride 110 mmol/L      CO2 17.9 mmol/L      Calcium 8.4 mg/dL      Total Protein 6.4 g/dL      Albumin 4.3 g/dL      ALT (SGPT) 11 U/L      AST (SGOT) 10 U/L      Alkaline Phosphatase 64 U/L      Total Bilirubin 0.3 mg/dL      Globulin 2.1 gm/dL      A/G Ratio 2.0 g/dL      BUN/Creatinine Ratio 15.7     Anion Gap 16.1 mmol/L      eGFR 134.7 mL/min/1.73     Narrative:      GFR Normal >60  Chronic Kidney Disease <60  Kidney Failure <15      Magnesium [[id number]]  (Normal) Collected: 07/30/23 0336    Specimen: Blood Updated: 07/30/23 0358     Magnesium 1.9 mg/dL     CBC & Differential [[id number]]  (Abnormal) Collected: 07/30/23 0336    Specimen: Blood Updated: 07/30/23 0348    Narrative:      The following orders were created for panel order CBC & Differential.  Procedure                               Abnormality         Status                     ---------                               -----------         ------                      CBC Auto Differential[[id number]]        Abnormal            Final result                 Please view results for these tests on the individual orders.    CBC Auto Differential [[id number]]  (Abnormal) Collected: 23 0336    Specimen: Blood Updated: 23     WBC 10.13 10*3/mm3      RBC 3.75 10*6/mm3      Hemoglobin 11.7 g/dL      Hematocrit 32.7 %      MCV 87.2 fL      MCH 31.2 pg      MCHC 35.8 g/dL      RDW 12.5 %      RDW-SD 39.6 fl      MPV 8.8 fL      Platelets 264 10*3/mm3      Neutrophil % 64.7 %      Lymphocyte % 25.2 %      Monocyte % 8.4 %      Eosinophil % 0.6 %      Basophil % 0.6 %      Immature Grans % 0.5 %      Neutrophils, Absolute 6.56 10*3/mm3      Lymphocytes, Absolute 2.55 10*3/mm3      Monocytes, Absolute 0.85 10*3/mm3      Eosinophils, Absolute 0.06 10*3/mm3      Basophils, Absolute 0.06 10*3/mm3      Immature Grans, Absolute 0.05 10*3/mm3      nRBC 0.0 /100 WBC              Physician Progress Notes (last 24 hours)        Isaac Wahl MD at 23 1401              [facility]    Name:  Ruby Ramos   Age:  23 y.o.  Sex:  female  :  2000  MRN:  [id number]   Visit Number:  [id number]  Admission Date:  2023  Date Of Service:  23  Primary Care Physician:  Isamar Leon APRN    Patient was seen and examined this while she is in the emergency room waiting for a bed. Pertinent laboratory and radiology data were reviewed.  She currently has a one-on-one sitter.    Vital signs:    Vital Signs (last 24 hours)          0700   0659  0700   1401   Most Recent      Temp (°F)   98.3       98.3 (36.8)  0237    Heart Rate 109 -  119    78 -  108     78  1336    Resp 14 -  20       14  0631    /46 -  126/80    91/54 -  122/55     91/54  1336    SpO2 (%) 95 -  99    96 -  100     100  1336     Patient admitted for hypoglycemia.  She states that she was in a bar  drinking with her friends when her sugar was high and she gave her 10 units of short-acting insulin and subsequently developed hypoglycemia.  She states that she has had problems with labile sugars.  She was diagnosed with type 1 diabetes mellitus at the age of 20.  She follows up with endocrinology in [place].    Patient currently denies any suicidal ideation or uncontrolled depression symptoms.  She follows up with [facility] behavioral health services.    She has been on 10% dextrose through the day but is currently having sugars in the 200s.  We will switch her IV fluids to normal saline and continue to monitor overnight.  We will consult behavioral health services in the morning tomorrow.  If her blood sugars remained stable, she should be able to go home tomorrow.      Isaac Wahl MD  07/30/23  14:01 EDT    Dictated utilizing Dragon dictation.    Electronically signed by Isaac Wahl MD at 07/30/23 1405       Consult Notes (last 24 hours)  Notes from 07/30/23 1201 through 07/31/23 1201   No notes of this type exist for this encounter.

## 2023-07-31 NOTE — DISCHARGE SUMMARY
[facility]   DISCHARGE SUMMARY      Name:  Ruby Ramos   Age:  23 y.o.  Sex:  female  :  2000  MRN:  [id number]   Visit Number:  [id number]    Admission Date:  2023  Date of Discharge:  2023  Primary Care Physician:  Isamar Leon APRN    Important issues to note:    1.  Patient was admitted with hypoglycemia secondary to unintentional overdose of insulin and was treated with dextrose 10% infusion with improvement of blood sugars and symptoms.  2.  Follow-up with behavioral health services as scheduled for management of depression.  3.  Follow-up with primary care provider in 1 week.  4.  Follow-up with endocrinology as scheduled on 2023.    Discharge Diagnoses:     1.  Acute hypoglycemia secondary to unintentional overdose of insulin, POA, resolved.  2.  Diabetes mellitus type 1.  3.  Celiac disease.  4.  Depression.    Problem List:     Active Hospital Problems    Diagnosis  POA    Celiac disease [K90.0]  Yes    Type 1 diabetes mellitus without complication [E10.9]  Yes      Resolved Hospital Problems    Diagnosis Date Resolved POA    Insulin overdose [T38.3X1A] 2023 Yes      Consults:     Consulting Physician(s)               None            Procedures Performed:    None.    History of presenting illness/Hospital Course:    Ms. Ramos is a 23-year-old insulin-dependent diabetic tevin antibody positive type I diabetic recently seen in the emergency department for depression and suicidal ideation status post the death of her father, who was offered inpatient treatment declined and did not meet criteria for involuntary hold at that time was admitted from the emergency room with hypoglycemia.  She reported that she accidentally took too much of her insulin however it was reported by her mother that this may have been intentional.  Patient reports she takes 16 units of basal insulin in the evening which she usually takes.  She reports she took extra 10  units of NovoLog while she was out drinking.  They were very concerned by the low glucose readings on her Dexcom.  Patient reported minimal recollection of these events.  In the ER she was required p.o. carbohydrates as well as 2 ampoules of D50 and subsequently needed D10 drip to maintain normal blood sugars.       Pertinent findings: Blood sugar 178, potassium 3.4, anion gap 16.1, bicarb 17.9, creatinine 0.51 CBC reviewed, urinalysis reviewed hCG negative alcohol percent 0.175 at admission.  UDS negative.  Due to history of suicidal ideation a bedside sitter was placed.  Patient however denied any suicidal ideation and any uncontrolled depression symptoms.  Patient improved with regards to hypoglycemia over the next 24 hours and is currently as mildly elevated blood sugars.  She currently denies suicidal ideation but will be seen by behavioral health services for outpatient follow-up prior to discharge.  I discussed the patient's condition and discharge plan with her mother Divya over the phone.  According to the mother, patient's depression symptoms have become worse since February 2023 when she lost her father.  Follow-up with primary care provider in 1 week.  Follow-up with endocrinology as scheduled on 8/24/2023.    Vital Signs:    Temp:  [97.5 °F (36.4 °C)-98.9 °F (37.2 °C)] 97.6 °F (36.4 °C)  Heart Rate:  [73-95] 73  Resp:  [16-18] 16  BP: ()/(38-68) 100/55    Physical Exam:    General Appearance:  Alert and cooperative.    Head:  Atraumatic and normocephalic.   Eyes: Conjunctivae and sclerae normal, no icterus. No pallor.   Ears:  Ears with no abnormalities noted.   Throat: No oral lesions, no thrush, oral mucosa moist.   Neck: Supple, trachea midline, no thyromegaly.   Back:   No kyphoscoliosis present. No tenderness to palpation.   Lungs:   Breath sounds heard bilaterally equally.  No crackles or wheezing. No Pleural rub or bronchial breathing.   Heart:  Normal S1 and S2, no murmur, no gallop, no  rub. No JVD.   Abdomen:   Normal bowel sounds, no masses, no organomegaly. Soft, nontender, nondistended, no rebound tenderness.   Extremities: Supple, no edema, no cyanosis, no clubbing.   Pulses: Pulses palpable bilaterally.   Skin: No bleeding or rash.   Neurologic: Alert and oriented x 3. No facial asymmetry. Moves all four limbs. No tremors.     Pertinent Lab Results:     Results from last 7 days   Lab Units 07/31/23  0529 07/30/23  1628 07/30/23  0823 07/30/23  0336   SODIUM mmol/L 137  --  138 144   POTASSIUM mmol/L 4.3 4.6 4.2 3.4*   CHLORIDE mmol/L 105  --  102 110*   CO2 mmol/L 21.1*  --  20.8* 17.9*   BUN mg/dL 7  --  6 8   CREATININE mg/dL 0.57  --  0.53* 0.51*   CALCIUM mg/dL 8.5*  --  8.4* 8.4*   BILIRUBIN mg/dL  --   --   --  0.3   ALK PHOS U/L  --   --   --  64   ALT (SGPT) U/L  --   --   --  11   AST (SGOT) U/L  --   --   --  10   GLUCOSE mg/dL 130*  --  254* 58*     Results from last 7 days   Lab Units 07/30/23  0336   WBC 10*3/mm3 10.13   HEMOGLOBIN g/dL 11.7*   HEMATOCRIT % 32.7*   PLATELETS 10*3/mm3 264     Pertinent Radiology Results:    Imaging Results (All)       None     Condition on Discharge:      Stable.    Code status during the hospital stay:    Code Status and Medical Interventions:   Ordered at: 07/30/23 0652     Code Status (Patient has no pulse and is not breathing):    CPR (Attempt to Resuscitate)     Medical Interventions (Patient has pulse or is breathing):    Full Support     Release to patient:    Routine Release     Discharge Disposition:    Home or Self Care    Discharge Medications:       Discharge Medications        Continue These Medications        Instructions Start Date   amphetamine-dextroamphetamine XR 30 MG 24 hr capsule  Commonly known as: ADDERALL XR   30 mg, Oral, Daily      amphetamine-dextroamphetamine 10 MG tablet  Commonly known as: Adderall   Take 10 mg orally every afternoon.      АНДРЕЙAGLAR KWIKPEN 100 UNIT/ML injection pen   12 Units, Subcutaneous, Nightly       busPIRone 15 MG tablet  Commonly known as: BUSPAR   15 mg, Oral, 3 Times Daily      Dexcom G6 Sensor   Other, Every 10 Days      Dexcom G6 Transmitter misc   1 each, Other, Every 3 Months      glucose blood test strip   Four times daily      Insulin Pen Needle 32G X 4 MM misc   1 each, Does not apply, 4 Times Daily      lamoTRIgine  MG tablet sustained-release 24 hour   200 mg, Oral, Nightly      multivitamin with minerals tablet tablet   1 tablet, Oral, Daily      NovoLOG FlexPen 100 UNIT/ML solution pen-injector sc pen  Generic drug: insulin aspart   1 unit for every 5 grams of carbs and 1:20>140, MDD 45 units      ondansetron ODT 4 MG disintegrating tablet  Commonly known as: Zofran ODT   4 mg, Translingual, Every 8 Hours PRN      sertraline 100 MG tablet  Commonly known as: ZOLOFT   100 mg, Oral, Nightly      True Comfort Twist Top Lancets misc   1 Device, Does not apply, 4 Times Daily             Discharge Diet:     Diet Instructions       Diet: Diabetic Diets; Consistent Carbohydrate; Regular Texture (IDDSI 7); Thin (IDDSI 0)      Discharge Diet: Diabetic Diets    Diabetic Diet: Consistent Carbohydrate    Texture: Regular Texture (IDDSI 7)    Fluid Consistency: Thin (IDDSI 0)          Activity at Discharge:     Activity Instructions       Activity as Tolerated            Follow-up Appointments:     Follow-up Information       Isamar Leon APRN .    Specialty: Nurse Practitioner  Contact information:  [address] [place] [address] [phone]                           Future Appointments   Date Time Provider Department Center   8/24/2023 10:00 AM Layla Corral DO [facility]   9/27/2023  4:30 PM Ellen Bowens APRN [facility]     Test Results Pending at Discharge:    None.       Isaac Wahl MD  07/31/23  09:28 EDT    Time: I spent 20 minutes on this discharge activity which included: face-to-face encounter with the patient, reviewing the data in the  system, coordination of the care with the nursing staff as well as consultants, documentation, and entering orders.     Dictated utilizing Dragon dictation.

## 2023-07-31 NOTE — PLAN OF CARE
Goal Outcome Evaluation:           Progress: improving  Outcome Evaluation: VSS; pt tearful at beginning of shift but was able to rest and pleasant rest of night

## 2023-07-31 NOTE — CASE MANAGEMENT/SOCIAL WORK
Discharge Planning Assessment  [facility]     Patient Name: Ruby Ramos  MRN: [id number]  Today's Date: 7/31/2023    Admit Date: 7/30/2023    Plan: Dcp done at bedside with pt who provided demographics. Pt's primary is Isamar Leon and pharmacy is [facility]. She works full time, drives, and is independent. Home dme is glucometer and dexacom.Sdoh complete. Pt has been at current residence for 2 yrs with her mother. Dcp is to retrun home, has transportation, will see behavioral health before dc today.   Discharge Needs Assessment       Row Name 07/31/23 0932       Living Environment    People in Home parent(s)    Current Living Arrangements home    Duration at Residence 2 yrs    Potentially Unsafe Housing Conditions none    Primary Care Provided by self    Family Caregiver if Needed parent(s)    Quality of Family Relationships helpful    Able to Return to Prior Arrangements yes       Resource/Environmental Concerns    Resource/Environmental Concerns none    Transportation Concerns none       Food Insecurity    Within the past 12 months, you worried that your food would run out before you got the money to buy more. Never true    Within the past 12 months, the food you bought just didn't last and you didn't have money to get more. Never true       Transition Planning    Patient/Family Anticipates Transition to home with family    Transportation Anticipated family or friend will provide       Discharge Needs Assessment    Readmission Within the Last 30 Days no previous admission in last 30 days    Concerns to be Addressed denies needs/concerns at this time    Equipment Needed After Discharge none                   Discharge Plan       Row Name 07/31/23 0935       Plan    Plan Dcp done at bedside with pt who provided demographics. Pt's primary is Isamar Leon and pharmacy is [facility]. She works full time, drives, and is independent. Home dme is glucometer and dexacom.Sdoh complete. Pt has been at current  residence for 2 yrs with her mother. Dcp is to retrun home, has transportation, will see behavioral health before dc today.                  Continued Care and Services - Admitted Since 7/30/2023    Coordination has not been started for this encounter.       Expected Discharge Date and Time       Expected Discharge Date Expected Discharge Time    Jul 31, 2023            Demographic Summary       Row Name 07/31/23 0930       General Information    Admission Type inpatient    Arrived From emergency department    Referral Source admission list    Reason for Consult discharge planning    Preferred Language English       Contact Information    Permission Granted to Share Info With family/designee                   Functional Status       Row Name 07/31/23 0931       Functional Status    Usual Activity Tolerance excellent    Current Activity Tolerance excellent       Physical Activity    On average, how many days per week do you engage in moderate to strenuous exercise (like a brisk walk)? 5 days    On average, how many minutes do you engage in exercise at this level? 60 min    Number of minutes of exercise per week 300       Functional Status, IADL    Medications independent    Meal Preparation independent    Housekeeping independent    Laundry independent    Shopping independent       Mental Status    General Appearance WDL WDL       Mental Status Summary    Recent Changes in Mental Status/Cognitive Functioning mood       Employment/    Employment Status employed full-time                   Psychosocial    No documentation.                  Abuse/Neglect    No documentation.                  Legal    No documentation.                  Substance Abuse    No documentation.                  Patient Forms    No documentation.                     Juanita Sams RN

## 2023-07-31 NOTE — CASE MANAGEMENT/SOCIAL WORK
Case Management Discharge Note                Selected Continued Care - Admitted Since 7/30/2023       Destination    No services have been selected for the patient.                Durable Medical Equipment    No services have been selected for the patient.                Dialysis/Infusion    No services have been selected for the patient.                Home Medical Care    No services have been selected for the patient.                Therapy    No services have been selected for the patient.                Community Resources    No services have been selected for the patient.                [facility]    No services have been selected for the patient.                    Transportation Services  Private: Car    Final Discharge Disposition Code: 01 - home or self-care

## 2023-07-31 NOTE — PLAN OF CARE
Goal Outcome Evaluation:  Plan of Care Reviewed With: patient        Progress: no change   Seen by behavioral health who cleared from suicide precautions.  To be discharged home with outpatient care.

## 2023-08-01 ENCOUNTER — TRANSITIONAL CARE MANAGEMENT TELEPHONE ENCOUNTER (OUTPATIENT)
Dept: CALL CENTER | Facility: HOSPITAL | Age: 23
End: 2023-08-01
Payer: MEDICAID

## 2023-08-01 NOTE — PAYOR COMM NOTE
"To:  [place]  From: Johanna Gipson RN  Phone: [phone]  Fax: [phone]  NPI: [id number]  TIN: [id number]  Member ID: [id number]   MRN: [id number]    Dharmesh Ramos (23 y.o. Female)       Date of Birth   2000    Social Security Number       Address   [address] [place] [address]    Home Phone   [phone]    MRN   [id number]       [facility]    Marital Status   Single                            Admission Date   23    Admission Type   Emergency    Admitting Provider       Attending Provider       Department, Room/Bed   [facility] TELEMETRY 4, 430/1       Discharge Date   2023    Discharge Disposition   Home or Self Care    Discharge Destination                                 Attending Provider: (none)   Allergies: Gluten Meal, Mucinex [Guaifenesin Er]    Isolation: None   Infection: None   Code Status: Prior    Ht: 157.5 cm (62\")   Wt: 70.9 kg (156 lb 4.9 oz)    Admission Cmt: None   Principal Problem: None                  Active Insurance as of 2023       Primary Coverage       Payor Plan Insurance Group Employer/Plan Group    ANTHEM MEDICAID ANTHEM MEDICAID [id number]       Payor Plan Address Payor Plan Phone Number Payor Plan Fax Number Effective Dates    [address] [address] [phone]  2020 - None Entered    [facility] [id number]         Subscriber Name Subscriber Birth Date Member ID       DHARMESH RAMOS 2000 [id number]                     Emergency Contacts        (Rel.) Home Phone Work Phone Mobile Phone    Divya Ramos (Mother) [phone] -- --                 Discharge Summary        Isaac Wahl MD at 23 0928              [facility] HOSPITALIST   DISCHARGE SUMMARY      Name:  Dharmesh Ramos   Age:  23 y.o.  Sex:  female  :  2000  MRN:  [id number]   Visit Number:  [id number]    Admission Date:  2023  Date of Discharge:  2023  Primary Care Physician:  Isamar Leon, " APRN    Important issues to note:    1.  Patient was admitted with hypoglycemia secondary to unintentional overdose of insulin and was treated with dextrose 10% infusion with improvement of blood sugars and symptoms.  2.  Follow-up with behavioral health services as scheduled for management of depression.  3.  Follow-up with primary care provider in 1 week.  4.  Follow-up with endocrinology as scheduled on 8/24/2023.    Discharge Diagnoses:     1.  Acute hypoglycemia secondary to unintentional overdose of insulin, POA, resolved.  2.  Diabetes mellitus type 1.  3.  Celiac disease.  4.  Depression.    Problem List:     Active Hospital Problems    Diagnosis  POA    Celiac disease [K90.0]  Yes    Type 1 diabetes mellitus without complication [E10.9]  Yes      Resolved Hospital Problems    Diagnosis Date Resolved POA    Insulin overdose [T38.3X1A] 07/31/2023 Yes      Consults:     Consulting Physician(s)               None            Procedures Performed:    None.    History of presenting illness/Hospital Course:    Ms. Ramos is a 23-year-old insulin-dependent diabetic tevin antibody positive type I diabetic recently seen in the emergency department for depression and suicidal ideation status post the death of her father, who was offered inpatient treatment declined and did not meet criteria for involuntary hold at that time was admitted from the emergency room with hypoglycemia.  She reported that she accidentally took too much of her insulin however it was reported by her mother that this may have been intentional.  Patient reports she takes 16 units of basal insulin in the evening which she usually takes.  She reports she took extra 10 units of NovoLog while she was out drinking.  They were very concerned by the low glucose readings on her Dexcom.  Patient reported minimal recollection of these events.  In the ER she was required p.o. carbohydrates as well as 2 ampoules of D50 and subsequently needed D10 drip to  maintain normal blood sugars.       Pertinent findings: Blood sugar 178, potassium 3.4, anion gap 16.1, bicarb 17.9, creatinine 0.51 CBC reviewed, urinalysis reviewed hCG negative alcohol percent 0.175 at admission.  UDS negative.  Due to history of suicidal ideation a bedside sitter was placed.  Patient however denied any suicidal ideation and any uncontrolled depression symptoms.  Patient improved with regards to hypoglycemia over the next 24 hours and is currently as mildly elevated blood sugars.  She currently denies suicidal ideation but will be seen by behavioral health services for outpatient follow-up prior to discharge.  I discussed the patient's condition and discharge plan with her mother Divya over the phone.  According to the mother, patient's depression symptoms have become worse since February 2023 when she lost her father.  Follow-up with primary care provider in 1 week.  Follow-up with endocrinology as scheduled on 8/24/2023.    Vital Signs:    Temp:  [97.5 °F (36.4 °C)-98.9 °F (37.2 °C)] 97.6 °F (36.4 °C)  Heart Rate:  [73-95] 73  Resp:  [16-18] 16  BP: ()/(38-68) 100/55    Physical Exam:    General Appearance:  Alert and cooperative.    Head:  Atraumatic and normocephalic.   Eyes: Conjunctivae and sclerae normal, no icterus. No pallor.   Ears:  Ears with no abnormalities noted.   Throat: No oral lesions, no thrush, oral mucosa moist.   Neck: Supple, trachea midline, no thyromegaly.   Back:   No kyphoscoliosis present. No tenderness to palpation.   Lungs:   Breath sounds heard bilaterally equally.  No crackles or wheezing. No Pleural rub or bronchial breathing.   Heart:  Normal S1 and S2, no murmur, no gallop, no rub. No JVD.   Abdomen:   Normal bowel sounds, no masses, no organomegaly. Soft, nontender, nondistended, no rebound tenderness.   Extremities: Supple, no edema, no cyanosis, no clubbing.   Pulses: Pulses palpable bilaterally.   Skin: No bleeding or rash.   Neurologic: Alert and  oriented x 3. No facial asymmetry. Moves all four limbs. No tremors.     Pertinent Lab Results:     Results from last 7 days   Lab Units 07/31/23  0529 07/30/23  1628 07/30/23  0823 07/30/23  0336   SODIUM mmol/L 137  --  138 144   POTASSIUM mmol/L 4.3 4.6 4.2 3.4*   CHLORIDE mmol/L 105  --  102 110*   CO2 mmol/L 21.1*  --  20.8* 17.9*   BUN mg/dL 7  --  6 8   CREATININE mg/dL 0.57  --  0.53* 0.51*   CALCIUM mg/dL 8.5*  --  8.4* 8.4*   BILIRUBIN mg/dL  --   --   --  0.3   ALK PHOS U/L  --   --   --  64   ALT (SGPT) U/L  --   --   --  11   AST (SGOT) U/L  --   --   --  10   GLUCOSE mg/dL 130*  --  254* 58*     Results from last 7 days   Lab Units 07/30/23  0336   WBC 10*3/mm3 10.13   HEMOGLOBIN g/dL 11.7*   HEMATOCRIT % 32.7*   PLATELETS 10*3/mm3 264     Pertinent Radiology Results:    Imaging Results (All)       None     Condition on Discharge:      Stable.    Code status during the hospital stay:    Code Status and Medical Interventions:   Ordered at: 07/30/23 0652     Code Status (Patient has no pulse and is not breathing):    CPR (Attempt to Resuscitate)     Medical Interventions (Patient has pulse or is breathing):    Full Support     Release to patient:    Routine Release     Discharge Disposition:    Home or Self Care    Discharge Medications:       Discharge Medications        Continue These Medications        Instructions Start Date   amphetamine-dextroamphetamine XR 30 MG 24 hr capsule  Commonly known as: ADDERALL XR   30 mg, Oral, Daily      amphetamine-dextroamphetamine 10 MG tablet  Commonly known as: Adderall   Take 10 mg orally every afternoon.      BASAGLAR KWIKPEN 100 UNIT/ML injection pen   12 Units, Subcutaneous, Nightly      busPIRone 15 MG tablet  Commonly known as: BUSPAR   15 mg, Oral, 3 Times Daily      Dexcom G6 Sensor   Other, Every 10 Days      Dexcom G6 Transmitter misc   1 each, Other, Every 3 Months      glucose blood test strip   Four times daily      Insulin Pen Needle 32G X 4 MM  misc   1 each, Does not apply, 4 Times Daily      lamoTRIgine  MG tablet sustained-release 24 hour   200 mg, Oral, Nightly      multivitamin with minerals tablet tablet   1 tablet, Oral, Daily      NovoLOG FlexPen 100 UNIT/ML solution pen-injector sc pen  Generic drug: insulin aspart   1 unit for every 5 grams of carbs and 1:20>140, MDD 45 units      ondansetron ODT 4 MG disintegrating tablet  Commonly known as: Zofran ODT   4 mg, Translingual, Every 8 Hours PRN      sertraline 100 MG tablet  Commonly known as: ZOLOFT   100 mg, Oral, Nightly      True Comfort Twist Top Lancets misc   1 Device, Does not apply, 4 Times Daily             Discharge Diet:     Diet Instructions       Diet: Diabetic Diets; Consistent Carbohydrate; Regular Texture (IDDSI 7); Thin (IDDSI 0)      Discharge Diet: Diabetic Diets    Diabetic Diet: Consistent Carbohydrate    Texture: Regular Texture (IDDSI 7)    Fluid Consistency: Thin (IDDSI 0)          Activity at Discharge:     Activity Instructions       Activity as Tolerated            Follow-up Appointments:     Follow-up Information       Isamar Leon APRN .    Specialty: Nurse Practitioner  Contact information:  [address] [place] [id number]  [phone]                           Future Appointments   Date Time Provider Department Center   8/24/2023 10:00 AM Layla Corral DO [facility]   9/27/2023  4:30 PM Ellen Bowens APRN [facility]     Test Results Pending at Discharge:    None.       Isaac Wahl MD  07/31/23  09:28 EDT    Time: I spent 20 minutes on this discharge activity which included: face-to-face encounter with the patient, reviewing the data in the system, coordination of the care with the nursing staff as well as consultants, documentation, and entering orders.     Dictated utilizing Dragon dictation.      Electronically signed by Isaac Wahl MD at 07/31/23 0951

## 2023-08-02 ENCOUNTER — TRANSITIONAL CARE MANAGEMENT TELEPHONE ENCOUNTER (OUTPATIENT)
Dept: CALL CENTER | Facility: HOSPITAL | Age: 23
End: 2023-08-02
Payer: MEDICAID

## 2023-09-06 ENCOUNTER — OFFICE VISIT (OUTPATIENT)
Dept: INTERNAL MEDICINE | Facility: CLINIC | Age: 23
End: 2023-09-06
Payer: MEDICAID

## 2023-09-06 VITALS
DIASTOLIC BLOOD PRESSURE: 68 MMHG | OXYGEN SATURATION: 100 % | HEIGHT: 62 IN | HEART RATE: 102 BPM | WEIGHT: 171 LBS | TEMPERATURE: 100 F | SYSTOLIC BLOOD PRESSURE: 108 MMHG | BODY MASS INDEX: 31.47 KG/M2

## 2023-09-06 DIAGNOSIS — R79.89 ELEVATED DHEA: ICD-10-CM

## 2023-09-06 DIAGNOSIS — G43.009 MIGRAINE WITHOUT AURA AND WITHOUT STATUS MIGRAINOSUS, NOT INTRACTABLE: ICD-10-CM

## 2023-09-06 DIAGNOSIS — G44.209 TENSION HEADACHE: Primary | ICD-10-CM

## 2023-09-06 DIAGNOSIS — N92.0 MENORRHAGIA WITH REGULAR CYCLE: ICD-10-CM

## 2023-09-06 DIAGNOSIS — G89.29 CHRONIC NECK PAIN: ICD-10-CM

## 2023-09-06 DIAGNOSIS — M54.2 CHRONIC NECK PAIN: ICD-10-CM

## 2023-09-06 PROCEDURE — 1159F MED LIST DOCD IN RCRD: CPT | Performed by: NURSE PRACTITIONER

## 2023-09-06 PROCEDURE — 1160F RVW MEDS BY RX/DR IN RCRD: CPT | Performed by: NURSE PRACTITIONER

## 2023-09-06 PROCEDURE — 3044F HG A1C LEVEL LT 7.0%: CPT | Performed by: NURSE PRACTITIONER

## 2023-09-06 PROCEDURE — 99214 OFFICE O/P EST MOD 30 MIN: CPT | Performed by: NURSE PRACTITIONER

## 2023-09-06 RX ORDER — TIZANIDINE 4 MG/1
4 TABLET ORAL NIGHTLY PRN
Qty: 30 TABLET | Refills: 0 | Status: SHIPPED | OUTPATIENT
Start: 2023-09-06

## 2023-09-06 RX ORDER — LEVOCETIRIZINE DIHYDROCHLORIDE 5 MG/1
5 TABLET, FILM COATED ORAL EVERY EVENING
Qty: 30 TABLET | Refills: 1 | Status: SHIPPED | OUTPATIENT
Start: 2023-09-06

## 2023-09-06 RX ORDER — SUMATRIPTAN 50 MG/1
TABLET, FILM COATED ORAL
Qty: 10 TABLET | Refills: 0 | Status: SHIPPED | OUTPATIENT
Start: 2023-09-06

## 2023-09-06 RX ORDER — INSULIN GLARGINE 100 [IU]/ML
16 INJECTION, SOLUTION SUBCUTANEOUS
COMMUNITY
Start: 2023-08-16

## 2023-09-06 NOTE — PROGRESS NOTES
Office Visit      Patient Name: Ruby Ramos  : 2000   MRN: [id number]   Care Team: Patient Care Team:  Isamar Leon APRN as PCP - General (Family Medicine)  Ried Garcia MD as Consulting Physician (General Surgery)  Ruby Conde LPCC as Counselor (Behavioral Health)  Beth House APRN as Nurse Practitioner (Behavioral Health)    Chief Complaint  Migraine (Over the last year her headaches have gotten progressively worse, Hx of headaches as a child.)    Subjective     Subjective      Ruby Ramos presents to [facility] for headaches.   Ms. Ramos presents to the office today with chief complaint of headaches.  States she had constant headaches since middle school. They resolved with chiropractic care and exercise.   She has been having headaches for the past year and states they occurred a few times a week but now they've progressed to everyday, which prompts her visit today.   States she's had migraines before and within the past year she has experienced one every few months.   She describes her migraines as bilateral constant pain starting in her neck and progresses to behind her eyes. She experiences nausea, vomiting, photophobia, burred vision, and body aches with her migraines.   Describes her headaches as a constant dull ache that progresses to a stabbing sensation behind bilateral eyes.   Reports nausea, occasional blurred vision, neck and shoulder pain, unable to concentrate, and a mental fog.   Denies  numbness, confusion, tingling, or vomiting with her headaches.   States she goes to sleep with a headache and wakes up with one.   She has tried Tylenol and ibuprofen for her headaches and states they do not improve her symptoms.   She has not identified any triggers to her headaches or migraines.  Currently rates her headache as an 8 out of 10 on the pain scale today.   Endorses stress from working two jobs and has severe insomnia that  "has been resistant to medications. She does follow with behavioral health for this problem.   States she has never had imaging of her head before.   Has a positive family history for migraines. States her mom has severe migraines and her brother has headaches similar presentation as hers.     She currently sees Dr. Corral as her endocrinologist. Dr. Corral ordered a CT scan of her abdomen adrenals with and without contrast 6 months ago due to her DHEA levels being elevated. CT scan results were unremarkable. PCOS has been mentioned to the patient as a possibility by Dr. Corral but no further workup has been performed at this time.  She is concerned all of these symptoms are somehow related.     Objective     Objective   Vital Signs:   /68 (BP Location: Left arm, Patient Position: Sitting, Cuff Size: Adult)   Pulse 102   Temp 100 °F (37.8 °C) (Temporal)   Ht 157.5 cm (62\")   Wt 77.6 kg (171 lb)   SpO2 100%   BMI 31.28 kg/m²     Physical Exam  Vitals and nursing note reviewed.   HENT:      Right Ear: Hearing, tympanic membrane and ear canal normal.      Left Ear: Hearing, tympanic membrane and ear canal normal.      Nose:      Right Sinus: No maxillary sinus tenderness or frontal sinus tenderness.      Left Sinus: No maxillary sinus tenderness or frontal sinus tenderness.   Eyes:      Extraocular Movements:      Right eye: Nystagmus (2 beats) present.      Left eye: Nystagmus (2 beats) present.      Pupils: Pupils are equal, round, and reactive to light.   Cardiovascular:      Rate and Rhythm: Regular rhythm. Tachycardia present.      Heart sounds: Normal heart sounds, S1 normal and S2 normal.   Pulmonary:      Effort: Pulmonary effort is normal.      Breath sounds: Normal breath sounds.   Abdominal:      General: Bowel sounds are normal.      Palpations: Abdomen is soft.      Tenderness: There is no abdominal tenderness.   Musculoskeletal:      Right shoulder: Tenderness present.      Left shoulder: " Tenderness present.      Cervical back: Spinous process tenderness and muscular tenderness present.      Right lower leg: No edema.      Left lower leg: No edema.   Lymphadenopathy:      Cervical: Cervical adenopathy present.      Right cervical: No superficial or posterior cervical adenopathy.     Left cervical: No superficial or posterior cervical adenopathy.   Skin:     General: Skin is warm and dry.   Neurological:      General: No focal deficit present.      Mental Status: She is alert.      GCS: GCS eye subscore is 4. GCS verbal subscore is 5. GCS motor subscore is 6.      Sensory: Sensation is intact.      Motor: Motor function is intact.   Psychiatric:         Attention and Perception: Attention and perception normal.         Mood and Affect: Mood normal.         Speech: Speech normal.         Behavior: Behavior normal.        Assessment / Plan      Assessment & Plan   Problem List Items Addressed This Visit          Musculoskeletal and Injuries    Chronic neck pain    I suspect this is contributing to tension headaches. Instructed to utilize heat twice a day on her neck, tizanidine as needed for spasm- discussed side effects, and stretching. Consider formal PT if not improving.        Neuro    Tension headache - Primary    Relevant Medications    SUMAtriptan (Imitrex) 50 MG tablet    tiZANidine (ZANAFLEX) 4 MG tablet    Educated on side effects of Tizanadine and to take it at home and not to operate heavy machinery or consume alcohol while taking this medication.   Encouraged her to talk with her psychiatrist for medications to aid in proper sleep hygiene.   Instructed to utilize heat on her neck and perform stretches. Tylenol and ibuprofen PRN and avoid triggers.      Other Visit Diagnoses       Elevated DHEA        Menorrhagia with regular cycle        Relevant Orders    US Non-ob Transvaginal    Perform TVUS to evaluate ovaries. Keep follow-up with endocrine, she is considering hormonal contraception.      Migraine without aura and without status migrainosus, not intractable        Relevant Medications    SUMAtriptan (Imitrex) 50 MG tablet    tiZANidine (ZANAFLEX) 4 MG tablet      Educated to take Sumatriptan at the first sign of a migraine- educated on side effects of medication. Offered toradol injection today, she politely declines. See above plan do not feel daily headache is true migraine. Consider amitriptyline for prevention, discuss with behavioral health.         This note accurately reflects work and decisions made by me.      Follow Up   Return in about 4 weeks (around 10/4/2023) for Next scheduled follow up.  Patient was given instructions and counseling regarding her condition or for health maintenance advice. Please see specific information pulled into the AVS if appropriate.     SONIYA Gasca  [facility]

## 2023-09-25 NOTE — PROGRESS NOTES
"This provider is located at [facility] ,[address], [address] in [place], [place],using a secure [employer] Video Visit through [employer]. Patient is being seen remotely via telehealth at their home address in [place], and stated they are in a secure environment for this session. The patient's condition being diagnosed/treated is appropriate for telemedicine. The provider identified herself as well as her credentials.   The patient, and/or patients guardian, consent to be seen remotely, and when consent is given they understand that the consent allows for patient identifiable information to be sent to a third party as needed.   They may refuse to be seen remotely at any time. The electronic data is encrypted and password protected, and the patient and/or guardian has been advised of the potential risks to privacy not withstanding such measures.     Chief Complaint  Bipolar I disorder, ADHD, anxiety, insomnia, bereavement, and alcohol use disorder, moderate, in sustained remission      Subjective          Ruby Ramos presents via [employer] Video visit through [employer] by herself for a follow up and medication check.    History of Present Illness: Ruby states, \"I have been up and down.' Ruby tells me she hasn't been doing well for some time. She notes her mood become depressed at the end of July and she felt it lasted for a week. She stopped her Zoloft after a \"blackout episode\" when she combined Zoloft with alcohol. EMS had to take her to the ED, unconscious, and her mom and friends said she had been talking of suicide and may have made an attempt. The patient denies an attempt and tells me she does not have any recollection of the event. She reports ongoing passive suicidal ideations with a plan but no intent. She attributes her worsening mood to living with her mother who can be toxic so she moved out two weeks ago and in with her brother. She is not sleeping well and " tells me she was not sleeping when she moved out from her mom's. She went three days without sleep and is only getting two hours now. She reported hypomanic symptoms then with decreased need for sleep, racing thoughts, intense interests, and a surge of energy. She denies any risky behaviors. Ruby admits to drinking alcohol once a week on Wednesday at dinner with friends and every other weekend she goes to a bar and has about four drinks. She elected to resume her Adderall so her appetite is decreased. She is no longer in therapy and tells me she wishes not to resume. She is taking Adderall XR, Adderall, Buspar, and lamotrigine ER. She denies any side effects. She denies any HI/AVH.    Current Medications:   Current Outpatient Medications   Medication Sig Dispense Refill    Continuous Blood Gluc Sensor (Dexcom G6 Sensor) by Other route Every 10 (Ten) Days. 9 each 3    glucose blood test strip Four times daily 400 each 3    insulin aspart (NovoLOG FlexPen) 100 UNIT/ML solution pen-injector sc pen 1 unit for every 5 grams of carbs and 1:20>140, MDD 45 units 45 mL 1    Insulin Glargine Solostar 100 UNIT/ML solution pen-injector Inject 16 Units as directed Daily Before Supper.      Insulin Pen Needle 32G X 4 MM misc 1 each 4 (Four) Times a Day. 400 each 3    lamoTRIgine  MG tablet sustained-release 24 hour Take 200 mg by mouth Every Night. 30 tablet 2    levocetirizine (XYZAL) 5 MG tablet Take 1 tablet by mouth Every Evening. 30 tablet 1    multivitamin with minerals tablet tablet Take 1 tablet by mouth Daily.      SUMAtriptan (Imitrex) 50 MG tablet Take one tablet at onset of headache. May repeat dose one time in 2 hours if headache not relieved. 10 tablet 0    tiZANidine (ZANAFLEX) 4 MG tablet Take 1 tablet by mouth At Night As Needed for Muscle Spasms. 30 tablet 0    True Comfort Twist Top Lancets misc 1 Device 4 (Four) Times a Day. 400 each 3    amphetamine-dextroamphetamine (Adderall) 10 MG tablet Take 10 mg  orally every afternoon. 30 tablet 0    amphetamine-dextroamphetamine XR (ADDERALL XR) 30 MG 24 hr capsule Take 1 capsule by mouth Daily 30 capsule 0    brompheniramine-pseudoephedrine-DM 30-2-10 MG/5ML syrup Take 10 mL by mouth 4 (Four) Times a Day As Needed for Congestion or Cough. (Patient not taking: Reported on 9/6/2023) 118 mL 0    Continuous Blood Gluc Transmit (Dexcom G6 Transmitter) misc 1 each by Other route Every 3 (Three) Months. 1 each 3    doxepin (SINEquan) 10 MG/ML solution Take 0.3-0.6 mL by mouth Every Night. 118 mL 2     No current facility-administered medications for this visit.         Objective   Vital Signs:   There were no vitals taken for this visit.    Physical Exam  Nursing note reviewed. Vitals reviewed: No vitals to review due to nature of telehealth visit.  Constitutional:       Appearance: Normal appearance. She is well-developed and normal weight.   Neurological:      General: No focal deficit present.      Mental Status: She is alert and oriented to person, place, and time.   Psychiatric:         Attention and Perception: Attention normal.         Mood and Affect: Mood is depressed.         Speech: Speech normal.         Behavior: Behavior normal. Withdrawn: guarded. Behavior is cooperative.         Thought Content: Thought content normal. Thought content includes suicidal (passive without intent) ideation.         Cognition and Memory: Cognition normal.         Judgment: Judgment normal. Judgment is impulsive and inappropriate.      Comments: Mood lability      Result Review :     The following data was reviewed by: SONIYA Jay on 09/28/2023:  CBC & Differential (07/30/2023 03:36)  Comprehensive Metabolic Panel (07/30/2023 03:36)  Acetaminophen Level (07/30/2023 03:36)  Ethanol (07/30/2023 03:36)  Salicylate Level (07/30/2023 03:36)  Magnesium (07/30/2023 03:36)  Pregnancy, Urine - Urine, Clean Catch (07/30/2023 03:55)  Urinalysis With Microscopic If Indicated  (No Culture) - Urine, Clean Catch (07/30/2023 03:55)  Urine Drug Screen - Urine, Clean Catch (07/30/2023 03:55)   Office Visit with Macrina Godinez APRN (09/06/2023)   UC with Soheila Casillas PA (08/17/2023) ED to Hosp-Admission (Discharged) with Isaac Wahl MD; Phani Sams DO (07/30/2023)     Assessment and Plan    Diagnoses and all orders for this visit:    1. Bipolar I disorder, current or most recent episode hypomanic (Primary)    2. Other insomnia  -     doxepin (SINEquan) 10 MG/ML solution; Take 0.3-0.6 mL by mouth Every Night.  Dispense: 118 mL; Refill: 2    3. Generalized anxiety disorder    4. Attention deficit hyperactivity disorder, combined type    5. Alcohol use disorder, moderate, dependence         Mental Status Exam:   Hygiene:   good  Cooperation:  Guarded  Eye Contact:  Fair  Psychomotor Behavior:  Appropriate  Affect:  Appropriate  Mood: depressed  Speech:  Normal  Thought Process:  Goal directed and Linear  Thought Content:  Normal  Suicidal:  Suicidal Ideation and passive without intent  Homicidal:  None  Hallucinations:  None  Delusion:  None  Memory:  Intact  Orientation:  Person, Place, Time and Situation  Reliability:  fair  Insight:  Fair  Judgement:  Fair  Impulse Control:  Fair  Physical/Medical Issues:  Yes Diabetes, celiac disease, headaches, back problems, hernia, and hair loss       PHQ-9 Score:   PHQ-9 Total Score:       Impression/Plan:  -This is a follow up and medication check. Ruby reports having mood lability. Her last depressive episode was at the end of July and may have included an intentional overdose with insulin. Ruby claims not to remember the event but her mom and friends say she was suicidal. She attributes the blackout to combining Zoloft with alcohol so she stopped Zoloft. She felt manic while taking this medication. She reports her last hypomanic episode being two weeks ago. She did not sleep for three days and moved out her mother's home then.  She lives with her brother and feels this is a positive situation. She identifies him and her friends as protective against suicide. She reports an ongoing passive thought with plans but denies any intent. She is drinking alcohol again so she and I discussed possibly decreasing to stop for benefits on health and mood. She has been considering and may try. She and I discussed a safety plan should her suicidal thoughts increase in intensity and frequency. She and I talked about medication options. She wishes to continue Adderalls and Lamotrigine and desires something to help insomnia because she attributes insomnia to worsening her mood. She has tried several medications so her PCP may refer her to sleep medicine. I thought that could be helpful and encouraged her to consider. I suggested Doxepin at low doses to target histamine as this seems to be a receptor not targeted by her past medications. She agrees to try. I warned her of the risks of combining sedating medications and stimulants with alcohol and encouraged abstinence if possible. She verbalized understanding. I inquired about therapy and Ruby had stopped and did not want to resume. I provided support and validation for her feelings but also encouraged her to reconsider and provided education about the benefits of therapy. She will think about it.   -Stop Zoloft since patient is no longer taking.  -Initiate Doxepin 3-6 mg nightly for insomnia.   -Continue change lamotrigine  mg nightly for depression and anxiety. Patient has refills.  -Continue Adderall XR 30 mg daily for ADHD symptoms.  Patient has refills.  -Continue Adderall 10 mg daily for breakthrough ADHD symptoms. Patient has refills.  -Continue Buspar 15 mg daily as needed for anxiety.  Patient has refills.   -Consider therapy.  -The JANI report, reviewed through PDMP, of the past 12 months were reviewed and is appropriate.  The patient/guardian reports taking the medication only as  prescribed.  The patient/guardian denies any abuse or misuse of the medication.  The patient/guardian denies any other substance use or issues.  There are no apparent substance related issues.  The patient reports no side effects of the current medication usage.  The patient/guardian has reported significant improvement with medication usage and wishes to continue medication as prescribed.  The patient/guardian is appropriate to continue with current medication usage at this time.  Reinforced risks and side effects of medication usage, patient and/or guardian verbalize understanding in their own words and are in agreement with current plan.  -Last UDS 1/9/23. Positive for amphetamine as expected.      MEDS ORDERED DURING VISIT:  New Medications Ordered This Visit   Medications    doxepin (SINEquan) 10 MG/ML solution     Sig: Take 0.3-0.6 mL by mouth Every Night.     Dispense:  118 mL     Refill:  2       I spent 45 minutes caring for Ruby on this date of service. This time includes time spent by me in the following activities:preparing for the visit, reviewing tests, obtaining and/or reviewing a separately obtained history, performing a medically appropriate examination and/or evaluation , counseling and educating the patient/family/caregiver, ordering medications, tests, or procedures, documenting information in the medical record, and care coordination   Follow Up   Return in about 2 months (around 11/28/2023) for Medication Check.  Patient was given instructions and counseling regarding her condition or for health maintenance advice. Please see specific information pulled into the AVS if appropriate.       TREATMENT PLAN/GOALS: Continue supportive psychotherapy efforts and medications as indicated. Treatment and medication options discussed during today's visit. Patient acknowledged and verbally consented to continue with current treatment plan and was educated on the importance of compliance with treatment and  follow-up appointments.    MEDICATION ISSUES:  Discussed medication options and treatment plan of prescribed medication as well as the risks, benefits, and side effects including potential falls, possible impaired driving and metabolic adversities among others. Patient is agreeable to call the office with any worsening of symptoms or onset of side effects. Patient is agreeable to call 911 or go to the nearest ER should he/she begin having SI/HI.        This document has been electronically signed by SONIYA Valadez, PMHNP-BC  September 28, 2023 19:34 EDT    Part of this note may be an electronic transcription/translation of spoken language to printed text using the Dragon Dictation System.

## 2023-09-28 ENCOUNTER — TELEMEDICINE (OUTPATIENT)
Dept: PSYCHIATRY | Facility: CLINIC | Age: 23
End: 2023-09-28
Payer: MEDICAID

## 2023-09-28 DIAGNOSIS — G47.09 OTHER INSOMNIA: Chronic | ICD-10-CM

## 2023-09-28 DIAGNOSIS — F90.2 ATTENTION DEFICIT HYPERACTIVITY DISORDER, COMBINED TYPE: Chronic | ICD-10-CM

## 2023-09-28 DIAGNOSIS — F10.20 ALCOHOL USE DISORDER, MODERATE, DEPENDENCE: Chronic | ICD-10-CM

## 2023-09-28 DIAGNOSIS — F41.1 GENERALIZED ANXIETY DISORDER: Chronic | ICD-10-CM

## 2023-09-28 DIAGNOSIS — F31.0 BIPOLAR I DISORDER, CURRENT OR MOST RECENT EPISODE HYPOMANIC: Primary | Chronic | ICD-10-CM

## 2023-09-28 RX ORDER — DOXEPIN HYDROCHLORIDE 10 MG/ML
3-6 SOLUTION ORAL NIGHTLY
Qty: 118 ML | Refills: 2 | Status: SHIPPED | OUTPATIENT
Start: 2023-09-28

## 2023-10-25 ENCOUNTER — HOSPITAL ENCOUNTER (OUTPATIENT)
Dept: ULTRASOUND IMAGING | Facility: HOSPITAL | Age: 23
Discharge: HOME | End: 2023-10-25
Admitting: NURSE PRACTITIONER
Payer: MEDICAID

## 2023-10-25 DIAGNOSIS — N92.0 MENORRHAGIA WITH REGULAR CYCLE: ICD-10-CM

## 2023-10-25 PROCEDURE — 76830 TRANSVAGINAL US NON-OB: CPT

## 2023-10-26 DIAGNOSIS — F31.30 BIPOLAR I DISORDER, MOST RECENT EPISODE DEPRESSED: Chronic | ICD-10-CM

## 2023-10-26 DIAGNOSIS — F90.2 ATTENTION DEFICIT HYPERACTIVITY DISORDER, COMBINED TYPE: Chronic | ICD-10-CM

## 2023-10-26 RX ORDER — INSULIN GLARGINE 100 [IU]/ML
INJECTION, SOLUTION SUBCUTANEOUS
Qty: 15 ML | Refills: 0 | Status: SHIPPED | OUTPATIENT
Start: 2023-10-26

## 2023-10-26 RX ORDER — DEXTROAMPHETAMINE SACCHARATE, AMPHETAMINE ASPARTATE MONOHYDRATE, DEXTROAMPHETAMINE SULFATE AND AMPHETAMINE SULFATE 7.5; 7.5; 7.5; 7.5 MG/1; MG/1; MG/1; MG/1
30 CAPSULE, EXTENDED RELEASE ORAL DAILY
Qty: 30 CAPSULE | Refills: 0 | Status: SHIPPED | OUTPATIENT
Start: 2023-10-26 | End: 2024-10-25

## 2023-10-26 RX ORDER — LAMOTRIGINE 200 MG/1
1 TABLET, EXTENDED RELEASE ORAL
Qty: 30 TABLET | Refills: 0 | Status: SHIPPED | OUTPATIENT
Start: 2023-10-26

## 2023-10-26 NOTE — TELEPHONE ENCOUNTER
Rx Refill Note  Requested Prescriptions     Pending Prescriptions Disp Refills    amphetamine-dextroamphetamine XR (ADDERALL XR) 30 MG 24 hr capsule 30 capsule 0     Sig: Take 1 capsule by mouth Daily      Last office visit with prescribing clinician: 6/12/2023   Last telemedicine visit with prescribing clinician: 9/28/2023   Next office visit with prescribing clinician: 11/28/2023                         Would you like a call back once the refill request has been completed: [] Yes [] No    If the office needs to give you a call back, can they leave a voicemail: [] Yes [] No    Renaldo Patel MA  10/26/23, 12:40 EDT

## 2023-10-26 NOTE — TELEPHONE ENCOUNTER
Rx Refill Note  Requested Prescriptions     Pending Prescriptions Disp Refills    Insulin Glargine Solostar 100 UNIT/ML solution pen-injector [Pharmacy Med Name: Insulin Glargine Solostar Subcutaneous Solution Pen-injector 100 UNIT/ML] 15 mL 0     Sig: Inject 14 Units under the skin into the appropriate area as directed Every Night.      Last office visit with prescribing clinician: 4/20/2023     Next office visit with prescribing clinician: 1/24/2024       Merle Huff MA  10/26/23, 11:21 EDT

## 2023-10-27 ENCOUNTER — PRIOR AUTHORIZATION (OUTPATIENT)
Dept: ENDOCRINOLOGY | Facility: CLINIC | Age: 23
End: 2023-10-27
Payer: MEDICAID

## 2023-10-27 NOTE — TELEPHONE ENCOUNTER
Approvedtoday  The request has been approved. The authorization is effective from 10/27/2023 to 10/26/2024, as long as the member is enrolled in their current health plan. The request was approved as submitted. A written notification letter will follow with additional details.  Drug  Basaglar KwikPen 100UNIT/ML pen-injectors  Form  [employer] [place] Medicaid ePA Form 2017 NCPDP

## 2023-10-31 ENCOUNTER — OFFICE VISIT (OUTPATIENT)
Dept: INTERNAL MEDICINE | Facility: CLINIC | Age: 23
End: 2023-10-31
Payer: MEDICAID

## 2023-10-31 VITALS
BODY MASS INDEX: 32.92 KG/M2 | DIASTOLIC BLOOD PRESSURE: 51 MMHG | WEIGHT: 178.9 LBS | HEIGHT: 62 IN | TEMPERATURE: 98.4 F | OXYGEN SATURATION: 100 % | HEART RATE: 106 BPM | SYSTOLIC BLOOD PRESSURE: 122 MMHG

## 2023-10-31 DIAGNOSIS — G47.00 INSOMNIA, UNSPECIFIED TYPE: ICD-10-CM

## 2023-10-31 DIAGNOSIS — G89.29 CHRONIC NECK PAIN: ICD-10-CM

## 2023-10-31 DIAGNOSIS — G44.209 TENSION HEADACHE: Primary | ICD-10-CM

## 2023-10-31 DIAGNOSIS — M54.2 CHRONIC NECK PAIN: ICD-10-CM

## 2023-10-31 DIAGNOSIS — R40.0 DAYTIME SLEEPINESS: ICD-10-CM

## 2023-10-31 PROCEDURE — 99214 OFFICE O/P EST MOD 30 MIN: CPT | Performed by: NURSE PRACTITIONER

## 2023-10-31 PROCEDURE — 1159F MED LIST DOCD IN RCRD: CPT | Performed by: NURSE PRACTITIONER

## 2023-10-31 PROCEDURE — 1160F RVW MEDS BY RX/DR IN RCRD: CPT | Performed by: NURSE PRACTITIONER

## 2023-10-31 PROCEDURE — 3044F HG A1C LEVEL LT 7.0%: CPT | Performed by: NURSE PRACTITIONER

## 2023-10-31 RX ORDER — TIZANIDINE 4 MG/1
4 TABLET ORAL NIGHTLY PRN
Qty: 30 TABLET | Refills: 1 | Status: SHIPPED | OUTPATIENT
Start: 2023-10-31

## 2023-10-31 RX ORDER — LEVOCETIRIZINE DIHYDROCHLORIDE 5 MG/1
5 TABLET, FILM COATED ORAL EVERY EVENING
Qty: 90 TABLET | Refills: 3 | Status: SHIPPED | OUTPATIENT
Start: 2023-10-31

## 2023-10-31 NOTE — PROGRESS NOTES
Office Visit      Patient Name: Ruby Ramos  : 2000   MRN: [id number]   Care Team: Patient Care Team:  Isamar Leon APRN as PCP - General (Family Medicine)  Reid Garcia MD as Consulting Physician (General Surgery)  Rbuy Conde LPCC as Counselor (Behavioral Health)  Beth House APRN as Nurse Practitioner (Behavioral Health)  Ellen Bowens APRN as Nurse Practitioner (Nurse Practitioner)    Chief Complaint  Follow-up (headaches)    Subjective     Subjective      Ruby Ramos presents to [facility] for follow-up on migraines.  Last visit we discussed possibility of migraines being related to cervical spine.  Did have cervical spine imaging with plain film x-rays that showed some curvature, no CT or MRI has been performed since 2017.  She denies any paresthesia down into the arms or weakness of bilateral upper extremities.  She was given tizanidine as needed at night for pain and spasms.  She has been doing this a couple of times per week.  She has noticed a major improvement in her neck pain as well as her sleep.  She is no longer having daily headaches and has not had a migraine since our last visit.  She has not needed sumatriptan for this reason.  Prior to this she was only getting 2 to 3 hours of sleep at night now getting around 6.  She states this is the best she has felt in many years and she contributes this to sleep.  She denies confusion, vision changes, changes in her migraines, nausea, or vomiting.  Does continue to have concerns regarding her sleep.  She has tried many prescribed sleep aids the last one was doxepin which caused her to have some sleep paralysis.  She has also tried trazodone and Lunesta without benefit.  She has never seen sleep medicine.  She does have frequent daytime drowsiness, can fall asleep anytime but at night cannot go to sleep.  She is not napping currently.  Her brother does have a sleep  "paralysis and mom suffers from sleep apnea.  She has never had any witnessed apneic episodes that she is aware of.    Objective     Objective   Vital Signs:   /51 (BP Location: Right arm)   Pulse 106   Temp 98.4 °F (36.9 °C) (Temporal)   Ht 157.5 cm (62\")   Wt 81.1 kg (178 lb 14.4 oz)   SpO2 100%   BMI 32.72 kg/m²     Physical Exam  Vitals and nursing note reviewed.   Constitutional:       General: She is not in acute distress.     Appearance: Normal appearance. She is obese. She is not toxic-appearing.   Eyes:      Extraocular Movements: Extraocular movements intact.      Pupils: Pupils are equal, round, and reactive to light.   Neck:      Comments: Kyphosis of the cervical spine noted  Cardiovascular:      Rate and Rhythm: Normal rate and regular rhythm.      Heart sounds: Normal heart sounds. No murmur heard.  Pulmonary:      Effort: Pulmonary effort is normal. No respiratory distress.      Breath sounds: Normal breath sounds. No wheezing.   Abdominal:      General: Bowel sounds are normal. There is no distension.      Palpations: Abdomen is soft.      Tenderness: There is no abdominal tenderness.   Musculoskeletal:      Cervical back: Neck supple. No tenderness. No pain with movement or muscular tenderness. Normal range of motion.   Lymphadenopathy:      Cervical: No cervical adenopathy.   Skin:     General: Skin is warm and dry.      Findings: No rash.   Neurological:      General: No focal deficit present.      Mental Status: She is alert and oriented to person, place, and time.      Motor: No weakness.      Coordination: Coordination normal.   Psychiatric:         Mood and Affect: Mood normal.         Behavior: Behavior normal.          Assessment / Plan      Assessment & Plan   Problem List Items Addressed This Visit       Tension headache - Primary    Relevant Medications    tiZANidine (ZANAFLEX) 4 MG tablet    Other Relevant Orders    Ambulatory Referral to Physical Therapy Evaluate and treat "    Chronic neck pain    Relevant Orders    Ambulatory Referral to Physical Therapy Evaluate and treat  .  Improving with muscle relaxant, discussed side effects and recommend using sparingly which she is doing. I also recommended formal PT for strengthening in hopes to be able to stop, heat/ice PRN, stretching, and NSAIDs PRN.     Other Visit Diagnoses       Insomnia, unspecified type        Relevant Orders    Ambulatory Referral to Sleep Medicine    Daytime sleepiness        Relevant Orders    Ambulatory Referral to Sleep Medicine    She does have some concerning features for narcolepsy as well as family history of disordered sleep. Recommend evaluation by sleep medicine, referral placed.             Follow Up   Return for Annual.  Patient was given instructions and counseling regarding her condition or for health maintenance advice. Please see specific information pulled into the AVS if appropriate.     SONIYA Gasca  [facility] [place]

## 2023-11-11 ENCOUNTER — APPOINTMENT (OUTPATIENT)
Dept: CT IMAGING | Facility: HOSPITAL | Age: 23
End: 2023-11-11
Payer: MEDICAID

## 2023-11-11 ENCOUNTER — HOSPITAL ENCOUNTER (EMERGENCY)
Facility: HOSPITAL | Age: 23
Discharge: HOME | End: 2023-11-11
Attending: EMERGENCY MEDICINE
Payer: MEDICAID

## 2023-11-11 VITALS
OXYGEN SATURATION: 99 % | WEIGHT: 180 LBS | BODY MASS INDEX: 33.13 KG/M2 | HEART RATE: 84 BPM | SYSTOLIC BLOOD PRESSURE: 108 MMHG | DIASTOLIC BLOOD PRESSURE: 74 MMHG | TEMPERATURE: 98.3 F | HEIGHT: 62 IN | RESPIRATION RATE: 18 BRPM

## 2023-11-11 DIAGNOSIS — N39.0 URINARY TRACT INFECTION WITHOUT HEMATURIA, SITE UNSPECIFIED: ICD-10-CM

## 2023-11-11 DIAGNOSIS — K52.9 GASTROENTERITIS: Primary | ICD-10-CM

## 2023-11-11 DIAGNOSIS — R73.9 HYPERGLYCEMIA: ICD-10-CM

## 2023-11-11 DIAGNOSIS — J06.9 VIRAL UPPER RESPIRATORY INFECTION: ICD-10-CM

## 2023-11-11 LAB
ALBUMIN SERPL-MCNC: 4.6 G/DL (ref 3.5–5.2)
ALBUMIN/GLOB SERPL: 1.7 G/DL
ALP SERPL-CCNC: 59 U/L (ref 39–117)
ALT SERPL W P-5'-P-CCNC: 13 U/L (ref 1–33)
ANION GAP SERPL CALCULATED.3IONS-SCNC: 12.9 MMOL/L (ref 5–15)
AST SERPL-CCNC: 21 U/L (ref 1–32)
B PARAPERT DNA SPEC QL NAA+PROBE: NOT DETECTED
B PERT DNA SPEC QL NAA+PROBE: NOT DETECTED
B-HCG UR QL: NEGATIVE
BACTERIA UR QL AUTO: ABNORMAL /HPF
BASOPHILS # BLD AUTO: 0.02 10*3/MM3 (ref 0–0.2)
BASOPHILS NFR BLD AUTO: 0.2 % (ref 0–1.5)
BILIRUB SERPL-MCNC: 1 MG/DL (ref 0–1.2)
BILIRUB UR QL STRIP: NEGATIVE
BUN SERPL-MCNC: 19 MG/DL (ref 6–20)
BUN/CREAT SERPL: 30.6 (ref 7–25)
C PNEUM DNA NPH QL NAA+NON-PROBE: NOT DETECTED
CALCIUM SPEC-SCNC: 8.8 MG/DL (ref 8.6–10.5)
CHLORIDE SERPL-SCNC: 106 MMOL/L (ref 98–107)
CLARITY UR: ABNORMAL
CO2 SERPL-SCNC: 18.1 MMOL/L (ref 22–29)
COLOR UR: YELLOW
CREAT SERPL-MCNC: 0.62 MG/DL (ref 0.57–1)
DEPRECATED RDW RBC AUTO: 37.2 FL (ref 37–54)
EGFRCR SERPLBLD CKD-EPI 2021: 128.5 ML/MIN/1.73
EOSINOPHIL # BLD AUTO: 0.02 10*3/MM3 (ref 0–0.4)
EOSINOPHIL NFR BLD AUTO: 0.2 % (ref 0.3–6.2)
ERYTHROCYTE [DISTWIDTH] IN BLOOD BY AUTOMATED COUNT: 12.2 % (ref 12.3–15.4)
FLUAV SUBTYP SPEC NAA+PROBE: NOT DETECTED
FLUBV RNA ISLT QL NAA+PROBE: NOT DETECTED
GLOBULIN UR ELPH-MCNC: 2.7 GM/DL
GLUCOSE SERPL-MCNC: 244 MG/DL (ref 65–99)
GLUCOSE UR STRIP-MCNC: ABNORMAL MG/DL
HADV DNA SPEC NAA+PROBE: NOT DETECTED
HCOV 229E RNA SPEC QL NAA+PROBE: NOT DETECTED
HCOV HKU1 RNA SPEC QL NAA+PROBE: NOT DETECTED
HCOV NL63 RNA SPEC QL NAA+PROBE: NOT DETECTED
HCOV OC43 RNA SPEC QL NAA+PROBE: NOT DETECTED
HCT VFR BLD AUTO: 34.9 % (ref 34–46.6)
HGB BLD-MCNC: 12.8 G/DL (ref 12–15.9)
HGB UR QL STRIP.AUTO: NEGATIVE
HMPV RNA NPH QL NAA+NON-PROBE: NOT DETECTED
HOLD SPECIMEN: NORMAL
HOLD SPECIMEN: NORMAL
HPIV1 RNA ISLT QL NAA+PROBE: DETECTED
HPIV2 RNA SPEC QL NAA+PROBE: NOT DETECTED
HPIV3 RNA NPH QL NAA+PROBE: NOT DETECTED
HPIV4 P GENE NPH QL NAA+PROBE: NOT DETECTED
HYALINE CASTS UR QL AUTO: ABNORMAL /LPF
IMM GRANULOCYTES # BLD AUTO: 0.07 10*3/MM3 (ref 0–0.05)
IMM GRANULOCYTES NFR BLD AUTO: 0.6 % (ref 0–0.5)
KETONES UR QL STRIP: ABNORMAL
LEUKOCYTE ESTERASE UR QL STRIP.AUTO: ABNORMAL
LIPASE SERPL-CCNC: 14 U/L (ref 13–60)
LYMPHOCYTES # BLD AUTO: 0.41 10*3/MM3 (ref 0.7–3.1)
LYMPHOCYTES NFR BLD AUTO: 3.3 % (ref 19.6–45.3)
M PNEUMO IGG SER IA-ACNC: NOT DETECTED
MCH RBC QN AUTO: 31 PG (ref 26.6–33)
MCHC RBC AUTO-ENTMCNC: 36.7 G/DL (ref 31.5–35.7)
MCV RBC AUTO: 84.5 FL (ref 79–97)
MONOCYTES # BLD AUTO: 0.25 10*3/MM3 (ref 0.1–0.9)
MONOCYTES NFR BLD AUTO: 2 % (ref 5–12)
NEUTROPHILS NFR BLD AUTO: 11.74 10*3/MM3 (ref 1.7–7)
NEUTROPHILS NFR BLD AUTO: 93.7 % (ref 42.7–76)
NITRITE UR QL STRIP: NEGATIVE
NRBC BLD AUTO-RTO: 0 /100 WBC (ref 0–0.2)
PH UR STRIP.AUTO: 5.5 [PH] (ref 5–8)
PLATELET # BLD AUTO: 246 10*3/MM3 (ref 140–450)
PMV BLD AUTO: 9.3 FL (ref 6–12)
POTASSIUM SERPL-SCNC: 4.3 MMOL/L (ref 3.5–5.2)
PROT SERPL-MCNC: 7.3 G/DL (ref 6–8.5)
PROT UR QL STRIP: NEGATIVE
RBC # BLD AUTO: 4.13 10*6/MM3 (ref 3.77–5.28)
RBC # UR STRIP: ABNORMAL /HPF
REF LAB TEST METHOD: ABNORMAL
RHINOVIRUS RNA SPEC NAA+PROBE: NOT DETECTED
RSV RNA NPH QL NAA+NON-PROBE: NOT DETECTED
SARS-COV-2 RNA NPH QL NAA+NON-PROBE: NOT DETECTED
SODIUM SERPL-SCNC: 137 MMOL/L (ref 136–145)
SP GR UR STRIP: >=1.03 (ref 1–1.03)
SQUAMOUS #/AREA URNS HPF: ABNORMAL /HPF
UROBILINOGEN UR QL STRIP: ABNORMAL
WBC # UR STRIP: ABNORMAL /HPF
WBC NRBC COR # BLD: 12.51 10*3/MM3 (ref 3.4–10.8)
WHOLE BLOOD HOLD COAG: NORMAL
WHOLE BLOOD HOLD SPECIMEN: NORMAL

## 2023-11-11 PROCEDURE — 83690 ASSAY OF LIPASE: CPT

## 2023-11-11 PROCEDURE — 81025 URINE PREGNANCY TEST: CPT

## 2023-11-11 PROCEDURE — 96376 TX/PRO/DX INJ SAME DRUG ADON: CPT

## 2023-11-11 PROCEDURE — 99285 EMERGENCY DEPT VISIT HI MDM: CPT

## 2023-11-11 PROCEDURE — 80053 COMPREHEN METABOLIC PANEL: CPT

## 2023-11-11 PROCEDURE — 0202U NFCT DS 22 TRGT SARS-COV-2: CPT

## 2023-11-11 PROCEDURE — 85025 COMPLETE CBC W/AUTO DIFF WBC: CPT

## 2023-11-11 PROCEDURE — 87086 URINE CULTURE/COLONY COUNT: CPT

## 2023-11-11 PROCEDURE — 25010000002 KETOROLAC TROMETHAMINE PER 15 MG

## 2023-11-11 PROCEDURE — 74177 CT ABD & PELVIS W/CONTRAST: CPT

## 2023-11-11 PROCEDURE — 25810000003 SODIUM CHLORIDE 0.9 % SOLUTION

## 2023-11-11 PROCEDURE — 96374 THER/PROPH/DIAG INJ IV PUSH: CPT

## 2023-11-11 PROCEDURE — 25010000002 ONDANSETRON PER 1 MG

## 2023-11-11 PROCEDURE — 96361 HYDRATE IV INFUSION ADD-ON: CPT

## 2023-11-11 PROCEDURE — 81001 URINALYSIS AUTO W/SCOPE: CPT

## 2023-11-11 PROCEDURE — 96375 TX/PRO/DX INJ NEW DRUG ADDON: CPT

## 2023-11-11 PROCEDURE — 25510000001 IOPAMIDOL 61 % SOLUTION: Performed by: EMERGENCY MEDICINE

## 2023-11-11 RX ORDER — CEPHALEXIN 500 MG/1
500 CAPSULE ORAL 2 TIMES DAILY
Qty: 14 CAPSULE | Refills: 0 | Status: SHIPPED | OUTPATIENT
Start: 2023-11-11 | End: 2023-11-18

## 2023-11-11 RX ORDER — CEPHALEXIN 250 MG/1
500 CAPSULE ORAL ONCE
Status: COMPLETED | OUTPATIENT
Start: 2023-11-11 | End: 2023-11-11

## 2023-11-11 RX ORDER — KETOROLAC TROMETHAMINE 10 MG/1
10 TABLET, FILM COATED ORAL EVERY 6 HOURS PRN
Qty: 10 TABLET | Refills: 0 | Status: SHIPPED | OUTPATIENT
Start: 2023-11-11

## 2023-11-11 RX ORDER — PROMETHAZINE HYDROCHLORIDE 25 MG/1
25 TABLET ORAL EVERY 6 HOURS PRN
Qty: 15 TABLET | Refills: 0 | Status: SHIPPED | OUTPATIENT
Start: 2023-11-11

## 2023-11-11 RX ORDER — DICYCLOMINE HCL 20 MG
20 TABLET ORAL EVERY 6 HOURS PRN
Qty: 10 TABLET | Refills: 0 | Status: SHIPPED | OUTPATIENT
Start: 2023-11-11

## 2023-11-11 RX ORDER — SODIUM CHLORIDE 0.9 % (FLUSH) 0.9 %
10 SYRINGE (ML) INJECTION AS NEEDED
Status: DISCONTINUED | OUTPATIENT
Start: 2023-11-11 | End: 2023-11-12 | Stop reason: HOSPADM

## 2023-11-11 RX ORDER — KETOROLAC TROMETHAMINE 30 MG/ML
15 INJECTION, SOLUTION INTRAMUSCULAR; INTRAVENOUS ONCE
Status: COMPLETED | OUTPATIENT
Start: 2023-11-11 | End: 2023-11-11

## 2023-11-11 RX ORDER — ONDANSETRON 2 MG/ML
4 INJECTION INTRAMUSCULAR; INTRAVENOUS ONCE
Status: COMPLETED | OUTPATIENT
Start: 2023-11-11 | End: 2023-11-11

## 2023-11-11 RX ADMIN — CEPHALEXIN 500 MG: 250 CAPSULE ORAL at 21:53

## 2023-11-11 RX ADMIN — SODIUM CHLORIDE 500 ML: 9 INJECTION, SOLUTION INTRAVENOUS at 21:22

## 2023-11-11 RX ADMIN — ONDANSETRON 4 MG: 2 INJECTION INTRAMUSCULAR; INTRAVENOUS at 18:51

## 2023-11-11 RX ADMIN — KETOROLAC TROMETHAMINE 15 MG: 30 INJECTION, SOLUTION INTRAMUSCULAR; INTRAVENOUS at 21:22

## 2023-11-11 RX ADMIN — IOPAMIDOL 100 ML: 612 INJECTION, SOLUTION INTRAVENOUS at 19:51

## 2023-11-11 RX ADMIN — SODIUM CHLORIDE 1000 ML: 9 INJECTION, SOLUTION INTRAVENOUS at 18:51

## 2023-11-11 RX ADMIN — KETOROLAC TROMETHAMINE 15 MG: 30 INJECTION, SOLUTION INTRAMUSCULAR; INTRAVENOUS at 18:51

## 2023-11-11 NOTE — Clinical Note
[facility] EMERGENCY DEPARTMENT  [address] [address]  Phone: [phone]    Ruby Ramos was seen and treated in our emergency department on 11/11/2023.  She may return to work on 11/15/2023.         Thank you for choosing [facility].    Tucker Nuno PA-C

## 2023-11-11 NOTE — Clinical Note
[facility] EMERGENCY DEPARTMENT  [address] [address]  Phone: [phone]    Ruby Ramos was seen and treated in our emergency department on 11/11/2023.  She may return to work on 11/15/2023.         Thank you for choosing [facility].    Tucker Nuno PA-C

## 2023-11-11 NOTE — ED PROVIDER NOTES
Subjective  History of Present Illness:    This is a 23-year-old female, history of celiac disease, diabetes, GERD, present emergency room today for evaluation of abdominal pain.  Woke up this morning with abdominal pain that is described as an aching pain but is stabbing when the symptoms progressed.  Waxing and waning in nature.  No vaginal bleeding or pelvic pain.  No dysuria hematuria frequency or urgency.  No cough congestion or runny nose.  Was tested for COVID flu earlier this date because she also had some nausea and vomiting but was negative for COVID flu at urgent care.  Also describes all over body aches.  No history of appendectomy or cholecystectomy.  She is tolerating p.o. intake.  No Vaginal discharge.  pain is mostly across the lower abdomen.      Nurses Notes reviewed and agree, including vitals, allergies, social history and prior medical history.     REVIEW OF SYSTEMS: All systems reviewed and not pertinent unless noted.  Review of Systems   Constitutional:  Negative for fever.   Gastrointestinal:  Positive for abdominal pain, nausea and vomiting. Negative for constipation and diarrhea.   Genitourinary:  Negative for dysuria, flank pain, frequency, hematuria, pelvic pain, urgency, vaginal bleeding, vaginal discharge and vaginal pain.   All other systems reviewed and are negative.      Past Medical History:   Diagnosis Date    Celiac disease     Diabetes mellitus     Diabetes mellitus type I     GERD (gastroesophageal reflux disease)     Homozygous MTHFR mutation C677T        Allergies:    Gluten meal and Mucinex [guaifenesin er]      Past Surgical History:   Procedure Laterality Date    ENDOSCOPY      ENDOSCOPY N/A 4/14/2021    Procedure: ESOPHAGOGASTRODUODENOSCOPY WITH BIOPSY;  Surgeon: Wolfgang Patiño MD;  Location: [facility] ENDOSCOPY;  Service: Gastroenterology;  Laterality: N/A;    HERNIA REPAIR           Social History     Socioeconomic History    Marital status: Single   Tobacco Use     "Smoking status: Never     Passive exposure: Never    Smokeless tobacco: Never    Tobacco comments:     E-cig   Vaping Use    Vaping Use: Every day    Substances: Nicotine, Flavoring    Devices: Disposable    Passive vaping exposure: Yes   Substance and Sexual Activity    Alcohol use: Yes     Comment: occ    Drug use: Never    Sexual activity: Defer         Family History   Problem Relation Age of Onset    Hypertension Mother     Hypertension Father     Diabetes Father     Cancer Maternal Grandmother     Cancer Paternal Grandmother     Colon cancer Neg Hx        Objective  Physical Exam:  /63   Pulse (!) 126   Temp 98 °F (36.7 °C) (Oral)   Resp 19   Ht 157.5 cm (62\")   Wt 81.6 kg (180 lb)   LMP 10/12/2023   SpO2 98%   BMI 32.92 kg/m²      Physical Exam  Vitals and nursing note reviewed.   Constitutional:       General: She is not in acute distress.     Appearance: She is well-developed and normal weight. She is not ill-appearing, toxic-appearing or diaphoretic.   HENT:      Head: Normocephalic and atraumatic.      Mouth/Throat:      Mouth: Mucous membranes are moist.      Pharynx: Oropharynx is clear.   Eyes:      Extraocular Movements: Extraocular movements intact.   Cardiovascular:      Rate and Rhythm: Regular rhythm. Tachycardia present.      Heart sounds: Normal heart sounds.   Pulmonary:      Effort: Pulmonary effort is normal. No respiratory distress.      Breath sounds: Normal breath sounds.   Abdominal:      General: Abdomen is flat.      Palpations: Abdomen is soft.      Tenderness: There is abdominal tenderness in the right lower quadrant, suprapubic area and left lower quadrant. There is no right CVA tenderness, left CVA tenderness, guarding or rebound.   Skin:     General: Skin is warm and dry.      Capillary Refill: Capillary refill takes less than 2 seconds.   Neurological:      General: No focal deficit present.      Mental Status: She is alert and oriented to person, place, and time. "   Psychiatric:         Mood and Affect: Mood normal.         Behavior: Behavior normal.               Procedures    ED Course:         Lab Results (last 24 hours)       Procedure Component Value Units Date/Time    POC Rapid Strep A [[id number]]  (Normal) Resulted: 11/11/23 1717    Specimen: Swab Updated: 11/11/23 1718     Rapid Strep A Screen Negative     Internal Control Passed     Lot Number [id number]     Expiration Date 03/30/2026    Covid-19 + Flu A&B AG, Veritor ([id number]) [[id number]]  (Normal) Collected: 11/11/23 1726    Specimen: Swab Updated: 11/11/23 1726     SARS Antigen Not Detected     Influenza A Antigen CORAZON Not Detected     Influenza B Antigen CORAZON Not Detected     Internal Control Passed     Lot Number [id number]     Expiration Date 4/2/24    Respiratory Panel PCR w/COVID-19(SARS-CoV-2) DEVAN/GUILLERMO/DEEDEE/PAD/COR/OFELIA In-House, NP Swab in UTM/VTM, 2 HR TAT - Swab, Nasopharynx [[id number]]  (Abnormal) Collected: 11/11/23 1843    Specimen: Swab from Nasopharynx Updated: 11/11/23 1935     ADENOVIRUS, PCR Not Detected     Coronavirus 229E Not Detected     Coronavirus HKU1 Not Detected     Coronavirus NL63 Not Detected     Coronavirus OC43 Not Detected     COVID19 Not Detected     Human Metapneumovirus Not Detected     Human Rhinovirus/Enterovirus Not Detected     Influenza A PCR Not Detected     Influenza B PCR Not Detected     Parainfluenza Virus 1 Detected     Parainfluenza Virus 2 Not Detected     Parainfluenza Virus 3 Not Detected     Parainfluenza Virus 4 Not Detected     RSV, PCR Not Detected     Bordetella pertussis pcr Not Detected     Bordetella parapertussis PCR Not Detected     Chlamydophila pneumoniae PCR Not Detected     Mycoplasma pneumo by PCR Not Detected    Narrative:      In the setting of a positive respiratory panel with a viral infection PLUS a negative procalcitonin without other underlying concern for bacterial infection, consider observing off antibiotics or discontinuation of antibiotics and  continue supportive care. If the respiratory panel is positive for atypical bacterial infection (Bordetella pertussis, Chlamydophila pneumoniae, or Mycoplasma pneumoniae), consider antibiotic de-escalation to target atypical bacterial infection.    CBC & Differential [[id number]]  (Abnormal) Collected: 11/11/23 1851    Specimen: Blood Updated: 11/11/23 1858    Narrative:      The following orders were created for panel order CBC & Differential.  Procedure                               Abnormality         Status                     ---------                               -----------         ------                     CBC Auto Differential[[id number]]        Abnormal            Final result                 Please view results for these tests on the individual orders.    Comprehensive Metabolic Panel [[id number]]  (Abnormal) Collected: 11/11/23 1851    Specimen: Blood Updated: 11/11/23 1924     Glucose 244 mg/dL      Comment: Glucose >180, Hemoglobin A1C recommended.        BUN 19 mg/dL      Creatinine 0.62 mg/dL      Sodium 137 mmol/L      Potassium 4.3 mmol/L      Comment: Specimen hemolyzed.  Result may be falsely elevated.        Chloride 106 mmol/L      CO2 18.1 mmol/L      Calcium 8.8 mg/dL      Total Protein 7.3 g/dL      Albumin 4.6 g/dL      ALT (SGPT) 13 U/L      Comment: Specimen hemolyzed.  Result may  be falsely elevated.        AST (SGOT) 21 U/L      Comment: Specimen hemolyzed.  Result may be falsely elevated.        Alkaline Phosphatase 59 U/L      Total Bilirubin 1.0 mg/dL      Globulin 2.7 gm/dL      A/G Ratio 1.7 g/dL      BUN/Creatinine Ratio 30.6     Anion Gap 12.9 mmol/L      eGFR 128.5 mL/min/1.73     Narrative:      GFR Normal >60  Chronic Kidney Disease <60  Kidney Failure <15      Lipase [[id number]]  (Normal) Collected: 11/11/23 1851    Specimen: Blood Updated: 11/11/23 1917     Lipase 14 U/L     CBC Auto Differential [[id number]]  (Abnormal) Collected: 11/11/23 1851    Specimen: Blood Updated:  11/11/23 1858     WBC 12.51 10*3/mm3      RBC 4.13 10*6/mm3      Hemoglobin 12.8 g/dL      Hematocrit 34.9 %      MCV 84.5 fL      MCH 31.0 pg      MCHC 36.7 g/dL      RDW 12.2 %      RDW-SD 37.2 fl      MPV 9.3 fL      Platelets 246 10*3/mm3      Neutrophil % 93.7 %      Lymphocyte % 3.3 %      Monocyte % 2.0 %      Eosinophil % 0.2 %      Basophil % 0.2 %      Immature Grans % 0.6 %      Neutrophils, Absolute 11.74 10*3/mm3      Lymphocytes, Absolute 0.41 10*3/mm3      Monocytes, Absolute 0.25 10*3/mm3      Eosinophils, Absolute 0.02 10*3/mm3      Basophils, Absolute 0.02 10*3/mm3      Immature Grans, Absolute 0.07 10*3/mm3      nRBC 0.0 /100 WBC     Urinalysis With Microscopic If Indicated (No Culture) - Urine, Clean Catch [[id number]]  (Abnormal) Collected: 11/11/23 1911    Specimen: Urine, Clean Catch Updated: 11/11/23 1922     Color, UA Yellow     Appearance, UA Turbid     pH, UA 5.5     Specific Gravity, UA >=1.030     Glucose, UA >=1000 mg/dL (3+)     Ketones, UA 80 mg/dL (3+)     Bilirubin, UA Negative     Blood, UA Negative     Protein, UA Negative     Leuk Esterase, UA Moderate (2+)     Nitrite, UA Negative     Urobilinogen, UA 0.2 E.U./dL    Pregnancy, Urine - Urine, Clean Catch [[id number]]  (Normal) Collected: 11/11/23 1911    Specimen: Urine, Clean Catch Updated: 11/11/23 1922     HCG, Urine QL Negative    Urinalysis, Microscopic Only - Urine, Clean Catch [[id number]]  (Abnormal) Collected: 11/11/23 1911    Specimen: Urine, Clean Catch Updated: 11/11/23 1936     RBC, UA None Seen /HPF      WBC, UA 21-50 /HPF      Bacteria, UA 2+ /HPF      Squamous Epithelial Cells, UA 13-20 /HPF      Hyaline Casts, UA None Seen /LPF      Methodology Manual Light Microscopy             CT Abdomen Pelvis With Contrast    Result Date: 11/11/2023  FINAL REPORT TECHNIQUE: Axial CT images were performed from the lung bases through the symphysis pubis after the administration of intravenous contrast.  This study was performed  with techniques to keep radiation doses as low as reasonably achievable (ALARA). Individualized dose reduction techniques using automated exposure control or adjustment of mA and/or kV according to the patient's size were employed. CLINICAL HISTORY: lower abdominal pain, nausea vomiting FINDINGS: LOWER CHEST: The heart is normal size.  The lung bases are clear.  ABDOMEN/PELVIS:  Liver, gallbladder and bile ducts: The liver enhances homogeneously without suspicious focal hepatic lesion.  The gallbladder is unremarkable.  There is no definite biliary duct dilatation.  Adrenal glands: The adrenal glands are morphologically unremarkable without suspicious lesion. Kidneys, ureter and urinary bladder: No suspicious renal lesion. No hydronephrosis.  Urinary bladder is unremarkable.  Spleen: The spleen is normal size.  Pancreas: The pancreas is grossly unremarkable.  GI systems and mesentery: There is a moderate colonic stool burden with probable rectal fecal impaction. There are nondilated fluid-filled segments of small bowel that demonstrate mild wall thickening.  No evidence of bowel obstruction.  The appendix is visualized and unremarkable in appearance.  No significant mesenteric inflammation.  Lymph nodes: No definite pathologically enlarged abdominal or pelvic lymph nodes present.  Vessels: The aorta and abdominal arteries are grossly patent.  The IVC and portal vein are patent and grossly unremarkable.  Peritoneum: No free intraperitoneal fluid or pneumoperitoneum.  Pelvic viscera: No acute findings.  Body wall: No body wall contusion. No significant body wall hernias. Bones: No acute fracture.     Impression: Possible nonspecific enteritis.  Moderate colonic stool burden, correlate with symptoms of constipation. Authenticated and Electronically Signed by Jeronimo Jacobson MD on 11/11/2023 09:30:51 PM        MDM      Initial impression of presenting illness: This is a 23-year-old female present emergency room today for  evaluation of abdominal pain.  Lower abdomen in nature.  Waxing waning come and go.  Nausea and vomiting.  Negative for COVID flu earlier this date    DDX: includes but is not limited to: Ovarian cyst, UTI, gastroenteritis, colitis, diverticulitis, intra-abdominal infection, appendicitis, UTI, others    Patient arrives hemodynamically stable afebrile tachycardic at a rate of 126 nonhypoxic and nontoxic-appearing with vitals interpreted by myself.     Pertinent features from physical exam: Soft, minimally tender in the right suprapubic and left lower quadrants.  Only reactive exam, soft abdomen.  No concern for peritonitis based on this exam.  Cardiac auscultation with slightly tachycardic rate and regular rhythm lungs were clear bilaterally.  Oropharynx clear.    Initial diagnostic plan: CBC, CMP, urinalysis, urine pregnancy, respiratory panel, lipase, CT abdomen pelvis with contrast    Results from initial plan were reviewed and interpreted by me revealing urinalysis with 80 ketones glucose urea, turbid appearance, leukocytes, 13-20 squamous cells, 2+ bacteria and 21-50 white blood cells appears consistent with infectious pattern.  Urine hCG was negative.  Glucose of 244, CO2 of 18.1, normal anion gap, therefore low suspicion for DKA given that she has no anion gap and her glucose is not above 250.  Lipase normal.  White blood cell count of 12.51.  CT abdomen pelvis per radiology revealed possible nonspecific enteritis and moderate colonic stool burden.  Respiratory panel does reveal parainfluenza virus.  Does not meet criteria for DKA at this time.    Diagnostic information from other sources: Old record reviewed    Interventions / Re-evaluation: Toradol Zofran fluids.  Given 1.5 L total.  Better after interventions and stable for discharge.  First dose of Keflex given in ED. heart rate improved to 107.    Results/clinical rationale were discussed with patient at bedside.  Given strict return precautions.  Was  cautioned to keep a close eye on her glucose and educated to return for signs symptoms of diabetic ketoacidosis as well or if her symptoms worsen.    Consultations/Discussion of results with other physicians: N/A    Disposition plan: Discharge.  Follow-up closely with primary care.  We will send Toradol, Phenergan, Bentyl, and Keflex.  Urine culture pending.  Given return precautions.  She is agreeable to these.  -----    Final diagnoses:   Gastroenteritis   Urinary tract infection without hematuria, site unspecified   Hyperglycemia   Viral upper respiratory infection          Tucker Nuno PA-C  11/11/23 9624

## 2023-11-12 NOTE — DISCHARGE INSTRUCTIONS
Return for worsening symptoms.  Follow-up closely with her primary care physician.  Make sure to keep a close eye on your blood glucose.  I sent several medications to your pharmacy, take these as directed.

## 2023-11-13 LAB — BACTERIA SPEC AEROBE CULT: NORMAL

## 2023-11-17 ENCOUNTER — HOSPITAL ENCOUNTER (EMERGENCY)
Facility: HOSPITAL | Age: 23
Discharge: HOME | End: 2023-11-17
Attending: EMERGENCY MEDICINE
Payer: MEDICAID

## 2023-11-17 VITALS
OXYGEN SATURATION: 100 % | WEIGHT: 180 LBS | TEMPERATURE: 97.9 F | HEIGHT: 62 IN | HEART RATE: 107 BPM | RESPIRATION RATE: 16 BRPM | BODY MASS INDEX: 33.13 KG/M2 | DIASTOLIC BLOOD PRESSURE: 73 MMHG | SYSTOLIC BLOOD PRESSURE: 117 MMHG

## 2023-11-17 DIAGNOSIS — R53.1 GENERAL WEAKNESS: ICD-10-CM

## 2023-11-17 DIAGNOSIS — B34.8 PARAINFLUENZA INFECTION: ICD-10-CM

## 2023-11-17 DIAGNOSIS — R42 DIZZY SPELLS: Primary | ICD-10-CM

## 2023-11-17 LAB
ALBUMIN SERPL-MCNC: 5 G/DL (ref 3.5–5.2)
ALBUMIN/GLOB SERPL: 1.8 G/DL
ALP SERPL-CCNC: 75 U/L (ref 39–117)
ALT SERPL W P-5'-P-CCNC: 24 U/L (ref 1–33)
ANION GAP SERPL CALCULATED.3IONS-SCNC: 11.4 MMOL/L (ref 5–15)
AST SERPL-CCNC: 20 U/L (ref 1–32)
ATMOSPHERIC PRESS: 731 MMHG
BASE EXCESS BLDV CALC-SCNC: -0.6 MMOL/L (ref 0–2)
BASOPHILS # BLD AUTO: 0.09 10*3/MM3 (ref 0–0.2)
BASOPHILS NFR BLD AUTO: 0.9 % (ref 0–1.5)
BDY SITE: ABNORMAL
BILIRUB SERPL-MCNC: 0.5 MG/DL (ref 0–1.2)
BILIRUB UR QL STRIP: NEGATIVE
BUN SERPL-MCNC: 8 MG/DL (ref 6–20)
BUN/CREAT SERPL: 12.1 (ref 7–25)
CALCIUM SPEC-SCNC: 9.8 MG/DL (ref 8.6–10.5)
CHLORIDE SERPL-SCNC: 101 MMOL/L (ref 98–107)
CLARITY UR: CLEAR
CO2 SERPL-SCNC: 23.6 MMOL/L (ref 22–29)
COHGB MFR BLD: 1.1 % (ref 0–5)
COLOR UR: YELLOW
CREAT SERPL-MCNC: 0.66 MG/DL (ref 0.57–1)
DEPRECATED RDW RBC AUTO: 37.4 FL (ref 37–54)
EGFRCR SERPLBLD CKD-EPI 2021: 126.6 ML/MIN/1.73
EOSINOPHIL # BLD AUTO: 0.1 10*3/MM3 (ref 0–0.4)
EOSINOPHIL NFR BLD AUTO: 1 % (ref 0.3–6.2)
ERYTHROCYTE [DISTWIDTH] IN BLOOD BY AUTOMATED COUNT: 12.4 % (ref 12.3–15.4)
GLOBULIN UR ELPH-MCNC: 2.8 GM/DL
GLUCOSE SERPL-MCNC: 119 MG/DL (ref 65–99)
GLUCOSE UR STRIP-MCNC: NEGATIVE MG/DL
HCO3 BLDV-SCNC: 25 MMOL/L (ref 22–28)
HCT VFR BLD AUTO: 35.3 % (ref 34–46.6)
HGB BLD-MCNC: 12.9 G/DL (ref 12–15.9)
HGB UR QL STRIP.AUTO: NEGATIVE
HOLD SPECIMEN: NORMAL
HOLD SPECIMEN: NORMAL
IMM GRANULOCYTES # BLD AUTO: 0.05 10*3/MM3 (ref 0–0.05)
IMM GRANULOCYTES NFR BLD AUTO: 0.5 % (ref 0–0.5)
INHALED O2 CONCENTRATION: 21 %
KETONES UR QL STRIP: NEGATIVE
LEUKOCYTE ESTERASE UR QL STRIP.AUTO: NEGATIVE
LIPASE SERPL-CCNC: 21 U/L (ref 13–60)
LYMPHOCYTES # BLD AUTO: 3.31 10*3/MM3 (ref 0.7–3.1)
LYMPHOCYTES NFR BLD AUTO: 31.7 % (ref 19.6–45.3)
Lab: ABNORMAL
MCH RBC QN AUTO: 30.6 PG (ref 26.6–33)
MCHC RBC AUTO-ENTMCNC: 36.5 G/DL (ref 31.5–35.7)
MCV RBC AUTO: 83.8 FL (ref 79–97)
METHGB BLD QL: 0.5 % (ref 0–3)
MODALITY: ABNORMAL
MONOCYTES # BLD AUTO: 0.66 10*3/MM3 (ref 0.1–0.9)
MONOCYTES NFR BLD AUTO: 6.3 % (ref 5–12)
NEUTROPHILS NFR BLD AUTO: 59.6 % (ref 42.7–76)
NEUTROPHILS NFR BLD AUTO: 6.22 10*3/MM3 (ref 1.7–7)
NITRITE UR QL STRIP: NEGATIVE
NOTIFIED BY: ABNORMAL
NOTIFIED WHO: ABNORMAL
NRBC BLD AUTO-RTO: 0 /100 WBC (ref 0–0.2)
OXYHGB MFR BLDV: 44.3 % (ref 40–70)
PCO2 BLDV: 44.3 MM HG (ref 40–50)
PH BLDV: 7.36 PH UNITS (ref 7.32–7.42)
PH UR STRIP.AUTO: 6 [PH] (ref 5–8)
PLATELET # BLD AUTO: 375 10*3/MM3 (ref 140–450)
PMV BLD AUTO: 9.3 FL (ref 6–12)
PO2 BLDV: 25.7 MM HG (ref 30–50)
POTASSIUM SERPL-SCNC: 3.6 MMOL/L (ref 3.5–5.2)
PROT SERPL-MCNC: 7.8 G/DL (ref 6–8.5)
PROT UR QL STRIP: NEGATIVE
RBC # BLD AUTO: 4.21 10*6/MM3 (ref 3.77–5.28)
SAO2 % BLDCOV: 45 % (ref 45–75)
SODIUM SERPL-SCNC: 136 MMOL/L (ref 136–145)
SP GR UR STRIP: 1.01 (ref 1–1.03)
UROBILINOGEN UR QL STRIP: NORMAL
VENTILATOR MODE: ABNORMAL
WBC NRBC COR # BLD AUTO: 10.43 10*3/MM3 (ref 3.4–10.8)
WHOLE BLOOD HOLD COAG: NORMAL
WHOLE BLOOD HOLD SPECIMEN: NORMAL

## 2023-11-17 PROCEDURE — 25810000003 LACTATED RINGERS SOLUTION: Performed by: EMERGENCY MEDICINE

## 2023-11-17 PROCEDURE — 80053 COMPREHEN METABOLIC PANEL: CPT

## 2023-11-17 PROCEDURE — 99283 EMERGENCY DEPT VISIT LOW MDM: CPT

## 2023-11-17 PROCEDURE — 85025 COMPLETE CBC W/AUTO DIFF WBC: CPT

## 2023-11-17 PROCEDURE — 82820 HEMOGLOBIN-OXYGEN AFFINITY: CPT

## 2023-11-17 PROCEDURE — 82805 BLOOD GASES W/O2 SATURATION: CPT

## 2023-11-17 PROCEDURE — 81003 URINALYSIS AUTO W/O SCOPE: CPT

## 2023-11-17 PROCEDURE — 83690 ASSAY OF LIPASE: CPT

## 2023-11-17 RX ORDER — ONDANSETRON 2 MG/ML
4 INJECTION INTRAMUSCULAR; INTRAVENOUS ONCE
Status: DISCONTINUED | OUTPATIENT
Start: 2023-11-17 | End: 2023-11-17 | Stop reason: HOSPADM

## 2023-11-17 RX ORDER — SODIUM CHLORIDE 0.9 % (FLUSH) 0.9 %
10 SYRINGE (ML) INJECTION AS NEEDED
Status: DISCONTINUED | OUTPATIENT
Start: 2023-11-17 | End: 2023-11-17 | Stop reason: HOSPADM

## 2023-11-17 RX ADMIN — SODIUM CHLORIDE, POTASSIUM CHLORIDE, SODIUM LACTATE AND CALCIUM CHLORIDE 2000 ML: 600; 310; 30; 20 INJECTION, SOLUTION INTRAVENOUS at 20:36

## 2023-11-18 NOTE — ED PROVIDER NOTES
HPI: Ruby Ramos is a 23 y.o. female who presents to the emergency department complaining of multiple complaints.  Patient states that for the last week she has had dizziness, lightheaded, blurred vision, poor appetite, nausea.  She was seen at onset of symptoms here in the ED, diagnosed with parainfluenza and UTI.  She did have some ketones in her urine at that time but no other evidence of DKA.  She states that she has continued to feel poorly despite treatment so came back in for evaluation.      REVIEW OF SYSTEMS: All other systems reviewed and are negative     PAST MEDICAL HISTORY:   Past Medical History:   Diagnosis Date    Celiac disease     Diabetes mellitus     Diabetes mellitus type I     GERD (gastroesophageal reflux disease)     Homozygous MTHFR mutation C677T         FAMILY HISTORY:   Family History   Problem Relation Age of Onset    Hypertension Mother     Hypertension Father     Diabetes Father     Cancer Maternal Grandmother     Cancer Paternal Grandmother     Colon cancer Neg Hx         SOCIAL HISTORY:   Social History     Socioeconomic History    Marital status: Single   Tobacco Use    Smoking status: Never     Passive exposure: Never    Smokeless tobacco: Never    Tobacco comments:     E-cig   Vaping Use    Vaping Use: Every day    Substances: Nicotine, Flavoring    Devices: Disposable    Passive vaping exposure: Yes   Substance and Sexual Activity    Alcohol use: Yes     Comment: occ    Drug use: Never    Sexual activity: Defer        SURGICAL HISTORY:   Past Surgical History:   Procedure Laterality Date    ENDOSCOPY      ENDOSCOPY N/A 4/14/2021    Procedure: ESOPHAGOGASTRODUODENOSCOPY WITH BIOPSY;  Surgeon: Wolfgang Patiño MD;  Location: [facility] ENDOSCOPY;  Service: Gastroenterology;  Laterality: N/A;    HERNIA REPAIR          ALLERGIES: Gluten meal and Mucinex [guaifenesin er]       PHYSICAL EXAM:   VITAL SIGNS:   Vitals:    11/17/23 2039   BP:    Pulse:    Resp:    Temp:    SpO2:  100%      CONSTITUTIONAL: Awake, well appearing, nontoxic   HENT: Atraumatic, normocephalic, oral mucosa moist, airway patent. Nares patent without drainage. External ears normal.   EYES: Conjunctivae clear, EOMI, PERRL   NECK: Trachea midline, nontender, supple   CARDIOVASCULAR: Mild tachycardia, Normal rhythm.  PULMONARY/CHEST: Normal work of breathing. Clear to auscultation, no rhonchi, wheezes, or rales.  ABDOMINAL: Nondistended, soft, nontender, no rebound or guarding.  NEUROLOGIC: Nonfocal, moves all four extremities, no gross sensory or motor deficits.   EXTREMITIES: No clubbing, cyanosis, or edema   SKIN: Warm, Dry, No erythema, No rash     IV Access by MD under Ultrasound Guidance    Indication: Nursing staff unable to obtain IV access.    The benefits, risks, and possible complications of the procedure were explained to the patient who verbalized understanding and gave verbal consent.    Timeout performed and correct patient, procedure, and site verified.    Color flow and compressibility were used to confirm a suitable vein.    After preparation, a 18-gauge size catheter was placed in the left antecubital fossa under realtime ultrasound guidance.    Total attempts 1. The patient tolerated the procedure well. No complications were noted.          ED COURSE / MEDICAL DECISION MAKING:     Ruby Ramos is a 23 y.o. female who presents to the emergency department for evaluation of multiple complaints.  Well-developed, well-nourished young female in no distress with exam as above.  Her vital signs are notable for mild tachycardia.  Her oxygen saturation is normal on room air 100%.  Her exam is otherwise nonfocal.  Will obtain repeat labs and give symptomatic treatment.  Disposition pending.    Differential diagnosis includes dehydration, viral illness, electrolyte derangement, DKA among other etiologies.    Lab work is unremarkable.  No evidence of DKA or other concerning findings.  Patient resting  comfortably.  Will discharge home with supportive measures and outpatient follow-up.    Final diagnoses:   Dizzy spells   General weakness   Parainfluenza infection        Nj Amaral MD  11/17/23 1985

## 2023-11-27 DIAGNOSIS — F31.30 BIPOLAR I DISORDER, MOST RECENT EPISODE DEPRESSED: Chronic | ICD-10-CM

## 2023-11-27 RX ORDER — LAMOTRIGINE 200 MG/1
1 TABLET, EXTENDED RELEASE ORAL
Qty: 30 TABLET | Refills: 0 | Status: SHIPPED | OUTPATIENT
Start: 2023-11-27

## 2023-11-27 RX ORDER — INSULIN GLARGINE 100 [IU]/ML
INJECTION, SOLUTION SUBCUTANEOUS
Qty: 15 ML | Refills: 0 | Status: SHIPPED | OUTPATIENT
Start: 2023-11-27

## 2023-11-27 NOTE — TELEPHONE ENCOUNTER
Rx Refill Note  Requested Prescriptions     Pending Prescriptions Disp Refills    Insulin Glargine (BASAGLAR KWIKPEN) 100 UNIT/ML injection pen [Pharmacy Med Name: Basaglar KwikPen Subcutaneous Solution Pen-injector 100 UNIT/ML] 15 mL 0     Sig: INJECT 14 UNITS UNDER THE SKIN INTO THE APPROPRIATE AREA AS DIRECTED EVERY NIGHT.      Last office visit with prescribing clinician: 4/20/2023     Next office visit with prescribing clinician: 1/24/2024       Merle Huff MA  11/27/23, 13:02 EST

## 2023-11-28 ENCOUNTER — TELEPHONE (OUTPATIENT)
Dept: PSYCHIATRY | Facility: CLINIC | Age: 23
End: 2023-11-28

## 2023-11-28 DIAGNOSIS — F90.2 ATTENTION DEFICIT HYPERACTIVITY DISORDER, COMBINED TYPE: Primary | ICD-10-CM

## 2023-11-28 RX ORDER — LISDEXAMFETAMINE DIMESYLATE CAPSULES 60 MG/1
60 CAPSULE ORAL EVERY MORNING
Qty: 30 CAPSULE | Refills: 0 | Status: SHIPPED | OUTPATIENT
Start: 2023-11-28

## 2023-11-28 NOTE — TELEPHONE ENCOUNTER
Sent. Stop both doses of Adderall once she has Vyvanse to start. Take in the morning. She will have to monitor appetite and sleep. She can expect other adverse effects or potential side effects of other stimulants. Talk to the pharmacists about potential adverse effects if she has questions or concerns.

## 2023-11-28 NOTE — TELEPHONE ENCOUNTER
We can try the Vyvanse at 60 mg. There is no exact equivalent. This may be a good starting place. It only goes to 70 mg though. Let me know.

## 2023-11-28 NOTE — TELEPHONE ENCOUNTER
Patient wanted to discuss switching from Adderall to Vyvanse at appointment today. She said recently Adderall is not doing anything for her anymore. She said she has researched the medication's and would feel most comfortable switching to Vyvanse. Please advise

## 2023-12-20 ENCOUNTER — TELEMEDICINE (OUTPATIENT)
Dept: SLEEP MEDICINE | Facility: CLINIC | Age: 23
End: 2023-12-20
Payer: MEDICAID

## 2023-12-20 VITALS — HEIGHT: 62 IN | WEIGHT: 180 LBS | BODY MASS INDEX: 33.13 KG/M2

## 2023-12-20 DIAGNOSIS — F51.04 PSYCHOPHYSIOLOGICAL INSOMNIA: Primary | ICD-10-CM

## 2023-12-20 PROCEDURE — 1159F MED LIST DOCD IN RCRD: CPT | Performed by: NURSE PRACTITIONER

## 2023-12-20 PROCEDURE — 1160F RVW MEDS BY RX/DR IN RCRD: CPT | Performed by: NURSE PRACTITIONER

## 2023-12-20 PROCEDURE — 99213 OFFICE O/P EST LOW 20 MIN: CPT | Performed by: NURSE PRACTITIONER

## 2023-12-20 RX ORDER — MIRTAZAPINE 30 MG/1
30 TABLET, FILM COATED ORAL NIGHTLY
Qty: 30 TABLET | Refills: 2 | Status: SHIPPED | OUTPATIENT
Start: 2023-12-20

## 2023-12-20 NOTE — PROGRESS NOTES
Chief Complaint:   Chief Complaint   Patient presents with    Insomnia       HPI:    Ruby Ramos is a 23 y.o. female here to establish care.  Patient does see Isamar Leon and SONIYA Gasca for primary care.  Patient states she has a lifelong history of insomnia.  Patient states over the past 2 years her insomnia seems to be worsening.  She does deny witnessed apneas, morning headaches, sleepwalking/talking, and gasping.  In certain positions she will have very light rare snoring.  Patient does deny nasal fracture, head injury, hypnagogic hallucinations and sleep paralysis.    Patient has tried several things in the past without relief for her insomnia.  She has tried doxepin, trazodone, Vistaril all without relief.  Patient states there are several things she has been told to stay away from per psych due to her bipolar disorder but is not sure of those things at this time.  Patient states her sleep is very erratic and will sometimes take her anywhere from 3 to 5 hours to go to sleep and then will only get several hours at a time.  Patient states she has been up 40 hours at times without sleep.  She has been working with psych regarding bluelight, no stimulation after a certain time, no caffeine after a certain time all without relief.    Social history  This very pleasant 23-year-old female.  Patient does work for a childcare center.  She is a non-smoker and denies illicit substance use.  She will have an alcoholic beverage approximately 2 times a month.  She will drink 2 vesta daily and occasional decaffeinated tea.  Patient puts her Burnham score at 9/24.    Family History   Problem Relation Age of Onset    Hypertension Mother     Hypertension Father     Diabetes Father     Cancer Maternal Grandmother     Cancer Paternal Grandmother     Colon cancer Neg Hx          Past Surgical History:   Procedure Laterality Date    ENDOSCOPY      ENDOSCOPY N/A 4/14/2021    Procedure: ESOPHAGOGASTRODUODENOSCOPY  WITH BIOPSY;  Surgeon: Wolfgang Patiño MD;  Location: [facility] ENDOSCOPY;  Service: Gastroenterology;  Laterality: N/A;    HERNIA REPAIR           Current medications are:   Current Outpatient Medications:     Continuous Blood Gluc Sensor (Dexcom G6 Sensor), by Other route Every 10 (Ten) Days., Disp: 9 each, Rfl: 3    Continuous Blood Gluc Transmit (Dexcom G6 Transmitter) misc, 1 each by Other route Every 3 (Three) Months., Disp: 1 each, Rfl: 3    dicyclomine (BENTYL) 20 MG tablet, Take 1 tablet by mouth Every 6 (Six) Hours As Needed for Abdominal Cramping., Disp: 10 tablet, Rfl: 0    glucose blood test strip, Four times daily, Disp: 400 each, Rfl: 3    insulin aspart (NovoLOG FlexPen) 100 UNIT/ML solution pen-injector sc pen, 1 unit for every 5 grams of carbs and 1:20>140, MDD 45 units, Disp: 45 mL, Rfl: 1    Insulin Glargine (BASAGLAR KWIKPEN) 100 UNIT/ML injection pen, INJECT 14 UNITS UNDER THE SKIN INTO THE APPROPRIATE AREA AS DIRECTED EVERY NIGHT., Disp: 15 mL, Rfl: 0    Insulin Pen Needle 32G X 4 MM misc, 1 each 4 (Four) Times a Day., Disp: 400 each, Rfl: 3    ketorolac (TORADOL) 10 MG tablet, Take 1 tablet by mouth Every 6 (Six) Hours As Needed for Moderate Pain., Disp: 10 tablet, Rfl: 0    lamoTRIgine  MG tablet sustained-release 24 hour, TAKE 1 TABLET BY MOUTH EVERY DAY AT NIGHT, Disp: 30 tablet, Rfl: 0    levocetirizine (XYZAL) 5 MG tablet, Take 1 tablet by mouth Every Evening., Disp: 90 tablet, Rfl: 3    lisdexamfetamine (VYVANSE) 60 MG capsule, Take 1 capsule by mouth Every Morning, Disp: 30 capsule, Rfl: 0    mirtazapine (Remeron) 30 MG tablet, Take 1 tablet by mouth Every Night., Disp: 30 tablet, Rfl: 2    multivitamin with minerals tablet tablet, Take 1 tablet by mouth Daily., Disp: , Rfl:     ondansetron ODT (ZOFRAN-ODT) 4 MG disintegrating tablet, Place 1 tablet on the tongue Every 8 (Eight) Hours As Needed for Vomiting., Disp: 9 tablet, Rfl: 0    promethazine (PHENERGAN) 25 MG tablet,  Take 1 tablet by mouth Every 6 (Six) Hours As Needed for Nausea or Vomiting., Disp: 15 tablet, Rfl: 0    tiZANidine (ZANAFLEX) 4 MG tablet, Take 1 tablet by mouth At Night As Needed for Muscle Spasms., Disp: 30 tablet, Rfl: 1    True Comfort Twist Top Lancets misc, 1 Device 4 (Four) Times a Day., Disp: 400 each, Rfl: 3.      The patient's relevant past medical, surgical, family and social history were reviewed and updated in Epic as appropriate.       Review of Systems   Constitutional:  Positive for fatigue.   Gastrointestinal:  Positive for nausea.        Heartburn   Musculoskeletal:  Positive for myalgias and neck pain.   Allergic/Immunologic: Positive for environmental allergies.   Neurological:  Positive for headaches.   Psychiatric/Behavioral:  Positive for decreased concentration, dysphoric mood and sleep disturbance. The patient is nervous/anxious.    All other systems reviewed and are negative.        Objective:    Physical Exam  Constitutional:       Appearance: Normal appearance.   HENT:      Head: Normocephalic and atraumatic.   Pulmonary:      Effort: Pulmonary effort is normal. No respiratory distress.   Neurological:      Mental Status: She is alert and oriented to person, place, and time.   Psychiatric:         Mood and Affect: Mood normal.         Behavior: Behavior normal.         Thought Content: Thought content normal.         Judgment: Judgment normal.             ASSESSMENT/PLAN    Diagnoses and all orders for this visit:    1. Psychophysiological insomnia (Primary)  -     mirtazapine (Remeron) 30 MG tablet; Take 1 tablet by mouth Every Night.  Dispense: 30 tablet; Refill: 2        Counseled patient regarding multimodal approach with healthy nutrition, healthy sleep, regular physical activity, social activities, counseling, and medications. Encouraged to practice lateral sleep position. Avoid alcohol and sedatives close to bedtime.    Patient will look up a cognitive behavioral therapy class  online and complete this.  We will also start low-dose Remeron at bedtime.  I will see her back in 8 weeks to reassess.  Patient is in [place] and gave consent for video visit.    I have reviewed the results of my evaluation and impression and discussed my recommendations in detail with the patient.      Signed by  SONIYA Solano    December 20, 2023      CC: Isamar Leon APRN Runnels, Laken N, SONIYA

## 2023-12-27 DIAGNOSIS — F31.30 BIPOLAR I DISORDER, MOST RECENT EPISODE DEPRESSED: Chronic | ICD-10-CM

## 2023-12-27 RX ORDER — LAMOTRIGINE 200 MG/1
1 TABLET, EXTENDED RELEASE ORAL
Qty: 30 TABLET | Refills: 0 | Status: SHIPPED | OUTPATIENT
Start: 2023-12-27

## 2024-01-02 RX ORDER — TIZANIDINE 4 MG/1
4 TABLET ORAL NIGHTLY PRN
Qty: 30 TABLET | Refills: 1 | Status: SHIPPED | OUTPATIENT
Start: 2024-01-02

## 2024-01-09 DIAGNOSIS — F90.2 ATTENTION DEFICIT HYPERACTIVITY DISORDER, COMBINED TYPE: ICD-10-CM

## 2024-01-09 RX ORDER — LISDEXAMFETAMINE DIMESYLATE CAPSULES 60 MG/1
60 CAPSULE ORAL EVERY MORNING
Qty: 30 CAPSULE | Refills: 0 | Status: SHIPPED | OUTPATIENT
Start: 2024-01-09

## 2024-01-09 NOTE — TELEPHONE ENCOUNTER
Rx Refill Note  Requested Prescriptions     Pending Prescriptions Disp Refills    lisdexamfetamine (VYVANSE) 60 MG capsule 30 capsule 0     Sig: Take 1 capsule by mouth Every Morning      Last office visit with prescribing clinician: 6/12/2023   Last telemedicine visit with prescribing clinician: 9/28/2023   Next office visit with prescribing clinician: 1/11/2024                         Would you like a call back once the refill request has been completed: [] Yes [] No    If the office needs to give you a call back, can they leave a voicemail: [] Yes [] No    Adam Verma MA  01/09/24, 12:01 EST

## 2024-01-23 DIAGNOSIS — F31.30 BIPOLAR I DISORDER, MOST RECENT EPISODE DEPRESSED: Chronic | ICD-10-CM

## 2024-01-23 RX ORDER — LAMOTRIGINE 200 MG/1
1 TABLET, EXTENDED RELEASE ORAL
Qty: 30 TABLET | Refills: 0 | Status: SHIPPED | OUTPATIENT
Start: 2024-01-23

## 2024-02-25 NOTE — PROGRESS NOTES
"This provider is located at [facility] ,[address], [address] in [place], [place],using a secure ERUCEShart Video Visit through [employer]. Patient is being seen remotely via telehealth at their home address in [place], and stated they are in a secure environment for this session. The patient's condition being diagnosed/treated is appropriate for telemedicine. The provider identified herself as well as her credentials.   The patient, and/or patients guardian, consent to be seen remotely, and when consent is given they understand that the consent allows for patient identifiable information to be sent to a third party as needed.   They may refuse to be seen remotely at any time. The electronic data is encrypted and password protected, and the patient and/or guardian has been advised of the potential risks to privacy not withstanding such measures.     Chief Complaint  Bipolar I disorder, ADHD, anxiety, insomnia, bereavement, and alcohol use disorder, moderate, in sustained remission      Subjective          Ruby Ramos presents via MyChart Video visit through [employer] by herself for a follow up and medication check.    History of Present Illness: Ruby states, \"I am good and bad.\" Ruby tells me there are times during the week her depression is managed and times on the weekend when she feels lonely or isolated. She reports anxiety has been higher unless she is distracted, and she does not want to be not busy on the weekends, leading to depression. She has been trying to go to the gym more and meeting a friend there helps. She is still overwhelmed by things she feels are \"small\" and should not cause feelings of being overwhelmed. She states, \"I am in my head a lot.\" She has interrupted sleep at times with a thought or worry, so she keeps a notebook by her bed to write things down. She has expressed how she likes Vyvanse more than Adderall XR, but has felt irritable at the 60 " mg dose. She is sleeping and eating well. She elected not to use Mirtazapine due to concerns for weight gain which may affect her blood glucose levels. She had confusion with insurance and is waiting til Friday to have coverage on prescriptions. She is taking Adderall XR, Adderall, and lamotrigine ER. She denies any side effects. She denies any SI/HI/AVH.    Current Medications:   Current Outpatient Medications   Medication Sig Dispense Refill    Continuous Blood Gluc Sensor (Dexcom G6 Sensor) by Other route Every 10 (Ten) Days. 9 each 3    Continuous Blood Gluc Transmit (Dexcom G6 Transmitter) misc 1 each by Other route Every 3 (Three) Months. 1 each 3    dicyclomine (BENTYL) 20 MG tablet Take 1 tablet by mouth Every 6 (Six) Hours As Needed for Abdominal Cramping. 10 tablet 0    glucose blood test strip Four times daily 400 each 3    insulin aspart (NovoLOG FlexPen) 100 UNIT/ML solution pen-injector sc pen 1 unit for every 5 grams of carbs and 1:20>140, MDD 45 units 45 mL 1    Insulin Glargine (BASAGLAR KWIKPEN) 100 UNIT/ML injection pen INJECT 14 UNITS UNDER THE SKIN INTO THE APPROPRIATE AREA AS DIRECTED EVERY NIGHT. 15 mL 0    Insulin Pen Needle 32G X 4 MM misc 1 each 4 (Four) Times a Day. 400 each 3    ketorolac (TORADOL) 10 MG tablet Take 1 tablet by mouth Every 6 (Six) Hours As Needed for Moderate Pain. 10 tablet 0    lamoTRIgine  MG tablet sustained-release 24 hour Take 200 mg by mouth every night at bedtime. 5 tablet 0    levocetirizine (XYZAL) 5 MG tablet Take 1 tablet by mouth Every Evening. 90 tablet 3    lisdexamfetamine (VYVANSE) 60 MG capsule Take 1 capsule by mouth Every Morning 30 capsule 0    multivitamin with minerals tablet tablet Take 1 tablet by mouth Daily.      ondansetron ODT (ZOFRAN-ODT) 4 MG disintegrating tablet Place 1 tablet on the tongue Every 8 (Eight) Hours As Needed for Vomiting. 9 tablet 0    promethazine (PHENERGAN) 25 MG tablet Take 1 tablet by mouth Every 6 (Six) Hours As  Needed for Nausea or Vomiting. 15 tablet 0    tiZANidine (ZANAFLEX) 4 MG tablet Take 1 tablet by mouth At Night As Needed for Muscle Spasms. 30 tablet 1    True Comfort Twist Top Lancets misc 1 Device 4 (Four) Times a Day. 400 each 3     No current facility-administered medications for this visit.         Objective   Vital Signs:   There were no vitals taken for this visit.    Physical Exam  Nursing note reviewed. Vitals reviewed: No vitals to review due to nature of telehealth visit.  Constitutional:       Appearance: Normal appearance. She is well-developed and normal weight.   Neurological:      General: No focal deficit present.      Mental Status: She is alert and oriented to person, place, and time.   Psychiatric:         Attention and Perception: Attention normal.         Mood and Affect: Mood and affect normal.         Speech: Speech normal.         Behavior: Behavior normal. Behavior is cooperative.         Thought Content: Thought content normal.         Cognition and Memory: Cognition normal.         Judgment: Judgment normal. Judgment is impulsive and inappropriate.        Result Review :     The following data was reviewed by: SONIYA Jay on 02/27/2024:     with Soheila Casillas PA (01/16/2024)   Assessment and Plan    Diagnoses and all orders for this visit:    1. Attention deficit hyperactivity disorder, combined type (Primary)  -     Urine Drug Screen - Urine, Clean Catch; Future  -     Urine Drug Screen - Urine, Clean Catch; Future    2. Medication management  -     Urine Drug Screen - Urine, Clean Catch; Future  -     Urine Drug Screen - Urine, Clean Catch; Future    3. Other insomnia    4. Bipolar I disorder, most recent episode depressed  -     lamoTRIgine  MG tablet sustained-release 24 hour; Take 200 mg by mouth every night at bedtime.  Dispense: 5 tablet; Refill: 0    5. Generalized anxiety disorder    6. Alcohol use disorder, moderate, dependence    7.  Bereavement         Mental Status Exam:   Hygiene:   good  Cooperation:  Cooperative  Eye Contact:  Good  Psychomotor Behavior:  Appropriate  Affect:  Appropriate  Mood: normal  Speech:  Normal  Thought Process:  Goal directed and Linear  Thought Content:  Normal  Suicidal:  None  Homicidal:  None  Hallucinations:  None  Delusion:  None  Memory:  Intact  Orientation:  Person, Place, Time and Situation  Reliability:  fair  Insight:  Fair  Judgement:  Fair  Impulse Control:  Fair  Physical/Medical Issues:  Yes Diabetes, celiac disease, headaches, back problems, hernia, and hair loss       PHQ-9 Score:   PHQ-9 Total Score: (P) 12     Impression/Plan:  -This is a follow up and medication check. Ruby reports times of depression and times of anxiety. She is trying to find ways to improve both symptoms like keeping herself busy, spending time with friends, and going to the gym. She reports good focus, concentration, and attention, but also irritability with Vyvanse. She like Vyvanse more than Adderall XR. She is aware of the national stimulant shortage and is aware than no pharmacies in [place] currently have Vyvanse. She is also aware that [facility] is having difficulty with receiving prescriptions through EMR. She is aware we may have to consider an alternative soon. She is without prescription coverage for drugs, so she agrees to sending 5 tablets of Lamotrigine and waiting to send Vyvanse when [employer] system is up again. I put her on [facility]'s list for sending refills and I set a reminder for myself for Monday just in case. I suggested therapy to help with symptoms of anxiety and depression since she is not interested in adjusting medications at this time. I provided information on [facility] and [facility] and she may consider a telehealth therapist like [facility]. She is aware a UDS needs completed before Vyvanse can be sent.   -Stop Doxepin, Mirtazapine, and Buspar since patient is no longer taking or did not take.   -Continue  lamotrigine  mg nightly for depression and anxiety.   -Continue Vyvanse 60 mg daily for ADHD symptoms.  Patient has refills.  -Consider therapy.  -The JANI report, reviewed through PDMP, of the past 12 months were reviewed and is appropriate.  The patient/guardian reports taking the medication only as prescribed.  The patient/guardian denies any abuse or misuse of the medication.  The patient/guardian denies any other substance use or issues.  There are no apparent substance related issues.  The patient reports no side effects of the current medication usage.  The patient/guardian has reported significant improvement with medication usage and wishes to continue medication as prescribed.  The patient/guardian is appropriate to continue with current medication usage at this time.  Reinforced risks and side effects of medication usage, patient and/or guardian verbalize understanding in their own words and are in agreement with current plan.  -Last UDS 1/9/23. Positive for amphetamine as expected.      MEDS ORDERED DURING VISIT:  New Medications Ordered This Visit   Medications    lamoTRIgine  MG tablet sustained-release 24 hour     Sig: Take 200 mg by mouth every night at bedtime.     Dispense:  5 tablet     Refill:  0       Follow Up   Return in about 3 months (around 5/27/2024) for Medication Check.  Patient was given instructions and counseling regarding her condition or for health maintenance advice. Please see specific information pulled into the AVS if appropriate.       TREATMENT PLAN/GOALS: Continue supportive psychotherapy efforts and medications as indicated. Treatment and medication options discussed during today's visit. Patient acknowledged and verbally consented to continue with current treatment plan and was educated on the importance of compliance with treatment and follow-up appointments.    MEDICATION ISSUES:  Discussed medication options and treatment plan of prescribed medication as  well as the risks, benefits, and side effects including potential falls, possible impaired driving and metabolic adversities among others. Patient is agreeable to call the office with any worsening of symptoms or onset of side effects. Patient is agreeable to call 911 or go to the nearest ER should he/she begin having SI/HI.        This document has been electronically signed by SONIYA Valadez, PMHNP-BC  February 27, 2024 20:13 EST    Part of this note may be an electronic transcription/translation of spoken language to printed text using the Dragon Dictation System.

## 2024-02-27 ENCOUNTER — TELEMEDICINE (OUTPATIENT)
Dept: PSYCHIATRY | Facility: CLINIC | Age: 24
End: 2024-02-27
Payer: MEDICAID

## 2024-02-27 DIAGNOSIS — F41.1 GENERALIZED ANXIETY DISORDER: Chronic | ICD-10-CM

## 2024-02-27 DIAGNOSIS — F31.30 BIPOLAR I DISORDER, MOST RECENT EPISODE DEPRESSED: Chronic | ICD-10-CM

## 2024-02-27 DIAGNOSIS — F90.2 ATTENTION DEFICIT HYPERACTIVITY DISORDER, COMBINED TYPE: Primary | Chronic | ICD-10-CM

## 2024-02-27 DIAGNOSIS — Z63.4 BEREAVEMENT: ICD-10-CM

## 2024-02-27 DIAGNOSIS — Z79.899 MEDICATION MANAGEMENT: ICD-10-CM

## 2024-02-27 DIAGNOSIS — G47.09 OTHER INSOMNIA: Chronic | ICD-10-CM

## 2024-02-27 DIAGNOSIS — F10.20 ALCOHOL USE DISORDER, MODERATE, DEPENDENCE: Chronic | ICD-10-CM

## 2024-02-27 PROCEDURE — 99214 OFFICE O/P EST MOD 30 MIN: CPT | Performed by: NURSE PRACTITIONER

## 2024-02-27 RX ORDER — LAMOTRIGINE 200 MG/1
1 TABLET, EXTENDED RELEASE ORAL
Qty: 5 TABLET | Refills: 0 | Status: SHIPPED | OUTPATIENT
Start: 2024-02-27

## 2024-02-27 SDOH — SOCIAL STABILITY - SOCIAL INSECURITY: DISSAPEARANCE AND DEATH OF FAMILY MEMBER: Z63.4

## 2024-03-04 ENCOUNTER — TELEPHONE (OUTPATIENT)
Dept: PSYCHIATRY | Facility: CLINIC | Age: 24
End: 2024-03-04
Payer: MEDICAID

## 2024-03-04 NOTE — TELEPHONE ENCOUNTER
Attempted to call pt. No answer. Left vm to call us back at her earliest convenience. Left [employer] message as well. c

## 2024-03-05 DIAGNOSIS — F31.30 BIPOLAR I DISORDER, MOST RECENT EPISODE DEPRESSED: Chronic | ICD-10-CM

## 2024-03-05 RX ORDER — LAMOTRIGINE 200 MG/1
1 TABLET, EXTENDED RELEASE ORAL
Qty: 30 TABLET | Refills: 2 | Status: SHIPPED | OUTPATIENT
Start: 2024-03-05

## 2024-03-05 NOTE — TELEPHONE ENCOUNTER
It was sent last week, but may have not been received due to the pharmacy's issues with e-scripts, so I will resend.

## 2024-03-05 NOTE — TELEPHONE ENCOUNTER
Patient called requesting refill of Lamictal to be sent to [facility].    Rx Refill Note  Requested Prescriptions     Pending Prescriptions Disp Refills    lamoTRIgine  MG tablet sustained-release 24 hour 30 tablet 2     Sig: Take 200 mg by mouth every night at bedtime.      Last office visit with prescribing clinician: 6/12/2023   Last telemedicine visit with prescribing clinician: 2/27/2024   Next office visit with prescribing clinician: 5/28/2024                         Would you like a call back once the refill request has been completed: [] Yes [] No    If the office needs to give you a call back, can they leave a voicemail: [] Yes [] No    Jacy Lucas CMA  03/05/24, 13:05 EST

## 2024-03-29 ENCOUNTER — TELEPHONE (OUTPATIENT)
Dept: PSYCHIATRY | Facility: CLINIC | Age: 24
End: 2024-03-29
Payer: MEDICAID

## 2024-03-29 NOTE — TELEPHONE ENCOUNTER
Patient called back to let us know she went to [facility] to do the urine drug screen, so results will come thru [facility]. I advised Patient that those tests take 7-10 days that is why we had ordered at [facility] outpatient lab.

## 2024-03-29 NOTE — TELEPHONE ENCOUNTER
Patient is going to get urine drug screen today. She said she wanted to let us know she has not had a refill of Vyvanse in awhile so it will not be in her system. She wanted to make sure this was ok. I advised Patient we had record of last script and we still needed the urine drug screen on file to be able to send refills. She voiced her understanding and will go today to complete.

## 2024-04-02 RX ORDER — INSULIN GLARGINE 100 [IU]/ML
INJECTION, SOLUTION SUBCUTANEOUS
Qty: 15 ML | Refills: 1 | Status: SHIPPED | OUTPATIENT
Start: 2024-04-02

## 2024-04-02 NOTE — TELEPHONE ENCOUNTER
Rx Refill Note  Requested Prescriptions     Pending Prescriptions Disp Refills    Insulin Glargine (BASAGLAR KWIKPEN) 100 UNIT/ML injection pen [Pharmacy Med Name: Basaglar KwikPen Subcutaneous Solution Pen-injector 100 UNIT/ML] 15 mL 0     Sig: INJECT 14 UNITS UNDER THE SKIN INTO THE APPROPRIATE AREA AS DIRECTED EVERY NIGHT.          Last office visit with prescribing clinician: 4/20/2023     Next office visit with prescribing clinician: 7/1/2024         Jaqui Pearce MA  04/02/24, 11:43 EDT

## 2024-04-05 LAB — DRUGS UR: NORMAL

## 2024-04-08 DIAGNOSIS — F90.2 ATTENTION DEFICIT HYPERACTIVITY DISORDER, COMBINED TYPE: ICD-10-CM

## 2024-04-08 DIAGNOSIS — E10.65 TYPE 1 DIABETES MELLITUS WITH HYPERGLYCEMIA: ICD-10-CM

## 2024-04-08 RX ORDER — LISDEXAMFETAMINE DIMESYLATE CAPSULES 60 MG/1
60 CAPSULE ORAL EVERY MORNING
Qty: 30 CAPSULE | Refills: 0 | Status: SHIPPED | OUTPATIENT
Start: 2024-04-08

## 2024-04-09 DIAGNOSIS — E10.65 TYPE 1 DIABETES MELLITUS WITH HYPERGLYCEMIA: ICD-10-CM

## 2024-04-09 RX ORDER — PROCHLORPERAZINE 25 MG/1
SUPPOSITORY RECTAL
Qty: 9 EACH | Refills: 0 | Status: SHIPPED | OUTPATIENT
Start: 2024-04-09

## 2024-04-09 NOTE — TELEPHONE ENCOUNTER
Rx Refill Note  Requested Prescriptions     Pending Prescriptions Disp Refills    Continuous Blood Gluc Sensor (Dexcom G6 Sensor) 9 each 3     Sig: by Other route Every 10 (Ten) Days.    Insulin Glargine (BASAGLAR KWIKPEN) 100 UNIT/ML injection pen 15 mL 1        Last office visit with prescribing clinician: 4/20/2023      Next office visit with prescribing clinician: 7/1/24      Cherelle Crouch (Jodi)  04/09/24, 13:45 EDT

## 2024-04-10 RX ORDER — PROCHLORPERAZINE 25 MG/1
SUPPOSITORY RECTAL
Qty: 9 EACH | Refills: 0 | Status: SHIPPED | OUTPATIENT
Start: 2024-04-10

## 2024-04-10 RX ORDER — INSULIN GLARGINE 100 [IU]/ML
INJECTION, SOLUTION SUBCUTANEOUS
Qty: 15 ML | Refills: 0 | Status: SHIPPED | OUTPATIENT
Start: 2024-04-10

## 2024-04-12 DIAGNOSIS — F90.2 ATTENTION DEFICIT HYPERACTIVITY DISORDER, COMBINED TYPE: ICD-10-CM

## 2024-04-12 RX ORDER — LISDEXAMFETAMINE DIMESYLATE CAPSULES 60 MG/1
60 CAPSULE ORAL EVERY MORNING
Qty: 30 CAPSULE | Refills: 0 | OUTPATIENT
Start: 2024-04-12

## 2024-04-12 NOTE — TELEPHONE ENCOUNTER
Pt stated on the VM she was unable to receive the RF until she got a UDS. I called the pharmacy and her Rx is ready for pick-up. Pt notified via phone call as well.

## 2024-04-12 NOTE — TELEPHONE ENCOUNTER
Joanne Pt.  Stated that she had a UDS and is requesting a RF.  UDS avaliable for review in chart.   Please advise.     Rx Refill Note  Requested Prescriptions     Pending Prescriptions Disp Refills    lisdexamfetamine (VYVANSE) 60 MG capsule 30 capsule 0     Sig: Take 1 capsule by mouth Every Morning      Last office visit with prescribing clinician: 6/12/2023   Last telemedicine visit with prescribing clinician: 2/27/2024   Next office visit with prescribing clinician: 5/28/2024                         Would you like a call back once the refill request has been completed: [] Yes [] No    If the office needs to give you a call back, can they leave a voicemail: [] Yes [] No    Adam Verma MA  04/12/24, 11:26 EDT

## 2024-04-12 NOTE — TELEPHONE ENCOUNTER
Barbara sent a refill on this 4 days ago. Did the pharmacy not receive it or was the patient unaware it was sent in?

## 2024-05-02 DIAGNOSIS — E10.65 TYPE 1 DIABETES MELLITUS WITH HYPERGLYCEMIA: ICD-10-CM

## 2024-05-02 RX ORDER — INSULIN ASPART 100 [IU]/ML
INJECTION, SOLUTION INTRAVENOUS; SUBCUTANEOUS
Qty: 45 ML | Refills: 0 | Status: SHIPPED | OUTPATIENT
Start: 2024-05-02

## 2024-05-02 NOTE — TELEPHONE ENCOUNTER
Rx Refill Note  Requested Prescriptions     Pending Prescriptions Disp Refills    insulin aspart (NovoLOG FlexPen) 100 UNIT/ML solution pen-injector sc pen [Pharmacy Med Name: NovoLOG FlexPen Subcutaneous Solution Pen-injector 100 UNIT/ML] 45 mL 0     Sig: INJECT 1 UNIT  UNDER THE SKIN FOR EVERY 5 GRAMS OF CARBS AND 1:20>140, MDD 45 UNITS          Last office visit with prescribing clinician: 4/20/2023     Next office visit with prescribing clinician: 7/1/2024         Jaqui Pearce MA  05/02/24, 13:19 EDT

## 2024-05-13 DIAGNOSIS — F90.2 ATTENTION DEFICIT HYPERACTIVITY DISORDER, COMBINED TYPE: ICD-10-CM

## 2024-05-13 RX ORDER — LISDEXAMFETAMINE DIMESYLATE 60 MG/1
60 CAPSULE ORAL EVERY MORNING
Qty: 30 CAPSULE | Refills: 0 | Status: SHIPPED | OUTPATIENT
Start: 2024-05-13

## 2024-05-13 NOTE — TELEPHONE ENCOUNTER
Rx Refill Note  Requested Prescriptions     Pending Prescriptions Disp Refills    lisdexamfetamine (VYVANSE) 60 MG capsule 30 capsule 0     Sig: Take 1 capsule by mouth Every Morning      Last office visit with prescribing clinician: 6/12/2023   Last telemedicine visit with prescribing clinician: 2/27/2024   Next office visit with prescribing clinician: 5/28/2024                         Would you like a call back once the refill request has been completed: [] Yes [] No    If the office needs to give you a call back, can they leave a voicemail: [] Yes [] No    Jacy Lucas CMA  05/13/24, 10:42 EDT

## 2024-05-15 RX ORDER — TIZANIDINE 4 MG/1
4 TABLET ORAL NIGHTLY PRN
Qty: 30 TABLET | Refills: 1 | Status: SHIPPED | OUTPATIENT
Start: 2024-05-15

## 2024-05-21 NOTE — PROGRESS NOTES
"This provider is located at [facility] ,[address], [address] in [place], [place],using a secure cloudControlhart Video Visit through [employer]. Patient is being seen remotely via telehealth at their home address in [place], and stated they are in a secure environment for this session. The patient's condition being diagnosed/treated is appropriate for telemedicine. The provider identified herself as well as her credentials.   The patient, and/or patients guardian, consent to be seen remotely, and when consent is given they understand that the consent allows for patient identifiable information to be sent to a third party as needed.   They may refuse to be seen remotely at any time. The electronic data is encrypted and password protected, and the patient and/or guardian has been advised of the potential risks to privacy not withstanding such measures.     Chief Complaint  Bipolar I disorder, ADHD, anxiety, insomnia, bereavement, and alcohol use disorder, moderate, in sustained remission      Subjective          Ruby Ramos presents via MyChart Video visit through Mg[employer] by herself for a follow up and medication check.    History of Present Illness: Ruby states, \"I have been okay.\" Ruby tells me she made notes to review the concerns with medication. She tells me that her mood is stable and anxiety is managed when she recognizes it is increasing. She xavier by working out and journaling. However, she does not find the Vyvanse to be managing ADHD as well because she has muscle tension and jittery feelings, along with brain fog. The patient changed from Adderall XR to Vyvanse in November of 2023. They find Vyvanse helps the binge eating episodes which comes along with anxiety and depression. This is important for also maintaining normal blood glucose levels due to diabetes. They are sleeping well. They continue to work.  She is taking Vyvanse and lamotrigine ER. She denies any " side effects. She denies any SI/HI/AVH.    Current Medications:   Current Outpatient Medications   Medication Sig Dispense Refill    Continuous Blood Gluc Sensor (Dexcom G6 Sensor) by Other route Every 10 (Ten) Days. 9 each 0    Continuous Blood Gluc Sensor (Dexcom G6 Sensor) CHANGE SENSOR EVERY 10 DAYS 9 each 0    Continuous Blood Gluc Transmit (Dexcom G6 Transmitter) misc 1 each by Other route Every 3 (Three) Months. 1 each 3    dicyclomine (BENTYL) 20 MG tablet Take 1 tablet by mouth Every 6 (Six) Hours As Needed for Abdominal Cramping. 10 tablet 0    glucose blood test strip Four times daily 400 each 3    insulin aspart (NovoLOG FlexPen) 100 UNIT/ML solution pen-injector sc pen INJECT 1 UNIT  UNDER THE SKIN FOR EVERY 5 GRAMS OF CARBS AND 1:20>140, MDD 45 UNITS 45 mL 0    Insulin Pen Needle 32G X 4 MM misc 1 each 4 (Four) Times a Day. 400 each 3    ketorolac (TORADOL) 10 MG tablet Take 1 tablet by mouth Every 6 (Six) Hours As Needed for Moderate Pain. 10 tablet 0    lamoTRIgine  MG tablet sustained-release 24 hour Take 200 mg by mouth every night at bedtime. 90 tablet 1    lisdexamfetamine (VYVANSE) 60 MG capsule Take 1 capsule by mouth Every Morning 30 capsule 0    multivitamin with minerals tablet tablet Take 1 tablet by mouth Daily.      tiZANidine (ZANAFLEX) 4 MG tablet Take 1 tablet by mouth At Night As Needed for Muscle Spasms. 30 tablet 1    True Comfort Twist Top Lancets misc 1 Device 4 (Four) Times a Day. 400 each 3    Insulin Glargine (BASAGLAR KWIKPEN) 100 UNIT/ML injection pen INJECT 14 UNITS EVERY NIGHT KEEP APPT FOR REFILLS 15 mL 0    levocetirizine (XYZAL) 5 MG tablet Take 1 tablet by mouth Every Evening. (Patient not taking: Reported on 5/28/2024) 90 tablet 3    ondansetron ODT (ZOFRAN-ODT) 4 MG disintegrating tablet Place 1 tablet on the tongue Every 8 (Eight) Hours As Needed for Vomiting. (Patient not taking: Reported on 5/28/2024) 9 tablet 0    promethazine (PHENERGAN) 25 MG tablet Take 1  tablet by mouth Every 6 (Six) Hours As Needed for Nausea or Vomiting. (Patient not taking: Reported on 5/28/2024) 15 tablet 0     No current facility-administered medications for this visit.         Objective   Vital Signs:   There were no vitals taken for this visit.    Physical Exam  Nursing note reviewed. Vitals reviewed: No vitals to review due to nature of telehealth visit.  Constitutional:       Appearance: Normal appearance. She is well-developed and normal weight.   Neurological:      General: No focal deficit present.      Mental Status: She is alert and oriented to person, place, and time.   Psychiatric:         Attention and Perception: Attention normal.         Mood and Affect: Mood and affect normal.         Speech: Speech normal.         Behavior: Behavior normal. Behavior is cooperative.         Thought Content: Thought content normal.         Cognition and Memory: Cognition normal.         Judgment: Judgment normal. Judgment is impulsive and inappropriate.        Result Review :     The following data was reviewed by: SONIYA Jay on 05/28/2024:      Assessment and Plan    Diagnoses and all orders for this visit:    1. Attention deficit hyperactivity disorder, combined type (Primary)    2. Bipolar I disorder, most recent episode depressed  -     lamoTRIgine  MG tablet sustained-release 24 hour; Take 200 mg by mouth every night at bedtime.  Dispense: 90 tablet; Refill: 1    3. Other insomnia    4. Generalized anxiety disorder    5. Alcohol use disorder, moderate, dependence    6. Bereavement           Mental Status Exam:   Hygiene:   good  Cooperation:  Cooperative  Eye Contact:  Good  Psychomotor Behavior:  Appropriate  Affect:  Appropriate  Mood: normal  Speech:  Normal  Thought Process:  Goal directed and Linear  Thought Content:  Normal  Suicidal:  None  Homicidal:  None  Hallucinations:  None  Delusion:  None  Memory:  Intact  Orientation:  Person, Place, Time and  Situation  Reliability:  fair  Insight:  Fair  Judgement:  Fair  Impulse Control:  Fair  Physical/Medical Issues:  Yes Diabetes, celiac disease, headaches, back problems, hernia, and hair loss       PHQ-9 Score:   PHQ-9 Total Score: (P) 14     Impression/Plan:  -This is a follow up and medication check. Ruby reports a stable mood and anxiety. She found coping skills of working out and journaling to be helpful. She is worried about ADHD management. There have been pros and cons of both Adderall and Vyvanse, but neither have provided good focus, concentration, and attention while managing appetite and not affecting sleep. She and I discussed options including changing the lines of stimulants to the Methylphenidate version like Ritalin, Concerta, and Jornay versus using a Evekeo or Dyanavel in the amphetamine line. I even mentioned using Phentermine from PCP to manage appetite without the adversities of other stimulants. For now, she would like to continue current medications and will look into options further and let me know.   -Continue lamotrigine  mg nightly for depression and anxiety.   -Continue Vyvanse 60 mg daily for ADHD symptoms.  Patient has refills.  -Consider therapy.  -The JANI report, reviewed through PDMP, of the past 12 months were reviewed and is appropriate.  The patient/guardian reports taking the medication only as prescribed.  The patient/guardian denies any abuse or misuse of the medication.  The patient/guardian denies any other substance use or issues.  There are no apparent substance related issues.  The patient reports no side effects of the current medication usage.  The patient/guardian has reported significant improvement with medication usage and wishes to continue medication as prescribed.  The patient/guardian is appropriate to continue with current medication usage at this time.  Reinforced risks and side effects of medication usage, patient and/or guardian verbalize  understanding in their own words and are in agreement with current plan.  -Last UDS 03/29/24. Negative for amphetamine.      MEDS ORDERED DURING VISIT:  New Medications Ordered This Visit   Medications    lamoTRIgine  MG tablet sustained-release 24 hour     Sig: Take 200 mg by mouth every night at bedtime.     Dispense:  90 tablet     Refill:  1       Follow Up   Return in about 3 months (around 8/28/2024) for Medication Check.  Patient was given instructions and counseling regarding her condition or for health maintenance advice. Please see specific information pulled into the AVS if appropriate.       TREATMENT PLAN/GOALS: Continue supportive psychotherapy efforts and medications as indicated. Treatment and medication options discussed during today's visit. Patient acknowledged and verbally consented to continue with current treatment plan and was educated on the importance of compliance with treatment and follow-up appointments.    MEDICATION ISSUES:  Discussed medication options and treatment plan of prescribed medication as well as the risks, benefits, and side effects including potential falls, possible impaired driving and metabolic adversities among others. Patient is agreeable to call the office with any worsening of symptoms or onset of side effects. Patient is agreeable to call 911 or go to the nearest ER should he/she begin having SI/HI.        This document has been electronically signed by SONIYA Valadez, PMHNP-BC  May 28, 2024 17:12 EDT    Part of this note may be an electronic transcription/translation of spoken language to printed text using the Dragon Dictation System.

## 2024-05-28 ENCOUNTER — TELEMEDICINE (OUTPATIENT)
Dept: PSYCHIATRY | Facility: CLINIC | Age: 24
End: 2024-05-28
Payer: MEDICAID

## 2024-05-28 DIAGNOSIS — Z63.4 BEREAVEMENT: Chronic | ICD-10-CM

## 2024-05-28 DIAGNOSIS — F41.1 GENERALIZED ANXIETY DISORDER: Chronic | ICD-10-CM

## 2024-05-28 DIAGNOSIS — F31.30 BIPOLAR I DISORDER, MOST RECENT EPISODE DEPRESSED: Chronic | ICD-10-CM

## 2024-05-28 DIAGNOSIS — G47.09 OTHER INSOMNIA: Chronic | ICD-10-CM

## 2024-05-28 DIAGNOSIS — F90.2 ATTENTION DEFICIT HYPERACTIVITY DISORDER, COMBINED TYPE: Primary | Chronic | ICD-10-CM

## 2024-05-28 DIAGNOSIS — F10.20 ALCOHOL USE DISORDER, MODERATE, DEPENDENCE: Chronic | ICD-10-CM

## 2024-05-28 PROCEDURE — 1159F MED LIST DOCD IN RCRD: CPT | Performed by: NURSE PRACTITIONER

## 2024-05-28 PROCEDURE — 99214 OFFICE O/P EST MOD 30 MIN: CPT | Performed by: NURSE PRACTITIONER

## 2024-05-28 PROCEDURE — 1160F RVW MEDS BY RX/DR IN RCRD: CPT | Performed by: NURSE PRACTITIONER

## 2024-05-28 RX ORDER — LAMOTRIGINE 200 MG/1
1 TABLET, EXTENDED RELEASE ORAL
Qty: 90 TABLET | Refills: 1 | Status: SHIPPED | OUTPATIENT
Start: 2024-05-28

## 2024-05-28 SDOH — SOCIAL STABILITY - SOCIAL INSECURITY: DISSAPEARANCE AND DEATH OF FAMILY MEMBER: Z63.4

## 2024-06-04 ENCOUNTER — TRANSCRIBE ORDERS (OUTPATIENT)
Dept: PSYCHIATRY | Facility: CLINIC | Age: 24
End: 2024-06-04
Payer: MEDICAID

## 2024-06-04 ENCOUNTER — TELEPHONE (OUTPATIENT)
Dept: BEHAVIORAL HEALTH | Facility: CLINIC | Age: 24
End: 2024-06-04
Payer: MEDICAID

## 2024-06-04 ENCOUNTER — TELEPHONE (OUTPATIENT)
Dept: PSYCHIATRY | Facility: CLINIC | Age: 24
End: 2024-06-04
Payer: MEDICAID

## 2024-06-04 NOTE — TELEPHONE ENCOUNTER
Patient left a message she had discussed with provider switching adhd meds she said for now she wants to stay on her Vyvanse and will talk to you more about switching at next appointment.

## 2024-07-01 ENCOUNTER — OFFICE VISIT (OUTPATIENT)
Dept: ENDOCRINOLOGY | Facility: CLINIC | Age: 24
End: 2024-07-01
Payer: MEDICAID

## 2024-07-01 VITALS
HEIGHT: 62 IN | DIASTOLIC BLOOD PRESSURE: 72 MMHG | TEMPERATURE: 97.8 F | HEART RATE: 99 BPM | SYSTOLIC BLOOD PRESSURE: 118 MMHG | WEIGHT: 179.2 LBS | RESPIRATION RATE: 18 BRPM | OXYGEN SATURATION: 99 % | BODY MASS INDEX: 32.97 KG/M2

## 2024-07-01 DIAGNOSIS — E10.65 TYPE 1 DIABETES MELLITUS WITH HYPERGLYCEMIA: Primary | ICD-10-CM

## 2024-07-01 LAB
EXPIRATION DATE: ABNORMAL
EXPIRATION DATE: ABNORMAL
GLUCOSE BLDC GLUCOMTR-MCNC: 233 MG/DL (ref 70–130)
HBA1C MFR BLD: 6.6 % (ref 4.5–5.7)
Lab: ABNORMAL
Lab: ABNORMAL

## 2024-07-01 RX ORDER — INSULIN ASPART 100 [IU]/ML
INJECTION, SOLUTION INTRAVENOUS; SUBCUTANEOUS
Qty: 45 ML | Refills: 1 | Status: SHIPPED | OUTPATIENT
Start: 2024-07-01

## 2024-07-01 RX ORDER — ACYCLOVIR 400 MG/1
1 TABLET ORAL
Qty: 9 EACH | Refills: 3 | Status: SHIPPED | OUTPATIENT
Start: 2024-07-01

## 2024-07-01 RX ORDER — INSULIN GLARGINE 100 [IU]/ML
INJECTION, SOLUTION SUBCUTANEOUS
Qty: 30 ML | Refills: 1 | Status: SHIPPED | OUTPATIENT
Start: 2024-07-01

## 2024-07-01 NOTE — PATIENT INSTRUCTIONS
Patients with diabetes should have a yearly eye exam. Please be sure to schedule this at least once yearly and have the eye exam report faxed to [phone].

## 2024-07-01 NOTE — PROGRESS NOTES
"Chief Complaint   Patient presents with    Diabetes          HPI   Ruby Ramos is a 24 y.o. female had concerns including Diabetes.    Pt was last seen 4/2023.   Using dexcom. Cannot download report today. Can't log into clarity.   Using basaglar 15 units daily. Novolog 2-5, up to 10 units with very high meals. Not carb counting. Is gluten free. Struggles with carb counting.    BG high today after having juice. Changed her sensor and was worried about hypoglycemia while the sensor wasn't reading so drank the juice to run her glucose a bit high.    She is struggling with binge eating disorder. Feels like her glucose levels are better controlled when her diet is controlled.     Is on her menstrual cycle.   Overdue for eye exam.     The following portions of the patient's history were reviewed and updated as appropriate: allergies, current medications, past family history, past medical history, past social history, past surgical history, and problem list.      Review of Systems   Constitutional: Negative.    Endocrine:        See HPI        Physical Exam  Vitals reviewed.   Constitutional:       Appearance: Normal appearance.   Neck:      Thyroid: No thyroid mass or thyromegaly.   Cardiovascular:      Rate and Rhythm: Normal rate.   Pulmonary:      Effort: Pulmonary effort is normal.   Neurological:      General: No focal deficit present.      Mental Status: She is alert. Mental status is at baseline.   Psychiatric:         Mood and Affect: Mood normal.         Behavior: Behavior normal.        /72 (BP Location: Left arm, Patient Position: Sitting, Cuff Size: Adult)   Pulse 99   Temp 97.8 °F (36.6 °C) (Temporal)   Resp 18   Ht 157.5 cm (62\")   Wt 81.3 kg (179 lb 3.2 oz)   SpO2 99%   BMI 32.78 kg/m²      Labs and imaging    CMP:  Lab Results   Component Value Date    BUN 8 11/17/2023    CREATININE 0.66 11/17/2023    EGFR 126.6 11/17/2023    BCR 12.1 11/17/2023     11/17/2023    K 3.6 11/17/2023    " CO2 23.6 11/17/2023    CALCIUM 9.8 11/17/2023    ALBUMIN 5.0 11/17/2023    BILITOT 0.5 11/17/2023    ALKPHOS 75 11/17/2023    AST 20 11/17/2023    ALT 24 11/17/2023     Lipid Panel:  Lab Results   Component Value Date    TRIG 54 12/14/2022    HDL 66 (H) 12/14/2022    VLDL 11 12/14/2022    LDL 84 12/14/2022     HbA1c:  Lab Results   Component Value Date    HGBA1C 6.6 (A) 07/01/2024    HGBA1C 5.6 04/20/2023     Glucose:  Lab Results   Component Value Date    POCGLU 233 (A) 07/01/2024     Microalbumin:  Lab Results   Component Value Date    MALBCRERATIO 6 12/16/2022     TSH:  Lab Results   Component Value Date    TSH 2.870 12/14/2022     Lab Results   Component Value Date    GAD65 2263.9 (H) 01/26/2021         Assessment and plan  Diagnoses and all orders for this visit:    1. Type 1 diabetes mellitus with hyperglycemia (Primary)  Uncontrolled with hyperglycemia. Diagnosed 2021.   A1c 6.6.   Increase basaglar to 16 units QHS.  She is using a range of NovoLog 2 to 5 units with most meals, up to 10 units for higher carb meals but struggles with binge eating disorder.  Will refer to diabetes educator for discussion of carb counting, controlling her appetite/binge disorder.  She also needs assistance getting clarity annie linked that we can review her glucose logs.  Is interested in considering an insulin pump, either tandem Mobi or omnipod.   She may try for children at some point in the future, but is not currently family-planning.  Reminded of glucose targets for pregnancy much more strict than outside of pregnancy.  Fasting glucose should be less than 95, 1 hour postprandial less than 140, 2-hour postprandial less than 120.  Check urine microalbumin at follow-up visit (when off cycle).  Monofilament was updated today.  Ophtho exam is due yearly and requested the patient have the report sent here.  -     POC Glucose, Blood  -     POC Glycosylated Hemoglobin (Hb A1C)  -     Insulin Glargine (BASAGLAR KWIKPEN) 100 UNIT/ML  injection pen; INJECT 16 UNITS NIGHTLY, INCREASE AS NEEDED  Dispense: 30 mL; Refill: 1  -     Continuous Glucose Sensor (Dexcom G7 Sensor) misc; Use 1 each Every 10 (Ten) Days.  Dispense: 9 each; Refill: 3  -     insulin aspart (NovoLOG FlexPen) 100 UNIT/ML solution pen-injector sc pen; 2-10 units as needed before meals, MDD 45 units  Dispense: 45 mL; Refill: 1         Return in about 4 months (around 11/1/2024). The patient was instructed to contact the clinic with any interval questions or concerns.    Electronically signed by: Layla Corral DO   Endocrinologist    Please note that portions of this note were completed with a voice recognition program.

## 2024-07-03 ENCOUNTER — TELEPHONE (OUTPATIENT)
Dept: ENDOCRINOLOGY | Facility: CLINIC | Age: 24
End: 2024-07-03
Payer: MEDICAID

## 2024-07-19 ENCOUNTER — TELEPHONE (OUTPATIENT)
Dept: ENDOCRINOLOGY | Facility: CLINIC | Age: 24
End: 2024-07-19
Payer: MEDICAID

## 2024-07-19 RX ORDER — ACYCLOVIR 400 MG/1
1 TABLET ORAL SEE ADMIN INSTRUCTIONS
Qty: 1 EACH | Refills: 0 | Status: SHIPPED | OUTPATIENT
Start: 2024-07-19

## 2024-07-19 NOTE — TELEPHONE ENCOUNTER
----- Message from [employer] sent at 7/18/2024  9:02 PM EDT -----  Regarding: Dexcom   Contact: [phone]  Hello! I just picked up my first prescription of dexcom g7 sensors but there wasn’t a  that came with them. I checked dexcoms website to see if the g7 usually came with a  and seen that they’re optional, so I was wondering if it’s possible for me to get one?

## 2024-07-22 ENCOUNTER — PRIOR AUTHORIZATION (OUTPATIENT)
Dept: ENDOCRINOLOGY | Facility: CLINIC | Age: 24
End: 2024-07-22
Payer: MEDICAID

## 2024-07-22 NOTE — TELEPHONE ENCOUNTER
Member should be able to get the drug/product without a PA at this time.  Drug  Dexcom G7  device  Form  [employer] [place] Medicaid ePA Form 2017 NCP[place]

## 2024-08-01 ENCOUNTER — OFFICE VISIT (OUTPATIENT)
Dept: PSYCHIATRY | Facility: CLINIC | Age: 24
End: 2024-08-01
Payer: MEDICAID

## 2024-08-01 DIAGNOSIS — F90.2 ATTENTION DEFICIT HYPERACTIVITY DISORDER, COMBINED TYPE: Primary | ICD-10-CM

## 2024-08-01 DIAGNOSIS — F31.30 BIPOLAR I DISORDER, MOST RECENT EPISODE DEPRESSED: ICD-10-CM

## 2024-08-01 PROCEDURE — 1159F MED LIST DOCD IN RCRD: CPT | Performed by: COUNSELOR

## 2024-08-01 PROCEDURE — 1160F RVW MEDS BY RX/DR IN RCRD: CPT | Performed by: COUNSELOR

## 2024-08-01 PROCEDURE — 90837 PSYTX W PT 60 MINUTES: CPT | Performed by: COUNSELOR

## 2024-08-01 NOTE — PROGRESS NOTES
Date:2024   Patient Name: Ruby Ramos  : 2000   MRN: [id number]   Time IN: 12:30    Time OUT: 1:30     Referring Provider: Macrina Godinez APRN    PROGRESS NOTE    History of Present Illness:   Rbuy Ramos is a 24 y.o. female who is being seen today for follow up individual Psychotherapy session.     Chief Complaint: Patient arrived at the [facility].  Patient expressed struggles with focusing, hyperactivity, impulsivity, miri, lack of motivation, down and out, sleep, overwhelmed, over thinking, social anxiety, and anger.     ICD-10-CM ICD-9-CM   1. Attention deficit hyperactivity disorder, combined type  F90.2 314.01   2. Bipolar I disorder, most recent episode depressed  F31.30 296.50        Clinical Maneuvering/Intervention: EMDR phase 1 client history: therapy manic history taking, case conceptualization (through AIP) lens, and treatment planning.  Phase 2 preparation: Client stabilization, resource building, EMDR therapy orientation, addressing client's concerns, addressing client's questions, oriented to bilateral forms of dual attention stimuli (BREWSTER) and technical set up, evaluating appropriateness going further (phase 3-6). Assisted patient in processing above session content; acknowledged and normalized patient’s thoughts, feelings, and concerns to build appropriate rapport and a positive therapeutic relationship with open and honest communication.  Rationalized patient thought process regarding ADHD and bipolar 1.  Discussed triggers associated with patient's ADHD and bipolar 1.  Also discussed coping skills for patient to implement such as looking for glimmers, mindful breathing, affirmation journal.    Allowed patient to freely discuss issues without interruption or judgment. Provided safe, confidential environment to facilitate the development of positive therapeutic relationship and encourage open, honest communication. Assisted patient in identifying risk factors which  would indicate the need for higher level of care including thoughts to harm self or others and/or self-harming behavior and encouraged patient to contact this office, call 911, or present to the nearest emergency room should any of these events occur. Discussed crisis intervention services and means to access. Patient adamantly and convincingly denies current suicidal or homicidal ideation or perceptual disturbance.    Assessment Scores:   PHQ-9 Total Score:     SILVIANO-7 Score:    PTSD Total Score: .PTSDTOTALSCORE    (Scales based on 0 - 10 with 10 being the worst)  Depression: 1 Anxiety 8       Mental Status Exam:   Hygiene:   good  Cooperation:  Cooperative  Eye Contact:  Good  Psychomotor Behavior:  Appropriate  Affect:  Appropriate  Mood: normal  Speech:  Normal  Thought Process:  Linear  Thought Content:  Mood congruent  Suicidal:  None  Homicidal:  None  Hallucinations:  None  Delusion:  None  Memory:  Intact  Orientation:  Person, Place, Time, and Situation  Reliability:  good  Insight:  Good  Judgement:  Good  Impulse Control:  Fair  Physical/Medical Issues:  No      Patient's Support Network Includes:  mother    Functional Status: Moderate impairment     Progress toward goal: At goal    Prognosis: Good with Ongoing Treatment     Medications:     Current Outpatient Medications:     Continuous Blood Gluc Transmit (Dexcom G6 Transmitter) misc, 1 each by Other route Every 3 (Three) Months., Disp: 1 each, Rfl: 3    Continuous Glucose  (Dexcom G7 ) device, Use 1 each See Admin Instructions., Disp: 1 each, Rfl: 0    Continuous Glucose Sensor (Dexcom G7 Sensor) misc, Use 1 each Every 10 (Ten) Days., Disp: 9 each, Rfl: 3    dicyclomine (BENTYL) 20 MG tablet, Take 1 tablet by mouth Every 6 (Six) Hours As Needed for Abdominal Cramping., Disp: 10 tablet, Rfl: 0    glucose blood test strip, Four times daily, Disp: 400 each, Rfl: 3    insulin aspart (NovoLOG FlexPen) 100 UNIT/ML solution pen-injector sc pen,  "2-10 units as needed before meals, MDD 45 units, Disp: 45 mL, Rfl: 1    Insulin Glargine (BASAGLAR KWIKPEN) 100 UNIT/ML injection pen, INJECT 16 UNITS NIGHTLY, INCREASE AS NEEDED, Disp: 30 mL, Rfl: 1    Insulin Pen Needle 32G X 4 MM misc, 1 each 4 (Four) Times a Day., Disp: 400 each, Rfl: 3    ketorolac (TORADOL) 10 MG tablet, Take 1 tablet by mouth Every 6 (Six) Hours As Needed for Moderate Pain., Disp: 10 tablet, Rfl: 0    lamoTRIgine  MG tablet sustained-release 24 hour, Take 200 mg by mouth every night at bedtime., Disp: 90 tablet, Rfl: 1    lisdexamfetamine (VYVANSE) 60 MG capsule, Take 1 capsule by mouth Every Morning, Disp: 30 capsule, Rfl: 0    multivitamin with minerals tablet tablet, Take 1 tablet by mouth Daily., Disp: , Rfl:     tiZANidine (ZANAFLEX) 4 MG tablet, Take 1 tablet by mouth At Night As Needed for Muscle Spasms., Disp: 30 tablet, Rfl: 1    True Comfort Twist Top Lancets [facility], 1 Device 4 (Four) Times a Day., Disp: 400 each, Rfl: 3    Visit Diagnosis/Orders Placed This Visit:    ICD-10-CM ICD-9-CM   1. Attention deficit hyperactivity disorder, combined type  F90.2 314.01   2. Bipolar I disorder, most recent episode depressed  F31.30 296.50        PLAN:  Safety: No acute safety concerns  Risk Assessment: Risk of self-harm acutely is low. Risk of self-harm chronically is also low, but could be further elevated in the event of treatment noncompliance and/or AODA.    Crisis Plan:  Symptoms and/or behaviors to indicate a crisis: Excessive worry or fear, Feeling sad or low, Extreme mood changes; including uncontrollable \"highs\" or euphoria, Prolonged irritability or anger, and Self-doubt    What calming techniques or other strategies will patient use to de-escalate and stay safe: slow down, breathe, visualize calming self, think it though, listen to music, change focus, take a walk    Who is one person patient can contact to assist with de-escalation? Brother, sister, best friend, and " mom    Treatment Plan/Goals: Patient will continue supportive psychotherapy efforts and medication regimen as prescribed. Therapist will provide Cognitive Behavioral Therapy to assist patient in improving functioning and gaining coping skills, maintaining stability, and avoiding decompensation and the need for higher level of care. Plan for treatment was discussed during today's visit. Patient acknowledged and verbally consented to continue with current treatment plan and was educated on the importance of compliance with treatment and follow-up appointments.     Patient will contact this office, call 911 or present to the nearest emergency room should suicidal or homicidal ideations occur.     Follow Up:   Return in about 1 month (around 9/1/2024).      JIM Vital   [facility] [place]     This document has been electronically signed by JIM Vital   August 1, 2024 13:29 EDT

## 2024-08-26 NOTE — PROGRESS NOTES
"Chief Complaint  Bipolar I disorder, ADHD, anxiety, insomnia, bereavement, and alcohol use disorder, moderate, in sustained remission      Subjective          Ruby Ramos presents to [facility], Behavior[facility], [place] by herself for a hospital follow up and medication check.    History of Present Illness: Ruby states, \"I went to the [facility].\"  Ruby tells me she is unsure what happened, but she woke up 1 day and was in a severe depressive episode.  She elected to quit her job and stopped her medications.  Her family and friends had been concerned, but she did not feel their concerns were valid.  She had an admission from July 22 to July 29.  She found the group therapies to be the most helpful, but is unsure if her medication changes have been helping.  She is concerned about Seroquel raising blood glucose levels and causing weight gain, but it had been helping her sleep initially.  She is also concerned about Prozac.  She tells me that this was never a good medication for her mother so she wonders if it may be helpful for her.  She is trying to implement healthy lifestyle measures like daily exercise, healthy diet, and drinking less caffeine.  She has started seeing [facility] for [facility] and finds this helpful.  She reports compliance with her psychiatric medication. She is taking lamotrigine, Prozac, and Seroquel. She denies any side effects. She denies any SI/HI/AVH.    Current Medications:   Current Outpatient Medications   Medication Sig Dispense Refill    Continuous Glucose  (Dexcom G7 ) device Use 1 each See Admin Instructions. 1 each 0    Continuous Glucose Sensor (Dexcom G7 Sensor) misc Use 1 each Every 10 (Ten) Days. 9 each 3    glucose blood test strip Four times daily 400 each 3    insulin aspart (NovoLOG FlexPen) 100 UNIT/ML solution pen-injector sc pen 2-10 units as needed before meals, MDD 45 units 45 mL 1    Insulin Glargine (BASAGLAR KWIKPEN) 100 UNIT/ML " "injection pen INJECT 16 UNITS NIGHTLY, INCREASE AS NEEDED 30 mL 1    Insulin Pen Needle 32G X 4 MM misc 1 each 4 (Four) Times a Day. 400 each 3    lamoTRIgine (LaMICtal) 25 MG tablet Take 2 tablets by mouth every night at bedtime. 60 tablet 2    multivitamin with minerals tablet tablet Take 1 tablet by mouth Daily.      tiZANidine (ZANAFLEX) 4 MG tablet Take 1 tablet by mouth At Night As Needed for Muscle Spasms. 30 tablet 1    True Comfort Twist Top Lancets misc 1 Device 4 (Four) Times a Day. 400 each 3    desvenlafaxine (PRISTIQ) 50 MG 24 hr tablet Take 1 tablet by mouth Daily. 30 tablet 2    QUEtiapine fumarate ER (SEROquel XR) 50 MG tablet sustained-release 24 hour tablet Take 2 tablets by mouth Every Night. 60 each 2     No current facility-administered medications for this visit.         Objective   Vital Signs:   /74   Pulse 98   Ht 157.5 cm (62\")   Wt 86.6 kg (191 lb)   SpO2 100%   BMI 34.93 kg/m²     Physical Exam  Vitals and nursing note reviewed.   Constitutional:       Appearance: Normal appearance. She is well-developed and normal weight.   Neurological:      General: No focal deficit present.      Mental Status: She is alert and oriented to person, place, and time.   Psychiatric:         Attention and Perception: Attention normal.         Mood and Affect: Mood and affect normal.         Speech: Speech normal.         Behavior: Behavior normal. Behavior is cooperative.         Thought Content: Thought content normal.         Cognition and Memory: Cognition normal.         Judgment: Judgment normal. Judgment is impulsive and inappropriate.        Result Review :     The following data was reviewed by: SONIYA Jay on 08/28/2024:    Office Visit with Bo [facility] (08/01/2024)   BEHAVIORAL HEALTH - SCAN - RIDGE DISCHARGE SUMMARY (07/30/2024)     Assessment and Plan    Diagnoses and all orders for this visit:    1. Bipolar I disorder, most recent episode depressed " (Primary)  -     desvenlafaxine (PRISTIQ) 50 MG 24 hr tablet; Take 1 tablet by mouth Daily.  Dispense: 30 tablet; Refill: 2  -     lamoTRIgine (LaMICtal) 25 MG tablet; Take 2 tablets by mouth every night at bedtime.  Dispense: 60 tablet; Refill: 2  -     QUEtiapine fumarate ER (SEROquel XR) 50 MG tablet sustained-release 24 hour tablet; Take 2 tablets by mouth Every Night.  Dispense: 60 each; Refill: 2    2. Attention deficit hyperactivity disorder, combined type    3. Other insomnia    4. Generalized anxiety disorder  -     desvenlafaxine (PRISTIQ) 50 MG 24 hr tablet; Take 1 tablet by mouth Daily.  Dispense: 30 tablet; Refill: 2             Mental Status Exam:   Hygiene:   good  Cooperation:  Cooperative  Eye Contact:  Good  Psychomotor Behavior:  Appropriate  Affect:  Appropriate  Mood: normal  Speech:  Normal  Thought Process:  Goal directed and Linear  Thought Content:  Normal  Suicidal:  None  Homicidal:  None  Hallucinations:  None  Delusion:  None  Memory:  Intact  Orientation:  Person, Place, Time and Situation  Reliability:  fair  Insight:  Fair  Judgement:  Fair  Impulse Control:  Fair  Physical/Medical Issues:  Yes Diabetes, celiac disease, headaches, back problems, hernia, and hair loss       PHQ-9 Score:   PHQ-9 Total Score: 19     Impression/Plan:  -This is a hospital follow up and medication check. Ruby reports improved symptoms of depression since her recent psychiatric inpatient hospitalization.  She found the group therapies to be helpful and she is continuing individual therapy with Bo.  Today, we discussed her medication regiment which she finds to be not as helpful as it was initially.  She has concerns about Seroquel increasing her blood glucose levels and causing weight gain; however, it is helping her sleep.  Therefore, we discussed increasing the dose and changing to XR.  I explained these changes may help offset the metabolic effect as well as help sleep.  She agrees to try.  We also  discussed stopping Prozac as this is not indicated in a bipolar disorder.  Her mother takes Pristiq and she is curious if this may help her.  I explained the mechanism of action, purpose, risk, benefits, and potential adverse affects of Pristiq.  This is in her use as directed category on the [employer] test.  Therefore she would like to try this medication.  -Stop Prozac due to risk of activating a bipolar episode.  -Increase and change Seroquel to  mg nightly for bipolar.   -Continue lamotrigine 50 mg nightly for bipolar.  -Continue therapy with Bo.  -The JANI report, reviewed through PDMP, of the past 12 months were reviewed and is appropriate.  The patient/guardian reports taking the medication only as prescribed.  The patient/guardian denies any abuse or misuse of the medication.  The patient/guardian denies any other substance use or issues.  There are no apparent substance related issues.  The patient reports no side effects of the current medication usage.  The patient/guardian has reported significant improvement with medication usage and wishes to continue medication as prescribed.  The patient/guardian is appropriate to continue with current medication usage at this time.  Reinforced risks and side effects of medication usage, patient and/or guardian verbalize understanding in their own words and are in agreement with current plan.  -Last UDS 03/29/24. Negative for amphetamine.      MEDS ORDERED DURING VISIT:  New Medications Ordered This Visit   Medications    desvenlafaxine (PRISTIQ) 50 MG 24 hr tablet     Sig: Take 1 tablet by mouth Daily.     Dispense:  30 tablet     Refill:  2    lamoTRIgine (LaMICtal) 25 MG tablet     Sig: Take 2 tablets by mouth every night at bedtime.     Dispense:  60 tablet     Refill:  2    QUEtiapine fumarate ER (SEROquel XR) 50 MG tablet sustained-release 24 hour tablet     Sig: Take 2 tablets by mouth Every Night.     Dispense:  60 each     Refill:  2       Follow  Up   Return in about 2 months (around 10/28/2024) for Medication Check.  Patient was given instructions and counseling regarding her condition or for health maintenance advice. Please see specific information pulled into the AVS if appropriate.       TREATMENT PLAN/GOALS: Continue supportive psychotherapy efforts and medications as indicated. Treatment and medication options discussed during today's visit. Patient acknowledged and verbally consented to continue with current treatment plan and was educated on the importance of compliance with treatment and follow-up appointments.    MEDICATION ISSUES:  Discussed medication options and treatment plan of prescribed medication as well as the risks, benefits, and side effects including potential falls, possible impaired driving and metabolic adversities among others. Patient is agreeable to call the office with any worsening of symptoms or onset of side effects. Patient is agreeable to call 911 or go to the nearest ER should he/she begin having SI/HI.        This document has been electronically signed by SONIYA Valadez, PMHNP-BC  August 29, 2024 07:44 EDT    Part of this note may be an electronic transcription/translation of spoken language to printed text using the Dragon Dictation System.

## 2024-08-28 ENCOUNTER — OFFICE VISIT (OUTPATIENT)
Dept: PSYCHIATRY | Facility: CLINIC | Age: 24
End: 2024-08-28
Payer: MEDICAID

## 2024-08-28 VITALS
DIASTOLIC BLOOD PRESSURE: 74 MMHG | WEIGHT: 191 LBS | SYSTOLIC BLOOD PRESSURE: 118 MMHG | BODY MASS INDEX: 35.15 KG/M2 | OXYGEN SATURATION: 100 % | HEART RATE: 98 BPM | HEIGHT: 62 IN

## 2024-08-28 DIAGNOSIS — F41.1 GENERALIZED ANXIETY DISORDER: Chronic | ICD-10-CM

## 2024-08-28 DIAGNOSIS — G47.09 OTHER INSOMNIA: Chronic | ICD-10-CM

## 2024-08-28 DIAGNOSIS — F31.30 BIPOLAR I DISORDER, MOST RECENT EPISODE DEPRESSED: Primary | Chronic | ICD-10-CM

## 2024-08-28 DIAGNOSIS — F90.2 ATTENTION DEFICIT HYPERACTIVITY DISORDER, COMBINED TYPE: Chronic | ICD-10-CM

## 2024-08-28 PROCEDURE — 99214 OFFICE O/P EST MOD 30 MIN: CPT | Performed by: NURSE PRACTITIONER

## 2024-08-28 PROCEDURE — 1160F RVW MEDS BY RX/DR IN RCRD: CPT | Performed by: NURSE PRACTITIONER

## 2024-08-28 PROCEDURE — 1159F MED LIST DOCD IN RCRD: CPT | Performed by: NURSE PRACTITIONER

## 2024-08-28 RX ORDER — LAMOTRIGINE 25 MG/1
50 TABLET ORAL
Qty: 60 TABLET | Refills: 2 | Status: SHIPPED | OUTPATIENT
Start: 2024-08-28

## 2024-08-28 RX ORDER — QUETIAPINE FUMARATE 50 MG/1
100 TABLET, EXTENDED RELEASE ORAL NIGHTLY
Qty: 60 EACH | Refills: 2 | Status: SHIPPED | OUTPATIENT
Start: 2024-08-28

## 2024-08-28 RX ORDER — INSULIN GLARGINE 100 [IU]/ML
INJECTION, SOLUTION SUBCUTANEOUS
COMMUNITY
Start: 2024-07-16 | End: 2024-08-28

## 2024-08-28 RX ORDER — LAMOTRIGINE 25 MG/1
50 TABLET ORAL
COMMUNITY
Start: 2024-08-12 | End: 2024-08-28 | Stop reason: SDUPTHER

## 2024-08-28 RX ORDER — DESVENLAFAXINE 50 MG/1
50 TABLET, FILM COATED, EXTENDED RELEASE ORAL DAILY
Qty: 30 TABLET | Refills: 2 | Status: SHIPPED | OUTPATIENT
Start: 2024-08-28

## 2024-08-28 RX ORDER — QUETIAPINE FUMARATE 50 MG/1
TABLET, FILM COATED ORAL
COMMUNITY
Start: 2024-08-12 | End: 2024-08-28

## 2024-08-29 ENCOUNTER — TELEPHONE (OUTPATIENT)
Dept: PSYCHIATRY | Facility: CLINIC | Age: 24
End: 2024-08-29
Payer: MEDICAID

## 2024-08-29 NOTE — TELEPHONE ENCOUNTER
Pt called and stated [facility] did not have Seroquel XR available. States that they do have Seroquel IR available. Would like to know if she should take that form of Seroquel or if she should call around and find XR. Please advise.

## 2024-08-29 NOTE — TELEPHONE ENCOUNTER
Called and informed pt of above. Pt calling other pharmacies and will return call once she finds one.

## 2024-09-25 ENCOUNTER — TELEPHONE (OUTPATIENT)
Dept: PSYCHIATRY | Facility: CLINIC | Age: 24
End: 2024-09-25
Payer: MEDICAID

## 2024-10-28 NOTE — PROGRESS NOTES
"This provider is located at [facility], [facility] ,[address], [address] in [place], [place],using a secure [employer] Video Visit through [employer]. Patient is being seen remotely via telehealth at their home address in [place], and stated they are in a secure environment for this session. The patient's condition being diagnosed/treated is appropriate for telemedicine. The provider identified herself as well as her credentials.   The patient, and/or patients guardian, consent to be seen remotely, and when consent is given they understand that the consent allows for patient identifiable information to be sent to a third party as needed.   They may refuse to be seen remotely at any time. The electronic data is encrypted and password protected, and the patient and/or guardian has been advised of the potential risks to privacy not withstanding such measures.     Chief Complaint  Bipolar I disorder, ADHD, anxiety, insomnia, bereavement, and alcohol use disorder, moderate, in sustained remission      Subjective          Ruby Ramos presents via secure [employer] Visit through [employer] by herself for a hospital follow up and medication check.    History of Present Illness: Ruby states, \"I have been doing really good.\"  Ruby tells me that her mood continues to improve after her hospitalization.  She finds that her depression may occur for a few days, but does not become as severe as it previously had.  She is able to recognize her symptoms and implement interventions like spending time with family and not secluding herself.  She reports improved anxiety as well.  She is able to identify the worry and rationalize.  She states, \"it is not consuming me anymore.\"  She finds going to the gym, journaling, and being with her family as helpful coping mechanisms for anxiety.  She has been struggling with her concentration at times.  She will find herself lost in thought and almost running " through red lights while driving.  This was a previous symptom of ADHD that improved with medication.  However, she continues to experience struggle with sleep.  The Seroquel XR does help somewhat, but she worries about increasing doses as this may impact her blood glucose levels. She reports compliance with her psychiatric medication. She is taking lamotrigine and Seroquel XR. She denies any side effects. She denies any SI/HI/AVH.    Current Medications:   Current Outpatient Medications   Medication Sig Dispense Refill    Continuous Glucose  (Dexcom G7 ) device Use 1 each See Admin Instructions. 1 each 0    Continuous Glucose Sensor (Dexcom G7 Sensor) misc Use 1 each Every 10 (Ten) Days. 9 each 3    desvenlafaxine (PRISTIQ) 50 MG 24 hr tablet Take 1 tablet by mouth Daily. 30 tablet 2    glucose blood test strip Four times daily 400 each 3    insulin aspart (NovoLOG FlexPen) 100 UNIT/ML solution pen-injector sc pen 2-10 units as needed before meals, MDD 45 units 45 mL 1    Insulin Glargine (BASAGLAR KWIKPEN) 100 UNIT/ML injection pen INJECT 16 UNITS NIGHTLY, INCREASE AS NEEDED 30 mL 1    Insulin Pen Needle 32G X 4 MM misc 1 each 4 (Four) Times a Day. 400 each 3    lamoTRIgine (LaMICtal) 25 MG tablet Take 2 tablets by mouth every night at bedtime. 60 tablet 2    MAGNESIUM GLYCINATE PO Take  by mouth.      multivitamin with minerals tablet tablet Take 1 tablet by mouth Daily.      QUEtiapine fumarate ER (SEROquel XR) 50 MG tablet sustained-release 24 hour tablet Take 2 tablets by mouth Every Night. 60 each 2    True Comfort Twist Top Lancets misc 1 Device 4 (Four) Times a Day. 400 each 3    Insulin Glargine (BASAGLAR KWIKPEN SC) INJECT 14 UNITS UNDER THE SKIN EVERY NIGHT - KEEP APPT FOR REFILLS (Patient not taking: Reported on 10/30/2024)      tiZANidine (ZANAFLEX) 4 MG tablet Take 1 tablet by mouth At Night As Needed for Muscle Spasms. (Patient not taking: Reported on 10/30/2024) 30 tablet 1     No  current facility-administered medications for this visit.         Objective   Vital Signs:   There were no vitals taken for this visit.    Physical Exam  Nursing note reviewed. Vitals reviewed: No vitals due to the nature of a telehealth visit.  Constitutional:       Appearance: Normal appearance. She is well-developed and normal weight.   Neurological:      General: No focal deficit present.      Mental Status: She is alert and oriented to person, place, and time.   Psychiatric:         Attention and Perception: Attention normal.         Mood and Affect: Mood and affect normal.         Speech: Speech normal.         Behavior: Behavior normal. Behavior is cooperative.         Thought Content: Thought content normal.         Cognition and Memory: Cognition normal.         Judgment: Judgment normal. Judgment is impulsive (improved) and inappropriate (improved).      Comments: Impaired concentration        Result Review :     The following data was reviewed by: SONIYA Jay on 10/30/2024:        Assessment and Plan    Diagnoses and all orders for this visit:    1. Bipolar I disorder, most recent episode depressed (Primary)    2. Attention deficit hyperactivity disorder, combined type    3. Other insomnia    4. Generalized anxiety disorder    5. Alcohol use disorder, moderate, dependence               Mental Status Exam:   Hygiene:   good  Cooperation:  Cooperative  Eye Contact:  Good  Psychomotor Behavior:  Appropriate  Affect:  Appropriate  Mood: normal  Speech:  Normal  Thought Process:  Goal directed and Linear  Thought Content:  Normal  Suicidal:  None  Homicidal:  None  Hallucinations:  None  Delusion:  None  Memory:  Intact  Orientation:  Person, Place, Time and Situation  Reliability:  fair  Insight:  Fair  Judgement:  Fair  Impulse Control:  Fair  Physical/Medical Issues:  Yes Diabetes, celiac disease, headaches, back problems, hernia, and hair loss       PHQ-9 Score:   PHQ-9 Total Score:        Impression/Plan:  -This is a hospital follow up and medication check. Ruby reports improved mood and anxiety.  She is recognizing her symptoms and implementing interventions to help.  She continues to struggle with sleep.  The Seroquel XR has been helpful; however, she feels as if an increased dose would be more beneficial but worries about the effects on her blood glucose level.  She is considering working on sleep hygiene.  We spent time reviewing sleep hygiene measures and the importance of sleep hygiene.  She has noticed that her concentration is becoming more impaired over time.  This could also be a result of sleep impairment or impaired blood glucose levels.  She inquires about resuming her stimulant; however, I discussed the importance of implementing the sleep hygiene measures and assessing results before worsening sleep with a stimulant.  She verbalizes understanding and agreement.  She has missed therapy and forgot to reschedule, so we discussed the importance of therapy.  She is open to adding DBT therapy, so I suggested [facility] which is an online support group for patients interested in DBT type therapies.  We will continue her medication as prescribed and meet back in 6 weeks to assess sleep.  -Continue Pristiq 50 mg daily for depression and anxiety.  Patient has refills.  -Continue Seroquel  mg nightly for bipolar.  Patient has refills.  -Continue lamotrigine 50 mg nightly for bipolar.  Patient has refills.  -Continue therapy with Bo.  -The JANI report, reviewed through PDMP, of the past 12 months were reviewed and is appropriate.  The patient/guardian reports taking the medication only as prescribed.  The patient/guardian denies any abuse or misuse of the medication.  The patient/guardian denies any other substance use or issues.  There are no apparent substance related issues.  The patient reports no side effects of the current medication usage.  The patient/guardian has  reported significant improvement with medication usage and wishes to continue medication as prescribed.  The patient/guardian is appropriate to continue with current medication usage at this time.  Reinforced risks and side effects of medication usage, patient and/or guardian verbalize understanding in their own words and are in agreement with current plan.  -Last UDS 03/29/24. Negative for amphetamine.  -Consider [facility] for DBT therapy    MEDS ORDERED DURING VISIT:  No orders of the defined types were placed in this encounter.      Follow Up   Return in about 6 weeks (around 12/11/2024) for Medication Check.  Patient was given instructions and counseling regarding her condition or for health maintenance advice. Please see specific information pulled into the AVS if appropriate.       TREATMENT PLAN/GOALS: Continue supportive psychotherapy efforts and medications as indicated. Treatment and medication options discussed during today's visit. Patient acknowledged and verbally consented to continue with current treatment plan and was educated on the importance of compliance with treatment and follow-up appointments.    MEDICATION ISSUES:  Discussed medication options and treatment plan of prescribed medication as well as the risks, benefits, and side effects including potential falls, possible impaired driving and metabolic adversities among others. Patient is agreeable to call the office with any worsening of symptoms or onset of side effects. Patient is agreeable to call 911 or go to the nearest ER should he/she begin having SI/HI.        This document has been electronically signed by SONIYA Valadez, PMHNP-BC  October 30, 2024 16:49 EDT    Part of this note may be an electronic transcription/translation of spoken language to printed text using the Dragon Dictation System.

## 2024-10-30 ENCOUNTER — TELEMEDICINE (OUTPATIENT)
Dept: PSYCHIATRY | Facility: CLINIC | Age: 24
End: 2024-10-30
Payer: MEDICAID

## 2024-10-30 DIAGNOSIS — F90.2 ATTENTION DEFICIT HYPERACTIVITY DISORDER, COMBINED TYPE: Chronic | ICD-10-CM

## 2024-10-30 DIAGNOSIS — F41.1 GENERALIZED ANXIETY DISORDER: Chronic | ICD-10-CM

## 2024-10-30 DIAGNOSIS — G47.09 OTHER INSOMNIA: Chronic | ICD-10-CM

## 2024-10-30 DIAGNOSIS — F31.30 BIPOLAR I DISORDER, MOST RECENT EPISODE DEPRESSED: Primary | Chronic | ICD-10-CM

## 2024-10-30 DIAGNOSIS — F10.20 ALCOHOL USE DISORDER, MODERATE, DEPENDENCE: Chronic | ICD-10-CM

## 2024-10-30 PROCEDURE — 99214 OFFICE O/P EST MOD 30 MIN: CPT | Performed by: NURSE PRACTITIONER

## 2024-10-30 PROCEDURE — 1159F MED LIST DOCD IN RCRD: CPT | Performed by: NURSE PRACTITIONER

## 2024-10-30 PROCEDURE — 1160F RVW MEDS BY RX/DR IN RCRD: CPT | Performed by: NURSE PRACTITIONER

## 2024-11-12 ENCOUNTER — TELEPHONE (OUTPATIENT)
Dept: PSYCHIATRY | Facility: CLINIC | Age: 24
End: 2024-11-12
Payer: MEDICAID

## 2024-11-12 DIAGNOSIS — F31.30 BIPOLAR I DISORDER, MOST RECENT EPISODE DEPRESSED: Primary | ICD-10-CM

## 2024-11-12 NOTE — TELEPHONE ENCOUNTER
Patient called in and said that she told provider at last appointment Seroquel was increasing her blood sugar. She said she got to thinking on it and she did not have any issues with this when she was on the regular Seroquel would like to switch back. Patient is aware provider is out and will not review message until 11/13/2024.

## 2024-11-12 NOTE — TELEPHONE ENCOUNTER
That is a strong possibility. Insurance will not approve unless she is about a week shy of needing a refill. When was her last fill date?

## 2024-11-13 RX ORDER — QUETIAPINE FUMARATE 50 MG/1
50-100 TABLET, FILM COATED ORAL NIGHTLY
Qty: 60 TABLET | Refills: 2 | Status: SHIPPED | OUTPATIENT
Start: 2024-11-13

## 2024-11-13 NOTE — TELEPHONE ENCOUNTER
Left detailed message (ok per verbal) with information per Provider below. Awaiting return call from Pt.

## 2024-11-21 RX ORDER — FLUCONAZOLE 150 MG/1
150 TABLET ORAL ONCE
Qty: 1 TABLET | Refills: 0 | Status: SHIPPED | OUTPATIENT
Start: 2024-11-21 | End: 2024-11-21

## 2024-11-26 DIAGNOSIS — F31.30 BIPOLAR I DISORDER, MOST RECENT EPISODE DEPRESSED: Chronic | ICD-10-CM

## 2024-11-27 DIAGNOSIS — F41.1 GENERALIZED ANXIETY DISORDER: Chronic | ICD-10-CM

## 2024-11-27 DIAGNOSIS — F31.30 BIPOLAR I DISORDER, MOST RECENT EPISODE DEPRESSED: Chronic | ICD-10-CM

## 2024-11-27 RX ORDER — LAMOTRIGINE 25 MG/1
50 TABLET ORAL
Qty: 60 TABLET | Refills: 0 | Status: SHIPPED | OUTPATIENT
Start: 2024-11-27

## 2024-12-02 RX ORDER — DESVENLAFAXINE 50 MG/1
50 TABLET, FILM COATED, EXTENDED RELEASE ORAL DAILY
Qty: 30 TABLET | Refills: 2 | Status: SHIPPED | OUTPATIENT
Start: 2024-12-02

## 2024-12-19 DIAGNOSIS — F31.30 BIPOLAR I DISORDER, MOST RECENT EPISODE DEPRESSED: Chronic | ICD-10-CM

## 2024-12-19 RX ORDER — LAMOTRIGINE 25 MG/1
50 TABLET ORAL
Qty: 60 TABLET | Refills: 0 | Status: SHIPPED | OUTPATIENT
Start: 2024-12-19

## 2024-12-23 ENCOUNTER — TELEPHONE (OUTPATIENT)
Dept: PSYCHIATRY | Facility: CLINIC | Age: 24
End: 2024-12-23
Payer: MEDICAID

## 2024-12-23 NOTE — TELEPHONE ENCOUNTER
Pt called and stated that her glucose continues to run high even after going off the XR version of Seroquel. States that it is constantly over 300 during the night and that she has gained a lot of weight as well. Would like to stop taking Seroquel for now to see how things go. Willing to make a sooner appointment to discuss in person if you would like. Please advise.

## 2024-12-23 NOTE — TELEPHONE ENCOUNTER
I support that. Some people do not tolerate stopping suddenly. Does she have an IR Seroquel and we can taper off? If so, how much and what dose. I will provide instructions.

## 2025-01-02 DIAGNOSIS — E10.65 TYPE 1 DIABETES MELLITUS WITH HYPERGLYCEMIA: ICD-10-CM

## 2025-01-02 RX ORDER — INSULIN GLARGINE 100 [IU]/ML
INJECTION, SOLUTION SUBCUTANEOUS
Qty: 15 ML | Refills: 0 | Status: SHIPPED | OUTPATIENT
Start: 2025-01-02

## 2025-01-02 NOTE — TELEPHONE ENCOUNTER
Rx Refill Note  Requested Prescriptions     Pending Prescriptions Disp Refills    Insulin Glargine (BASAGLAR KWIKPEN) 100 UNIT/ML injection pen [Pharmacy Med Name: Basaglar KwikPen Subcutaneous Solution Pen-injector 100 UNIT/ML] 15 mL 0     Sig: INJECT 14 UNITS UNDER THE SKIN EVERY NIGHT - KEEP APPT FOR REFILLS      Last office visit with prescribing clinician: 7/1/2024     Next office visit with prescribing clinician: 1/13/2025       Nubia Mcdermott  01/02/25, 10:38 EST

## 2025-01-03 ENCOUNTER — OFFICE VISIT (OUTPATIENT)
Dept: INTERNAL MEDICINE | Facility: CLINIC | Age: 25
End: 2025-01-03
Payer: MEDICAID

## 2025-01-03 VITALS
OXYGEN SATURATION: 97 % | TEMPERATURE: 98 F | RESPIRATION RATE: 16 BRPM | BODY MASS INDEX: 38.83 KG/M2 | HEART RATE: 104 BPM | SYSTOLIC BLOOD PRESSURE: 124 MMHG | DIASTOLIC BLOOD PRESSURE: 68 MMHG | WEIGHT: 211 LBS | HEIGHT: 62 IN

## 2025-01-03 DIAGNOSIS — E66.812 CLASS 2 SEVERE OBESITY DUE TO EXCESS CALORIES WITH SERIOUS COMORBIDITY AND BODY MASS INDEX (BMI) OF 38.0 TO 38.9 IN ADULT: ICD-10-CM

## 2025-01-03 DIAGNOSIS — G47.00 INSOMNIA, UNSPECIFIED TYPE: ICD-10-CM

## 2025-01-03 DIAGNOSIS — R40.0 DAYTIME SLEEPINESS: ICD-10-CM

## 2025-01-03 DIAGNOSIS — E10.9 TYPE 1 DIABETES MELLITUS WITHOUT COMPLICATION: ICD-10-CM

## 2025-01-03 DIAGNOSIS — R20.2 PARESTHESIAS IN RIGHT HAND: ICD-10-CM

## 2025-01-03 DIAGNOSIS — G44.86 CERVICOGENIC HEADACHE: ICD-10-CM

## 2025-01-03 DIAGNOSIS — Z00.00 ANNUAL PHYSICAL EXAM: Primary | ICD-10-CM

## 2025-01-03 DIAGNOSIS — E66.01 CLASS 2 SEVERE OBESITY DUE TO EXCESS CALORIES WITH SERIOUS COMORBIDITY AND BODY MASS INDEX (BMI) OF 38.0 TO 38.9 IN ADULT: ICD-10-CM

## 2025-01-03 PROCEDURE — 2014F MENTAL STATUS ASSESS: CPT | Performed by: NURSE PRACTITIONER

## 2025-01-03 PROCEDURE — 99395 PREV VISIT EST AGE 18-39: CPT | Performed by: NURSE PRACTITIONER

## 2025-01-03 PROCEDURE — 1159F MED LIST DOCD IN RCRD: CPT | Performed by: NURSE PRACTITIONER

## 2025-01-03 PROCEDURE — 1126F AMNT PAIN NOTED NONE PRSNT: CPT | Performed by: NURSE PRACTITIONER

## 2025-01-03 PROCEDURE — 1160F RVW MEDS BY RX/DR IN RCRD: CPT | Performed by: NURSE PRACTITIONER

## 2025-01-03 RX ORDER — SUMATRIPTAN SUCCINATE 100 MG/1
100 TABLET ORAL
COMMUNITY

## 2025-01-03 NOTE — PROGRESS NOTES
Subjective   Ruby Ramos is a 24 y.o. female and is here for a comprehensive physical exam. The patient reports problems - headaches .    HPI:  Here today for annual exam.  Patient is following with behavioral health for mood.  Patient reports mood is stable.  She denies suicidal and homicidal ideation.  Patient is following with endocrinology for type 1 diabetes.  Last A1c was 6.6 in July.  Denies polydipsia, polyphasia, polyuria, slow wound healing.    Patient complains of worsening headaches for the last several years.  Patient states she is experiencing these headaches nearly every day.  Patient describes the headaches as a dull ache that started at the base of her skull and wraparound to her forehead.  Endorses new onset nausea with these headaches, paraesthesia of the right hand intermittently, neck and shoulder pain.  Denies syncope, disorientation, vision changes, confusion.  At home she has tried Tylenol, increasing caffeine intake - patient feels like this does help but only temporarily.  Last year she went to physical therapy for neck pain and this helped the headaches temporarily, but over the last several months she has been having them more consistently.  She has been doing the physical therapy exercises at home, but this has not helped.  Patient is concerned that this could be related to her lack of sleep.   She reports she has an inconsistent sleep pattern, and is up multiple times at night.  Last year she was referred to sleep medicine, and did have one telehealth encounter and was prescribed mirtazapine, but she did not take the medication for fear of weight gain.  No sleep study was ordered at the time.  No other acute complaints today.    Health Habits:  Eye exam within last 2 years?  No.  Dental exam every 6 months?  Yes.  Exercise habits: Goes to the gym 2 times weekly.  Healthy diet?  Follows a well-balanced diet, low-carb and gluten-free.    The ASCVD Risk score (Gina DK, et al.,  2019) failed to calculate for the following reasons:    The 2019 ASCVD risk score is only valid for ages 40 to 79      Do you take any herbs or supplements that were not prescribed by a doctor? no  Are you taking calcium supplements? No  Are you taking aspirin daily? No     History:  LMP: December 26, 2024  Menopause: No  Last pap date: 11/2021  Abnormal pap? no  Family history of breast or ovarian cancer: no         OB History   No obstetric history on file.     Sexual activity questions deferred to the physician.        The following portions of the patient's history were reviewed and updated as appropriate: She  has a past medical history of ADHD (attention deficit hyperactivity disorder), Anxiety, Bipolar disorder, Celiac disease, Diabetes mellitus, Diabetes mellitus type I, GERD (gastroesophageal reflux disease), and Homozygous MTHFR mutation [id number].  She does not have any pertinent problems on file.  She  has a past surgical history that includes Hernia repair; Esophagogastroduodenoscopy; and Esophagogastroduodenoscopy (N/A, 4/14/2021).  Her family history includes Cancer in her maternal grandmother and paternal grandmother; Diabetes in her father; Hypertension in her father and mother.  She  reports that she has never smoked. She has never been exposed to tobacco smoke. She has never used smokeless tobacco. She reports current alcohol use. She reports that she does not use drugs.  Current Outpatient Medications   Medication Sig Dispense Refill    Continuous Glucose  (Dexcom G7 ) device Use 1 each See Admin Instructions. 1 each 0    Continuous Glucose Sensor (Dexcom G7 Sensor) misc Use 1 each Every 10 (Ten) Days. 9 each 3    desvenlafaxine (PRISTIQ) 50 MG 24 hr tablet TAKE 1 TABLET BY MOUTH EVERY DAY 30 tablet 2    glucose blood test strip Four times daily 400 each 3    insulin aspart (NovoLOG FlexPen) 100 UNIT/ML solution pen-injector sc pen 2-10 units as needed before meals, MDD 45 units 45  "mL 1    Insulin Glargine (BASAGLAR KWIKPEN) 100 UNIT/ML injection pen INJECT 14 UNITS UNDER THE SKIN EVERY NIGHT 15 mL 0    Insulin Pen Needle 32G X 4 MM misc 1 each 4 (Four) Times a Day. 400 each 3    lamoTRIgine (LaMICtal) 25 MG tablet TAKE 2 TABLETS BY MOUTH AT BEDTIME 60 tablet 0    MAGNESIUM GLYCINATE PO Take  by mouth.      multivitamin with minerals tablet tablet Take 1 tablet by mouth Daily.      QUEtiapine (SEROquel) 50 MG tablet Take 1-2 tablets by mouth Every Night. 60 tablet 2    tiZANidine (ZANAFLEX) 4 MG tablet Take 1 tablet by mouth At Night As Needed for Muscle Spasms. 30 tablet 1    True Comfort Twist Top Lancets misc 1 Device 4 (Four) Times a Day. 400 each 3    Diclofenac Sodium (VOLTAREN) 1 % gel gel Apply 4 g topically to the appropriate area as directed 4 (Four) Times a Day As Needed (pain). 150 g 0    SUMAtriptan (IMITREX) 100 MG tablet Take 1 tablet by mouth Every 2 (Two) Hours As Needed for Migraine. Take one tablet at onset of headache. May repeat dose one time in 2 hours if headache not relieved.       No current facility-administered medications for this visit.       Review of Systems  Do you have pain that bothers you in your daily life? yes - headaches      Objective   /68   Pulse 104   Temp 98 °F (36.7 °C)   Resp 16   Ht 157.5 cm (62\")   Wt 95.7 kg (211 lb)   SpO2 97%   BMI 38.59 kg/m²     Physical Exam  Vitals and nursing note reviewed.   Constitutional:       General: She is not in acute distress.     Appearance: Normal appearance. She is obese. She is not ill-appearing, toxic-appearing or diaphoretic.   HENT:      Head: Normocephalic and atraumatic.      Mouth/Throat:      Mouth: Mucous membranes are moist.      Pharynx: No posterior oropharyngeal erythema.   Eyes:      General: No scleral icterus.     Extraocular Movements: Extraocular movements intact.      Pupils: Pupils are equal, round, and reactive to light.   Neck:      Thyroid: No thyromegaly or thyroid " tenderness.   Cardiovascular:      Rate and Rhythm: Normal rate and regular rhythm.      Heart sounds: Normal heart sounds. No murmur heard.  Pulmonary:      Effort: Pulmonary effort is normal. No respiratory distress.      Breath sounds: Normal breath sounds. No stridor. No wheezing, rhonchi or rales.   Chest:      Chest wall: No tenderness.   Abdominal:      General: Abdomen is flat. Bowel sounds are normal. There is no distension.      Palpations: Abdomen is soft. There is no mass.      Tenderness: There is no abdominal tenderness. There is no guarding or rebound.      Hernia: No hernia is present.   Musculoskeletal:         General: Normal range of motion.      Cervical back: Normal range of motion. No tenderness.      Right lower leg: No edema.      Left lower leg: No edema.   Lymphadenopathy:      Cervical: No cervical adenopathy.   Skin:     General: Skin is warm and dry.      Capillary Refill: Capillary refill takes less than 2 seconds.   Neurological:      General: No focal deficit present.      Mental Status: She is alert and oriented to person, place, and time.      Cranial Nerves: No facial asymmetry.      Motor: No weakness.   Psychiatric:         Mood and Affect: Mood normal.         Behavior: Behavior normal.         Thought Content: Thought content normal.         Judgment: Judgment normal.          Assessment & Plan   Healthy female exam.      Diagnosis Plan   1. Annual physical exam  Preventative maintenance discussed during visit.      2. Cervicogenic headache  Ambulatory Referral to Neurology    MRI Cervical Spine Without Contrast    Referral to neurology placed. Imaging ordered as above. Can continue tizanidine PRN. Counseled on avoiding triggers when possible, pushing fluids, tylenol PRN per package instructions. Neuro exam reassuring in office today.       3. Class 2 severe obesity due to excess calories with serious comorbidity and body mass index (BMI) of 38.0 to 38.9 in adult  Counseled on  healthy diet and moderate intensity exercise 4-5 times weekly.       4. Daytime sleepiness  Ambulatory Referral to Neurology    Referral to neurology placed. Counseled on sleep hygiene, avoiding daytime napping, avoiding screen time before bed, decreasing caffeine before bed time.       5. Type 1 diabetes mellitus without complication  Continue following with endocrinology. Continue medications as prescribed. Counseled on diabetic diet, moderate intensity exercise 4-5 times weekly, medication compliance, home monitoring, annual eye exams, frequent foot exams.       6. Insomnia, unspecified type  Ambulatory Referral to Neurology    Counseled on sleep hygiene, avoiding daytime napping, avoiding screen time before bed, decreasing caffeine before bed time. Referral to neurology placed.       7. Paresthesias in right hand  MRI Cervical Spine Without Contrast    Imaging ordered as above, concerning for cervical radiculopathy. Tried and failed conservative therapy MRI to evaluate.            2. Patient Counseling:  --Nutrition: Recommend high protein low carbohydrate diet, portion control, moderate sodium and caffeine intake, and 1 g folic acid supplementation if childbearing age.  --Discussed the issue of calcium supplement, and the daily use of baby aspirin if applicable.               --Screening mammogram to start at age 40 and repeat every 1-2 years. Monthly breast exams by patient recommended.   --Exercise: discussed impact of regular exercise on healthy with goal of 30 minutes of moderate-intensity exercise 4-5 times/week.  --Substance Abuse: Discussed cessation/primary prevention of tobacco (if applicable), alcohol, or other drug use (if applicable); driving or other dangerous activities under the influence; availability of treatment for abuse.   --Sexuality: Discussed sexually transmitted diseases, partner selection, use of condoms, avoidance of unintended pregnancy  and contraceptive alternatives.   --Injury  prevention: Discussed safety belts, safety helmets, smoke detector, smoking near bedding or upholstery, avoid distracted driving (texting/phone use).  --Dental health: Discussed importance of regular tooth brushing, flossing, and dental visits every 6 months.  --Immunizations reviewed and recommend staying up-to-date.   --Discussed benefits of screening colonoscopy vs cologuard testing for low risk individuals (if applicable).  --After hours service discussed with patient    3. Discussed the patient's BMI with her.  The BMI is above average; BMI management plan is completed  Class 2 Severe Obesity (BMI >=35 and <=39.9). Obesity-related health conditions include the following: diabetes mellitus. Obesity is worsening. BMI is is above average; BMI management plan is completed. We discussed portion control, increasing exercise, and Information on healthy weight added to patient's after visit summary.    4. Return in about 1 year (around 1/3/2026) for Annual physical.  SONIYA Soto  01/03/2025  15:27 EST

## 2025-01-15 ENCOUNTER — OFFICE VISIT (OUTPATIENT)
Dept: ENDOCRINOLOGY | Facility: CLINIC | Age: 25
End: 2025-01-15
Payer: MEDICAID

## 2025-01-15 VITALS
SYSTOLIC BLOOD PRESSURE: 110 MMHG | BODY MASS INDEX: 38.09 KG/M2 | WEIGHT: 207 LBS | HEIGHT: 62 IN | DIASTOLIC BLOOD PRESSURE: 74 MMHG | OXYGEN SATURATION: 100 % | HEART RATE: 93 BPM

## 2025-01-15 DIAGNOSIS — E10.65 TYPE 1 DIABETES MELLITUS WITH HYPERGLYCEMIA: Primary | ICD-10-CM

## 2025-01-15 LAB
EXPIRATION DATE: ABNORMAL
EXPIRATION DATE: NORMAL
GLUCOSE BLDC GLUCOMTR-MCNC: 88 MG/DL (ref 70–130)
HBA1C MFR BLD: 6.2 % (ref 4.5–5.7)
Lab: ABNORMAL
Lab: NORMAL

## 2025-01-15 RX ORDER — INSULIN GLARGINE 100 [IU]/ML
INJECTION, SOLUTION SUBCUTANEOUS
Qty: 15 ML | Refills: 1 | Status: SHIPPED | OUTPATIENT
Start: 2025-01-15

## 2025-01-15 RX ORDER — INSULIN ASPART 100 [IU]/ML
INJECTION, SOLUTION INTRAVENOUS; SUBCUTANEOUS
Qty: 45 ML | Refills: 1 | Status: SHIPPED | OUTPATIENT
Start: 2025-01-15

## 2025-01-15 NOTE — PROGRESS NOTES
Chief Complaint   Patient presents with    Diabetes     Type 1 diabetes mellitus with hyperglycemia          HPI   Ruby Ramos is a 24 y.o. female had concerns including Diabetes (Type 1 diabetes mellitus with hyperglycemia).    She is checking blood sugars 4+ times per day with dexcom 7 CGM. Data reviewed from the last two weeks shows average glucose 174 with variability of 32.7%. In target range 59%, high 28%, very high 12%, low 1%, very low <1%.   Pattern of: postprandial hyperglycemia    Current medications for diabetes include basaglar 16 units, novolog 10-15 with meals.     Bgs were running high - suspected due to seroquel. She has since tapered off and Bgs are starting to improve.     The following portions of the patient's history were reviewed and updated as appropriate: allergies, current medications, past family history, past medical history, past social history, past surgical history, and problem list.      Review of Systems   Constitutional: Negative.    Endocrine:        See HPI        Physical Exam  Vitals reviewed.   Constitutional:       Appearance: Normal appearance.   Cardiovascular:      Rate and Rhythm: Normal rate.      Pulses:           Dorsalis pedis pulses are 2+ on the right side and 2+ on the left side.   Pulmonary:      Effort: Pulmonary effort is normal.   Feet:      Right foot:      Skin integrity: Skin integrity normal.      Toenail Condition: Right toenails are normal.      Left foot:      Skin integrity: Skin integrity normal.      Toenail Condition: Left toenails are normal.      Comments: Diabetic Foot Exam Performed and Monofilament Test Performed    Monofilament 5/5 bilaterally    Neurological:      General: No focal deficit present.      Mental Status: She is alert. Mental status is at baseline.   Psychiatric:         Mood and Affect: Mood normal.         Behavior: Behavior normal.        /74 (BP Location: Left arm, Patient Position: Sitting, Cuff Size: Adult)   Pulse  "93   Ht 157.5 cm (62\")   Wt 93.9 kg (207 lb)   SpO2 100%   BMI 37.86 kg/m²      Labs and imaging    A1C:  Lab Results   Component Value Date    HGBA1C 6.2 (A) 01/15/2025    HGBA1C 6.6 (A) 07/01/2024      Glucose:  Lab Results   Component Value Date    POCGLU 88 01/15/2025      CMP:  Lab Results   Component Value Date    GLUCOSE 119 (H) 11/17/2023    BUN 8 11/17/2023    CREATININE 0.66 11/17/2023     11/17/2023    K 3.6 11/17/2023     11/17/2023    CALCIUM 9.8 11/17/2023    PROTEINTOT 7.8 11/17/2023    ALBUMIN 5.0 11/17/2023    ALT 24 11/17/2023    AST 20 11/17/2023    ALKPHOS 75 11/17/2023    BILITOT 0.5 11/17/2023    GLOB 2.8 11/17/2023    AGRATIO 1.8 11/17/2023    BCR 12.1 11/17/2023    ANIONGAP 11.4 11/17/2023    EGFR 126.6 11/17/2023      Lipid Panel:  Lab Results   Component Value Date    CHLPL 161 12/14/2022    TRIG 54 12/14/2022    HDL 66 (H) 12/14/2022    LDL 84 12/14/2022      Urine Microalbumin:  Lab Results   Component Value Date    MALBCRERATIO 6 12/16/2022      TSH:  Lab Results   Component Value Date    TSH 2.870 12/14/2022      Lab Results   Component Value Date    GAD65 2263.9 (H) 01/26/2021       Assessment and plan  Diagnoses and all orders for this visit:    1. Type 1 diabetes mellitus with hyperglycemia (Primary)  Uncontrolled with postprandial hyperglycemia and occasional hypoglycemia.  A1c today 6.2.  This is a incongruent with CGM estimating A1c closer to 7.8 over the last 3 months.  Question accuracy of the Dexcom.  Diabetes diagnosed 2021.  No complications from diabetes.  Advised more consistent fingerstick readings.  BG in the office today was about 20 points different from fingerstick to CGM.  She is considering transition to an insulin pump but needs to start carb counting.  Will refer to our diabetes educator for carb counting and to review tandem Mobi versus OmniPod pumps.  No changes to insulin doses today.  No consistent patterns noted on CGM.    Continue Basaglar 16 " units nightly, NovoLog 10 to 15 units before meals.  Ophtho exam is overdue and patient was encouraged to schedule.  Urine microalbumin is due but patient unable to provide a sample today.  Will check at follow-up visit.  Monofilament was updated today.  -     POC Glucose, Blood  -     POC Glycosylated Hemoglobin (Hb A1C)  -     Insulin Glargine (BASAGLAR KWIKPEN) 100 UNIT/ML injection pen; INJECT 16 UNITS UNDER THE SKIN EVERY NIGHT  Dispense: 15 mL; Refill: 1  -     insulin aspart (NovoLOG FlexPen) 100 UNIT/ML solution pen-injector sc pen; 10-15 units three times daily before meals, MDD 45 units  Dispense: 45 mL; Refill: 1  -     Insulin Pen Needle 32G X 4 MM misc; Use 1 each 4 (Four) Times a Day.  Dispense: 400 each; Refill: 3         Return in about 4 months (around 5/15/2025) for next scheduled follow up. The patient was instructed to contact the clinic with any interval questions or concerns.    Electronically signed by: Layla Corral DO   Endocrinologist    Please note that portions of this note were completed with a voice recognition program.

## 2025-01-20 ENCOUNTER — PATIENT MESSAGE (OUTPATIENT)
Dept: ENDOCRINOLOGY | Facility: CLINIC | Age: 25
End: 2025-01-20
Payer: MEDICAID

## 2025-01-22 NOTE — Clinical Note
[facility] EMERGENCY DEPARTMENT  [address] [address]  Phone: [phone]    Ruby Ramos was seen and treated in our emergency department on 5/21/2023.  She may return to work on 05/24/2023.         Thank you for choosing [facility].    Natalia Bailey MD        used

## 2025-01-23 DIAGNOSIS — F31.30 BIPOLAR I DISORDER, MOST RECENT EPISODE DEPRESSED: Chronic | ICD-10-CM

## 2025-01-23 RX ORDER — LAMOTRIGINE 25 MG/1
50 TABLET ORAL
Qty: 60 TABLET | Refills: 0 | Status: SHIPPED | OUTPATIENT
Start: 2025-01-23

## 2025-01-27 NOTE — PROGRESS NOTES
"Mode of Visit: Video   Location of patient: -HOME-   Location of provider: +[facility] [place]+   You have chosen to receive care through a telehealth visit.   The patient has signed the video visit consent form.   The visit included audio and video interaction. No technical issues occurred during this visit.     Chief Complaint  Bipolar I disorder, ADHD, anxiety, insomnia, bereavement, and alcohol use disorder, moderate, in sustained remission      Subjective          Ruby Ramos presents via secure [facility] Video Visit through [employer] by herself for a hospital follow up and medication check.    History of Present Illness: Ruby states, \" my mood has been somewhat down.\"  Ruby describes a depression level of 3 out of 10 with 10 being the most severe most days.  She has identified negative coping skills like isolating herself and sleeping too much, so she is trying to avoid this.  She has been reaching out to her support system more often.  She rates her anxiety at a 5 or 6 out of 10 with 10 being the highest most days.  She is having excessive worry, difficulty controlling her worry, and feelings of paranoia at times.  She wants to catastrophize her thoughts and think about worst case scenarios.  She has noticed this is much worse since stopping Seroquel.  However, her endocrinologist strongly urged her to stop the medication due to high blood glucose levels and weight gain.  She has seen improvement over the last 2 weeks on her blood glucose levels since stopping the medication.  She admits that she has not made any therapy appointments recently.  She describes avoiding when she is feeling overwhelmed or down.  She continues to struggle with her focus, concentration, and attention.  She denies any problems with sleep or appetite since stopping Seroquel. She reports compliance with her psychiatric medication. She is taking lamotrigine and Pristiq. She denies any side effects. She denies any SI/HI/AVH.    Current " Medications:   Current Outpatient Medications   Medication Sig Dispense Refill    Continuous Glucose  (Dexcom G7 ) device Use 1 each See Admin Instructions. 1 each 0    Continuous Glucose Sensor (Dexcom G7 Sensor) misc Use 1 each Every 10 (Ten) Days. 9 each 3    desvenlafaxine (PRISTIQ) 50 MG 24 hr tablet TAKE 1 TABLET BY MOUTH EVERY DAY 30 tablet 2    glucose blood test strip Four times daily 400 each 3    insulin aspart (NovoLOG FlexPen) 100 UNIT/ML solution pen-injector sc pen 10-15 units three times daily before meals, MDD 45 units 45 mL 1    Insulin Glargine (LANTUS SOLOSTAR) 100 UNIT/ML injection pen Inject 16 Units under the skin into the appropriate area as directed Daily. 15 mL 1    Insulin Pen Needle 32G X 4 MM misc Use 1 each 4 (Four) Times a Day. 400 each 3    l-methylfolate calcium (DEPLIN) 7.5 MG tablet tablet Take 1 tablet by mouth Daily.      lamoTRIgine (LaMICtal) 25 MG tablet TAKE 2 TABLETS BY MOUTH AT BEDTIME 60 tablet 0    MAGNESIUM GLYCINATE PO Take  by mouth.      multivitamin with minerals tablet tablet Take 1 tablet by mouth Daily.      SUMAtriptan (IMITREX) 100 MG tablet Take 1 tablet by mouth Every 2 (Two) Hours As Needed for Migraine. Take one tablet at onset of headache. May repeat dose one time in 2 hours if headache not relieved.      tiZANidine (ZANAFLEX) 4 MG tablet Take 1 tablet by mouth At Night As Needed for Muscle Spasms. 30 tablet 1    True Comfort Twist Top Lancets misc 1 Device 4 (Four) Times a Day. 400 each 3    Diclofenac Sodium (VOLTAREN) 1 % gel gel Apply 4 g topically to the appropriate area as directed 4 (Four) Times a Day As Needed (pain). (Patient not taking: Reported on 1/30/2025) 150 g 0    OLANZapine (zyPREXA) 5 MG tablet Take 0.5 tablets by mouth Every Night. 30 tablet 1     No current facility-administered medications for this visit.         Objective   Vital Signs:   There were no vitals taken for this visit.    Physical Exam  Nursing note  reviewed. Vitals reviewed: No vitals due to the nature of a telehealth visit.  Constitutional:       Appearance: Normal appearance. She is well-developed and normal weight.   Neurological:      General: No focal deficit present.      Mental Status: She is alert and oriented to person, place, and time.   Psychiatric:         Attention and Perception: Attention normal.         Mood and Affect: Mood is depressed. Affect is blunt.         Speech: Speech normal.         Behavior: Behavior normal. Behavior is cooperative.         Thought Content: Thought content normal. Thought content includes suicidal (Passive suicidal ideations without intent or plan) ideation.         Cognition and Memory: Cognition normal.         Judgment: Judgment normal. Judgment is impulsive (improved) and inappropriate (improved).      Comments: Impaired concentration        Result Review :     The following data was reviewed by: SONIYA Jay on 01/30/2025:    POC Glycosylated Hemoglobin (Hb A1C) (01/15/2025 13:29)  POC Glucose, Blood (01/15/2025 13:28)  Office Visit with Layla Corral DO (01/15/2025)   Office Visit with Macrina Godinez APRN (01/03/2025)     Assessment and Plan    Diagnoses and all orders for this visit:    1. Bipolar I disorder, most recent episode depressed (Primary)  -     OLANZapine (zyPREXA) 5 MG tablet; Take 0.5 tablets by mouth Every Night.  Dispense: 30 tablet; Refill: 1    2. Generalized anxiety disorder    3. Attention deficit hyperactivity disorder, combined type    4. Other insomnia    5. Alcohol use disorder, moderate, dependence            Mental Status Exam:   Hygiene:   good  Cooperation:  Cooperative  Eye Contact:  Good  Psychomotor Behavior:  Appropriate  Affect:  Blunted  Mood: depressed  Speech:  Normal  Thought Process:  Goal directed and Linear  Thought Content:  Normal  Suicidal:  Suicidal Ideation and passive suicidal ideations without intent or plan  Homicidal:   None  Hallucinations:  None  Delusion:  None  Memory:  Intact  Orientation:  Person, Place, Time and Situation  Reliability:  fair  Insight:  Fair  Judgement:  Fair  Impulse Control:  Fair  Physical/Medical Issues:  Yes Diabetes, celiac disease, headaches, back problems, hernia, and hair loss       PHQ-9 Score:   PHQ-9 Total Score:       Impression/Plan:  -This is a hospital follow up and medication check. Ruby reports feelings of a depressed mood and anxiety.  She finds anxiety has been higher since stopping Seroquel.  She has also been surprised by feelings of paranoia or catastrophizing thoughts that have been difficult to control.  She is seeing benefits on blood glucose levels and appetite since stopping Seroquel.  However, she would like to consider an option to treat bipolar and anxiety with lower risk on weight and blood glucose levels.  I suggested trying Lybalvi.  I explained the mechanism of action, purpose, risk, benefits, and potential adverse effects of the medication.  I provided information on starting with olanzapine immediate release and changing to lobe Sarah as soon as possible.  She is aware this medication can still have effects on raising blood glucose levels, so she will have to monitor carefully.  However, there is long-term data that Lybalvi is better for metabolic effects related to atypical antipsychotics.  She is also aware of the potential for tardive dyskinesia related to these types of medications.  In the future, she would like to resume treatment for ADHD, but does not want to risk a manic episode at this time.  We discussed the importance of resuming therapy to work on catastrophizing and anxious thoughts.  She is going to consider.  -Stop Seroquel since patient is no longer taking.   -Initiate olanzapine 2.5 mg nightly for bipolar disorder.  Consider changing to Lybalvi after check-in on February 10 th.  -Continue Pristiq 50 mg daily for depression and anxiety.  Patient has  refills.  -Continue lamotrigine 50 mg nightly for bipolar.  Patient has refills.  -Continue therapy with Bo.  -The JANI report, reviewed through PDMP, of the past 12 months were reviewed and is appropriate.  The patient/guardian reports taking the medication only as prescribed.  The patient/guardian denies any abuse or misuse of the medication.  The patient/guardian denies any other substance use or issues.  There are no apparent substance related issues.  The patient reports no side effects of the current medication usage.  The patient/guardian has reported significant improvement with medication usage and wishes to continue medication as prescribed.  The patient/guardian is appropriate to continue with current medication usage at this time.  Reinforced risks and side effects of medication usage, patient and/or guardian verbalize understanding in their own words and are in agreement with current plan.  -Last UDS 03/29/24. Negative for amphetamine.  -Consider [facility] for DBT therapy    MEDS ORDERED DURING VISIT:  New Medications Ordered This Visit   Medications    OLANZapine (zyPREXA) 5 MG tablet     Sig: Take 0.5 tablets by mouth Every Night.     Dispense:  30 tablet     Refill:  1       Follow Up   Return in about 6 weeks (around 3/13/2025) for Medication Check.  Patient was given instructions and counseling regarding her condition or for health maintenance advice. Please see specific information pulled into the AVS if appropriate.       TREATMENT PLAN/GOALS: Continue supportive psychotherapy efforts and medications as indicated. Treatment and medication options discussed during today's visit. Patient acknowledged and verbally consented to continue with current treatment plan and was educated on the importance of compliance with treatment and follow-up appointments.    MEDICATION ISSUES:  Discussed medication options and treatment plan of prescribed medication as well as the risks, benefits, and side  effects including potential falls, possible impaired driving and metabolic adversities among others. Patient is agreeable to call the office with any worsening of symptoms or onset of side effects. Patient is agreeable to call 911 or go to the nearest ER should he/she begin having SI/HI.        This document has been electronically signed by SONIYA Valadez, PMHNP-BC  January 30, 2025 19:29 EST    Part of this note may be an electronic transcription/translation of spoken language to printed text using the Dragon Dictation System.

## 2025-01-30 ENCOUNTER — TELEMEDICINE (OUTPATIENT)
Dept: PSYCHIATRY | Facility: CLINIC | Age: 25
End: 2025-01-30
Payer: MEDICAID

## 2025-01-30 DIAGNOSIS — F90.2 ATTENTION DEFICIT HYPERACTIVITY DISORDER, COMBINED TYPE: Chronic | ICD-10-CM

## 2025-01-30 DIAGNOSIS — F41.1 GENERALIZED ANXIETY DISORDER: Chronic | ICD-10-CM

## 2025-01-30 DIAGNOSIS — F10.20 ALCOHOL USE DISORDER, MODERATE, DEPENDENCE: Chronic | ICD-10-CM

## 2025-01-30 DIAGNOSIS — G47.09 OTHER INSOMNIA: Chronic | ICD-10-CM

## 2025-01-30 DIAGNOSIS — F31.30 BIPOLAR I DISORDER, MOST RECENT EPISODE DEPRESSED: Primary | Chronic | ICD-10-CM

## 2025-01-30 PROCEDURE — 99214 OFFICE O/P EST MOD 30 MIN: CPT | Performed by: NURSE PRACTITIONER

## 2025-01-30 PROCEDURE — 1159F MED LIST DOCD IN RCRD: CPT | Performed by: NURSE PRACTITIONER

## 2025-01-30 PROCEDURE — 1160F RVW MEDS BY RX/DR IN RCRD: CPT | Performed by: NURSE PRACTITIONER

## 2025-01-30 RX ORDER — OLANZAPINE 5 MG/1
2.5 TABLET ORAL NIGHTLY
Qty: 30 TABLET | Refills: 1 | Status: SHIPPED | OUTPATIENT
Start: 2025-01-30

## 2025-01-30 RX ORDER — LEVOMEFOLATE CALCIUM 7.5 MG
7.5 TABLET ORAL DAILY
COMMUNITY

## 2025-02-05 ENCOUNTER — HOSPITAL ENCOUNTER (OUTPATIENT)
Dept: MRI IMAGING | Facility: HOSPITAL | Age: 25
Discharge: HOME | End: 2025-02-05
Admitting: NURSE PRACTITIONER
Payer: MEDICAID

## 2025-02-05 DIAGNOSIS — G44.86 CERVICOGENIC HEADACHE: ICD-10-CM

## 2025-02-05 DIAGNOSIS — R20.2 PARESTHESIAS IN RIGHT HAND: ICD-10-CM

## 2025-02-05 PROCEDURE — 72141 MRI NECK SPINE W/O DYE: CPT

## 2025-02-07 DIAGNOSIS — G43.009 MIGRAINE WITHOUT AURA AND WITHOUT STATUS MIGRAINOSUS, NOT INTRACTABLE: ICD-10-CM

## 2025-02-07 DIAGNOSIS — R20.2 PARESTHESIAS IN RIGHT HAND: Primary | ICD-10-CM

## 2025-02-07 DIAGNOSIS — R93.7 ABNORMAL MRI, CERVICAL SPINE: ICD-10-CM

## 2025-02-14 ENCOUNTER — TELEPHONE (OUTPATIENT)
Dept: PSYCHIATRY | Facility: CLINIC | Age: 25
End: 2025-02-14
Payer: MEDICAID

## 2025-02-14 NOTE — TELEPHONE ENCOUNTER
Patient called in states she is doing ok on Olanzapine but this has affected sugar has had a eye twitch also and also recently found out she is having neurological problems does not want to continue on a antipsychotic until she works with a neurologist.

## 2025-02-17 NOTE — TELEPHONE ENCOUNTER
Patient has been taking 1/2 tablet nightly and is going to discontinue. Patient will call if she needs anything in between appointments.

## 2025-02-26 ENCOUNTER — OFFICE VISIT (OUTPATIENT)
Dept: NEUROLOGY | Facility: CLINIC | Age: 25
End: 2025-02-26
Payer: MEDICAID

## 2025-02-26 VITALS
HEART RATE: 107 BPM | WEIGHT: 215 LBS | HEIGHT: 62 IN | TEMPERATURE: 97.3 F | OXYGEN SATURATION: 97 % | SYSTOLIC BLOOD PRESSURE: 131 MMHG | DIASTOLIC BLOOD PRESSURE: 67 MMHG | BODY MASS INDEX: 39.56 KG/M2

## 2025-02-26 DIAGNOSIS — F31.30 BIPOLAR I DISORDER, MOST RECENT EPISODE DEPRESSED: Chronic | ICD-10-CM

## 2025-02-26 DIAGNOSIS — G47.19 EXCESSIVE DAYTIME SLEEPINESS: Primary | ICD-10-CM

## 2025-02-26 DIAGNOSIS — G47.00 INSOMNIA, UNSPECIFIED TYPE: ICD-10-CM

## 2025-02-26 DIAGNOSIS — F41.1 GENERALIZED ANXIETY DISORDER: Chronic | ICD-10-CM

## 2025-02-26 DIAGNOSIS — R90.89 ABNORMAL FINDING ON MRI OF BRAIN: ICD-10-CM

## 2025-02-26 DIAGNOSIS — G47.9 RESTLESS SLEEPER: ICD-10-CM

## 2025-02-26 RX ORDER — LAMOTRIGINE 25 MG/1
50 TABLET ORAL
Qty: 60 TABLET | Refills: 0 | Status: SHIPPED | OUTPATIENT
Start: 2025-02-26

## 2025-02-26 RX ORDER — DESVENLAFAXINE 50 MG/1
50 TABLET, FILM COATED, EXTENDED RELEASE ORAL DAILY
Qty: 30 TABLET | Refills: 0 | Status: SHIPPED | OUTPATIENT
Start: 2025-02-26

## 2025-02-26 NOTE — PROGRESS NOTES
"     New Sleep Patient Office Visit      Patient Name: Ruby Ramos  : 2000   MRN: [id number]     Referring Physician: Macrina Godinez APRN    Chief Complaint:    Chief Complaint   Patient presents with    Consult     NP, in office to establish care for daljit. Patient c/o trouble falling asleep and staying asleep.         History of Present Illness: Ruby Ramos is a 24 y.o. female who is here today to establish care with Sleep Medicine.  She complains of difficulty initiating and maintaining sleep.  Sleep questionnaire reviewed- it can take 2-6 hours to fall asleep at night, she wakes up multiple times during sleep and then is unable to fall back to sleep, the number of hours she sleeps per night varies, she experiences restless or disturbed sleep, she takes a nap 1 day per week, she wakes up with a dry mouth, she experiences memory loss and decreased concentration, she experiences daytime sleepiness, she has leg kicking/leg movements during sleep, she has nightmares on some nights, she grinds her teeth on some nights, she has vivid dream like scenes upon awakening or when going to sleep, she has felt paralyzed when waking or going to sleep, she awakens with morning headaches frequently, she has sleep attacks on some days, she has pain that frequently interferes with sleep.  Says she has always struggled with insomnia and has tried multiple medications (Trazodone, Melatonin, Doxepin, Seroquel, Olanzapine) to help with sleep, without significant improvement.  Additional risk factors- BMI 39, family history of narcolepsy, family history of DALJIT, migraine DO, bipolar DO, diabetes, ADHD, anxiety DO, insomnia.   *She mentions she recently had an MRI of her cervical spine and it showed low-lying cerebellar tonsils.  She has a brain MRI scheduled for next month.  She states, \"I have almost all the symptoms of Chiari\"- headache, neck and shoulder pain, tingling in hands and feet, nausea, dizzy spells, poor " memory, brain fog, trouble with word finding, ringing in ears, fatigue, pressure in head and behind the eyes, anxiety and depression, passing out with vomiting, insomnia.     [place] Score: 13    Subjective      Review of Systems:   Review of Systems   Neurological:  Positive for headache.   Psychiatric/Behavioral:  Positive for sleep disturbance.        Past Medical History:   Past Medical History:   Diagnosis Date    ADHD (attention deficit hyperactivity disorder)     Anxiety     Bipolar disorder     Celiac disease     Diabetes mellitus     Diabetes mellitus type I     GERD (gastroesophageal reflux disease)     Homozygous MTHFR mutation [id number]        Past Surgical History:   Past Surgical History:   Procedure Laterality Date    ENDOSCOPY      ENDOSCOPY N/A 4/14/2021    Procedure: ESOPHAGOGASTRODUODENOSCOPY WITH BIOPSY;  Surgeon: Wolfgang Patiño MD;  Location: [facility] ENDOSCOPY;  Service: Gastroenterology;  Laterality: N/A;    HERNIA REPAIR         Family History:   Family History   Problem Relation Age of Onset    Hypertension Mother     Sleep apnea Mother     Hypertension Father     Diabetes Father     Narcolepsy Brother     Cancer Maternal Grandmother     Cancer Paternal Grandmother     Colon cancer Neg Hx        Social History:   Social History     Socioeconomic History    Marital status: Single   Tobacco Use    Smoking status: Never     Passive exposure: Never    Smokeless tobacco: Never    Tobacco comments:     E-cig   Vaping Use    Vaping status: Every Day    Substances: Nicotine, Flavoring    Devices: Disposable    Passive vaping exposure: Yes   Substance and Sexual Activity    Alcohol use: Yes     Comment: occ    Drug use: Never    Sexual activity: Defer       Medications:     Current Outpatient Medications:     Continuous Glucose  (Dexcom G7 ) device, Use 1 each See Admin Instructions., Disp: 1 each, Rfl: 0    Continuous Glucose Sensor (Dexcom G7 Sensor) misc, Use 1 each Every 10  (Ten) Days., Disp: 9 each, Rfl: 3    desvenlafaxine (PRISTIQ) 50 MG 24 hr tablet, TAKE 1 TABLET BY MOUTH EVERY DAY, Disp: 30 tablet, Rfl: 2    Diclofenac Sodium (VOLTAREN) 1 % gel gel, Apply 4 g topically to the appropriate area as directed 4 (Four) Times a Day As Needed (pain)., Disp: 150 g, Rfl: 0    glucose blood test strip, Four times daily, Disp: 400 each, Rfl: 3    insulin aspart (NovoLOG FlexPen) 100 UNIT/ML solution pen-injector sc pen, 10-15 units three times daily before meals, MDD 45 units, Disp: 45 mL, Rfl: 1    Insulin Glargine (LANTUS SOLOSTAR) 100 UNIT/ML injection pen, Inject 16 Units under the skin into the appropriate area as directed Daily., Disp: 15 mL, Rfl: 1    Insulin Pen Needle 32G X 4 MM misc, Use 1 each 4 (Four) Times a Day., Disp: 400 each, Rfl: 3    l-methylfolate calcium (DEPLIN) 7.5 MG tablet tablet, Take 1 tablet by mouth Daily., Disp: , Rfl:     lamoTRIgine (LaMICtal) 25 MG tablet, TAKE 2 TABLETS BY MOUTH AT BEDTIME, Disp: 60 tablet, Rfl: 0    MAGNESIUM GLYCINATE PO, Take  by mouth., Disp: , Rfl:     multivitamin with minerals tablet tablet, Take 1 tablet by mouth Daily., Disp: , Rfl:     SUMAtriptan (IMITREX) 100 MG tablet, Take 1 tablet by mouth Every 2 (Two) Hours As Needed for Migraine. Take one tablet at onset of headache. May repeat dose one time in 2 hours if headache not relieved., Disp: , Rfl:     tiZANidine (ZANAFLEX) 4 MG tablet, Take 1 tablet by mouth At Night As Needed for Muscle Spasms., Disp: 30 tablet, Rfl: 1    True Comfort Twist Top Lancets misc, 1 Device 4 (Four) Times a Day., Disp: 400 each, Rfl: 3    Allergies:   Allergies   Allergen Reactions    Gluten Meal Other (See Comments)     Celiac disease    Mucinex [Guaifenesin Er] Nausea Only and Dizziness       Objective     Physical Exam:  Vital Signs:   Vitals:    02/26/25 0912   BP: 131/67   BP Location: Right arm   Patient Position: Sitting   Cuff Size: Adult   Pulse: 107   Temp: 97.3 °F (36.3 °C)   SpO2: 97%  "  Weight: 97.5 kg (215 lb)   Height: 157.5 cm (62\")   PainSc: 0-No pain     BMI: Body mass index is 39.32 kg/m².  Neck Circumference:  13 1/2 inches        Physical Exam  Vitals and nursing note reviewed.   Constitutional:       General: She is not in acute distress.     Appearance: Normal appearance. She is well-developed. She is obese. She is not diaphoretic.   HENT:      Head: Normocephalic and atraumatic.      Mouth/Throat:      Comments: Mallampati 2  Eyes:      Extraocular Movements: Extraocular movements intact.      Conjunctiva/sclera: Conjunctivae normal.   Neck:      Trachea: Trachea normal.   Pulmonary:      Effort: Pulmonary effort is normal. No respiratory distress.   Musculoskeletal:         General: Normal range of motion.   Skin:     General: Skin is warm and dry.   Neurological:      Mental Status: She is alert and oriented to person, place, and time.   Psychiatric:         Mood and Affect: Mood normal.         Behavior: Behavior normal.         Thought Content: Thought content normal.         Judgment: Judgment normal.       Assessment / Plan      Assessment/Plan:   Diagnoses and all orders for this visit:    1. Excessive daytime sleepiness (Primary)  -     Polysomnography 4 or more parameters; Future  -     Urine Drug Screen - Urine, Clean Catch; Future  -     Multiple sleep latency test without CPAP; Future    2. Insomnia, unspecified type  -     Polysomnography 4 or more parameters; Future  -     Urine Drug Screen - Urine, Clean Catch; Future  -     Multiple sleep latency test without CPAP; Future    3. Restless sleeper  -     Polysomnography 4 or more parameters; Future  -     Urine Drug Screen - Urine, Clean Catch; Future  -     Multiple sleep latency test without CPAP; Future    4. Abnormal finding on MRI of brain       *Patient education on DALJIT, Insomnia, Chiari malfomation (for education purposes only), and Narcolepsy (for education purposes only) provided today.   *She is agreeable to an " MSLT/PSG. I have advised her to avoid stimulant medications for 72 hours before the sleep study.   *Advised patient to avoid driving if drowsy.     Follow Up:   Return in about 3 months (around 5/26/2025) for Follow Up.    I have advised patient the gold standard for treatment of sleep apnea includes weight loss, use of cpap and avoidance of alcohol.  Encouraged weight loss (if applicable) with a BMI goal of 24.  I have discussed the implications of untreated significant DALJIT- the development of hypertension, increased risk of cardiovascular disease, increased risk of stroke, work-related issues and driving accidents. I have counseled and advised the patient to avoid driving or operating heavy/dangerous equipment if feeling drowsy.     SONIYA Carrera, FNP-C  [facility]

## 2025-03-04 ENCOUNTER — PATIENT ROUNDING (BHMG ONLY) (OUTPATIENT)
Dept: NEUROLOGY | Facility: CLINIC | Age: 25
End: 2025-03-04
Payer: MEDICAID

## 2025-03-10 DIAGNOSIS — E10.65 TYPE 1 DIABETES MELLITUS WITH HYPERGLYCEMIA: Primary | ICD-10-CM

## 2025-03-10 NOTE — PROGRESS NOTES
"Mode of Visit: Video   Location of patient: -HOME-   Location of provider: +[facility]   You have chosen to receive care through a telehealth visit.   The patient has signed the video visit consent form.   The visit included audio and video interaction. No technical issues occurred during this visit.     Chief Complaint  Bipolar I disorder, ADHD, anxiety, insomnia, bereavement, and alcohol use disorder, moderate, in sustained remission      Subjective          Ruby Ramos presents via secure [employer] Video Visit through [employer] by herself for a hospital follow up and medication check.    History of Present Illness: Ruby states, \" I have been sick for the last week.\"  Ruby tells me she possibly had a sinus infection, but was given Tamiflu as well as an antibiotic.  She did test negative for flu, COVID, and strep throat.  She is finally starting to feel better with medication.  She is completely off olanzapine and has seen some changes in mood and anxiety for the worse.  She elected to stop olanzapine after having a cervical MRI with possible concerns.  She has been referred to a neurologist and will have further advance testing like a second MRI with contrast and a sleep study.  Her preference would be to stay off atypical antipsychotics until assuring that she does not have a Chiari malformation.  She finds the atypical antipsychotics to raise her blood glucose and cause weight gain, so she would like to take into careful consideration before resuming.  She has had a little difficulty with sleep since stopping olanzapine and Seroquel.  She has very little appetite throughout the day and binge eats more at night.  She has been having difficulty with concentrating, especially on tasks that require sustained attention.  She would like to begin working soon and is hoping to resume ADHD treatment if possible. She reports compliance with her psychiatric medication. She is taking lamotrigine and Pristiq. She denies " any side effects. She denies any SI/HI/AVH.    Current Medications:   Current Outpatient Medications   Medication Sig Dispense Refill    brompheniramine-pseudoephedrine-DM 30-2-10 MG/5ML syrup Take 10 mL by mouth 6 (Six) Times a Day.      desvenlafaxine (PRISTIQ) 50 MG 24 hr tablet Take 1 tablet by mouth Daily. 30 tablet 2    glucose blood test strip Four times daily 400 each 3    insulin aspart (NovoLOG FlexPen) 100 UNIT/ML solution pen-injector sc pen 10-15 units three times daily before meals, MDD 45 units 45 mL 1    Insulin Glargine (LANTUS SOLOSTAR) 100 UNIT/ML injection pen Inject 16 Units under the skin into the appropriate area as directed Daily. 15 mL 1    Insulin Pen Needle 32G X 4 MM misc Use 1 each 4 (Four) Times a Day. 400 each 3    l-methylfolate calcium (DEPLIN) 7.5 MG tablet tablet Take 1 tablet by mouth Daily.      lamoTRIgine (LaMICtal) 100 MG tablet Take 1 tablet by mouth every night at bedtime for 14 days, THEN 1.5 tablets every night at bedtime for 14 days. 35 tablet 0    MAGNESIUM GLYCINATE PO Take  by mouth.      multivitamin with minerals tablet tablet Take 1 tablet by mouth Daily.      oseltamivir (TAMIFLU) 75 MG capsule TAKE 1 CAPSULE BY MOUTH 2 TIMES A DAY UNTIL GONE      SUMAtriptan (IMITREX) 100 MG tablet Take 1 tablet by mouth Every 2 (Two) Hours As Needed for Migraine. Take one tablet at onset of headache. May repeat dose one time in 2 hours if headache not relieved.      tiZANidine (ZANAFLEX) 4 MG tablet Take 1 tablet by mouth At Night As Needed for Muscle Spasms. 30 tablet 1    True Comfort Twist Top Lancets misc 1 Device 4 (Four) Times a Day. 400 each 3    Continuous Glucose  (Dexcom G7 ) device Use 1 each See Admin Instructions. (Patient not taking: Reported on 3/13/2025) 1 each 0    Continuous Glucose Sensor (Dexcom G7 Sensor) misc Use 1 each Every 10 (Ten) Days. (Patient not taking: Reported on 3/13/2025) 9 each 3    Diclofenac Sodium (VOLTAREN) 1 % gel gel Apply  4 g topically to the appropriate area as directed 4 (Four) Times a Day As Needed (pain). (Patient not taking: Reported on 3/13/2025) 150 g 0     No current facility-administered medications for this visit.         Objective   Vital Signs:   There were no vitals taken for this visit.    Physical Exam  Nursing note reviewed. Vitals reviewed: No vitals due to the nature of a telehealth visit.  Constitutional:       Appearance: Normal appearance. She is well-developed and normal weight.   Neurological:      General: No focal deficit present.      Mental Status: She is alert and oriented to person, place, and time.   Psychiatric:         Attention and Perception: Attention normal.         Mood and Affect: Mood and affect normal.         Speech: Speech normal.         Behavior: Behavior normal. Behavior is cooperative.         Thought Content: Thought content normal.         Cognition and Memory: Cognition normal.         Judgment: Judgment normal. Judgment is impulsive (improved) and inappropriate (improved).      Comments: Impaired concentration        Result Review :     The following data was reviewed by: SONIYA Jay on 03/13/2025:    MRI Cervical Spine Without Contrast (02/05/2025 16:52)   Office Visit with Kerry Lake APRN (02/26/2025)     Assessment and Plan    Diagnoses and all orders for this visit:    1. Bipolar I disorder, most recent episode depressed (Primary)  -     desvenlafaxine (PRISTIQ) 50 MG 24 hr tablet; Take 1 tablet by mouth Daily.  Dispense: 30 tablet; Refill: 2  -     lamoTRIgine (LaMICtal) 100 MG tablet; Take 1 tablet by mouth every night at bedtime for 14 days, THEN 1.5 tablets every night at bedtime for 14 days.  Dispense: 35 tablet; Refill: 0    2. Generalized anxiety disorder  -     desvenlafaxine (PRISTIQ) 50 MG 24 hr tablet; Take 1 tablet by mouth Daily.  Dispense: 30 tablet; Refill: 2    3. Attention deficit hyperactivity disorder, combined type    4. Other  insomnia    5. Alcohol use disorder, moderate, dependence              Mental Status Exam:   Hygiene:   good  Cooperation:  Cooperative  Eye Contact:  Good  Psychomotor Behavior:  Appropriate  Affect:  Appropriate  Mood: normal  Speech:  Normal  Thought Process:  Goal directed and Linear  Thought Content:  Normal  Suicidal:  None  Homicidal:  None  Hallucinations:  None  Delusion:  None  Memory:  Intact  Orientation:  Person, Place, Time and Situation  Reliability:  fair  Insight:  Fair  Judgement:  Fair  Impulse Control:  Fair  Physical/Medical Issues:  Yes Diabetes, celiac disease, headaches, back problems, hernia, and hair loss       PHQ-9 Score:   PHQ-9 Total Score: (Patient-Rptd) 17      Impression/Plan:  -This is a hospital follow up and medication check. Ruby reports ongoing concerns for depression and anxiety.  She elected to come off of olanzapine after being referred to neurology for a possible Chiari malformation.  A Chiari malformation can include symptoms of neck pain, dizziness, headache, and trouble swallowing.  She is scheduled for a second MRI with contrast on March 24th, as well as a sleep study.  She is aware they may monitor kidney function and give IV hydration before receiving contrast.  I also reinforced the importance of drinking plenty of water after the procedure to flush her kidneys.  She and I discussed medication management of depression and anxiety without using an atypical antipsychotic due to risk of hyperglycemia and weight gain.  I suggested maximizing the dose of lamotrigine.  At 1 point, she was taking 200 mg a day.  I provided information that I am seeing benefits of the combination of Pristiq and lamotrigine anecdotally and practice.  She agrees to the increase in medication and is aware to monitor for rash.  She would also like to consider resuming stimulant for ADHD treatment.  She is considering working again and having a difficult time sustaining her concentration and  focus when the task is more mentally strenuous.  We agreed to complete a 2-week check in and consider resuming Adderall at that time.  I will send a controlled substance agreement and we will order a UDS on March 27th.  -Stop olanzapine since patient is no longer taking.   -Continue Pristiq 50 mg daily for depression and anxiety.  Patient has refills.  -Increase lamotrigine to 100 mg nightly for 2 weeks then 150 mg nightly for bipolar.    -Continue therapy with Bo.  -The JANI report, reviewed through PDMP, of the past 12 months were reviewed and is appropriate.  The patient/guardian reports taking the medication only as prescribed.  The patient/guardian denies any abuse or misuse of the medication.  The patient/guardian denies any other substance use or issues.  There are no apparent substance related issues.  The patient reports no side effects of the current medication usage.  The patient/guardian has reported significant improvement with medication usage and wishes to continue medication as prescribed.  The patient/guardian is appropriate to continue with current medication usage at this time.  Reinforced risks and side effects of medication usage, patient and/or guardian verbalize understanding in their own words and are in agreement with current plan.  -Last UDS 03/29/24. Negative for amphetamine.  -Consider [facility] for DBT therapy  -Complete 2-week check in on March 27 through my chart.  -The patient has read and signed the [facility] Controlled Substance Contract. We discussed the risks and benefits of the use of controlled substances, including the risk of tolerance and drug dependence. Anorectic medications can be prescribed by one provider at a time and dispensed from one facility at a time, they can only be taken as prescribed, and we are not obligated to refill them if lost or stolen. The refills are only during regular clinic hours. Jani report was pulled on patient, reviewed and found  to be appropriate.          MEDS ORDERED DURING VISIT:  New Medications Ordered This Visit   Medications    desvenlafaxine (PRISTIQ) 50 MG 24 hr tablet     Sig: Take 1 tablet by mouth Daily.     Dispense:  30 tablet     Refill:  2    lamoTRIgine (LaMICtal) 100 MG tablet     Sig: Take 1 tablet by mouth every night at bedtime for 14 days, THEN 1.5 tablets every night at bedtime for 14 days.     Dispense:  35 tablet     Refill:  0       Follow Up   Return in about 2 months (around 5/13/2025) for Medication Check.  Patient was given instructions and counseling regarding her condition or for health maintenance advice. Please see specific information pulled into the AVS if appropriate.       TREATMENT PLAN/GOALS: Continue supportive psychotherapy efforts and medications as indicated. Treatment and medication options discussed during today's visit. Patient acknowledged and verbally consented to continue with current treatment plan and was educated on the importance of compliance with treatment and follow-up appointments.    MEDICATION ISSUES:  Discussed medication options and treatment plan of prescribed medication as well as the risks, benefits, and side effects including potential falls, possible impaired driving and metabolic adversities among others. Patient is agreeable to call the office with any worsening of symptoms or onset of side effects. Patient is agreeable to call 911 or go to the nearest ER should he/she begin having SI/HI.        This document has been electronically signed by SONIAY Valadez, PMHNP-BC  March 13, 2025 15:00 EDT    Part of this note may be an electronic transcription/translation of spoken language to printed text using the Dragon Dictation System.

## 2025-03-13 ENCOUNTER — TELEMEDICINE (OUTPATIENT)
Dept: PSYCHIATRY | Facility: CLINIC | Age: 25
End: 2025-03-13
Payer: MEDICAID

## 2025-03-13 DIAGNOSIS — G47.09 OTHER INSOMNIA: Chronic | ICD-10-CM

## 2025-03-13 DIAGNOSIS — F31.30 BIPOLAR I DISORDER, MOST RECENT EPISODE DEPRESSED: Primary | Chronic | ICD-10-CM

## 2025-03-13 DIAGNOSIS — F10.20 ALCOHOL USE DISORDER, MODERATE, DEPENDENCE: Chronic | ICD-10-CM

## 2025-03-13 DIAGNOSIS — F41.1 GENERALIZED ANXIETY DISORDER: Chronic | ICD-10-CM

## 2025-03-13 DIAGNOSIS — F90.2 ATTENTION DEFICIT HYPERACTIVITY DISORDER, COMBINED TYPE: Chronic | ICD-10-CM

## 2025-03-13 PROCEDURE — 99214 OFFICE O/P EST MOD 30 MIN: CPT | Performed by: NURSE PRACTITIONER

## 2025-03-13 PROCEDURE — 1160F RVW MEDS BY RX/DR IN RCRD: CPT | Performed by: NURSE PRACTITIONER

## 2025-03-13 PROCEDURE — 1159F MED LIST DOCD IN RCRD: CPT | Performed by: NURSE PRACTITIONER

## 2025-03-13 RX ORDER — DESVENLAFAXINE 50 MG/1
50 TABLET, FILM COATED, EXTENDED RELEASE ORAL DAILY
Qty: 30 TABLET | Refills: 2 | Status: SHIPPED | OUTPATIENT
Start: 2025-03-13

## 2025-03-13 RX ORDER — OSELTAMIVIR PHOSPHATE 75 MG/1
CAPSULE ORAL
COMMUNITY
Start: 2025-03-12

## 2025-03-13 RX ORDER — BROMPHENIRAMINE MALEATE, PSEUDOEPHEDRINE HYDROCHLORIDE, AND DEXTROMETHORPHAN HYDROBROMIDE 2; 30; 10 MG/5ML; MG/5ML; MG/5ML
10 SYRUP ORAL
COMMUNITY
Start: 2025-03-12

## 2025-03-13 RX ORDER — LAMOTRIGINE 100 MG/1
TABLET ORAL
Qty: 35 TABLET | Refills: 0 | Status: SHIPPED | OUTPATIENT
Start: 2025-03-13 | End: 2025-04-10

## 2025-03-24 ENCOUNTER — HOSPITAL ENCOUNTER (OUTPATIENT)
Dept: MRI IMAGING | Facility: HOSPITAL | Age: 25
Discharge: HOME | End: 2025-03-24
Admitting: NURSE PRACTITIONER
Payer: MEDICAID

## 2025-03-24 DIAGNOSIS — R93.7 ABNORMAL MRI, CERVICAL SPINE: ICD-10-CM

## 2025-03-24 DIAGNOSIS — R20.2 PARESTHESIAS IN RIGHT HAND: ICD-10-CM

## 2025-03-24 DIAGNOSIS — G43.009 MIGRAINE WITHOUT AURA AND WITHOUT STATUS MIGRAINOSUS, NOT INTRACTABLE: ICD-10-CM

## 2025-03-24 PROCEDURE — A9577 INJ MULTIHANCE: HCPCS | Performed by: NURSE PRACTITIONER

## 2025-03-24 PROCEDURE — 25510000002 GADOBENATE DIMEGLUMINE 529 MG/ML SOLUTION: Performed by: NURSE PRACTITIONER

## 2025-03-24 PROCEDURE — 70553 MRI BRAIN STEM W/O & W/DYE: CPT

## 2025-03-24 RX ADMIN — GADOBENATE DIMEGLUMINE 15 ML: 529 INJECTION, SOLUTION INTRAVENOUS at 11:16

## 2025-03-27 ENCOUNTER — PATIENT MESSAGE (OUTPATIENT)
Dept: PSYCHIATRY | Facility: CLINIC | Age: 25
End: 2025-03-27
Payer: MEDICAID

## 2025-03-27 DIAGNOSIS — F90.2 ATTENTION DEFICIT HYPERACTIVITY DISORDER, COMBINED TYPE: ICD-10-CM

## 2025-03-27 DIAGNOSIS — Z79.899 MEDICATION MANAGEMENT: Primary | ICD-10-CM

## 2025-03-27 DIAGNOSIS — F31.30 BIPOLAR I DISORDER, MOST RECENT EPISODE DEPRESSED: Chronic | ICD-10-CM

## 2025-03-27 RX ORDER — LAMOTRIGINE 150 MG/1
150 TABLET ORAL
Qty: 30 TABLET | Refills: 2 | Status: SHIPPED | OUTPATIENT
Start: 2025-03-27

## 2025-03-27 NOTE — TELEPHONE ENCOUNTER
Pt called back and was notified per Provider below. Made aware labs were ordered and she will need to report to the outpatient [facility] lab to complete. Once Provider receives, reviews, and advises, RX will be sent. Pt agreeable and verbalized an understanding to all.

## 2025-03-27 NOTE — TELEPHONE ENCOUNTER
Left VM asking Pt to call office at [phone] to advise per Provider below. Awaiting return call. Will also send via [employer].

## 2025-03-27 NOTE — TELEPHONE ENCOUNTER
Spoke with Pt. Pt states that she is doing well on Lamotrigine. Pt is currently at 100mg and is agreeable to increase to 150mg as discussed with Provider (per Pt.). Pt would also like to resume stimulant and agreeable to UDS at out patient lab. Pt would like Provider to Rx Adderall. Pt was on Vyvnase, but expresses that Adderall worked better. Pt did review and sign her CSA.    Please advise anything additional. Thank you!

## 2025-03-28 ENCOUNTER — LAB (OUTPATIENT)
Dept: LAB | Facility: HOSPITAL | Age: 25
End: 2025-03-28
Payer: MEDICAID

## 2025-03-28 DIAGNOSIS — F90.2 ATTENTION DEFICIT HYPERACTIVITY DISORDER, COMBINED TYPE: ICD-10-CM

## 2025-03-28 DIAGNOSIS — Z79.899 MEDICATION MANAGEMENT: ICD-10-CM

## 2025-03-28 LAB
AMPHET+METHAMPHET UR QL: NEGATIVE
AMPHETAMINES UR QL: NEGATIVE
BARBITURATES UR QL SCN: NEGATIVE
BENZODIAZ UR QL SCN: NEGATIVE
BUPRENORPHINE SERPL-MCNC: NEGATIVE NG/ML
CANNABINOIDS SERPL QL: NEGATIVE
COCAINE UR QL: NEGATIVE
METHADONE UR QL SCN: NEGATIVE
OPIATES UR QL: NEGATIVE
OXYCODONE UR QL SCN: NEGATIVE
PCP UR QL SCN: NEGATIVE
TRICYCLICS UR QL SCN: NEGATIVE

## 2025-03-28 PROCEDURE — G0483 DRUG TEST DEF 22+ CLASSES: HCPCS

## 2025-03-28 PROCEDURE — 80306 DRUG TEST PRSMV INSTRMNT: CPT

## 2025-03-28 PROCEDURE — 80307 DRUG TEST PRSMV CHEM ANLYZR: CPT

## 2025-03-30 DIAGNOSIS — F90.2 ATTENTION DEFICIT HYPERACTIVITY DISORDER, COMBINED TYPE: Primary | ICD-10-CM

## 2025-03-30 RX ORDER — DEXTROAMPHETAMINE SACCHARATE, AMPHETAMINE ASPARTATE MONOHYDRATE, DEXTROAMPHETAMINE SULFATE AND AMPHETAMINE SULFATE 2.5; 2.5; 2.5; 2.5 MG/1; MG/1; MG/1; MG/1
10 CAPSULE, EXTENDED RELEASE ORAL EVERY MORNING
Qty: 14 CAPSULE | Refills: 0 | Status: SHIPPED | OUTPATIENT
Start: 2025-03-30

## 2025-04-03 ENCOUNTER — HOSPITAL ENCOUNTER (OUTPATIENT)
Dept: DIABETES SERVICES | Facility: HOSPITAL | Age: 25
Setting detail: RECURRING SERIES
Discharge: HOME | End: 2025-04-03
Payer: MEDICAID

## 2025-04-03 LAB — DRUGS UR: NORMAL

## 2025-04-03 PROCEDURE — G0108 DIAB MANAGE TRN  PER INDIV: HCPCS

## 2025-04-03 NOTE — CONSULTS
This medical referred consult was provided as a telephone call, tele-health or e-visit. Consent for treatment was given verbally.  Patient attended their scheduled 2 hour diabetes education visit.  Please see media tab for assessment and notes if you use [employer]. If you are not an [employer] user a copy of patient's assessment and notes will be sent per routine. Thank you.

## 2025-04-07 ENCOUNTER — OFFICE VISIT (OUTPATIENT)
Dept: NEUROLOGY | Facility: CLINIC | Age: 25
End: 2025-04-07
Payer: MEDICAID

## 2025-04-07 VITALS
WEIGHT: 209 LBS | HEIGHT: 62 IN | BODY MASS INDEX: 38.46 KG/M2 | TEMPERATURE: 97.1 F | OXYGEN SATURATION: 99 % | SYSTOLIC BLOOD PRESSURE: 138 MMHG | DIASTOLIC BLOOD PRESSURE: 80 MMHG | HEART RATE: 109 BPM

## 2025-04-07 DIAGNOSIS — G47.19 EXCESSIVE DAYTIME SLEEPINESS: ICD-10-CM

## 2025-04-07 DIAGNOSIS — G47.00 INSOMNIA, UNSPECIFIED TYPE: Primary | ICD-10-CM

## 2025-04-07 DIAGNOSIS — G47.9 RESTLESS SLEEPER: ICD-10-CM

## 2025-04-07 PROCEDURE — 1159F MED LIST DOCD IN RCRD: CPT | Performed by: NURSE PRACTITIONER

## 2025-04-07 PROCEDURE — 99214 OFFICE O/P EST MOD 30 MIN: CPT | Performed by: NURSE PRACTITIONER

## 2025-04-07 PROCEDURE — 1160F RVW MEDS BY RX/DR IN RCRD: CPT | Performed by: NURSE PRACTITIONER

## 2025-04-07 NOTE — PROGRESS NOTES
Follow Up Office Visit      Patient Name: Ruby Ramos  : 2000   MRN: [id number]     Chief Complaint:    Chief Complaint   Patient presents with    Follow-up     Patient in office to discuss MRI results from PCP.         Sleep Apnea     Patient is scheduled for a MSLT and PSG this month.          History of Present Illness: Ruby Ramos is a 24 y.o. female who is here today to discuss recent MRI results and was seen on 2025 for a sleep consult.  She has a PSG/MSLT scheduled later this month.  She had a cervical spine MRI on 2025 and it showed low-lying cerebellar tonsils, so an MRI brain was ordered for further evaluation.  MRI brain with and without contrast, on 3/24/2025, was unremarkable.  She asks if she can take her muscle relaxer on the night of her PSG because she has significant problems initiating and maintaining sleep.  She asks how long she needs to be off the Adderall before the sleep study (just started that back last week).  She mentions she has never been a good sleeper and has always had problems falling asleep at night.  She has frequent headaches and asks what could be causing those if it isn't Chiari malformation.     Following taken from previous visit note:  Ruby Ramos is a 24 y.o. female who is here today to establish care with Sleep Medicine.  She complains of difficulty initiating and maintaining sleep.  Sleep questionnaire reviewed- it can take 2-6 hours to fall asleep at night, she wakes up multiple times during sleep and then is unable to fall back to sleep, the number of hours she sleeps per night varies, she experiences restless or disturbed sleep, she takes a nap 1 day per week, she wakes up with a dry mouth, she experiences memory loss and decreased concentration, she experiences daytime sleepiness, she has leg kicking/leg movements during sleep, she has nightmares on some nights, she grinds her teeth on some nights, she has vivid dream like scenes upon  "awakening or when going to sleep, she has felt paralyzed when waking or going to sleep, she awakens with morning headaches frequently, she has sleep attacks on some days, she has pain that frequently interferes with sleep.  Says she has always struggled with insomnia and has tried multiple medications (Trazodone, Melatonin, Doxepin, Seroquel, Olanzapine) to help with sleep, without significant improvement.  Additional risk factors- BMI 39, family history of narcolepsy, family history of DALJIT, migraine DO, bipolar DO, diabetes, ADHD, anxiety DO, insomnia.   *She mentions she recently had an MRI of her cervical spine and it showed low-lying cerebellar tonsils.  She has a brain MRI scheduled for next month.  She states, \"I have almost all the symptoms of Chiari\"- headache, neck and shoulder pain, tingling in hands and feet, nausea, dizzy spells, poor memory, brain fog, trouble with word finding, ringing in ears, fatigue, pressure in head and behind the eyes, anxiety and depression, passing out with vomiting, insomnia.      [place] Score: 13     Subjective      Review of Systems:   Review of Systems   Psychiatric/Behavioral:  Positive for sleep disturbance.        I have reviewed and the following portions of the patient's history were updated as appropriate: past family history, past medical history, past social history, past surgical history and problem list.    Medications:     Current Outpatient Medications:     amphetamine-dextroamphetamine XR (ADDERALL XR) 10 MG 24 hr capsule, Take 1 capsule by mouth Every Morning, Disp: 14 capsule, Rfl: 0    Continuous Glucose  (Dexcom G7 ) device, Use 1 each See Admin Instructions., Disp: 1 each, Rfl: 0    Continuous Glucose Sensor (Dexcom G7 Sensor) misc, Use 1 each Every 10 (Ten) Days., Disp: 9 each, Rfl: 3    desvenlafaxine (PRISTIQ) 50 MG 24 hr tablet, Take 1 tablet by mouth Daily., Disp: 30 tablet, Rfl: 2    Diclofenac Sodium (VOLTAREN) 1 % gel gel, Apply 4 g " topically to the appropriate area as directed 4 (Four) Times a Day As Needed (pain)., Disp: 150 g, Rfl: 0    glucose blood test strip, Four times daily, Disp: 400 each, Rfl: 3    insulin aspart (NovoLOG FlexPen) 100 UNIT/ML solution pen-injector sc pen, 10-15 units three times daily before meals, MDD 45 units, Disp: 45 mL, Rfl: 1    Insulin Glargine (LANTUS SOLOSTAR) 100 UNIT/ML injection pen, Inject 16 Units under the skin into the appropriate area as directed Daily., Disp: 15 mL, Rfl: 1    Insulin Pen Needle 32G X 4 MM misc, Use 1 each 4 (Four) Times a Day., Disp: 400 each, Rfl: 3    l-methylfolate calcium (DEPLIN) 7.5 MG tablet tablet, Take 1 tablet by mouth Daily., Disp: , Rfl:     lamoTRIgine (LaMICtal) 150 MG tablet, Take 1 tablet by mouth every night at bedtime., Disp: 30 tablet, Rfl: 2    MAGNESIUM GLYCINATE PO, Take  by mouth., Disp: , Rfl:     multivitamin with minerals tablet tablet, Take 1 tablet by mouth Daily., Disp: , Rfl:     SUMAtriptan (IMITREX) 100 MG tablet, Take 1 tablet by mouth Every 2 (Two) Hours As Needed for Migraine. Take one tablet at onset of headache. May repeat dose one time in 2 hours if headache not relieved., Disp: , Rfl:     tiZANidine (ZANAFLEX) 4 MG tablet, Take 1 tablet by mouth At Night As Needed for Muscle Spasms., Disp: 30 tablet, Rfl: 1    True Comfort Twist Top Lancets [facility] [place] [facility], 1 Device 4 (Four) Times a Day., Disp: 400 each, Rfl: 3    brompheniramine-pseudoephedrine-DM 30-2-10 MG/5ML syrup, Take 10 mL by mouth 6 (Six) Times a Day., Disp: , Rfl:     oseltamivir (TAMIFLU) 75 MG capsule, TAKE 1 CAPSULE BY MOUTH 2 TIMES A DAY UNTIL GONE, Disp: , Rfl:     Allergies:   Allergies   Allergen Reactions    Gluten Meal Other (See Comments)     Celiac disease    Mucinex [Guaifenesin Er] Nausea Only and Dizziness       Objective     Physical Exam:  Vital Signs:   Vitals:    04/07/25 1521   BP: 138/80   BP Location: Left arm   Patient Position: Sitting   Cuff Size: Adult   Pulse: 109  "  Temp: 97.1 °F (36.2 °C)   SpO2: 99%   Weight: 94.8 kg (209 lb)   Height: 157.5 cm (62\")   PainSc: 0-No pain     Body mass index is 38.23 kg/m².    Physical Exam  Vitals and nursing note reviewed.   Constitutional:       General: She is not in acute distress.     Appearance: Normal appearance. She is well-developed. She is obese. She is not diaphoretic.   HENT:      Head: Normocephalic and atraumatic.   Eyes:      Extraocular Movements: Extraocular movements intact.      Conjunctiva/sclera: Conjunctivae normal.   Pulmonary:      Effort: Pulmonary effort is normal. No respiratory distress.   Musculoskeletal:         General: Normal range of motion.   Skin:     General: Skin is dry.   Neurological:      Mental Status: She is alert and oriented to person, place, and time.   Psychiatric:         Mood and Affect: Mood normal.         Behavior: Behavior normal.         Thought Content: Thought content normal.         Judgment: Judgment normal.         Neurological Exam  Mental Status  Alert. Oriented to person, place, and time.    Cranial Nerves  CN III, IV, VI: Extraocular movements intact bilaterally.      Assessment / Plan      Assessment/Plan:   Diagnoses and all orders for this visit:    1. Insomnia, unspecified type (Primary)    2. Excessive daytime sleepiness    3. Restless sleeper    *I have advised patient to avoid the Adderall for 72 hours prior to the PSG/MSLT and she can take Tizanidine on the night of her PSG/MSLT.   *May consider a psychological sleep evaluation with Dr. Tucker Treadwell at follow up. She is interested in this even though he doesn't take medicaid and she would have to pay out of pocket.   *I have advised patient if she has significant DALJIT that could be causing her headaches due to a low oxygen level during sleep.   *Advised patient to avoid driving if drowsy.     Follow Up:   Return in about 4 weeks (around 5/5/2025) for Follow Up.    SONIYA Carrera, FNP-C  [facility] " Neurology and Sleep Medicine

## 2025-04-17 ENCOUNTER — HOSPITAL ENCOUNTER (OUTPATIENT)
Dept: SLEEP MEDICINE | Facility: HOSPITAL | Age: 25
Discharge: HOME | End: 2025-04-17
Admitting: NURSE PRACTITIONER
Payer: MEDICAID

## 2025-04-17 DIAGNOSIS — G47.00 INSOMNIA, UNSPECIFIED TYPE: ICD-10-CM

## 2025-04-17 DIAGNOSIS — G47.9 RESTLESS SLEEPER: ICD-10-CM

## 2025-04-17 DIAGNOSIS — G47.19 EXCESSIVE DAYTIME SLEEPINESS: ICD-10-CM

## 2025-04-17 PROCEDURE — 95805 MULTIPLE SLEEP LATENCY TEST: CPT

## 2025-04-17 PROCEDURE — 95810 POLYSOM 6/> YRS 4/> PARAM: CPT

## 2025-04-18 LAB
AMPHET+METHAMPHET UR QL: NEGATIVE
AMPHETAMINES UR QL: NEGATIVE
BARBITURATES UR QL SCN: NEGATIVE
BENZODIAZ UR QL SCN: NEGATIVE
BUPRENORPHINE SERPL-MCNC: NEGATIVE NG/ML
CANNABINOIDS SERPL QL: NEGATIVE
COCAINE UR QL: NEGATIVE
FENTANYL UR-MCNC: NEGATIVE NG/ML
METHADONE UR QL SCN: NEGATIVE
OPIATES UR QL: NEGATIVE
OXYCODONE UR QL SCN: NEGATIVE
PCP UR QL SCN: NEGATIVE
TRICYCLICS UR QL SCN: NEGATIVE

## 2025-04-18 PROCEDURE — 80307 DRUG TEST PRSMV CHEM ANLYZR: CPT | Performed by: NURSE PRACTITIONER

## 2025-04-21 DIAGNOSIS — F90.2 ATTENTION DEFICIT HYPERACTIVITY DISORDER, COMBINED TYPE: ICD-10-CM

## 2025-04-21 RX ORDER — DEXTROAMPHETAMINE SACCHARATE, AMPHETAMINE ASPARTATE MONOHYDRATE, DEXTROAMPHETAMINE SULFATE AND AMPHETAMINE SULFATE 2.5; 2.5; 2.5; 2.5 MG/1; MG/1; MG/1; MG/1
10 CAPSULE, EXTENDED RELEASE ORAL EVERY MORNING
Qty: 30 CAPSULE | Refills: 0 | Status: SHIPPED | OUTPATIENT
Start: 2025-04-21

## 2025-04-21 NOTE — TELEPHONE ENCOUNTER
Rx Refill Note  Requested Prescriptions     Pending Prescriptions Disp Refills    amphetamine-dextroamphetamine XR (ADDERALL XR) 10 MG 24 hr capsule 14 capsule 0     Sig: Take 1 capsule by mouth Every Morning      Last office visit with prescribing clinician: 8/28/2024   Last telemedicine visit with prescribing clinician: 3/13/2025   Next office visit with prescribing clinician: 5/14/2025                         Would you like a call back once the refill request has been completed: [] Yes [] No    If the office needs to give you a call back, can they leave a voicemail: [] Yes [] No    Elenita Neff MA  04/21/25, 09:14 EDT

## 2025-05-05 ENCOUNTER — OFFICE VISIT (OUTPATIENT)
Dept: NEUROLOGY | Facility: CLINIC | Age: 25
End: 2025-05-05
Payer: MEDICAID

## 2025-05-05 VITALS
SYSTOLIC BLOOD PRESSURE: 123 MMHG | TEMPERATURE: 98.4 F | HEIGHT: 62 IN | BODY MASS INDEX: 39.01 KG/M2 | WEIGHT: 212 LBS | OXYGEN SATURATION: 98 % | HEART RATE: 108 BPM | DIASTOLIC BLOOD PRESSURE: 94 MMHG

## 2025-05-05 DIAGNOSIS — G47.00 INSOMNIA, UNSPECIFIED TYPE: Primary | ICD-10-CM

## 2025-05-05 DIAGNOSIS — G47.19 EXCESSIVE DAYTIME SLEEPINESS: ICD-10-CM

## 2025-05-05 DIAGNOSIS — G47.9 RESTLESS SLEEPER: ICD-10-CM

## 2025-05-05 PROCEDURE — 99214 OFFICE O/P EST MOD 30 MIN: CPT | Performed by: NURSE PRACTITIONER

## 2025-05-05 PROCEDURE — 1160F RVW MEDS BY RX/DR IN RCRD: CPT | Performed by: NURSE PRACTITIONER

## 2025-05-05 PROCEDURE — 1159F MED LIST DOCD IN RCRD: CPT | Performed by: NURSE PRACTITIONER

## 2025-05-05 NOTE — PROGRESS NOTES
"     Follow Up Office Visit      Patient Name: Ruby Ramos  : 2000   MRN: [id number]     Chief Complaint:    Chief Complaint   Patient presents with    Follow-up    Results     Patient in office to discuss MSLT and SLEEP STUDY       History of Present Illness: Ruby Ramos is a 24 y.o. female who is here today to follow up to discuss her PSG and MSLT results.  She is taking Adderall XR and feels it does help with her daytime sleepiness some, but it wears off in the afternoon and \"I just crash\".  She plans to talk to her psychiatry provider about adding a second dose of Adderall in the afternoon.  She has been on various medications (Seroquel caused increased blood sugar, Olanzapine didn't help at all, Trazodone didn't help at all, Hydroxyzine didn't help at all, Doxepin) to help with insomnia, without long-term improvement in her sleep.  It seems as if a medication may help for a few weeks then it loses its efficacy.  She does try to adhere to a strict sleep-wake schedule.  She is going to bed around [id number] and she is very rarely asleep before 0100.  She wakes up at 0530, on work days- sleeps later on days she is off work and states, \"I could sleep all day\".  She often oversleeps through her alarms and this affects her day to day life negatively.  Her mother has to get her out of bed every day and she feels she can't be dependent on her mother to wake her up for the rest of her life.  She denies illegal drug use or marijuana use.  Drinks alcohol rarely.    *PSG, on 2025, was negative for significant DALJIT with an AHI of 0.2/hour; MSLT, on 2025, was not suggestive of narcolepsy. -    Following taken from previous visit note on 2025:  Ruby Ramos is a 24 y.o. female who is here today to discuss recent MRI results and was seen on 2025 for a sleep consult.  She has a PSG/MSLT scheduled later this month.  She had a cervical spine MRI on 2025 and it showed low-lying cerebellar " tonsils, so an MRI brain was ordered for further evaluation.  MRI brain with and without contrast, on 3/24/2025, was unremarkable.  She asks if she can take her muscle relaxer on the night of her PSG because she has significant problems initiating and maintaining sleep.  She asks how long she needs to be off the Adderall before the sleep study (just started that back last week).  She mentions she has never been a good sleeper and has always had problems falling asleep at night.  She has frequent headaches and asks what could be causing those if it isn't Chiari malformation.     Subjective      Review of Systems:   Review of Systems   Psychiatric/Behavioral:  Positive for sleep disturbance.        I have reviewed and the following portions of the patient's history were updated as appropriate: past family history, past medical history, past social history, past surgical history and problem list.    Medications:     Current Outpatient Medications:     amphetamine-dextroamphetamine XR (ADDERALL XR) 10 MG 24 hr capsule, Take 1 capsule by mouth Every Morning, Disp: 30 capsule, Rfl: 0    Continuous Glucose  (Dexcom G7 ) device, Use 1 each See Admin Instructions., Disp: 1 each, Rfl: 0    Continuous Glucose Sensor (Dexcom G7 Sensor) misc, Use 1 each Every 10 (Ten) Days., Disp: 9 each, Rfl: 3    desvenlafaxine (PRISTIQ) 50 MG 24 hr tablet, Take 1 tablet by mouth Daily., Disp: 30 tablet, Rfl: 2    Diclofenac Sodium (VOLTAREN) 1 % gel gel, Apply 4 g topically to the appropriate area as directed 4 (Four) Times a Day As Needed (pain)., Disp: 150 g, Rfl: 0    glucose blood test strip, Four times daily, Disp: 400 each, Rfl: 3    insulin aspart (NovoLOG FlexPen) 100 UNIT/ML solution pen-injector sc pen, 10-15 units three times daily before meals, MDD 45 units, Disp: 45 mL, Rfl: 1    Insulin Glargine (LANTUS SOLOSTAR) 100 UNIT/ML injection pen, Inject 18 Units under the skin into the appropriate area as directed  "Daily. Titrate up to MDD 30 units if needed, Disp: 30 mL, Rfl: 1    Insulin Pen Needle 32G X 4 MM misc, Use 1 each 4 (Four) Times a Day., Disp: 400 each, Rfl: 3    l-methylfolate calcium (DEPLIN) 7.5 MG tablet tablet, Take 1 tablet by mouth Daily., Disp: , Rfl:     lamoTRIgine (LaMICtal) 150 MG tablet, Take 1 tablet by mouth every night at bedtime., Disp: 30 tablet, Rfl: 2    MAGNESIUM GLYCINATE PO, Take  by mouth., Disp: , Rfl:     multivitamin with minerals tablet tablet, Take 1 tablet by mouth Daily., Disp: , Rfl:     SUMAtriptan (IMITREX) 100 MG tablet, Take 1 tablet by mouth Every 2 (Two) Hours As Needed for Migraine. Take one tablet at onset of headache. May repeat dose one time in 2 hours if headache not relieved., Disp: , Rfl:     tiZANidine (ZANAFLEX) 4 MG tablet, Take 1 tablet by mouth At Night As Needed for Muscle Spasms., Disp: 30 tablet, Rfl: 1    True Comfort Twist Top Lancets [facility], 1 Device 4 (Four) Times a Day., Disp: 400 each, Rfl: 3    Allergies:   Allergies   Allergen Reactions    Gluten Meal Other (See Comments)     Celiac disease    Mucinex [Guaifenesin Er] Nausea Only and Dizziness       Objective     Physical Exam:  Vital Signs:   Vitals:    05/05/25 1503   BP: 123/94   BP Location: Left arm   Patient Position: Sitting   Cuff Size: Adult   Pulse: 108   Temp: 98.4 °F (36.9 °C)   SpO2: 98%   Weight: 96.2 kg (212 lb)   Height: 157.5 cm (62\")   PainSc: 0-No pain     Body mass index is 38.78 kg/m².    Physical Exam  Vitals and nursing note reviewed.   Constitutional:       General: She is not in acute distress.     Appearance: Normal appearance. She is well-developed. She is obese. She is not diaphoretic.   HENT:      Head: Normocephalic and atraumatic.   Eyes:      Extraocular Movements: Extraocular movements intact.      Conjunctiva/sclera: Conjunctivae normal.   Pulmonary:      Effort: Pulmonary effort is normal. No respiratory distress.   Musculoskeletal:         General: Normal range of " motion.   Skin:     General: Skin is warm and dry.   Neurological:      Mental Status: She is alert and oriented to person, place, and time.   Psychiatric:         Mood and Affect: Mood normal.         Behavior: Behavior normal.         Thought Content: Thought content normal.         Judgment: Judgment normal.         Neurological Exam  Mental Status  Alert. Oriented to person, place, and time.    Cranial Nerves  CN III, IV, VI: Extraocular movements intact bilaterally.        Assessment / Plan      Assessment/Plan:   Diagnoses and all orders for this visit:    1. Insomnia, unspecified type (Primary)    2. Excessive daytime sleepiness    3. Restless sleeper    *May consider a trial of Ambien 5mg QHS if okay with psychiatric provider. Indications and possible SEs of Ambien discussed with patient. CANDELARIA signed and Levi reviewed, in case Ambien prescription sent to pharmacy.      Follow Up:   Return in about 3 months (around 8/5/2025) for Follow Up.    SONIYA Carrera, FNP-C  [facility]

## 2025-05-07 ENCOUNTER — HOSPITAL ENCOUNTER (OUTPATIENT)
Dept: DIABETES SERVICES | Facility: HOSPITAL | Age: 25
Discharge: HOME | End: 2025-05-07
Payer: MEDICAID

## 2025-05-07 NOTE — CONSULTS
Patient attended the scheduled 30 minute diabetes education f/u phone call. Please see media tab for assessment and notes if you use EP[employer]. If you are not an EP[employer] user a copy of patient's assessment and notes will be sent per routine. Thank you.

## 2025-05-12 NOTE — PROGRESS NOTES
"Chief Complaint  Bipolar I disorder, ADHD, anxiety, insomnia, bereavement, and alcohol use disorder, moderate, in sustained remission      Subjective          Ruby Ramos presents via secure video through [employer] by herself for a hospital follow up and medication check.    History of Present Illness: Ruby states, \"I have been good.\" Ruby tells me that she has been doing well with the addition of Adderall XR back to her medication regiment. She is anxious in the late afternoon when medication wears off and she has somewhat of a \"crash\" too. She is still having trouble with sleep and states, \"it is so unpredictable.\" She has started to work on sleep hygiene measures, but still struggles to fall asleep for hours after laying in bed and then may only get 5.5 hours on work nights. She is constantly tired and can oversleep if not woken by her mother. She feels anxious in the evenings worrying about sleeping, but does not have anxious thoughts running through her mind at night. She stays asleep once she is asleep. She may nap after work. She completed a sleep study with neurology and was diagnosed with idiopathic hypersomnia, according to the patient. She discussed with provider the possibility of using a hypnotic like Ambien for sleep. She has considered trying this since she finds insomnia triggers depressive episodes for her. She denies any problems with appetite. She reports compliance with her psychiatric medication. She is taking lamotrigine and Pristiq. She denies any side effects. She denies any SI/HI/AVH.    Current Medications:   Current Outpatient Medications   Medication Sig Dispense Refill    Continuous Glucose  (Dexcom G7 ) device Use 1 each See Admin Instructions. 1 each 0    Continuous Glucose Sensor (Dexcom G7 Sensor) misc Use 1 each Every 10 (Ten) Days. 9 each 3    desvenlafaxine (PRISTIQ) 50 MG 24 hr tablet Take 1 tablet by mouth Daily. 30 tablet 2    Diclofenac Sodium (VOLTAREN) 1 % " gel gel Apply 4 g topically to the appropriate area as directed 4 (Four) Times a Day As Needed (pain). 150 g 0    glucose blood test strip Four times daily 400 each 3    insulin aspart (NovoLOG FlexPen) 100 UNIT/ML solution pen-injector sc pen 10-15 units three times daily before meals, MDD 45 units 45 mL 1    Insulin Glargine (LANTUS SOLOSTAR) 100 UNIT/ML injection pen Inject 18 Units under the skin into the appropriate area as directed Daily. Titrate up to MDD 30 units if needed 30 mL 1    Insulin Pen Needle 32G X 4 MM misc Use 1 each 4 (Four) Times a Day. 400 each 3    l-methylfolate calcium (DEPLIN) 7.5 MG tablet tablet Take 1 tablet by mouth Daily.      lamoTRIgine (LaMICtal) 150 MG tablet Take 1 tablet by mouth every night at bedtime. 30 tablet 2    MAGNESIUM GLYCINATE PO Take  by mouth.      multivitamin with minerals tablet tablet Take 1 tablet by mouth Daily.      SUMAtriptan (IMITREX) 100 MG tablet Take 1 tablet by mouth Every 2 (Two) Hours As Needed for Migraine. Take one tablet at onset of headache. May repeat dose one time in 2 hours if headache not relieved.      tiZANidine (ZANAFLEX) 4 MG tablet Take 1 tablet by mouth At Night As Needed for Muscle Spasms. 30 tablet 1    True Comfort Twist Top Lancets misc 1 Device 4 (Four) Times a Day. 400 each 3    amphetamine-dextroamphetamine XR (ADDERALL XR) 15 MG 24 hr capsule Take 1 capsule by mouth Every Morning 30 capsule 0     No current facility-administered medications for this visit.         Objective   Vital Signs:   There were no vitals taken for this visit.    Physical Exam  Nursing note reviewed. Vitals reviewed: No vitals due to the nature of a telehealth visit.  Constitutional:       Appearance: Normal appearance. She is well-developed and normal weight.   Neurological:      General: No focal deficit present.      Mental Status: She is alert and oriented to person, place, and time.   Psychiatric:         Attention and Perception: Attention normal.          Mood and Affect: Mood and affect normal.         Speech: Speech normal.         Behavior: Behavior normal. Behavior is cooperative.         Thought Content: Thought content normal.         Cognition and Memory: Cognition normal.         Judgment: Judgment normal. Judgment is impulsive (improved) and inappropriate (improved).      Comments: Impaired concentration        Result Review :     The following data was reviewed by: SONIYA Jay on 05/14/2025:    Polysomnography 4 or more parameters (04/18/2025 06:03)  Multiple sleep latency test without CPAP (04/18/2025 06:03)  MRI Brain With & Without Contrast (03/24/2025 11:16)   Office Visit with Kerry Lake APRN (05/05/2025)   Office Visit with Kerry Lake APRN (04/07/2025)   TELEHEALTH DIABETES MANAGEMENT 2 HR IND (04/03/2025)     Assessment and Plan    Diagnoses and all orders for this visit:    1. Attention deficit hyperactivity disorder, combined type (Primary)  -     amphetamine-dextroamphetamine XR (ADDERALL XR) 15 MG 24 hr capsule; Take 1 capsule by mouth Every Morning  Dispense: 30 capsule; Refill: 0    2. Bipolar I disorder, most recent episode depressed    3. Generalized anxiety disorder    4. Other insomnia    5. Alcohol use disorder, moderate, dependence  Comments:  In sustained remission                Mental Status Exam:   Hygiene:   good  Cooperation:  Cooperative  Eye Contact:  Good  Psychomotor Behavior:  Appropriate  Affect:  Appropriate  Mood: normal  Speech:  Normal  Thought Process:  Goal directed and Linear  Thought Content:  Normal  Suicidal:  None  Homicidal:  None  Hallucinations:  None  Delusion:  None  Memory:  Intact  Orientation:  Person, Place, Time and Situation  Reliability:  fair  Insight:  Fair  Judgement:  Fair  Impulse Control:  Fair  Physical/Medical Issues:  Yes Diabetes, celiac disease, headaches, back problems, hernia, and hair loss       PHQ-9 Score:   PHQ-9 Total Score:  (Patient-Rptd) 12     Impression/Plan:  -This is a hospital follow up and medication check. Ruby reports some improved symptoms of executive dysfunction since resuming ADHD treatment. However, she is more anxious after medication wears off and she is still having problems with sleep. She is constantly tired throughout the day, but then cannot fall asleep at night for hours. She wakes early and may only get 5.5 hours, so she oversleeps and risks being late for work. She also naps if possible after work. Today, we talked about her neurology evaluation and results of sleep study. We discussed the importance of strict sleep hygiene measures, but also allowing herself to sleep for 10-20 minutes after work to help alleviate the anxiety associated with not sleeping well at night. Neurology is considering using a hypnotic like Ambien for sleep, so we discussed the risks versus benefits, pros and cons, as well as the potential for addiction and dependency when using a stimulant to keep awake in the day and a medication to sleep at night. I also suggested an alternative like Lunesta if absolutely necessary due to lower risk for abnormal sleep behaviors with Ambien. She meets with neurology on 5/20/25. She is pleased with medication and wants to resume therapy for CBT to treat insomnia and EMDR to help anxiety. She agrees to a dose adjustment of Adderall XR to assess benefits on focus, concentration, anxiety, and wakefulness. Patient is aware of change in provider's location and agrees to meeting with Tianna Garcia at follow up.   -Increase Adderall XR to 15 mg daily for ADHD.   -Continue Pristiq 50 mg daily for depression and anxiety.  Patient has refills.  -Continue lamotrigine 150 mg nightly for bipolar.   Patient has refills.  -Continue therapy with Bo.  -The JANI report, reviewed through PDMP, of the past 12 months were reviewed and is appropriate.  The patient/guardian reports taking the medication only as  prescribed.  The patient/guardian denies any abuse or misuse of the medication.  The patient/guardian denies any other substance use or issues.  There are no apparent substance related issues.  The patient reports no side effects of the current medication usage.  The patient/guardian has reported significant improvement with medication usage and wishes to continue medication as prescribed.  The patient/guardian is appropriate to continue with current medication usage at this time.  Reinforced risks and side effects of medication usage, patient and/or guardian verbalize understanding in their own words and are in agreement with current plan.  -Last UDS 04/18/25. Appropriate.  -Consider [email or web address].sh for DBT therapy  -Try measures like Cognitive shuffling for sleep.   -Returned message to Kerry Lake in regard to daytime sleepiness and insomnia concerns.        MEDS ORDERED DURING VISIT:  New Medications Ordered This Visit   Medications    amphetamine-dextroamphetamine XR (ADDERALL XR) 15 MG 24 hr capsule     Sig: Take 1 capsule by mouth Every Morning     Dispense:  30 capsule     Refill:  0     I spent 40 minutes caring for Ruby on this date of service. This time includes time spent by me in the following activities:preparing for the visit, reviewing tests, obtaining and/or reviewing a separately obtained history, performing a medically appropriate examination and/or evaluation , counseling and educating the patient/family/caregiver, ordering medications, tests, or procedures, referring and communicating with other health care professionals , documenting information in the medical record, and care coordination    Follow Up   Return in about 2 months (around 7/14/2025) for Medication Check.  Patient was given instructions and counseling regarding her condition or for health maintenance advice. Please see specific information pulled into the AVS if appropriate.       TREATMENT PLAN/GOALS: Continue supportive  psychotherapy efforts and medications as indicated. Treatment and medication options discussed during today's visit. Patient acknowledged and verbally consented to continue with current treatment plan and was educated on the importance of compliance with treatment and follow-up appointments.    MEDICATION ISSUES:  Discussed medication options and treatment plan of prescribed medication as well as the risks, benefits, and side effects including potential falls, possible impaired driving and metabolic adversities among others. Patient is agreeable to call the office with any worsening of symptoms or onset of side effects. Patient is agreeable to call 911 or go to the nearest ER should he/she begin having SI/HI.        This document has been electronically signed by SONIYA Valadez, PMHNP-BC  May 14, 2025 20:47 EDT    Part of this note may be an electronic transcription/translation of spoken language to printed text using the Dragon Dictation System.

## 2025-05-14 ENCOUNTER — TELEMEDICINE (OUTPATIENT)
Dept: PSYCHIATRY | Facility: CLINIC | Age: 25
End: 2025-05-14
Payer: MEDICAID

## 2025-05-14 DIAGNOSIS — F10.20 ALCOHOL USE DISORDER, MODERATE, DEPENDENCE: Chronic | ICD-10-CM

## 2025-05-14 DIAGNOSIS — F31.30 BIPOLAR I DISORDER, MOST RECENT EPISODE DEPRESSED: Chronic | ICD-10-CM

## 2025-05-14 DIAGNOSIS — G47.09 OTHER INSOMNIA: Chronic | ICD-10-CM

## 2025-05-14 DIAGNOSIS — F90.2 ATTENTION DEFICIT HYPERACTIVITY DISORDER, COMBINED TYPE: Primary | Chronic | ICD-10-CM

## 2025-05-14 DIAGNOSIS — F41.1 GENERALIZED ANXIETY DISORDER: Chronic | ICD-10-CM

## 2025-05-14 RX ORDER — DEXTROAMPHETAMINE SACCHARATE, AMPHETAMINE ASPARTATE MONOHYDRATE, DEXTROAMPHETAMINE SULFATE AND AMPHETAMINE SULFATE 3.75; 3.75; 3.75; 3.75 MG/1; MG/1; MG/1; MG/1
15 CAPSULE, EXTENDED RELEASE ORAL EVERY MORNING
Qty: 30 CAPSULE | Refills: 0 | Status: SHIPPED | OUTPATIENT
Start: 2025-05-14

## 2025-05-27 DIAGNOSIS — F41.1 GENERALIZED ANXIETY DISORDER: Chronic | ICD-10-CM

## 2025-05-27 DIAGNOSIS — F31.30 BIPOLAR I DISORDER, MOST RECENT EPISODE DEPRESSED: Chronic | ICD-10-CM

## 2025-05-27 DIAGNOSIS — E10.65 TYPE 1 DIABETES MELLITUS WITH HYPERGLYCEMIA: ICD-10-CM

## 2025-05-27 RX ORDER — DESVENLAFAXINE 50 MG/1
50 TABLET, FILM COATED, EXTENDED RELEASE ORAL DAILY
Qty: 30 TABLET | Refills: 0 | Status: SHIPPED | OUTPATIENT
Start: 2025-05-27

## 2025-05-28 RX ORDER — INSULIN ASPART 100 [IU]/ML
INJECTION, SOLUTION INTRAVENOUS; SUBCUTANEOUS
Qty: 45 ML | Refills: 1 | Status: SHIPPED | OUTPATIENT
Start: 2025-05-28

## 2025-05-28 RX ORDER — SUMATRIPTAN SUCCINATE 100 MG/1
100 TABLET ORAL
Qty: 10 TABLET | Refills: 3 | Status: SHIPPED | OUTPATIENT
Start: 2025-05-28

## 2025-05-28 RX ORDER — ACYCLOVIR 400 MG/1
1 TABLET ORAL
Qty: 9 EACH | Refills: 1 | Status: SHIPPED | OUTPATIENT
Start: 2025-05-28

## 2025-05-28 NOTE — TELEPHONE ENCOUNTER
Rx Refill Note  Requested Prescriptions     Pending Prescriptions Disp Refills    SUMAtriptan (IMITREX) 100 MG tablet       Sig: Take 1 tablet by mouth Every 2 (Two) Hours As Needed for Migraine. Take one tablet at onset of headache. May repeat dose one time in 2 hours if headache not relieved.      Last office visit with prescribing clinician: 1/3/2025   Last telemedicine visit with prescribing clinician: Visit date not found   Next office visit with prescribing clinician: 1/6/2026                         Would you like a call back once the refill request has been completed: [] Yes [] No    If the office needs to give you a call back, can they leave a voicemail: [] Yes [] No    Roselyn Phelps MA  05/28/25, 10:35 EDT

## 2025-05-28 NOTE — TELEPHONE ENCOUNTER
Rx Refill Note  Requested Prescriptions     Pending Prescriptions Disp Refills    Continuous Glucose Sensor (Dexcom G7 Sensor) misc 9 each 3     Sig: Use 1 each Every 10 (Ten) Days.    insulin aspart (NovoLOG FlexPen) 100 UNIT/ML solution pen-injector sc pen 45 mL 1     Sig: 10-15 units three times daily before meals, MDD 45 units      Last office visit with prescribing clinician: 1/15/2025     Next office visit with prescribing clinician: 10/13/2025

## 2025-06-06 ENCOUNTER — OFFICE VISIT (OUTPATIENT)
Dept: INTERNAL MEDICINE | Facility: CLINIC | Age: 25
End: 2025-06-06
Payer: MEDICAID

## 2025-06-06 VITALS
HEART RATE: 99 BPM | TEMPERATURE: 98.8 F | HEIGHT: 62 IN | RESPIRATION RATE: 20 BRPM | OXYGEN SATURATION: 100 % | DIASTOLIC BLOOD PRESSURE: 71 MMHG | BODY MASS INDEX: 38.28 KG/M2 | WEIGHT: 208 LBS | SYSTOLIC BLOOD PRESSURE: 120 MMHG

## 2025-06-06 DIAGNOSIS — K21.9 GASTROESOPHAGEAL REFLUX DISEASE WITHOUT ESOPHAGITIS: ICD-10-CM

## 2025-06-06 DIAGNOSIS — R19.7 DIARRHEA, UNSPECIFIED TYPE: ICD-10-CM

## 2025-06-06 DIAGNOSIS — E10.9 TYPE 1 DIABETES MELLITUS WITHOUT COMPLICATION: Primary | ICD-10-CM

## 2025-06-06 LAB
BILIRUB BLD-MCNC: NEGATIVE MG/DL
CLARITY, POC: CLEAR
COLOR UR: YELLOW
EXPIRATION DATE: ABNORMAL
EXPIRATION DATE: NORMAL
GLUCOSE BLDC GLUCOMTR-MCNC: 268 MG/DL (ref 70–130)
GLUCOSE UR STRIP-MCNC: ABNORMAL MG/DL
KETONES UR QL: NEGATIVE
LEUKOCYTE EST, POC: NEGATIVE
Lab: ABNORMAL
Lab: NORMAL
NITRITE UR-MCNC: NEGATIVE MG/ML
PH UR: 6 [PH] (ref 5–8)
POC ALBUMIN, URINE: 10 MG/L
POC CREATININE, URINE: 100 MG/DL
POC URINE ALB/CREA RATIO: >30
PROT UR STRIP-MCNC: NEGATIVE MG/DL
RBC # UR STRIP: NEGATIVE /UL
SP GR UR: 1.02 (ref 1–1.03)
UROBILINOGEN UR QL: NORMAL

## 2025-06-06 RX ORDER — OMEPRAZOLE 40 MG/1
40 CAPSULE, DELAYED RELEASE ORAL DAILY
Qty: 14 CAPSULE | Refills: 0 | Status: SHIPPED | OUTPATIENT
Start: 2025-06-06

## 2025-06-06 NOTE — PROGRESS NOTES
"  Office Visit      Patient Name: Ruby Ramos  : 2000   MRN: [id number]   Care Team: Patient Care Team:  Macrina Godinez APRN as PCP - General (Family Medicine)  Reid Garcia MD as Consulting Physician (General Surgery)  Ruby Conde LPCC as Counselor (Behavioral Health)  Beth House APRN as Nurse Practitioner (Behavioral Health)  Ellen Bowens APRN as Nurse Practitioner (Nurse Practitioner)    Chief Complaint  Diabetes (Type 1 showing symptoms dry mouth, nausea, excess thirst )    Subjective     Subjective      Ruby Ramos presents to [facility] for diarrhea.   Symptoms have been waxing and waning for about 2 weeks.   Endorses abdominal pain and cramping, nausea, diarrhea (no formed stools), and indigestion.   Denies hematochezia, decreased appetite, vomiting, fever.   No current abdominal pain. She has intermittent RUQ pain without significant known triggers.   Has tried increasing fiber in her diet in the last 2 months but unsure if this is related.   She takes TUMS intermittently and does help some.   No recent travel, hospitalization, no exposure to c.diff, and no household contacts with similar symptoms. No new medications.   She is not sexually active, denies chance of pregnancy, LMP 2 weeks ago.     She has had dry mouth over the last 2 days with increased thirst which concerned her for DKA. A few glucose over 400, uses CGM. Type 1 diabetic managed by endocrine.   Lab Results   Component Value Date    HGBA1C 6.2 (A) 01/15/2025   Clarity reading average glucose at 169.  She has known celiac disease, no known ingestion of gluten.     Objective     Objective   Vital Signs:   /71   Pulse 99   Temp 98.8 °F (37.1 °C)   Resp 20   Ht 157.5 cm (62.01\")   Wt 94.3 kg (208 lb)   SpO2 100%   BMI 38.03 kg/m²     Physical Exam  Vitals and nursing note reviewed.   Constitutional:       General: She is not in acute distress.     " Appearance: Normal appearance. She is obese. She is not ill-appearing or toxic-appearing.   Eyes:      Pupils: Pupils are equal, round, and reactive to light.   Cardiovascular:      Rate and Rhythm: Normal rate and regular rhythm.      Heart sounds: Normal heart sounds. No murmur heard.  Pulmonary:      Effort: Pulmonary effort is normal. No respiratory distress.      Breath sounds: Normal breath sounds. No wheezing.   Abdominal:      General: Bowel sounds are normal. There is no distension.      Palpations: Abdomen is soft.      Tenderness: There is no abdominal tenderness. There is no guarding.   Skin:     General: Skin is warm and dry.      Findings: No rash.   Neurological:      General: No focal deficit present.      Mental Status: She is alert.   Psychiatric:         Mood and Affect: Mood normal.         Behavior: Behavior normal.          Assessment / Plan      Assessment & Plan   Problem List Items Addressed This Visit       Type 1 diabetes mellitus without complication - Primary    Overview   Diagnosed 1/2021, positive antibodies, DKA at presentation         Relevant Orders    POC Albumin/Creatinine Ratio Urine (Completed)    POCT urinalysis dipstick, automated (Completed)    POCT Glucose (Completed)    CBC No Differential    Comprehensive metabolic panel    Sedimentation Rate    Glucose upper 200's, no ketones on UA, she is non-toxic. I have low suspicion for acute DKA. Discussed warning signs to go to the ER with. Recommend continuous glucose monitoring and continue insulin as prescribed.     Gastroesophageal reflux disease without esophagitis    Overview   Added automatically from request for surgery [id number]         Relevant Medications    omeprazole (priLOSEC) 40 MG capsule    Could be contributing to several of her symptoms. Recommend 2 week trial of omeprazole and follow-up in clinic in 4 weeks. TUMS PRN. Dietary modification encouraged.      Other Visit Diagnoses         Diarrhea, unspecified type         Relevant Orders    CBC No Differential    Comprehensive metabolic panel    Sedimentation Rate    Likely viral cause, check above labs. Recommend hydration and bland foods. Can use imodium after 5 days if needed. Discussed red flags to be re-evaluated with.               Follow Up   Return in about 4 weeks (around 7/4/2025) for Next scheduled follow up.  Patient was given instructions and counseling regarding her condition or for health maintenance advice. Please see specific information pulled into the AVS if appropriate.     SONIYA Gasca  [facility]

## 2025-06-09 DIAGNOSIS — F31.30 BIPOLAR I DISORDER, MOST RECENT EPISODE DEPRESSED: Chronic | ICD-10-CM

## 2025-06-09 DIAGNOSIS — E10.65 TYPE 1 DIABETES MELLITUS WITH HYPERGLYCEMIA: ICD-10-CM

## 2025-06-09 RX ORDER — LAMOTRIGINE 150 MG/1
150 TABLET ORAL
Qty: 30 TABLET | Refills: 2 | Status: SHIPPED | OUTPATIENT
Start: 2025-06-09

## 2025-06-09 NOTE — TELEPHONE ENCOUNTER
Patient sent request for refill thru MyChart     Sent 3/27/25 with 2 refills.  Follow up appointment with Tianna BROUSSARD 7/15/25

## 2025-06-10 RX ORDER — ACYCLOVIR 400 MG/1
1 TABLET ORAL
Qty: 9 EACH | Refills: 1 | Status: SHIPPED | OUTPATIENT
Start: 2025-06-10

## 2025-06-10 NOTE — TELEPHONE ENCOUNTER
Rx Refill Note  Requested Prescriptions     Pending Prescriptions Disp Refills    Insulin Glargine (LANTUS SOLOSTAR) 100 UNIT/ML injection pen 30 mL 1     Sig: Inject 18 Units under the skin into the appropriate area as directed Daily. Titrate up to MDD 30 units if needed    Continuous Glucose Sensor (Dexcom G7 Sensor) misc 9 each 1     Sig: Use 1 each Every 10 (Ten) Days.          Last office visit with prescribing clinician: 1/15/2025     Next office visit with prescribing clinician: 10/13/2025         Jaqui Pearce MA  06/10/25, 10:41 EDT

## 2025-06-30 DIAGNOSIS — F41.1 GENERALIZED ANXIETY DISORDER: Chronic | ICD-10-CM

## 2025-06-30 DIAGNOSIS — F31.30 BIPOLAR I DISORDER, MOST RECENT EPISODE DEPRESSED: Chronic | ICD-10-CM

## 2025-07-01 DIAGNOSIS — F31.30 BIPOLAR I DISORDER, MOST RECENT EPISODE DEPRESSED: Chronic | ICD-10-CM

## 2025-07-01 RX ORDER — LAMOTRIGINE 150 MG/1
150 TABLET ORAL
Qty: 30 TABLET | Refills: 2 | OUTPATIENT
Start: 2025-07-01

## 2025-07-01 RX ORDER — DESVENLAFAXINE 50 MG/1
50 TABLET, FILM COATED, EXTENDED RELEASE ORAL DAILY
Qty: 30 TABLET | Refills: 0 | Status: SHIPPED | OUTPATIENT
Start: 2025-07-01

## 2025-07-07 DIAGNOSIS — F90.2 ATTENTION DEFICIT HYPERACTIVITY DISORDER, COMBINED TYPE: Chronic | ICD-10-CM

## 2025-07-07 RX ORDER — DEXTROAMPHETAMINE SACCHARATE, AMPHETAMINE ASPARTATE MONOHYDRATE, DEXTROAMPHETAMINE SULFATE AND AMPHETAMINE SULFATE 3.75; 3.75; 3.75; 3.75 MG/1; MG/1; MG/1; MG/1
15 CAPSULE, EXTENDED RELEASE ORAL EVERY MORNING
Qty: 30 CAPSULE | Refills: 0 | Status: SHIPPED | OUTPATIENT
Start: 2025-07-07

## 2025-07-07 NOTE — TELEPHONE ENCOUNTER
Rx Refill Note  Requested Prescriptions     Pending Prescriptions Disp Refills    amphetamine-dextroamphetamine XR (ADDERALL XR) 15 MG 24 hr capsule 30 capsule 0     Sig: Take 1 capsule by mouth Every Morning      Last office visit with prescribing clinician: Visit date not found   Last telemedicine visit with prescribing clinician: 5/14/2025   Next office visit with prescribing clinician: 7/15/2025                         Would you like a call back once the refill request has been completed: [] Yes [] No    If the office needs to give you a call back, can they leave a voicemail: [] Yes [] No    Renaldo Patel MA  07/07/25, 13:49 EDT

## 2025-07-11 NOTE — TELEPHONE ENCOUNTER
Rx Refill Note  Requested Prescriptions     Pending Prescriptions Disp Refills    tiZANidine (ZANAFLEX) 4 MG tablet 30 tablet 1     Sig: Take 1 tablet by mouth At Night As Needed for Muscle Spasms.      Last office visit with prescribing clinician: 6/6/2025   Last telemedicine visit with prescribing clinician: Visit date not found   Next office visit with prescribing clinician: 1/6/2026                         Michelle Calloway MA  07/11/25, 15:33 EDT

## 2025-07-14 ENCOUNTER — OFFICE VISIT (OUTPATIENT)
Dept: PSYCHIATRY | Facility: CLINIC | Age: 25
End: 2025-07-14
Payer: MEDICAID

## 2025-07-14 DIAGNOSIS — F31.30 BIPOLAR I DISORDER, MOST RECENT EPISODE DEPRESSED: Primary | ICD-10-CM

## 2025-07-14 DIAGNOSIS — F90.2 ATTENTION DEFICIT HYPERACTIVITY DISORDER, COMBINED TYPE: ICD-10-CM

## 2025-07-14 PROCEDURE — 1159F MED LIST DOCD IN RCRD: CPT | Performed by: COUNSELOR

## 2025-07-14 PROCEDURE — 1160F RVW MEDS BY RX/DR IN RCRD: CPT | Performed by: COUNSELOR

## 2025-07-14 PROCEDURE — 90837 PSYTX W PT 60 MINUTES: CPT | Performed by: COUNSELOR

## 2025-07-14 NOTE — PROGRESS NOTES
Date:2025   Patient Name: Ruby Ramos  : 2000   MRN: [id number]   Time IN: 12:27    Time OUT: 1:25     Referring Provider: Macrina Godinez APRN    PROGRESS NOTE    History of Present Illness:   Ruby Ramos is a 25 y.o. female who is being seen today for follow up individual Psychotherapy session.     Chief Complaint: Patient arrived at the [facility] for a therapy appointment.  Patient expressed difficulties with    ICD-10-CM ICD-9-CM   1. Bipolar I disorder, most recent episode depressed  F31.30 296.50   2. Attention deficit hyperactivity disorder, combined type  F90.2 314.01        Clinical Maneuvering/Intervention: CBT techniques to assist with bipolar 1 disorder and ADHD    Assisted patient in processing above session content; acknowledged and normalized patient’s thoughts, feelings, and concerns to build appropriate rapport and a positive therapeutic relationship with open and honest communication.  Rationalized patient thought process regarding thoughts and behavior.  Discussed triggers associated with patient's thoughts and behavior.  Also discussed coping skills for patient to implement such as mindful breathing, taking breaks, reaching out to family members, and podcasts.    Allowed patient to freely discuss issues without interruption or judgment. Provided safe, confidential environment to facilitate the development of positive therapeutic relationship and encourage open, honest communication. Assisted patient in identifying risk factors which would indicate the need for higher level of care including thoughts to harm self or others and/or self-harming behavior and encouraged patient to contact this office, call 911, or present to the nearest emergency room should any of these events occur. Discussed crisis intervention services and means to access. Patient adamantly and convincingly denies current suicidal or homicidal ideation or perceptual disturbance.    Assessment Scores:    PHQ-9 Total Score:     SILVIANO-7 Score:    PTSD Total Score: .PTSDTOTALSCORE    (Scales based on 0 - 10 with 10 being the worst)  Depression: 3 Anxiety 7       Mental Status Exam:   Hygiene:   good  Cooperation:  Cooperative  Eye Contact:  Good  Psychomotor Behavior:  Restless  Affect:  Appropriate  Mood: anxious  Speech:  Normal  Thought Process:  Linear  Thought Content:  Mood congruent  Suicidal:  None  Homicidal:  None  Hallucinations:  None  Delusion:  None  Memory:  Intact  Orientation:  Person, Place, Time, and Situation  Reliability:  fair  Insight:  Fair  Judgement:  Fair  Impulse Control:  Fair  Physical/Medical Issues:  No      Patient's Support Network Includes:  extended family    Functional Status: Moderate impairment     Progress toward goal: At goal    Prognosis: Good with Ongoing Treatment     Medications:     Current Outpatient Medications:     amphetamine-dextroamphetamine XR (ADDERALL XR) 15 MG 24 hr capsule, Take 1 capsule by mouth Every Morning, Disp: 30 capsule, Rfl: 0    Continuous Glucose  (Dexcom G7 ) device, Use 1 each See Admin Instructions., Disp: 1 each, Rfl: 0    Continuous Glucose Sensor (Dexcom G7 Sensor) misc, Use 1 each Every 10 (Ten) Days., Disp: 9 each, Rfl: 1    desvenlafaxine (PRISTIQ) 50 MG 24 hr tablet, TAKE 1 TABLET BY MOUTH EVERY DAY, Disp: 30 tablet, Rfl: 0    Diclofenac Sodium (VOLTAREN) 1 % gel gel, Apply 4 g topically to the appropriate area as directed 4 (Four) Times a Day As Needed (pain)., Disp: 150 g, Rfl: 0    glucose blood test strip, Four times daily, Disp: 400 each, Rfl: 3    insulin aspart (NovoLOG FlexPen) 100 UNIT/ML solution pen-injector sc pen, 10-15 units three times daily before meals, MDD 45 units, Disp: 45 mL, Rfl: 1    Insulin Glargine (LANTUS SOLOSTAR) 100 UNIT/ML injection pen, Inject 18 Units under the skin into the appropriate area as directed Daily. Titrate up to MDD 30 units if needed, Disp: 30 mL, Rfl: 1    Insulin Pen Needle 32G X  4 MM misc, Use 1 each 4 (Four) Times a Day., Disp: 400 each, Rfl: 3    l-methylfolate calcium (DEPLIN) 7.5 MG tablet tablet, Take 1 tablet by mouth Daily., Disp: , Rfl:     lamoTRIgine (LaMICtal) 150 MG tablet, Take 1 tablet by mouth every night at bedtime., Disp: 30 tablet, Rfl: 2    MAGNESIUM GLYCINATE PO, Take  by mouth., Disp: , Rfl:     multivitamin with minerals tablet tablet, Take 1 tablet by mouth Daily., Disp: , Rfl:     omeprazole (priLOSEC) 40 MG capsule, Take 1 capsule by mouth Daily., Disp: 14 capsule, Rfl: 0    SUMAtriptan (IMITREX) 100 MG tablet, Take 1 tablet by mouth Every 2 (Two) Hours As Needed for Migraine. Take one tablet at onset of headache. May repeat dose one time in 2 hours if headache not relieved., Disp: 10 tablet, Rfl: 3    tiZANidine (ZANAFLEX) 4 MG tablet, Take 1 tablet by mouth At Night As Needed for Muscle Spasms., Disp: 30 tablet, Rfl: 1    True Comfort Twist Top Lancets misc, 1 Device 4 (Four) Times a Day., Disp: 400 each, Rfl: 3    Visit Diagnosis/Orders Placed This Visit:    ICD-10-CM ICD-9-CM   1. Bipolar I disorder, most recent episode depressed  F31.30 296.50   2. Attention deficit hyperactivity disorder, combined type  F90.2 314.01        PLAN:  Safety: No acute safety concerns  Risk Assessment: Risk of self-harm acutely is low. Risk of self-harm chronically is also low, but could be further elevated in the event of treatment noncompliance and/or AODA.    Crisis Plan:  Symptoms and/or behaviors to indicate a crisis: Excessive worry or fear, Prolonged irritability or anger, and Self-doubt    What calming techniques or other strategies will patient use to de-escalate and stay safe: slow down, breathe, visualize calming self, think it though, listen to music, change focus, take a walk    Who is one person patient can contact to assist with de-escalation? Brother    Treatment Plan/Goals: Patient will continue supportive psychotherapy efforts and medication regimen as prescribed.  Therapist will provide Cognitive Behavioral Therapy to assist patient in improving functioning and gaining coping skills, maintaining stability, and avoiding decompensation and the need for higher level of care. Plan for treatment was discussed during today's visit. Patient acknowledged and verbally consented to continue with current treatment plan and was educated on the importance of compliance with treatment and follow-up appointments.     Patient will contact this office, call 911 or present to the nearest emergency room should suicidal or homicidal ideations occur.     Follow Up:   No follow-ups on file.      JIM Vital   [facility]     This document has been electronically signed by JIM Vital   July 14, 2025 12:22 EDT

## 2025-07-17 ENCOUNTER — OFFICE VISIT (OUTPATIENT)
Dept: PSYCHIATRY | Facility: CLINIC | Age: 25
End: 2025-07-17
Payer: MEDICAID

## 2025-07-17 VITALS
SYSTOLIC BLOOD PRESSURE: 108 MMHG | WEIGHT: 205 LBS | HEART RATE: 92 BPM | HEIGHT: 62 IN | DIASTOLIC BLOOD PRESSURE: 76 MMHG | OXYGEN SATURATION: 99 % | BODY MASS INDEX: 37.73 KG/M2

## 2025-07-17 DIAGNOSIS — F90.2 ATTENTION DEFICIT HYPERACTIVITY DISORDER, COMBINED TYPE: Primary | ICD-10-CM

## 2025-07-17 DIAGNOSIS — F41.1 GENERALIZED ANXIETY DISORDER: ICD-10-CM

## 2025-07-17 DIAGNOSIS — G47.09 OTHER INSOMNIA: ICD-10-CM

## 2025-07-17 DIAGNOSIS — F31.30 BIPOLAR I DISORDER, MOST RECENT EPISODE DEPRESSED: ICD-10-CM

## 2025-07-17 RX ORDER — DEXTROAMPHETAMINE SACCHARATE, AMPHETAMINE ASPARTATE MONOHYDRATE, DEXTROAMPHETAMINE SULFATE AND AMPHETAMINE SULFATE 5; 5; 5; 5 MG/1; MG/1; MG/1; MG/1
20 CAPSULE, EXTENDED RELEASE ORAL EVERY MORNING
Qty: 30 CAPSULE | Refills: 0 | Status: SHIPPED | OUTPATIENT
Start: 2025-07-17

## 2025-07-17 NOTE — PROGRESS NOTES
"Subjective   Ruby Ramos is a 25 y.o. female who presents today for initial evaluation     Chief Complaint:  ADHD, anxiety and depression    History of Present Illness:   History of Present Illness  Ruby Ramos presents to [facility] for medication management. Previously seeing SONIYA Deras until provider recently transferred to a new location. Reports history of ADHD and Bipolar 1 disorder. Current medication regimen includes Pristiq 50 mg daily, Lamictal 150 mg daily and Adderall XR 15 mg daily. Voices that medication regimen is working well overall to control symptoms. Mood has been stable without any fluctuations. Experiences episodes of depression at times, feels that these episodes have been less frequent over the past several months. Anxiety has been slightly elevated, attributes most of this to starting EMDR.  Adderall dose was increased approximately 6 weeks ago and voices that she has noticed no significant improvement in symptoms. Says that she took medication for several years prior to stopping last year.  Reports that sleep can vary, has \"spells of insomnia\" along with difficulty initiating and maintaining sleep. Had recent sleep study and voices that she was diagnosed with idiopathic hypersomnia. Reports trying multiple sleep medications in the past without benefit. Denies any issues with appetite. Denies SI/HI. PHQ-9 total score: 16, SILVIANO-7 total score: 17.    Past Psychiatric History: Reports history of anxiety and depression, later diagnosed with ADHD at age 20. Voices that she was started on Adderall. She began to use alcohol around age 21 that worsened mood and resulted in manic behavior. Says that she quit her job during that time and spent excessive amount of money, this episode led to bipolar diagnosis. Stopped taking medications last year, admitted to the [facility] for mood stabilization and medications were restarted. Currently receiving EMDR with " Bo Corea, [facility].     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    Legal History: Denies    The following portions of the patient's history were reviewed and updated as appropriate: allergies, current medications, past family history, past medical history, past social history, past surgical history and problem list.    Past Medical History:   Diagnosis Date    ADHD (attention deficit hyperactivity disorder)     Anxiety     Bipolar disorder     Celiac disease     Diabetes mellitus     Diabetes mellitus type I     GERD (gastroesophageal reflux disease)     Homozygous MTHFR mutation [id number]      Social History     Socioeconomic History    Marital status: Single   Tobacco Use    Smoking status: Never     Passive exposure: Never    Smokeless tobacco: Never    Tobacco comments:     E-cig   Vaping Use    Vaping status: Every Day    Substances: Nicotine, Flavoring    Devices: Disposable    Passive vaping exposure: Yes   Substance and Sexual Activity    Alcohol use: Yes     Comment: occ    Drug use: Never    Sexual activity: Defer     Family History   Problem Relation Age of Onset    Hypertension Mother     Sleep apnea Mother     Hypertension Father     Diabetes Father     Narcolepsy Brother     Cancer Maternal Grandmother     Cancer Paternal Grandmother     Colon cancer Neg Hx      Past Surgical History:   Procedure Laterality Date    ENDOSCOPY      ENDOSCOPY N/A 4/14/2021    Procedure: ESOPHAGOGASTRODUODENOSCOPY WITH BIOPSY;  Surgeon: Haroon  Wolfgang RIVAS MD;  Location: [facility] ENDOSCOPY;  Service: Gastroenterology;  Laterality: N/A;    HERNIA REPAIR       Patient Active Problem List   Diagnosis    Type 1 diabetes mellitus without complication    Celiac disease    Gastroesophageal reflux disease without esophagitis    Attention deficit hyperactivity disorder, combined type    Generalized anxiety disorder    Tension headache    Chronic neck pain     Allergies   Allergen Reactions    Gluten Meal Other (See Comments)     Celiac disease    Mucinex [Guaifenesin Er] Nausea Only and Dizziness      Current Outpatient Medications   Medication Sig Dispense Refill    Continuous Glucose  (Dexcom G7 ) device Use 1 each See Admin Instructions. 1 each 0    Continuous Glucose Sensor (Dexcom G7 Sensor) misc Use 1 each Every 10 (Ten) Days. 9 each 1    glucose blood test strip Four times daily 400 each 3    insulin aspart (NovoLOG FlexPen) 100 UNIT/ML solution pen-injector sc pen 10-15 units three times daily before meals, MDD 45 units 45 mL 1    Insulin Glargine (LANTUS SOLOSTAR) 100 UNIT/ML injection pen Inject 18 Units under the skin into the appropriate area as directed Daily. Titrate up to MDD 30 units if needed 30 mL 1    Insulin Pen Needle 32G X 4 MM misc Use 1 each 4 (Four) Times a Day. 400 each 3    l-methylfolate calcium (DEPLIN) 7.5 MG tablet tablet Take 1 tablet by mouth Daily.      lamoTRIgine (LaMICtal) 150 MG tablet Take 1 tablet by mouth every night at bedtime. 30 tablet 2    MAGNESIUM GLYCINATE PO Take  by mouth.      multivitamin with minerals tablet tablet Take 1 tablet by mouth Daily.      omeprazole (priLOSEC) 40 MG capsule Take 1 capsule by mouth Daily. 14 capsule 0    SUMAtriptan (IMITREX) 100 MG tablet Take 1 tablet by mouth Every 2 (Two) Hours As Needed for Migraine. Take one tablet at onset of headache. May repeat dose one time in 2 hours if headache not relieved. 10 tablet 3    tiZANidine (ZANAFLEX) 4 MG tablet Take 1 tablet by  "mouth At Night As Needed for Muscle Spasms. 30 tablet 1    True Comfort Twist Top Lancets misc 1 Device 4 (Four) Times a Day. 400 each 3    amphetamine-dextroamphetamine XR (Adderall XR) 20 MG 24 hr capsule Take 1 capsule by mouth Every Morning 30 capsule 0    desvenlafaxine (PRISTIQ) 50 MG 24 hr tablet TAKE 1 TABLET BY MOUTH EVERY DAY 30 tablet 2     No current facility-administered medications for this visit.     Review of Systems   Constitutional:  Negative for activity change, appetite change, unexpected weight gain and unexpected weight loss.   Respiratory:  Negative for shortness of breath.    Cardiovascular:  Negative for chest pain.   Psychiatric/Behavioral:  Positive for decreased concentration and dysphoric mood. Negative for sleep disturbance and suicidal ideas. The patient is nervous/anxious.      Physical Exam  Vitals reviewed.   Constitutional:       General: She is not in acute distress.     Appearance: Normal appearance.   Neurological:      Mental Status: She is alert.      Gait: Gait normal.     Vitals:   Blood pressure 108/76, pulse 92, height 157.5 cm (62\"), weight 93 kg (205 lb), SpO2 99%, not currently breastfeeding.    Mental Status Exam:   Hygiene:   good  Cooperation:  Cooperative  Eye Contact:  Good  Psychomotor Behavior:  Appropriate  Affect:  Full range and Appropriate  Mood: normal  Hopelessness: Denies  Speech:  Normal  Thought Process:  Goal directed and Linear  Thought Content:  Mood congruent  Suicidal:  None  Homicidal:  None  Hallucinations:  None  Delusion:  None  Memory:  Intact  Orientation:  Person, Place, Time, and Situation  Reliability:  good  Insight:  Good  Judgement:  Good  Impulse Control:  Good    Assessment & Plan   Problems Addressed this Visit       Attention deficit hyperactivity disorder, combined type - Primary    Relevant Medications    amphetamine-dextroamphetamine XR (Adderall XR) 20 MG 24 hr capsule    Generalized anxiety disorder    Relevant Medications    " amphetamine-dextroamphetamine XR (Adderall XR) 20 MG 24 hr capsule     Other Visit Diagnoses         Bipolar I disorder, most recent episode depressed        Relevant Medications    amphetamine-dextroamphetamine XR (Adderall XR) 20 MG 24 hr capsule      Other insomnia        Relevant Medications    amphetamine-dextroamphetamine XR (Adderall XR) 20 MG 24 hr capsule          Diagnoses         Codes Comments      Attention deficit hyperactivity disorder, combined type    -  Primary ICD-10-CM: F90.2  ICD-9-CM: 314.01       Bipolar I disorder, most recent episode depressed     ICD-10-CM: F31.30  ICD-9-CM: 296.50       Generalized anxiety disorder     ICD-10-CM: F41.1  ICD-9-CM: 300.02       Other insomnia     ICD-10-CM: G47.09  ICD-9-CM: 780.52           Visit Diagnoses:    ICD-10-CM ICD-9-CM   1. Attention deficit hyperactivity disorder, combined type  F90.2 314.01   2. Bipolar I disorder, most recent episode depressed  F31.30 296.50   3. Generalized anxiety disorder  F41.1 300.02   4. Other insomnia  G47.09 780.52     Today's visit is for initial evaluation to establish care for medication management with new provider. Her overall mood has been stable with adequate control of anxiety and depression. Anxiety has been slightly more elevated, likely situational related with noticing increase following initiation of EMDR. Continues to struggle with ADHD symptoms, difficulty with focus and concentration. Discussed medication regimen, will increase Adderall XR to 20 mg daily for better control of ADHD symptoms. We will continue with Pristiq 50 mg daily and Lamictal 150 mg daily for mood stabilization. Denies any adverse effects associated with medication regimen.  -Continue therapy with JIM Vital.  -Increase Adderall XR to 20 mg daily  -Continue Pristiq 50 mg daily  -Continue Lamictal 150 mg daily    -Reviewed previous available documentation and most recent available labs. UDS on file from 6/28/2025 is  appropriate.  JANI reviewed and is appropriate.on use of controlled substances.    GOALS:  Short Term Goals: Patient will be compliant with medication, and patient will have no significant medication related side effects.  Patient will be engaged in psychotherapy as indicated.  Patient will report subjective improvement of symptoms.  Long term goals: To stabilize mood and treat/improve subjective symptoms, the patient will stay out of the hospital, the patient will be at an optimal level of functioning, and the patient will take all medications as prescribed.  The patient/guardian verbalized understanding and agreement with goals that were mutually set.    TREATMENT PLAN: Continue supportive psychotherapy efforts and medications as indicated for patient's diagnosis.  Pharmacological and Non-Pharmacological treatment options discussed during today's visit. Patient/Guardian acknowledged and verbally consented with current treatment plan and was educated on the importance of compliance with treatment and follow-up appointments.      MEDICATION ISSUES:  Discussed medication options and treatment plan of prescribed medication as well as the risks, benefits, any black box warnings, and side effects including potential falls, possible impaired driving, and metabolic adversities among others. Patient is agreeable to call the office with any worsening of symptoms or onset of side effects, or if any concerns or questions arise.  The contact information for the office is made available to the patient. Patient is agreeable to call 911 or go to the nearest ER should they begin having any SI/HI, or if any urgent concerns arise. No medication side effects or related complaints today.     MEDS ORDERED DURING VISIT:  New Medications Ordered This Visit   Medications    amphetamine-dextroamphetamine XR (Adderall XR) 20 MG 24 hr capsule     Sig: Take 1 capsule by mouth Every Morning     Dispense:  30 capsule     Refill:  0     FOLLOW  UP:  Return in about 6 weeks (around 8/28/2025) for Recheck.             This document has been electronically signed by SONIYA Genao  August 1, 2025 00:25 EDT    Please note that portions of this note were completed with a voice recognition program. Efforts were made to edit dictation, but occasionally words are mistranscribed.

## 2025-07-28 DIAGNOSIS — F41.1 GENERALIZED ANXIETY DISORDER: Chronic | ICD-10-CM

## 2025-07-28 DIAGNOSIS — F31.30 BIPOLAR I DISORDER, MOST RECENT EPISODE DEPRESSED: Chronic | ICD-10-CM

## 2025-07-28 RX ORDER — DESVENLAFAXINE 50 MG/1
50 TABLET, FILM COATED, EXTENDED RELEASE ORAL DAILY
Qty: 30 TABLET | Refills: 2 | Status: SHIPPED | OUTPATIENT
Start: 2025-07-28

## 2025-08-25 DIAGNOSIS — F90.2 ATTENTION DEFICIT HYPERACTIVITY DISORDER, COMBINED TYPE: ICD-10-CM

## 2025-08-25 RX ORDER — DEXTROAMPHETAMINE SACCHARATE, AMPHETAMINE ASPARTATE MONOHYDRATE, DEXTROAMPHETAMINE SULFATE AND AMPHETAMINE SULFATE 5; 5; 5; 5 MG/1; MG/1; MG/1; MG/1
20 CAPSULE, EXTENDED RELEASE ORAL EVERY MORNING
Qty: 30 CAPSULE | Refills: 0 | Status: SHIPPED | OUTPATIENT
Start: 2025-08-25

## (undated) DEVICE — ENDOSCOPY PORT CONNECTOR FOR OLYMPUS® SCOPES: Brand: ERBE

## (undated) DEVICE — Device

## (undated) DEVICE — VLV SXN AIR/H2O ORCAPOD3 1P/U STRL

## (undated) DEVICE — SUCTION CANISTER, 1500CC, RIGID: Brand: DEROYAL

## (undated) DEVICE — HYBRID TUBING/CAP SET FOR OLYMPUS® SCOPES: Brand: ERBE

## (undated) DEVICE — CONMED SCOPE SAVER BITE BLOCK, 20X27 MM: Brand: SCOPE SAVER

## (undated) DEVICE — FRCP BIOP COLD ENDOJAW ALLGTR W/NDL 2.8X2300MM BLU